# Patient Record
Sex: FEMALE | Race: WHITE | NOT HISPANIC OR LATINO | Employment: OTHER | URBAN - METROPOLITAN AREA
[De-identification: names, ages, dates, MRNs, and addresses within clinical notes are randomized per-mention and may not be internally consistent; named-entity substitution may affect disease eponyms.]

---

## 2017-01-06 ENCOUNTER — ALLSCRIPTS OFFICE VISIT (OUTPATIENT)
Dept: OTHER | Facility: OTHER | Age: 82
End: 2017-01-06

## 2017-01-06 DIAGNOSIS — E78.5 HYPERLIPIDEMIA: ICD-10-CM

## 2017-01-19 ENCOUNTER — ALLSCRIPTS OFFICE VISIT (OUTPATIENT)
Dept: FAMILY MEDICINE CLINIC | Facility: CLINIC | Age: 82
End: 2017-01-19
Payer: COMMERCIAL

## 2017-01-19 PROCEDURE — 99212 OFFICE O/P EST SF 10 MIN: CPT | Performed by: PHYSICIAN ASSISTANT

## 2017-02-16 ENCOUNTER — ALLSCRIPTS OFFICE VISIT (OUTPATIENT)
Dept: FAMILY MEDICINE CLINIC | Facility: CLINIC | Age: 82
End: 2017-02-16
Payer: COMMERCIAL

## 2017-02-16 PROCEDURE — 99213 OFFICE O/P EST LOW 20 MIN: CPT | Performed by: PHYSICIAN ASSISTANT

## 2017-02-20 ENCOUNTER — GENERIC CONVERSION - ENCOUNTER (OUTPATIENT)
Dept: OTHER | Facility: OTHER | Age: 82
End: 2017-02-20

## 2017-03-20 ENCOUNTER — ALLSCRIPTS OFFICE VISIT (OUTPATIENT)
Dept: FAMILY MEDICINE CLINIC | Facility: CLINIC | Age: 82
End: 2017-03-20
Payer: COMMERCIAL

## 2017-03-20 PROCEDURE — 99212 OFFICE O/P EST SF 10 MIN: CPT | Performed by: PHYSICIAN ASSISTANT

## 2017-04-14 ENCOUNTER — APPOINTMENT (EMERGENCY)
Dept: RADIOLOGY | Facility: HOSPITAL | Age: 82
End: 2017-04-14
Payer: COMMERCIAL

## 2017-04-14 ENCOUNTER — HOSPITAL ENCOUNTER (EMERGENCY)
Facility: HOSPITAL | Age: 82
Discharge: HOME | End: 2017-04-14
Attending: EMERGENCY MEDICINE
Payer: COMMERCIAL

## 2017-04-14 VITALS
WEIGHT: 163.14 LBS | DIASTOLIC BLOOD PRESSURE: 61 MMHG | HEART RATE: 61 BPM | RESPIRATION RATE: 18 BRPM | TEMPERATURE: 96.8 F | OXYGEN SATURATION: 99 % | SYSTOLIC BLOOD PRESSURE: 132 MMHG

## 2017-04-14 DIAGNOSIS — M16.9 DEGENERATIVE JOINT DISEASE (DJD) OF HIP: ICD-10-CM

## 2017-04-14 DIAGNOSIS — G89.29 CHRONIC PAIN OF RIGHT HIP: Primary | ICD-10-CM

## 2017-04-14 DIAGNOSIS — M25.551 ACUTE RIGHT HIP PAIN: ICD-10-CM

## 2017-04-14 DIAGNOSIS — M25.551 CHRONIC PAIN OF RIGHT HIP: Primary | ICD-10-CM

## 2017-04-14 PROCEDURE — 96372 THER/PROPH/DIAG INJ SC/IM: CPT

## 2017-04-14 PROCEDURE — 73502 X-RAY EXAM HIP UNI 2-3 VIEWS: CPT

## 2017-04-14 PROCEDURE — 99283 EMERGENCY DEPT VISIT LOW MDM: CPT

## 2017-04-14 RX ORDER — ASPIRIN 81 MG/1
81 TABLET ORAL DAILY
Status: ON HOLD | COMMUNITY
End: 2017-07-17 | Stop reason: HOSPADM

## 2017-04-14 RX ORDER — HYDROCHLOROTHIAZIDE 25 MG/1
25 TABLET ORAL DAILY
Status: ON HOLD | COMMUNITY
End: 2017-07-17 | Stop reason: ALTCHOICE

## 2017-04-14 RX ORDER — KETOROLAC TROMETHAMINE 30 MG/ML
60 INJECTION, SOLUTION INTRAMUSCULAR; INTRAVENOUS ONCE
Status: COMPLETED | OUTPATIENT
Start: 2017-04-14 | End: 2017-04-14

## 2017-04-14 RX ORDER — FUROSEMIDE 20 MG/1
20 TABLET ORAL 2 TIMES DAILY
Status: ON HOLD | COMMUNITY
End: 2017-07-21 | Stop reason: CLARIF

## 2017-04-14 RX ORDER — OLMESARTAN MEDOXOMIL 40 MG/1
40 TABLET ORAL DAILY
Status: ON HOLD | COMMUNITY
End: 2017-07-21 | Stop reason: SDUPTHER

## 2017-04-14 RX ORDER — MULTIVITAMIN
1 TABLET ORAL DAILY
COMMUNITY
End: 2019-09-25

## 2017-04-14 RX ORDER — ALPRAZOLAM 1 MG/1
1 TABLET ORAL
COMMUNITY
End: 2018-01-31 | Stop reason: SDUPTHER

## 2017-04-14 RX ORDER — EPINEPHRINE 0.3 MG/.3ML
0.3 INJECTION SUBCUTANEOUS ONCE
Status: ON HOLD | COMMUNITY
End: 2017-07-21 | Stop reason: SDUPTHER

## 2017-04-14 RX ORDER — METOPROLOL SUCCINATE 100 MG/1
100 TABLET, EXTENDED RELEASE ORAL 2 TIMES DAILY
Status: ON HOLD | COMMUNITY
End: 2017-07-17 | Stop reason: ALTCHOICE

## 2017-04-14 RX ORDER — DICLOFENAC SODIUM AND MISOPROSTOL 75; 200 MG/1; UG/1
1 TABLET, DELAYED RELEASE ORAL AS NEEDED
COMMUNITY
End: 2017-07-20 | Stop reason: HOSPADM

## 2017-04-14 RX ORDER — OXYCODONE HYDROCHLORIDE AND ACETAMINOPHEN 5; 325 MG/1; MG/1
1 TABLET ORAL EVERY 6 HOURS PRN
Qty: 10 TABLET | Refills: 0 | Status: SHIPPED | OUTPATIENT
Start: 2017-04-14 | End: 2017-07-24

## 2017-04-14 RX ORDER — ATORVASTATIN CALCIUM 20 MG/1
20 TABLET, FILM COATED ORAL DAILY
COMMUNITY
End: 2019-06-05 | Stop reason: SDUPTHER

## 2017-04-14 RX ORDER — OXYCODONE HYDROCHLORIDE AND ACETAMINOPHEN 5; 325 MG/1; MG/1
1 TABLET ORAL EVERY 4 HOURS PRN
Status: COMPLETED | OUTPATIENT
Start: 2017-04-14 | End: 2017-04-14

## 2017-04-14 RX ORDER — CLOTRIMAZOLE 1 %
CREAM (GRAM) TOPICAL AS NEEDED
COMMUNITY
End: 2018-07-03

## 2017-04-14 RX ORDER — POTASSIUM CHLORIDE 20 MEQ/1
20 TABLET, EXTENDED RELEASE ORAL DAILY
Status: ON HOLD | COMMUNITY
End: 2017-07-21 | Stop reason: CLARIF

## 2017-04-14 RX ORDER — DOCUSATE SODIUM 100 MG/1
100 CAPSULE, LIQUID FILLED ORAL 2 TIMES DAILY PRN
COMMUNITY

## 2017-04-14 RX ORDER — ALBUTEROL SULFATE 90 UG/1
2 AEROSOL, METERED RESPIRATORY (INHALATION) EVERY 6 HOURS PRN
COMMUNITY
End: 2020-03-16

## 2017-04-14 RX ADMIN — KETOROLAC TROMETHAMINE 60 MG: 30 INJECTION, SOLUTION INTRAMUSCULAR at 11:11

## 2017-04-14 RX ADMIN — OXYCODONE HYDROCHLORIDE AND ACETAMINOPHEN 1 TABLET: 5; 325 TABLET ORAL at 11:11

## 2017-04-19 ENCOUNTER — APPOINTMENT (OUTPATIENT)
Dept: LAB | Facility: MEDICAL CENTER | Age: 82
End: 2017-04-19
Payer: COMMERCIAL

## 2017-04-19 ENCOUNTER — ALLSCRIPTS OFFICE VISIT (OUTPATIENT)
Dept: FAMILY MEDICINE CLINIC | Facility: CLINIC | Age: 82
End: 2017-04-19
Payer: COMMERCIAL

## 2017-04-19 ENCOUNTER — TRANSCRIBE ORDERS (OUTPATIENT)
Dept: LAB | Facility: MEDICAL CENTER | Age: 82
End: 2017-04-19

## 2017-04-19 DIAGNOSIS — R68.89 OTHER GENERAL SYMPTOMS AND SIGNS: ICD-10-CM

## 2017-04-19 DIAGNOSIS — N28.9 DISORDER OF KIDNEY AND URETER: ICD-10-CM

## 2017-04-19 DIAGNOSIS — M25.551 PAIN IN RIGHT HIP: ICD-10-CM

## 2017-04-19 DIAGNOSIS — R94.6 ABNORMAL RESULTS OF THYROID FUNCTION STUDIES: ICD-10-CM

## 2017-04-19 DIAGNOSIS — M25.552 PAIN IN LEFT HIP: ICD-10-CM

## 2017-04-19 DIAGNOSIS — M16.11 PRIMARY OSTEOARTHRITIS OF RIGHT HIP: ICD-10-CM

## 2017-04-19 DIAGNOSIS — R94.4 ABNORMAL RESULTS OF KIDNEY FUNCTION STUDIES: ICD-10-CM

## 2017-04-19 LAB
ALBUMIN SERPL BCP-MCNC: 3.9 G/DL (ref 3.5–5)
ALP SERPL-CCNC: 82 U/L (ref 46–116)
ALT SERPL W P-5'-P-CCNC: 37 U/L (ref 12–78)
ANION GAP SERPL CALCULATED.3IONS-SCNC: 9 MMOL/L (ref 4–13)
AST SERPL W P-5'-P-CCNC: 25 U/L (ref 5–45)
BASOPHILS # BLD AUTO: 0.06 THOUSANDS/ΜL (ref 0–0.1)
BASOPHILS NFR BLD AUTO: 1 % (ref 0–1)
BILIRUB SERPL-MCNC: 0.67 MG/DL (ref 0.2–1)
BUN SERPL-MCNC: 59 MG/DL (ref 5–25)
CALCIUM SERPL-MCNC: 10 MG/DL (ref 8.3–10.1)
CHLORIDE SERPL-SCNC: 103 MMOL/L (ref 100–108)
CO2 SERPL-SCNC: 31 MMOL/L (ref 21–32)
CREAT SERPL-MCNC: 1.62 MG/DL (ref 0.6–1.3)
EOSINOPHIL # BLD AUTO: 0.4 THOUSAND/ΜL (ref 0–0.61)
EOSINOPHIL NFR BLD AUTO: 5 % (ref 0–6)
ERYTHROCYTE [DISTWIDTH] IN BLOOD BY AUTOMATED COUNT: 14.2 % (ref 11.6–15.1)
GFR SERPL CREATININE-BSD FRML MDRD: 30.4 ML/MIN/1.73SQ M
GLUCOSE SERPL-MCNC: 135 MG/DL (ref 65–140)
HCT VFR BLD AUTO: 40.9 % (ref 34.8–46.1)
HGB BLD-MCNC: 13.3 G/DL (ref 11.5–15.4)
IRON SERPL-MCNC: 55 UG/DL (ref 50–170)
LYMPHOCYTES # BLD AUTO: 1.82 THOUSANDS/ΜL (ref 0.6–4.47)
LYMPHOCYTES NFR BLD AUTO: 24 % (ref 14–44)
MCH RBC QN AUTO: 28.1 PG (ref 26.8–34.3)
MCHC RBC AUTO-ENTMCNC: 32.5 G/DL (ref 31.4–37.4)
MCV RBC AUTO: 87 FL (ref 82–98)
MONOCYTES # BLD AUTO: 0.52 THOUSAND/ΜL (ref 0.17–1.22)
MONOCYTES NFR BLD AUTO: 7 % (ref 4–12)
NEUTROPHILS # BLD AUTO: 4.74 THOUSANDS/ΜL (ref 1.85–7.62)
NEUTS SEG NFR BLD AUTO: 63 % (ref 43–75)
NRBC BLD AUTO-RTO: 0 /100 WBCS
PLATELET # BLD AUTO: 253 THOUSANDS/UL (ref 149–390)
PMV BLD AUTO: 10.8 FL (ref 8.9–12.7)
POTASSIUM SERPL-SCNC: 3.4 MMOL/L (ref 3.5–5.3)
PROT SERPL-MCNC: 7.9 G/DL (ref 6.4–8.2)
RBC # BLD AUTO: 4.73 MILLION/UL (ref 3.81–5.12)
SODIUM SERPL-SCNC: 143 MMOL/L (ref 136–145)
T3 SERPL-MCNC: 0.7 NG/ML (ref 0.6–1.8)
T4 FREE SERPL-MCNC: 1.41 NG/DL (ref 0.76–1.46)
TSH SERPL DL<=0.05 MIU/L-ACNC: 0.29 UIU/ML (ref 0.36–3.74)
WBC # BLD AUTO: 7.56 THOUSAND/UL (ref 4.31–10.16)

## 2017-04-19 PROCEDURE — 80053 COMPREHEN METABOLIC PANEL: CPT

## 2017-04-19 PROCEDURE — 84443 ASSAY THYROID STIM HORMONE: CPT

## 2017-04-19 PROCEDURE — 86376 MICROSOMAL ANTIBODY EACH: CPT

## 2017-04-19 PROCEDURE — 83540 ASSAY OF IRON: CPT

## 2017-04-19 PROCEDURE — 36415 COLL VENOUS BLD VENIPUNCTURE: CPT

## 2017-04-19 PROCEDURE — 85025 COMPLETE CBC W/AUTO DIFF WBC: CPT

## 2017-04-19 PROCEDURE — 84439 ASSAY OF FREE THYROXINE: CPT

## 2017-04-19 PROCEDURE — 84480 ASSAY TRIIODOTHYRONINE (T3): CPT

## 2017-04-19 PROCEDURE — 99213 OFFICE O/P EST LOW 20 MIN: CPT | Performed by: FAMILY MEDICINE

## 2017-04-20 LAB — THYROPEROXIDASE AB SERPL-ACNC: 25 IU/ML (ref 0–34)

## 2017-04-21 ENCOUNTER — GENERIC CONVERSION - ENCOUNTER (OUTPATIENT)
Dept: OTHER | Facility: OTHER | Age: 82
End: 2017-04-21

## 2017-05-02 ENCOUNTER — TRANSCRIBE ORDERS (OUTPATIENT)
Dept: LAB | Facility: MEDICAL CENTER | Age: 82
End: 2017-05-02

## 2017-05-02 ENCOUNTER — ALLSCRIPTS OFFICE VISIT (OUTPATIENT)
Dept: OTHER | Facility: OTHER | Age: 82
End: 2017-05-02

## 2017-05-02 ENCOUNTER — APPOINTMENT (OUTPATIENT)
Dept: LAB | Facility: MEDICAL CENTER | Age: 82
End: 2017-05-02
Payer: COMMERCIAL

## 2017-05-02 DIAGNOSIS — R94.4 ABNORMAL RESULTS OF KIDNEY FUNCTION STUDIES: ICD-10-CM

## 2017-05-02 DIAGNOSIS — R94.6 ABNORMAL RESULTS OF THYROID FUNCTION STUDIES: ICD-10-CM

## 2017-05-02 LAB
BUN SERPL-MCNC: 35 MG/DL (ref 5–25)
CREAT SERPL-MCNC: 1.61 MG/DL (ref 0.6–1.3)
GFR SERPL CREATININE-BSD FRML MDRD: 30.6 ML/MIN/1.73SQ M
TSH SERPL DL<=0.05 MIU/L-ACNC: 0.43 UIU/ML (ref 0.36–3.74)

## 2017-05-02 PROCEDURE — 84520 ASSAY OF UREA NITROGEN: CPT

## 2017-05-02 PROCEDURE — 82565 ASSAY OF CREATININE: CPT

## 2017-05-02 PROCEDURE — 36415 COLL VENOUS BLD VENIPUNCTURE: CPT

## 2017-05-02 PROCEDURE — 84443 ASSAY THYROID STIM HORMONE: CPT

## 2017-05-16 ENCOUNTER — GENERIC CONVERSION - ENCOUNTER (OUTPATIENT)
Dept: OTHER | Facility: OTHER | Age: 82
End: 2017-05-16

## 2017-05-17 ENCOUNTER — ALLSCRIPTS OFFICE VISIT (OUTPATIENT)
Dept: FAMILY MEDICINE CLINIC | Facility: CLINIC | Age: 82
End: 2017-05-17
Payer: COMMERCIAL

## 2017-05-17 ENCOUNTER — LAB REQUISITION (OUTPATIENT)
Dept: LAB | Facility: HOSPITAL | Age: 82
End: 2017-05-17
Payer: COMMERCIAL

## 2017-05-17 DIAGNOSIS — F19.20 OTHER PSYCHOACTIVE SUBSTANCE DEPENDENCE, UNCOMPLICATED (HCC): ICD-10-CM

## 2017-05-17 PROCEDURE — 80346 BENZODIAZEPINES1-12: CPT | Performed by: PHYSICIAN ASSISTANT

## 2017-05-17 PROCEDURE — 99212 OFFICE O/P EST SF 10 MIN: CPT | Performed by: FAMILY MEDICINE

## 2017-05-24 DIAGNOSIS — E87.6 HYPOKALEMIA: ICD-10-CM

## 2017-05-24 DIAGNOSIS — N28.9 DISORDER OF KIDNEY AND URETER: ICD-10-CM

## 2017-05-24 DIAGNOSIS — R94.4 ABNORMAL RESULTS OF KIDNEY FUNCTION STUDIES: ICD-10-CM

## 2017-05-24 DIAGNOSIS — I10 ESSENTIAL (PRIMARY) HYPERTENSION: ICD-10-CM

## 2017-05-24 DIAGNOSIS — R60.9 EDEMA: ICD-10-CM

## 2017-05-24 LAB — MISCELLANEOUS LAB TEST RESULT: NORMAL

## 2017-05-30 ENCOUNTER — HOSPITAL ENCOUNTER (EMERGENCY)
Facility: HOSPITAL | Age: 82
Discharge: HOME | End: 2017-05-30
Attending: EMERGENCY MEDICINE | Admitting: EMERGENCY MEDICINE
Payer: COMMERCIAL

## 2017-05-30 VITALS
TEMPERATURE: 99.3 F | HEART RATE: 100 BPM | RESPIRATION RATE: 18 BRPM | DIASTOLIC BLOOD PRESSURE: 74 MMHG | BODY MASS INDEX: 25.2 KG/M2 | HEIGHT: 59 IN | WEIGHT: 125 LBS | OXYGEN SATURATION: 95 % | SYSTOLIC BLOOD PRESSURE: 169 MMHG

## 2017-05-30 DIAGNOSIS — M10.9: Primary | ICD-10-CM

## 2017-05-30 LAB
ALBUMIN SERPL BCP-MCNC: 3.5 G/DL (ref 3.5–5)
ALP SERPL-CCNC: 142 U/L (ref 46–116)
ALT SERPL W P-5'-P-CCNC: 64 U/L (ref 12–78)
ANION GAP SERPL CALCULATED.3IONS-SCNC: 13 MMOL/L (ref 4–13)
APTT PPP: 38 SECONDS (ref 23–35)
AST SERPL W P-5'-P-CCNC: 50 U/L (ref 5–45)
BASOPHILS # BLD AUTO: 0.02 THOUSANDS/ΜL (ref 0–0.1)
BASOPHILS NFR BLD AUTO: 0 % (ref 0–1)
BILIRUB SERPL-MCNC: 0.9 MG/DL (ref 0.2–1)
BUN SERPL-MCNC: 29 MG/DL (ref 5–25)
CALCIUM SERPL-MCNC: 9.9 MG/DL (ref 8.3–10.1)
CHLORIDE SERPL-SCNC: 98 MMOL/L (ref 100–108)
CO2 SERPL-SCNC: 28 MMOL/L (ref 21–32)
CREAT SERPL-MCNC: 1.26 MG/DL (ref 0.6–1.3)
EOSINOPHIL # BLD AUTO: 0.07 THOUSAND/ΜL (ref 0–0.61)
EOSINOPHIL NFR BLD AUTO: 1 % (ref 0–6)
ERYTHROCYTE [DISTWIDTH] IN BLOOD BY AUTOMATED COUNT: 13.7 % (ref 11.6–15.1)
ERYTHROCYTE [SEDIMENTATION RATE] IN BLOOD: 112 MM/HOUR (ref 0–20)
GFR SERPL CREATININE-BSD FRML MDRD: 40.7 ML/MIN/1.73SQ M
GLUCOSE SERPL-MCNC: 124 MG/DL (ref 65–140)
HCT VFR BLD AUTO: 38.5 % (ref 34.8–46.1)
HGB BLD-MCNC: 12.7 G/DL (ref 11.5–15.4)
INR PPP: 1.13 (ref 0.86–1.16)
LACTATE SERPL-SCNC: 1.8 MMOL/L (ref 0.5–2)
LYMPHOCYTES # BLD AUTO: 1.22 THOUSANDS/ΜL (ref 0.6–4.47)
LYMPHOCYTES NFR BLD AUTO: 13 % (ref 14–44)
MCH RBC QN AUTO: 28.5 PG (ref 26.8–34.3)
MCHC RBC AUTO-ENTMCNC: 33 G/DL (ref 31.4–37.4)
MCV RBC AUTO: 87 FL (ref 82–98)
MONOCYTES # BLD AUTO: 1.03 THOUSAND/ΜL (ref 0.17–1.22)
MONOCYTES NFR BLD AUTO: 11 % (ref 4–12)
NEUTROPHILS # BLD AUTO: 7.39 THOUSANDS/ΜL (ref 1.85–7.62)
NEUTS SEG NFR BLD AUTO: 75 % (ref 43–75)
PLATELET # BLD AUTO: 198 THOUSANDS/UL (ref 149–390)
PMV BLD AUTO: 10.5 FL (ref 8.9–12.7)
POTASSIUM SERPL-SCNC: 3.6 MMOL/L (ref 3.5–5.3)
PROT SERPL-MCNC: 8.5 G/DL (ref 6.4–8.2)
PROTHROMBIN TIME: 14.4 SECONDS (ref 12.1–14.4)
RBC # BLD AUTO: 4.45 MILLION/UL (ref 3.81–5.12)
SODIUM SERPL-SCNC: 139 MMOL/L (ref 136–145)
URATE SERPL-MCNC: 9.3 MG/DL (ref 2–6.8)
WBC # BLD AUTO: 9.73 THOUSAND/UL (ref 4.31–10.16)

## 2017-05-30 PROCEDURE — 96374 THER/PROPH/DIAG INJ IV PUSH: CPT

## 2017-05-30 PROCEDURE — 96361 HYDRATE IV INFUSION ADD-ON: CPT

## 2017-05-30 PROCEDURE — 85652 RBC SED RATE AUTOMATED: CPT | Performed by: EMERGENCY MEDICINE

## 2017-05-30 PROCEDURE — 36415 COLL VENOUS BLD VENIPUNCTURE: CPT | Performed by: EMERGENCY MEDICINE

## 2017-05-30 PROCEDURE — 85025 COMPLETE CBC W/AUTO DIFF WBC: CPT | Performed by: EMERGENCY MEDICINE

## 2017-05-30 PROCEDURE — 84550 ASSAY OF BLOOD/URIC ACID: CPT | Performed by: EMERGENCY MEDICINE

## 2017-05-30 PROCEDURE — 99284 EMERGENCY DEPT VISIT MOD MDM: CPT

## 2017-05-30 PROCEDURE — 85610 PROTHROMBIN TIME: CPT | Performed by: EMERGENCY MEDICINE

## 2017-05-30 PROCEDURE — 80053 COMPREHEN METABOLIC PANEL: CPT | Performed by: EMERGENCY MEDICINE

## 2017-05-30 PROCEDURE — 85730 THROMBOPLASTIN TIME PARTIAL: CPT | Performed by: EMERGENCY MEDICINE

## 2017-05-30 PROCEDURE — 83605 ASSAY OF LACTIC ACID: CPT | Performed by: EMERGENCY MEDICINE

## 2017-05-30 PROCEDURE — 87040 BLOOD CULTURE FOR BACTERIA: CPT | Performed by: EMERGENCY MEDICINE

## 2017-05-30 PROCEDURE — 96375 TX/PRO/DX INJ NEW DRUG ADDON: CPT

## 2017-05-30 RX ORDER — COLCHICINE 0.6 MG/1
0.6 TABLET ORAL ONCE
Status: COMPLETED | OUTPATIENT
Start: 2017-05-30 | End: 2017-05-30

## 2017-05-30 RX ORDER — FENTANYL CITRATE 50 UG/ML
50 INJECTION, SOLUTION INTRAMUSCULAR; INTRAVENOUS ONCE
Status: COMPLETED | OUTPATIENT
Start: 2017-05-30 | End: 2017-05-30

## 2017-05-30 RX ORDER — ETODOLAC 400 MG/1
400 TABLET, FILM COATED ORAL 2 TIMES DAILY
Qty: 14 TABLET | Refills: 0 | Status: ON HOLD | OUTPATIENT
Start: 2017-05-30 | End: 2017-07-17 | Stop reason: ALTCHOICE

## 2017-05-30 RX ORDER — COLCHICINE 0.6 MG/1
0.6 TABLET ORAL DAILY
Qty: 3 TABLET | Refills: 0 | Status: ON HOLD | OUTPATIENT
Start: 2017-05-30 | End: 2017-07-17 | Stop reason: HOSPADM

## 2017-05-30 RX ORDER — KETOROLAC TROMETHAMINE 30 MG/ML
15 INJECTION, SOLUTION INTRAMUSCULAR; INTRAVENOUS ONCE
Status: COMPLETED | OUTPATIENT
Start: 2017-05-30 | End: 2017-05-30

## 2017-05-30 RX ADMIN — COLCHICINE 0.6 MG: 0.6 TABLET, FILM COATED ORAL at 21:05

## 2017-05-30 RX ADMIN — FENTANYL CITRATE 50 MCG: 50 INJECTION INTRAMUSCULAR; INTRAVENOUS at 20:27

## 2017-05-30 RX ADMIN — KETOROLAC TROMETHAMINE 15 MG: 30 INJECTION, SOLUTION INTRAMUSCULAR at 20:25

## 2017-05-30 RX ADMIN — SODIUM CHLORIDE 1000 ML: 0.9 INJECTION, SOLUTION INTRAVENOUS at 20:24

## 2017-05-30 RX ADMIN — COLCHICINE 0.6 MG: 0.6 TABLET, FILM COATED ORAL at 21:04

## 2017-05-31 ENCOUNTER — GENERIC CONVERSION - ENCOUNTER (OUTPATIENT)
Dept: OTHER | Facility: OTHER | Age: 82
End: 2017-05-31

## 2017-06-05 LAB — BACTERIA BLD CULT: NORMAL

## 2017-06-12 ENCOUNTER — ALLSCRIPTS OFFICE VISIT (OUTPATIENT)
Dept: FAMILY MEDICINE CLINIC | Facility: CLINIC | Age: 82
End: 2017-06-12
Payer: COMMERCIAL

## 2017-06-12 PROCEDURE — 99212 OFFICE O/P EST SF 10 MIN: CPT | Performed by: FAMILY MEDICINE

## 2017-06-20 ENCOUNTER — HOSPITAL ENCOUNTER (OUTPATIENT)
Dept: ULTRASOUND IMAGING | Facility: HOSPITAL | Age: 82
Discharge: HOME | End: 2017-06-20
Payer: COMMERCIAL

## 2017-06-20 DIAGNOSIS — R60.9 EDEMA: ICD-10-CM

## 2017-06-20 DIAGNOSIS — R94.4 ABNORMAL RESULTS OF KIDNEY FUNCTION STUDIES: ICD-10-CM

## 2017-06-20 DIAGNOSIS — N28.9 DISORDER OF KIDNEY AND URETER: ICD-10-CM

## 2017-06-20 PROCEDURE — 76770 US EXAM ABDO BACK WALL COMP: CPT

## 2017-07-05 ENCOUNTER — HOSPITAL ENCOUNTER (OUTPATIENT)
Dept: RADIOLOGY | Facility: HOSPITAL | Age: 82
Discharge: HOME | End: 2017-07-05
Payer: COMMERCIAL

## 2017-07-05 ENCOUNTER — APPOINTMENT (OUTPATIENT)
Dept: LAB | Facility: HOSPITAL | Age: 82
End: 2017-07-05
Payer: COMMERCIAL

## 2017-07-05 ENCOUNTER — HOSPITAL ENCOUNTER (OUTPATIENT)
Dept: NON INVASIVE DIAGNOSTICS | Facility: HOSPITAL | Age: 82
Discharge: HOME | End: 2017-07-05
Payer: COMMERCIAL

## 2017-07-05 ENCOUNTER — TRANSCRIBE ORDERS (OUTPATIENT)
Dept: ADMINISTRATIVE | Facility: HOSPITAL | Age: 82
End: 2017-07-05

## 2017-07-05 DIAGNOSIS — N28.9 DISORDER OF KIDNEY AND URETER: ICD-10-CM

## 2017-07-05 DIAGNOSIS — M25.552 PAIN IN LEFT HIP: ICD-10-CM

## 2017-07-05 DIAGNOSIS — M25.551 PAIN IN RIGHT HIP: ICD-10-CM

## 2017-07-05 DIAGNOSIS — M16.11 PRIMARY OSTEOARTHRITIS OF RIGHT HIP: ICD-10-CM

## 2017-07-05 DIAGNOSIS — M16.11 PRIMARY OSTEOARTHRITIS OF RIGHT HIP: Primary | ICD-10-CM

## 2017-07-05 LAB
ABO GROUP BLD: NORMAL
ALBUMIN SERPL BCP-MCNC: 3.6 G/DL (ref 3.5–5)
ALP SERPL-CCNC: 76 U/L (ref 46–116)
ALT SERPL W P-5'-P-CCNC: 28 U/L (ref 12–78)
ANION GAP SERPL CALCULATED.3IONS-SCNC: 8 MMOL/L (ref 4–13)
APTT PPP: 35 SECONDS (ref 23–35)
AST SERPL W P-5'-P-CCNC: 21 U/L (ref 5–45)
ATRIAL RATE: 61 BPM
BASOPHILS # BLD AUTO: 0.04 THOUSANDS/ΜL (ref 0–0.1)
BASOPHILS NFR BLD AUTO: 1 % (ref 0–1)
BILIRUB SERPL-MCNC: 0.4 MG/DL (ref 0.2–1)
BILIRUB UR QL STRIP: NEGATIVE
BLD GP AB SCN SERPL QL: NEGATIVE
BUN SERPL-MCNC: 21 MG/DL (ref 5–25)
CALCIUM SERPL-MCNC: 9.2 MG/DL (ref 8.3–10.1)
CHLORIDE SERPL-SCNC: 105 MMOL/L (ref 100–108)
CLARITY UR: CLEAR
CO2 SERPL-SCNC: 29 MMOL/L (ref 21–32)
COLOR UR: YELLOW
CREAT SERPL-MCNC: 0.97 MG/DL (ref 0.6–1.3)
EOSINOPHIL # BLD AUTO: 0.27 THOUSAND/ΜL (ref 0–0.61)
EOSINOPHIL NFR BLD AUTO: 4 % (ref 0–6)
ERYTHROCYTE [DISTWIDTH] IN BLOOD BY AUTOMATED COUNT: 14.9 % (ref 11.6–15.1)
EST. AVERAGE GLUCOSE BLD GHB EST-MCNC: 114 MG/DL
GFR SERPL CREATININE-BSD FRML MDRD: 55 ML/MIN/1.73SQ M
GLUCOSE SERPL-MCNC: 84 MG/DL (ref 65–140)
GLUCOSE UR STRIP-MCNC: NEGATIVE MG/DL
HBA1C MFR BLD: 5.6 % (ref 4.2–6.3)
HCT VFR BLD AUTO: 40.7 % (ref 34.8–46.1)
HGB BLD-MCNC: 12.9 G/DL (ref 11.5–15.4)
HGB UR QL STRIP.AUTO: NEGATIVE
INR PPP: 1.05 (ref 0.86–1.16)
KETONES UR STRIP-MCNC: NEGATIVE MG/DL
LEUKOCYTE ESTERASE UR QL STRIP: NEGATIVE
LYMPHOCYTES # BLD AUTO: 1.28 THOUSANDS/ΜL (ref 0.6–4.47)
LYMPHOCYTES NFR BLD AUTO: 19 % (ref 14–44)
MCH RBC QN AUTO: 27.6 PG (ref 26.8–34.3)
MCHC RBC AUTO-ENTMCNC: 31.7 G/DL (ref 31.4–37.4)
MCV RBC AUTO: 87 FL (ref 82–98)
MONOCYTES # BLD AUTO: 0.49 THOUSAND/ΜL (ref 0.17–1.22)
MONOCYTES NFR BLD AUTO: 7 % (ref 4–12)
NEUTROPHILS # BLD AUTO: 4.7 THOUSANDS/ΜL (ref 1.85–7.62)
NEUTS SEG NFR BLD AUTO: 69 % (ref 43–75)
NITRITE UR QL STRIP: NEGATIVE
P AXIS: 61 DEGREES
PH UR STRIP.AUTO: 5.5 [PH] (ref 4.5–8)
PLATELET # BLD AUTO: 236 THOUSANDS/UL (ref 149–390)
PMV BLD AUTO: 9.9 FL (ref 8.9–12.7)
POTASSIUM SERPL-SCNC: 4.2 MMOL/L (ref 3.5–5.3)
PR INTERVAL: 148 MS
PROT SERPL-MCNC: 7.2 G/DL (ref 6.4–8.2)
PROT UR STRIP-MCNC: NEGATIVE MG/DL
PROTHROMBIN TIME: 13.6 SECONDS (ref 12.1–14.4)
QRS AXIS: 8 DEGREES
QRSD INTERVAL: 76 MS
QT INTERVAL: 400 MS
QTC INTERVAL: 402 MS
RBC # BLD AUTO: 4.68 MILLION/UL (ref 3.81–5.12)
RH BLD: POSITIVE
SODIUM SERPL-SCNC: 142 MMOL/L (ref 136–145)
SP GR UR STRIP.AUTO: 1.01 (ref 1–1.03)
SPECIMEN EXPIRATION DATE: NORMAL
T WAVE AXIS: 56 DEGREES
UROBILINOGEN UR QL STRIP.AUTO: 0.2 E.U./DL
VENTRICULAR RATE: 61 BPM
WBC # BLD AUTO: 6.78 THOUSAND/UL (ref 4.31–10.16)

## 2017-07-05 PROCEDURE — 85730 THROMBOPLASTIN TIME PARTIAL: CPT

## 2017-07-05 PROCEDURE — 86850 RBC ANTIBODY SCREEN: CPT

## 2017-07-05 PROCEDURE — 71010 HB CHEST X-RAY 1 VIEW FRONTAL: CPT

## 2017-07-05 PROCEDURE — 86900 BLOOD TYPING SEROLOGIC ABO: CPT

## 2017-07-05 PROCEDURE — 83036 HEMOGLOBIN GLYCOSYLATED A1C: CPT

## 2017-07-05 PROCEDURE — 85610 PROTHROMBIN TIME: CPT

## 2017-07-05 PROCEDURE — 85025 COMPLETE CBC W/AUTO DIFF WBC: CPT

## 2017-07-05 PROCEDURE — 86901 BLOOD TYPING SEROLOGIC RH(D): CPT

## 2017-07-05 PROCEDURE — 36415 COLL VENOUS BLD VENIPUNCTURE: CPT

## 2017-07-05 PROCEDURE — 80053 COMPREHEN METABOLIC PANEL: CPT

## 2017-07-05 PROCEDURE — 81003 URINALYSIS AUTO W/O SCOPE: CPT

## 2017-07-05 PROCEDURE — 93005 ELECTROCARDIOGRAM TRACING: CPT

## 2017-07-06 ENCOUNTER — ALLSCRIPTS OFFICE VISIT (OUTPATIENT)
Dept: OTHER | Facility: OTHER | Age: 82
End: 2017-07-06

## 2017-07-06 DIAGNOSIS — N28.9 DISORDER OF KIDNEY AND URETER: ICD-10-CM

## 2017-07-10 ENCOUNTER — ALLSCRIPTS OFFICE VISIT (OUTPATIENT)
Dept: FAMILY MEDICINE CLINIC | Facility: CLINIC | Age: 82
End: 2017-07-10
Payer: COMMERCIAL

## 2017-07-10 PROCEDURE — 99213 OFFICE O/P EST LOW 20 MIN: CPT | Performed by: FAMILY MEDICINE

## 2017-07-14 ENCOUNTER — ANESTHESIA EVENT (OUTPATIENT)
Dept: PERIOP | Facility: HOSPITAL | Age: 82
DRG: 470 | End: 2017-07-14
Payer: COMMERCIAL

## 2017-07-17 ENCOUNTER — ANESTHESIA (OUTPATIENT)
Dept: PERIOP | Facility: HOSPITAL | Age: 82
DRG: 470 | End: 2017-07-17
Payer: COMMERCIAL

## 2017-07-17 ENCOUNTER — HOSPITAL ENCOUNTER (INPATIENT)
Facility: HOSPITAL | Age: 82
LOS: 3 days | Discharge: NURSING, REHAB OR LONG-TERM CARE | DRG: 470 | End: 2017-07-20
Attending: ORTHOPAEDIC SURGERY | Admitting: ORTHOPAEDIC SURGERY
Payer: COMMERCIAL

## 2017-07-17 ENCOUNTER — APPOINTMENT (OUTPATIENT)
Dept: RADIOLOGY | Facility: HOSPITAL | Age: 82
DRG: 470 | End: 2017-07-17
Payer: COMMERCIAL

## 2017-07-17 DIAGNOSIS — M16.11 PRIMARY OSTEOARTHRITIS OF RIGHT HIP: Primary | ICD-10-CM

## 2017-07-17 PROBLEM — E79.0 ELEVATED BLOOD URIC ACID LEVEL: Status: ACTIVE | Noted: 2017-07-17

## 2017-07-17 PROBLEM — M41.80 DEXTROSCOLIOSIS: Status: ACTIVE | Noted: 2017-07-17

## 2017-07-17 PROBLEM — E78.5 HYPERLIPIDEMIA: Status: ACTIVE | Noted: 2017-07-17

## 2017-07-17 PROBLEM — I10 ESSENTIAL HYPERTENSION: Status: ACTIVE | Noted: 2017-07-17

## 2017-07-17 PROBLEM — Z87.39 HISTORY OF GOUT: Status: ACTIVE | Noted: 2017-07-17

## 2017-07-17 PROBLEM — Z96.641 STATUS POST TOTAL REPLACEMENT OF RIGHT HIP: Status: ACTIVE | Noted: 2017-07-17

## 2017-07-17 PROCEDURE — C1776 JOINT DEVICE (IMPLANTABLE): HCPCS | Performed by: ORTHOPAEDIC SURGERY

## 2017-07-17 PROCEDURE — 0SR9029 REPLACEMENT OF RIGHT HIP JOINT WITH METAL ON POLYETHYLENE SYNTHETIC SUBSTITUTE, CEMENTED, OPEN APPROACH: ICD-10-PCS | Performed by: ORTHOPAEDIC SURGERY

## 2017-07-17 PROCEDURE — C1713 ANCHOR/SCREW BN/BN,TIS/BN: HCPCS | Performed by: ORTHOPAEDIC SURGERY

## 2017-07-17 PROCEDURE — 73502 X-RAY EXAM HIP UNI 2-3 VIEWS: CPT

## 2017-07-17 PROCEDURE — 94760 N-INVAS EAR/PLS OXIMETRY 1: CPT

## 2017-07-17 PROCEDURE — 86920 COMPATIBILITY TEST SPIN: CPT

## 2017-07-17 PROCEDURE — 94664 DEMO&/EVAL PT USE INHALER: CPT

## 2017-07-17 DEVICE — PINNACLE GRIPTION ACETABULAR SHELL SECTOR 50MM OD
Type: IMPLANTABLE DEVICE | Site: HIP | Status: FUNCTIONAL
Brand: PINNACLE GRIPTION

## 2017-07-17 DEVICE — CEMENTRALIZER STEM CENTRALIZER 10.5MM CEMENTED
Type: IMPLANTABLE DEVICE | Site: HIP | Status: FUNCTIONAL
Brand: CEMENTRALIZER

## 2017-07-17 DEVICE — PINNACLE CANCELLOUS BONE SCREW 6.5MM X 20MM
Type: IMPLANTABLE DEVICE | Site: HIP | Status: FUNCTIONAL
Brand: PINNACLE

## 2017-07-17 DEVICE — SMARTSET GMV HIGH PERFORMANCE GENTAMICIN MEDIUM VISCOSITY BONE CEMENT 40G
Type: IMPLANTABLE DEVICE | Site: HIP | Status: FUNCTIONAL
Brand: SMARTSET

## 2017-07-17 DEVICE — SUMMIT FEMORAL STEM 12/14 TAPER CEMENTED SIZE 4 STD 114MM
Type: IMPLANTABLE DEVICE | Site: HIP | Status: FUNCTIONAL
Brand: SUMMIT

## 2017-07-17 DEVICE — PINNACLE CANCELLOUS BONE SCREW 6.5MM X 25MM
Type: IMPLANTABLE DEVICE | Site: HIP | Status: FUNCTIONAL
Brand: PINNACLE

## 2017-07-17 DEVICE — PINNACLE HIP SOLUTIONS ALTRX POLYETHYLENE ACETABULAR LINER NEUTRAL 32MM ID 50MM OD
Type: IMPLANTABLE DEVICE | Site: HIP | Status: FUNCTIONAL
Brand: PINNACLE ALTRX

## 2017-07-17 DEVICE — ARTICUL/EZE FEMORAL HEAD DIAMETER 32MM +1 12/14 TAPER
Type: IMPLANTABLE DEVICE | Site: HIP | Status: FUNCTIONAL
Brand: ARTICUL/EZE

## 2017-07-17 RX ORDER — SODIUM CHLORIDE, SODIUM LACTATE, POTASSIUM CHLORIDE, CALCIUM CHLORIDE 600; 310; 30; 20 MG/100ML; MG/100ML; MG/100ML; MG/100ML
100 INJECTION, SOLUTION INTRAVENOUS CONTINUOUS
Status: DISCONTINUED | OUTPATIENT
Start: 2017-07-17 | End: 2017-07-18

## 2017-07-17 RX ORDER — SODIUM CHLORIDE, SODIUM LACTATE, POTASSIUM CHLORIDE, CALCIUM CHLORIDE 600; 310; 30; 20 MG/100ML; MG/100ML; MG/100ML; MG/100ML
125 INJECTION, SOLUTION INTRAVENOUS CONTINUOUS
Status: DISCONTINUED | OUTPATIENT
Start: 2017-07-17 | End: 2017-07-17

## 2017-07-17 RX ORDER — ALPRAZOLAM 0.5 MG/1
1 TABLET ORAL
Status: DISCONTINUED | OUTPATIENT
Start: 2017-07-17 | End: 2017-07-20 | Stop reason: HOSPADM

## 2017-07-17 RX ORDER — FENTANYL CITRATE/PF 50 MCG/ML
25 SYRINGE (ML) INJECTION AS NEEDED
Status: DISCONTINUED | OUTPATIENT
Start: 2017-07-17 | End: 2017-07-17 | Stop reason: HOSPADM

## 2017-07-17 RX ORDER — PROMETHAZINE HYDROCHLORIDE 25 MG/ML
12.5 INJECTION, SOLUTION INTRAMUSCULAR; INTRAVENOUS ONCE AS NEEDED
Status: DISCONTINUED | OUTPATIENT
Start: 2017-07-17 | End: 2017-07-17 | Stop reason: HOSPADM

## 2017-07-17 RX ORDER — ASPIRIN 81 MG/1
81 TABLET ORAL DAILY
Status: DISCONTINUED | OUTPATIENT
Start: 2017-07-18 | End: 2017-07-20 | Stop reason: HOSPADM

## 2017-07-17 RX ORDER — ATORVASTATIN CALCIUM 10 MG/1
20 TABLET, FILM COATED ORAL EVERY EVENING
Status: DISCONTINUED | OUTPATIENT
Start: 2017-07-17 | End: 2017-07-20 | Stop reason: HOSPADM

## 2017-07-17 RX ORDER — ONDANSETRON 2 MG/ML
4 INJECTION INTRAMUSCULAR; INTRAVENOUS EVERY 6 HOURS PRN
Status: DISCONTINUED | OUTPATIENT
Start: 2017-07-17 | End: 2017-07-20 | Stop reason: HOSPADM

## 2017-07-17 RX ORDER — CHOLECALCIFEROL (VITAMIN D3) 10 MCG
1 TABLET ORAL DAILY
Status: DISCONTINUED | OUTPATIENT
Start: 2017-07-18 | End: 2017-07-18 | Stop reason: ALTCHOICE

## 2017-07-17 RX ORDER — LABETALOL HYDROCHLORIDE 5 MG/ML
INJECTION, SOLUTION INTRAVENOUS AS NEEDED
Status: DISCONTINUED | OUTPATIENT
Start: 2017-07-17 | End: 2017-07-17 | Stop reason: SURG

## 2017-07-17 RX ORDER — MAGNESIUM HYDROXIDE/ALUMINUM HYDROXICE/SIMETHICONE 120; 1200; 1200 MG/30ML; MG/30ML; MG/30ML
30 SUSPENSION ORAL EVERY 6 HOURS PRN
Status: DISCONTINUED | OUTPATIENT
Start: 2017-07-17 | End: 2017-07-20 | Stop reason: HOSPADM

## 2017-07-17 RX ORDER — DIPHENHYDRAMINE HYDROCHLORIDE 50 MG/ML
12.5 INJECTION INTRAMUSCULAR; INTRAVENOUS ONCE AS NEEDED
Status: DISCONTINUED | OUTPATIENT
Start: 2017-07-17 | End: 2017-07-17 | Stop reason: HOSPADM

## 2017-07-17 RX ORDER — ALBUTEROL SULFATE 2.5 MG/3ML
2.5 SOLUTION RESPIRATORY (INHALATION) EVERY 4 HOURS PRN
Status: DISCONTINUED | OUTPATIENT
Start: 2017-07-17 | End: 2017-07-17 | Stop reason: HOSPADM

## 2017-07-17 RX ORDER — ONDANSETRON 2 MG/ML
INJECTION INTRAMUSCULAR; INTRAVENOUS AS NEEDED
Status: DISCONTINUED | OUTPATIENT
Start: 2017-07-17 | End: 2017-07-17 | Stop reason: SURG

## 2017-07-17 RX ORDER — OXYCODONE HYDROCHLORIDE AND ACETAMINOPHEN 5; 325 MG/1; MG/1
1 TABLET ORAL EVERY 6 HOURS PRN
Status: DISCONTINUED | OUTPATIENT
Start: 2017-07-17 | End: 2017-07-17

## 2017-07-17 RX ORDER — ONDANSETRON 2 MG/ML
4 INJECTION INTRAMUSCULAR; INTRAVENOUS ONCE AS NEEDED
Status: DISCONTINUED | OUTPATIENT
Start: 2017-07-17 | End: 2017-07-17 | Stop reason: HOSPADM

## 2017-07-17 RX ORDER — DOCUSATE SODIUM 100 MG/1
100 CAPSULE, LIQUID FILLED ORAL 2 TIMES DAILY PRN
Status: DISCONTINUED | OUTPATIENT
Start: 2017-07-17 | End: 2017-07-20 | Stop reason: HOSPADM

## 2017-07-17 RX ORDER — GABAPENTIN 100 MG/1
100 CAPSULE ORAL EVERY 8 HOURS SCHEDULED
Status: DISCONTINUED | OUTPATIENT
Start: 2017-07-17 | End: 2017-07-20 | Stop reason: HOSPADM

## 2017-07-17 RX ORDER — OXYCODONE HYDROCHLORIDE 5 MG/1
5 TABLET ORAL EVERY 4 HOURS PRN
Status: DISCONTINUED | OUTPATIENT
Start: 2017-07-17 | End: 2017-07-20 | Stop reason: HOSPADM

## 2017-07-17 RX ORDER — CLOTRIMAZOLE 1 %
CREAM (GRAM) TOPICAL AS NEEDED
Status: DISCONTINUED | OUTPATIENT
Start: 2017-07-17 | End: 2017-07-20 | Stop reason: HOSPADM

## 2017-07-17 RX ORDER — ALBUMIN, HUMAN INJ 5% 5 %
SOLUTION INTRAVENOUS CONTINUOUS PRN
Status: DISCONTINUED | OUTPATIENT
Start: 2017-07-17 | End: 2017-07-17 | Stop reason: SURG

## 2017-07-17 RX ORDER — FERROUS SULFATE 325(65) MG
325 TABLET ORAL
Status: DISCONTINUED | OUTPATIENT
Start: 2017-07-18 | End: 2017-07-20 | Stop reason: HOSPADM

## 2017-07-17 RX ORDER — ROCURONIUM BROMIDE 10 MG/ML
INJECTION, SOLUTION INTRAVENOUS AS NEEDED
Status: DISCONTINUED | OUTPATIENT
Start: 2017-07-17 | End: 2017-07-17 | Stop reason: SURG

## 2017-07-17 RX ORDER — ACETAMINOPHEN 325 MG/1
650 TABLET ORAL EVERY 6 HOURS PRN
Status: DISCONTINUED | OUTPATIENT
Start: 2017-07-17 | End: 2017-07-17

## 2017-07-17 RX ORDER — POTASSIUM CHLORIDE 20 MEQ/1
20 TABLET, EXTENDED RELEASE ORAL
Status: DISCONTINUED | OUTPATIENT
Start: 2017-07-18 | End: 2017-07-17

## 2017-07-17 RX ORDER — EPINEPHRINE 0.3 MG/.3ML
0.3 INJECTION SUBCUTANEOUS ONCE
Status: DISCONTINUED | OUTPATIENT
Start: 2017-07-17 | End: 2017-07-17

## 2017-07-17 RX ORDER — METOPROLOL TARTRATE 100 MG/1
100 TABLET ORAL EVERY 12 HOURS SCHEDULED
Status: DISCONTINUED | OUTPATIENT
Start: 2017-07-17 | End: 2017-07-20 | Stop reason: HOSPADM

## 2017-07-17 RX ORDER — ALLOPURINOL 100 MG/1
100 TABLET ORAL
Status: DISCONTINUED | OUTPATIENT
Start: 2017-07-18 | End: 2017-07-20 | Stop reason: HOSPADM

## 2017-07-17 RX ORDER — FOLIC ACID 1 MG/1
1 TABLET ORAL DAILY
Status: DISCONTINUED | OUTPATIENT
Start: 2017-07-18 | End: 2017-07-20 | Stop reason: HOSPADM

## 2017-07-17 RX ORDER — ALBUTEROL SULFATE 90 UG/1
2 AEROSOL, METERED RESPIRATORY (INHALATION) EVERY 6 HOURS PRN
Status: DISCONTINUED | OUTPATIENT
Start: 2017-07-17 | End: 2017-07-20 | Stop reason: HOSPADM

## 2017-07-17 RX ORDER — COLCHICINE 0.6 MG/1
0.6 TABLET ORAL DAILY
Status: DISCONTINUED | OUTPATIENT
Start: 2017-07-18 | End: 2017-07-17

## 2017-07-17 RX ORDER — PROPOFOL 10 MG/ML
INJECTION, EMULSION INTRAVENOUS CONTINUOUS PRN
Status: DISCONTINUED | OUTPATIENT
Start: 2017-07-17 | End: 2017-07-17 | Stop reason: SURG

## 2017-07-17 RX ORDER — METOPROLOL TARTRATE 100 MG/1
100 TABLET ORAL EVERY 12 HOURS SCHEDULED
Status: ON HOLD | COMMUNITY
End: 2017-07-21 | Stop reason: SDUPTHER

## 2017-07-17 RX ORDER — FENTANYL CITRATE 50 UG/ML
INJECTION, SOLUTION INTRAMUSCULAR; INTRAVENOUS AS NEEDED
Status: DISCONTINUED | OUTPATIENT
Start: 2017-07-17 | End: 2017-07-17 | Stop reason: SURG

## 2017-07-17 RX ORDER — FUROSEMIDE 20 MG/1
20 TABLET ORAL 2 TIMES DAILY
Status: DISCONTINUED | OUTPATIENT
Start: 2017-07-17 | End: 2017-07-17

## 2017-07-17 RX ORDER — PANTOPRAZOLE SODIUM 40 MG/1
40 TABLET, DELAYED RELEASE ORAL DAILY
Status: DISCONTINUED | OUTPATIENT
Start: 2017-07-18 | End: 2017-07-20 | Stop reason: HOSPADM

## 2017-07-17 RX ORDER — TRANEXAMIC ACID 100 MG/ML
INJECTION, SOLUTION INTRAVENOUS AS NEEDED
Status: DISCONTINUED | OUTPATIENT
Start: 2017-07-17 | End: 2017-07-17 | Stop reason: SURG

## 2017-07-17 RX ORDER — HYDRALAZINE HYDROCHLORIDE 20 MG/ML
INJECTION INTRAMUSCULAR; INTRAVENOUS AS NEEDED
Status: DISCONTINUED | OUTPATIENT
Start: 2017-07-17 | End: 2017-07-17 | Stop reason: SURG

## 2017-07-17 RX ORDER — ACETAMINOPHEN 325 MG/1
650 TABLET ORAL EVERY 6 HOURS PRN
Status: DISCONTINUED | OUTPATIENT
Start: 2017-07-17 | End: 2017-07-20 | Stop reason: HOSPADM

## 2017-07-17 RX ORDER — GLYCOPYRROLATE 0.2 MG/ML
INJECTION INTRAMUSCULAR; INTRAVENOUS AS NEEDED
Status: DISCONTINUED | OUTPATIENT
Start: 2017-07-17 | End: 2017-07-17 | Stop reason: SURG

## 2017-07-17 RX ORDER — BUPIVACAINE HYDROCHLORIDE AND EPINEPHRINE 5; 5 MG/ML; UG/ML
INJECTION, SOLUTION PERINEURAL AS NEEDED
Status: DISCONTINUED | OUTPATIENT
Start: 2017-07-17 | End: 2017-07-17 | Stop reason: HOSPADM

## 2017-07-17 RX ORDER — ASCORBIC ACID 500 MG
500 TABLET ORAL DAILY
Status: DISCONTINUED | OUTPATIENT
Start: 2017-07-18 | End: 2017-07-20 | Stop reason: HOSPADM

## 2017-07-17 RX ORDER — ALLOPURINOL 100 MG/1
100 TABLET ORAL DAILY
COMMUNITY
End: 2017-07-24

## 2017-07-17 RX ORDER — LIDOCAINE HYDROCHLORIDE 5 MG/ML
INJECTION, SOLUTION INFILTRATION; PERINEURAL AS NEEDED
Status: DISCONTINUED | OUTPATIENT
Start: 2017-07-17 | End: 2017-07-17 | Stop reason: SURG

## 2017-07-17 RX ORDER — SODIUM CHLORIDE, SODIUM LACTATE, POTASSIUM CHLORIDE, CALCIUM CHLORIDE 600; 310; 30; 20 MG/100ML; MG/100ML; MG/100ML; MG/100ML
100 INJECTION, SOLUTION INTRAVENOUS CONTINUOUS
Status: DISCONTINUED | OUTPATIENT
Start: 2017-07-17 | End: 2017-07-17

## 2017-07-17 RX ORDER — DOCUSATE SODIUM 100 MG/1
100 CAPSULE, LIQUID FILLED ORAL 2 TIMES DAILY
Status: DISCONTINUED | OUTPATIENT
Start: 2017-07-17 | End: 2017-07-20 | Stop reason: HOSPADM

## 2017-07-17 RX ORDER — MAGNESIUM HYDROXIDE 1200 MG/15ML
LIQUID ORAL AS NEEDED
Status: DISCONTINUED | OUTPATIENT
Start: 2017-07-17 | End: 2017-07-17 | Stop reason: HOSPADM

## 2017-07-17 RX ORDER — METOPROLOL TARTRATE 5 MG/5ML
INJECTION INTRAVENOUS AS NEEDED
Status: DISCONTINUED | OUTPATIENT
Start: 2017-07-17 | End: 2017-07-17 | Stop reason: SURG

## 2017-07-17 RX ORDER — PROPOFOL 10 MG/ML
INJECTION, EMULSION INTRAVENOUS AS NEEDED
Status: DISCONTINUED | OUTPATIENT
Start: 2017-07-17 | End: 2017-07-17 | Stop reason: SURG

## 2017-07-17 RX ORDER — OLMESARTAN MEDOXOMIL 20 MG/1
40 TABLET ORAL DAILY
Status: DISCONTINUED | OUTPATIENT
Start: 2017-07-18 | End: 2017-07-20 | Stop reason: HOSPADM

## 2017-07-17 RX ADMIN — TRANEXAMIC ACID 1 G: 100 INJECTION, SOLUTION INTRAVENOUS at 11:12

## 2017-07-17 RX ADMIN — LABETALOL HYDROCHLORIDE 10 MG: 5 INJECTION, SOLUTION INTRAVENOUS at 11:05

## 2017-07-17 RX ADMIN — HYDROMORPHONE HYDROCHLORIDE 1 MG: 1 INJECTION, SOLUTION INTRAMUSCULAR; INTRAVENOUS; SUBCUTANEOUS at 11:40

## 2017-07-17 RX ADMIN — HYDRALAZINE HYDROCHLORIDE 5 MG: 20 INJECTION INTRAMUSCULAR; INTRAVENOUS at 11:21

## 2017-07-17 RX ADMIN — FENTANYL CITRATE 50 MCG: 50 INJECTION, SOLUTION INTRAMUSCULAR; INTRAVENOUS at 10:35

## 2017-07-17 RX ADMIN — HYDROMORPHONE HYDROCHLORIDE 0.5 MG: 1 INJECTION, SOLUTION INTRAMUSCULAR; INTRAVENOUS; SUBCUTANEOUS at 13:31

## 2017-07-17 RX ADMIN — ROCURONIUM BROMIDE 10 MG: 10 INJECTION, SOLUTION INTRAVENOUS at 12:30

## 2017-07-17 RX ADMIN — OXYCODONE HYDROCHLORIDE AND ACETAMINOPHEN 1 TABLET: 5; 325 TABLET ORAL at 20:17

## 2017-07-17 RX ADMIN — HYDROMORPHONE HYDROCHLORIDE 0.5 MG: 1 INJECTION, SOLUTION INTRAMUSCULAR; INTRAVENOUS; SUBCUTANEOUS at 13:29

## 2017-07-17 RX ADMIN — LABETALOL HYDROCHLORIDE 5 MG: 5 INJECTION, SOLUTION INTRAVENOUS at 10:41

## 2017-07-17 RX ADMIN — METOPROLOL TARTRATE 2 MG: 1 INJECTION, SOLUTION INTRAVENOUS at 11:20

## 2017-07-17 RX ADMIN — CEFAZOLIN SODIUM 1000 MG: 1 SOLUTION INTRAVENOUS at 18:42

## 2017-07-17 RX ADMIN — ALBUMIN HUMAN: 0.05 INJECTION, SOLUTION INTRAVENOUS at 13:08

## 2017-07-17 RX ADMIN — METOPROLOL TARTRATE 3 MG: 1 INJECTION, SOLUTION INTRAVENOUS at 11:25

## 2017-07-17 RX ADMIN — GLYCOPYRROLATE 0.4 MG: 0.2 INJECTION, SOLUTION INTRAMUSCULAR; INTRAVENOUS at 13:07

## 2017-07-17 RX ADMIN — DOCUSATE SODIUM 100 MG: 100 CAPSULE, LIQUID FILLED ORAL at 20:17

## 2017-07-17 RX ADMIN — PROPOFOL 130 MG: 10 INJECTION, EMULSION INTRAVENOUS at 10:35

## 2017-07-17 RX ADMIN — CEFAZOLIN SODIUM 1000 MG: 1 SOLUTION INTRAVENOUS at 10:40

## 2017-07-17 RX ADMIN — ALBUMIN HUMAN: 0.05 INJECTION, SOLUTION INTRAVENOUS at 12:43

## 2017-07-17 RX ADMIN — SODIUM CHLORIDE, SODIUM LACTATE, POTASSIUM CHLORIDE, AND CALCIUM CHLORIDE: .6; .31; .03; .02 INJECTION, SOLUTION INTRAVENOUS at 12:09

## 2017-07-17 RX ADMIN — SODIUM CHLORIDE, SODIUM LACTATE, POTASSIUM CHLORIDE, AND CALCIUM CHLORIDE 125 ML/HR: .6; .31; .03; .02 INJECTION, SOLUTION INTRAVENOUS at 09:05

## 2017-07-17 RX ADMIN — DEXAMETHASONE SODIUM PHOSPHATE 8 MG: 10 INJECTION INTRAMUSCULAR; INTRAVENOUS at 11:02

## 2017-07-17 RX ADMIN — FUROSEMIDE 20 MG: 20 TABLET ORAL at 20:18

## 2017-07-17 RX ADMIN — LIDOCAINE HYDROCHLORIDE 10 ML: 5 INJECTION, SOLUTION INFILTRATION; PERINEURAL at 10:35

## 2017-07-17 RX ADMIN — GABAPENTIN 100 MG: 100 CAPSULE ORAL at 22:22

## 2017-07-17 RX ADMIN — ONDANSETRON HYDROCHLORIDE 4 MG: 2 INJECTION, SOLUTION INTRAVENOUS at 13:11

## 2017-07-17 RX ADMIN — METOPROLOL TARTRATE 100 MG: 100 TABLET ORAL at 20:18

## 2017-07-17 RX ADMIN — SODIUM CHLORIDE, SODIUM LACTATE, POTASSIUM CHLORIDE, AND CALCIUM CHLORIDE: .6; .31; .03; .02 INJECTION, SOLUTION INTRAVENOUS at 10:25

## 2017-07-17 RX ADMIN — ATORVASTATIN CALCIUM 20 MG: 10 TABLET, FILM COATED ORAL at 20:17

## 2017-07-17 RX ADMIN — ROCURONIUM BROMIDE 30 MG: 10 INJECTION, SOLUTION INTRAVENOUS at 10:35

## 2017-07-17 RX ADMIN — SODIUM CHLORIDE, SODIUM LACTATE, POTASSIUM CHLORIDE, AND CALCIUM CHLORIDE 125 ML/HR: .6; .31; .03; .02 INJECTION, SOLUTION INTRAVENOUS at 18:42

## 2017-07-17 RX ADMIN — ROCURONIUM BROMIDE 10 MG: 10 INJECTION, SOLUTION INTRAVENOUS at 11:05

## 2017-07-17 RX ADMIN — PROPOFOL 80 MCG/KG/MIN: 10 INJECTION, EMULSION INTRAVENOUS at 10:39

## 2017-07-17 RX ADMIN — FENTANYL CITRATE 50 MCG: 50 INJECTION, SOLUTION INTRAMUSCULAR; INTRAVENOUS at 11:05

## 2017-07-17 RX ADMIN — NEOSTIGMINE METHYLSULFATE 3 MG: 1 INJECTION, SOLUTION INTRAMUSCULAR; INTRAVENOUS; SUBCUTANEOUS at 13:07

## 2017-07-18 ENCOUNTER — APPOINTMENT (INPATIENT)
Dept: PHYSICAL THERAPY | Facility: HOSPITAL | Age: 82
DRG: 470 | End: 2017-07-18
Payer: COMMERCIAL

## 2017-07-18 ENCOUNTER — APPOINTMENT (INPATIENT)
Dept: OCCUPATIONAL THERAPY | Facility: HOSPITAL | Age: 82
DRG: 470 | End: 2017-07-18
Payer: COMMERCIAL

## 2017-07-18 LAB
25(OH)D3 SERPL-MCNC: 43.1 NG/ML (ref 30–100)
ABO GROUP BLD BPU: NORMAL
ABO GROUP BLD BPU: NORMAL
ALBUMIN SERPL BCP-MCNC: 2.9 G/DL (ref 3.5–5)
ALP SERPL-CCNC: 45 U/L (ref 46–116)
ALT SERPL W P-5'-P-CCNC: 22 U/L (ref 12–78)
ANION GAP SERPL CALCULATED.3IONS-SCNC: 5 MMOL/L (ref 4–13)
AST SERPL W P-5'-P-CCNC: 27 U/L (ref 5–45)
BILIRUB DIRECT SERPL-MCNC: 0.15 MG/DL (ref 0–0.2)
BILIRUB SERPL-MCNC: 0.5 MG/DL (ref 0.2–1)
BPU ID: NORMAL
BPU ID: NORMAL
BUN SERPL-MCNC: 20 MG/DL (ref 5–25)
CALCIUM SERPL-MCNC: 8.1 MG/DL (ref 8.3–10.1)
CHLORIDE SERPL-SCNC: 102 MMOL/L (ref 100–108)
CK MB SERPL-MCNC: 0.9 NG/ML (ref 0–5)
CK MB SERPL-MCNC: <1 % (ref 0–2.5)
CK SERPL-CCNC: 530 U/L (ref 26–192)
CO2 SERPL-SCNC: 31 MMOL/L (ref 21–32)
CREAT SERPL-MCNC: 0.9 MG/DL (ref 0.6–1.3)
CROSSMATCH: NORMAL
CROSSMATCH: NORMAL
ERYTHROCYTE [DISTWIDTH] IN BLOOD BY AUTOMATED COUNT: 14.9 % (ref 11.6–15.1)
GFR SERPL CREATININE-BSD FRML MDRD: 59.9 ML/MIN/1.73SQ M
GLUCOSE SERPL-MCNC: 130 MG/DL (ref 65–140)
HCT VFR BLD AUTO: 25.9 % (ref 34.8–46.1)
HGB BLD-MCNC: 8.3 G/DL (ref 11.5–15.4)
LACTATE SERPL-SCNC: 2.5 MMOL/L (ref 0.5–2)
MAGNESIUM SERPL-MCNC: 1.1 MG/DL (ref 1.6–2.6)
MCH RBC QN AUTO: 27.3 PG (ref 26.8–34.3)
MCHC RBC AUTO-ENTMCNC: 32 G/DL (ref 31.4–37.4)
MCV RBC AUTO: 85 FL (ref 82–98)
PHOSPHATE SERPL-MCNC: 3.8 MG/DL (ref 2.3–4.1)
PLATELET # BLD AUTO: 167 THOUSANDS/UL (ref 149–390)
PMV BLD AUTO: 10.3 FL (ref 8.9–12.7)
POTASSIUM SERPL-SCNC: 4 MMOL/L (ref 3.5–5.3)
PROT SERPL-MCNC: 5.8 G/DL (ref 6.4–8.2)
RBC # BLD AUTO: 3.04 MILLION/UL (ref 3.81–5.12)
SODIUM SERPL-SCNC: 138 MMOL/L (ref 136–145)
UNIT DISPENSE STATUS: NORMAL
UNIT DISPENSE STATUS: NORMAL
UNIT PRODUCT CODE: NORMAL
UNIT PRODUCT CODE: NORMAL
UNIT RH: NORMAL
UNIT RH: NORMAL
URATE SERPL-MCNC: 4.4 MG/DL (ref 2–6.8)
WBC # BLD AUTO: 11.45 THOUSAND/UL (ref 4.31–10.16)

## 2017-07-18 PROCEDURE — 82306 VITAMIN D 25 HYDROXY: CPT | Performed by: INTERNAL MEDICINE

## 2017-07-18 PROCEDURE — G8988 SELF CARE GOAL STATUS: HCPCS

## 2017-07-18 PROCEDURE — 84100 ASSAY OF PHOSPHORUS: CPT | Performed by: INTERNAL MEDICINE

## 2017-07-18 PROCEDURE — G8979 MOBILITY GOAL STATUS: HCPCS | Performed by: PHYSICAL THERAPIST

## 2017-07-18 PROCEDURE — 80076 HEPATIC FUNCTION PANEL: CPT | Performed by: INTERNAL MEDICINE

## 2017-07-18 PROCEDURE — 97535 SELF CARE MNGMENT TRAINING: CPT

## 2017-07-18 PROCEDURE — 85027 COMPLETE CBC AUTOMATED: CPT | Performed by: ORTHOPAEDIC SURGERY

## 2017-07-18 PROCEDURE — 83605 ASSAY OF LACTIC ACID: CPT | Performed by: INTERNAL MEDICINE

## 2017-07-18 PROCEDURE — 97167 OT EVAL HIGH COMPLEX 60 MIN: CPT

## 2017-07-18 PROCEDURE — 82550 ASSAY OF CK (CPK): CPT | Performed by: INTERNAL MEDICINE

## 2017-07-18 PROCEDURE — G8978 MOBILITY CURRENT STATUS: HCPCS | Performed by: PHYSICAL THERAPIST

## 2017-07-18 PROCEDURE — 84550 ASSAY OF BLOOD/URIC ACID: CPT | Performed by: INTERNAL MEDICINE

## 2017-07-18 PROCEDURE — 82553 CREATINE MB FRACTION: CPT | Performed by: INTERNAL MEDICINE

## 2017-07-18 PROCEDURE — 97530 THERAPEUTIC ACTIVITIES: CPT

## 2017-07-18 PROCEDURE — 83735 ASSAY OF MAGNESIUM: CPT | Performed by: INTERNAL MEDICINE

## 2017-07-18 PROCEDURE — 97116 GAIT TRAINING THERAPY: CPT | Performed by: PHYSICAL THERAPIST

## 2017-07-18 PROCEDURE — G8987 SELF CARE CURRENT STATUS: HCPCS

## 2017-07-18 PROCEDURE — 97116 GAIT TRAINING THERAPY: CPT

## 2017-07-18 PROCEDURE — 97163 PT EVAL HIGH COMPLEX 45 MIN: CPT | Performed by: PHYSICAL THERAPIST

## 2017-07-18 PROCEDURE — 80048 BASIC METABOLIC PNL TOTAL CA: CPT | Performed by: ORTHOPAEDIC SURGERY

## 2017-07-18 RX ORDER — MAGNESIUM SULFATE HEPTAHYDRATE 40 MG/ML
2 INJECTION, SOLUTION INTRAVENOUS EVERY 4 HOURS
Status: DISCONTINUED | OUTPATIENT
Start: 2017-07-18 | End: 2017-07-18

## 2017-07-18 RX ORDER — MAGNESIUM SULFATE HEPTAHYDRATE 40 MG/ML
2 INJECTION, SOLUTION INTRAVENOUS EVERY 4 HOURS
Status: COMPLETED | OUTPATIENT
Start: 2017-07-18 | End: 2017-07-18

## 2017-07-18 RX ORDER — VITAMIN B COMPLEX
1 CAPSULE ORAL DAILY
Status: DISCONTINUED | OUTPATIENT
Start: 2017-07-19 | End: 2017-07-20 | Stop reason: HOSPADM

## 2017-07-18 RX ADMIN — DOCUSATE SODIUM 100 MG: 100 CAPSULE, LIQUID FILLED ORAL at 08:36

## 2017-07-18 RX ADMIN — ALLOPURINOL 100 MG: 100 TABLET ORAL at 08:35

## 2017-07-18 RX ADMIN — ASPIRIN 81 MG: 81 TABLET, COATED ORAL at 08:37

## 2017-07-18 RX ADMIN — Medication 325 MG: at 08:38

## 2017-07-18 RX ADMIN — ENOXAPARIN SODIUM 40 MG: 40 INJECTION SUBCUTANEOUS at 08:33

## 2017-07-18 RX ADMIN — ATORVASTATIN CALCIUM 20 MG: 10 TABLET, FILM COATED ORAL at 17:46

## 2017-07-18 RX ADMIN — METOPROLOL TARTRATE 100 MG: 100 TABLET ORAL at 22:32

## 2017-07-18 RX ADMIN — GABAPENTIN 100 MG: 100 CAPSULE ORAL at 05:51

## 2017-07-18 RX ADMIN — GABAPENTIN 100 MG: 100 CAPSULE ORAL at 22:32

## 2017-07-18 RX ADMIN — SODIUM CHLORIDE, SODIUM LACTATE, POTASSIUM CHLORIDE, AND CALCIUM CHLORIDE 100 ML/HR: .6; .31; .03; .02 INJECTION, SOLUTION INTRAVENOUS at 05:51

## 2017-07-18 RX ADMIN — PSYLLIUM HUSK 1 PACKET: 3.4 POWDER ORAL at 17:46

## 2017-07-18 RX ADMIN — ACETAMINOPHEN 650 MG: 325 TABLET, FILM COATED ORAL at 16:51

## 2017-07-18 RX ADMIN — GABAPENTIN 100 MG: 100 CAPSULE ORAL at 14:23

## 2017-07-18 RX ADMIN — MAGNESIUM SULFATE HEPTAHYDRATE 2 G: 40 INJECTION, SOLUTION INTRAVENOUS at 08:38

## 2017-07-18 RX ADMIN — PANTOPRAZOLE SODIUM 40 MG: 40 TABLET, DELAYED RELEASE ORAL at 08:39

## 2017-07-18 RX ADMIN — PSYLLIUM HUSK 1 PACKET: 3.4 POWDER ORAL at 08:35

## 2017-07-18 RX ADMIN — OLMESARTAN MEDOXOMIL 40 MG: 20 TABLET, FILM COATED ORAL at 08:38

## 2017-07-18 RX ADMIN — CEFAZOLIN SODIUM 1000 MG: 1 SOLUTION INTRAVENOUS at 02:23

## 2017-07-18 RX ADMIN — MAGNESIUM SULFATE HEPTAHYDRATE 2 G: 40 INJECTION, SOLUTION INTRAVENOUS at 12:01

## 2017-07-18 RX ADMIN — DOCUSATE SODIUM 100 MG: 100 CAPSULE, LIQUID FILLED ORAL at 17:46

## 2017-07-18 RX ADMIN — FOLIC ACID 1 MG: 1 TABLET ORAL at 08:36

## 2017-07-18 RX ADMIN — OXYCODONE HYDROCHLORIDE 5 MG: 5 TABLET ORAL at 08:46

## 2017-07-18 RX ADMIN — ALPRAZOLAM 1 MG: 0.5 TABLET ORAL at 00:02

## 2017-07-18 RX ADMIN — OXYCODONE HYDROCHLORIDE AND ACETAMINOPHEN 500 MG: 500 TABLET ORAL at 08:36

## 2017-07-18 RX ADMIN — METOPROLOL TARTRATE 100 MG: 100 TABLET ORAL at 08:36

## 2017-07-18 RX ADMIN — Medication 1 TABLET: at 08:38

## 2017-07-18 RX ADMIN — Medication 1 CAPSULE: at 08:36

## 2017-07-19 ENCOUNTER — APPOINTMENT (INPATIENT)
Dept: RADIOLOGY | Facility: HOSPITAL | Age: 82
DRG: 470 | End: 2017-07-19
Payer: COMMERCIAL

## 2017-07-19 ENCOUNTER — APPOINTMENT (INPATIENT)
Dept: OCCUPATIONAL THERAPY | Facility: HOSPITAL | Age: 82
DRG: 470 | End: 2017-07-19
Payer: COMMERCIAL

## 2017-07-19 ENCOUNTER — APPOINTMENT (INPATIENT)
Dept: PHYSICAL THERAPY | Facility: HOSPITAL | Age: 82
DRG: 470 | End: 2017-07-19
Payer: COMMERCIAL

## 2017-07-19 LAB
ANION GAP SERPL CALCULATED.3IONS-SCNC: 5 MMOL/L (ref 4–13)
ANION GAP SERPL CALCULATED.3IONS-SCNC: 5 MMOL/L (ref 4–13)
BASOPHILS # BLD AUTO: 0.02 THOUSANDS/ΜL (ref 0–0.1)
BASOPHILS NFR BLD AUTO: 0 % (ref 0–1)
BUN SERPL-MCNC: 21 MG/DL (ref 5–25)
BUN SERPL-MCNC: 24 MG/DL (ref 5–25)
CALCIUM SERPL-MCNC: 8.4 MG/DL (ref 8.3–10.1)
CALCIUM SERPL-MCNC: 8.6 MG/DL (ref 8.3–10.1)
CHLORIDE SERPL-SCNC: 101 MMOL/L (ref 100–108)
CHLORIDE SERPL-SCNC: 101 MMOL/L (ref 100–108)
CK MB SERPL-MCNC: 0.1 NG/ML (ref 0–5)
CK MB SERPL-MCNC: <1 % (ref 0–2.5)
CK SERPL-CCNC: 535 U/L (ref 26–192)
CO2 SERPL-SCNC: 31 MMOL/L (ref 21–32)
CO2 SERPL-SCNC: 32 MMOL/L (ref 21–32)
CREAT SERPL-MCNC: 0.97 MG/DL (ref 0.6–1.3)
CREAT SERPL-MCNC: 1.16 MG/DL (ref 0.6–1.3)
EOSINOPHIL # BLD AUTO: 0.03 THOUSAND/ΜL (ref 0–0.61)
EOSINOPHIL NFR BLD AUTO: 0 % (ref 0–6)
ERYTHROCYTE [DISTWIDTH] IN BLOOD BY AUTOMATED COUNT: 15.6 % (ref 11.6–15.1)
GFR SERPL CREATININE-BSD FRML MDRD: 44.7 ML/MIN/1.73SQ M
GFR SERPL CREATININE-BSD FRML MDRD: 55 ML/MIN/1.73SQ M
GLUCOSE SERPL-MCNC: 111 MG/DL (ref 65–140)
GLUCOSE SERPL-MCNC: 131 MG/DL (ref 65–140)
HCT VFR BLD AUTO: 25 % (ref 34.8–46.1)
HGB BLD-MCNC: 7.9 G/DL (ref 11.5–15.4)
LYMPHOCYTES # BLD AUTO: 1.24 THOUSANDS/ΜL (ref 0.6–4.47)
LYMPHOCYTES NFR BLD AUTO: 8 % (ref 14–44)
MAGNESIUM SERPL-MCNC: 2 MG/DL (ref 1.6–2.6)
MCH RBC QN AUTO: 27.3 PG (ref 26.8–34.3)
MCHC RBC AUTO-ENTMCNC: 31.6 G/DL (ref 31.4–37.4)
MCV RBC AUTO: 87 FL (ref 82–98)
MONOCYTES # BLD AUTO: 1.57 THOUSAND/ΜL (ref 0.17–1.22)
MONOCYTES NFR BLD AUTO: 11 % (ref 4–12)
NEUTROPHILS # BLD AUTO: 12.16 THOUSANDS/ΜL (ref 1.85–7.62)
NEUTS SEG NFR BLD AUTO: 81 % (ref 43–75)
PLATELET # BLD AUTO: 168 THOUSANDS/UL (ref 149–390)
PMV BLD AUTO: 10.3 FL (ref 8.9–12.7)
POTASSIUM SERPL-SCNC: 3.6 MMOL/L (ref 3.5–5.3)
POTASSIUM SERPL-SCNC: 3.7 MMOL/L (ref 3.5–5.3)
RBC # BLD AUTO: 2.89 MILLION/UL (ref 3.81–5.12)
SODIUM SERPL-SCNC: 137 MMOL/L (ref 136–145)
SODIUM SERPL-SCNC: 138 MMOL/L (ref 136–145)
WBC # BLD AUTO: 15.02 THOUSAND/UL (ref 4.31–10.16)

## 2017-07-19 PROCEDURE — 82553 CREATINE MB FRACTION: CPT | Performed by: INTERNAL MEDICINE

## 2017-07-19 PROCEDURE — 97116 GAIT TRAINING THERAPY: CPT

## 2017-07-19 PROCEDURE — 82550 ASSAY OF CK (CPK): CPT | Performed by: INTERNAL MEDICINE

## 2017-07-19 PROCEDURE — 97530 THERAPEUTIC ACTIVITIES: CPT

## 2017-07-19 PROCEDURE — 83735 ASSAY OF MAGNESIUM: CPT | Performed by: INTERNAL MEDICINE

## 2017-07-19 PROCEDURE — 80048 BASIC METABOLIC PNL TOTAL CA: CPT | Performed by: ORTHOPAEDIC SURGERY

## 2017-07-19 PROCEDURE — 97110 THERAPEUTIC EXERCISES: CPT

## 2017-07-19 PROCEDURE — 80048 BASIC METABOLIC PNL TOTAL CA: CPT | Performed by: FAMILY MEDICINE

## 2017-07-19 PROCEDURE — 71020 HB CHEST X-RAY 2VW FRONTAL&LATL: CPT

## 2017-07-19 PROCEDURE — 85025 COMPLETE CBC W/AUTO DIFF WBC: CPT | Performed by: ORTHOPAEDIC SURGERY

## 2017-07-19 RX ORDER — ASPIRIN 81 MG/1
81 TABLET ORAL DAILY
Qty: 1 TABLET | Refills: 0 | Status: SHIPPED | OUTPATIENT
Start: 2017-07-19 | End: 2021-09-23

## 2017-07-19 RX ORDER — SODIUM CHLORIDE 9 MG/ML
100 INJECTION, SOLUTION INTRAVENOUS CONTINUOUS
Status: DISCONTINUED | OUTPATIENT
Start: 2017-07-19 | End: 2017-07-20 | Stop reason: HOSPADM

## 2017-07-19 RX ADMIN — ALLOPURINOL 100 MG: 100 TABLET ORAL at 08:15

## 2017-07-19 RX ADMIN — METOPROLOL TARTRATE 100 MG: 100 TABLET ORAL at 21:22

## 2017-07-19 RX ADMIN — PSYLLIUM HUSK 1 PACKET: 3.4 POWDER ORAL at 17:44

## 2017-07-19 RX ADMIN — ATORVASTATIN CALCIUM 20 MG: 10 TABLET, FILM COATED ORAL at 17:44

## 2017-07-19 RX ADMIN — DOCUSATE SODIUM 100 MG: 100 CAPSULE, LIQUID FILLED ORAL at 08:32

## 2017-07-19 RX ADMIN — SODIUM CHLORIDE 100 ML/HR: 0.9 INJECTION, SOLUTION INTRAVENOUS at 23:31

## 2017-07-19 RX ADMIN — OLMESARTAN MEDOXOMIL 40 MG: 20 TABLET, FILM COATED ORAL at 08:32

## 2017-07-19 RX ADMIN — SODIUM CHLORIDE 100 ML/HR: 0.9 INJECTION, SOLUTION INTRAVENOUS at 13:40

## 2017-07-19 RX ADMIN — PANTOPRAZOLE SODIUM 40 MG: 40 TABLET, DELAYED RELEASE ORAL at 08:33

## 2017-07-19 RX ADMIN — Medication 1 TABLET: at 08:33

## 2017-07-19 RX ADMIN — GABAPENTIN 100 MG: 100 CAPSULE ORAL at 21:22

## 2017-07-19 RX ADMIN — GABAPENTIN 100 MG: 100 CAPSULE ORAL at 13:31

## 2017-07-19 RX ADMIN — ASPIRIN 81 MG: 81 TABLET, COATED ORAL at 08:31

## 2017-07-19 RX ADMIN — DOCUSATE SODIUM 100 MG: 100 CAPSULE, LIQUID FILLED ORAL at 17:44

## 2017-07-19 RX ADMIN — Medication 325 MG: at 08:15

## 2017-07-19 RX ADMIN — GABAPENTIN 100 MG: 100 CAPSULE ORAL at 05:03

## 2017-07-19 RX ADMIN — BISACODYL 10 MG: 5 TABLET, COATED ORAL at 12:05

## 2017-07-19 RX ADMIN — PSYLLIUM HUSK 1 PACKET: 3.4 POWDER ORAL at 08:33

## 2017-07-19 RX ADMIN — Medication 1 EACH: at 08:47

## 2017-07-19 RX ADMIN — ENOXAPARIN SODIUM 40 MG: 40 INJECTION SUBCUTANEOUS at 08:33

## 2017-07-19 RX ADMIN — OXYCODONE HYDROCHLORIDE 5 MG: 5 TABLET ORAL at 08:34

## 2017-07-19 RX ADMIN — METOPROLOL TARTRATE 100 MG: 100 TABLET ORAL at 08:32

## 2017-07-19 RX ADMIN — ACETAMINOPHEN 650 MG: 325 TABLET, FILM COATED ORAL at 05:09

## 2017-07-19 RX ADMIN — ACETAMINOPHEN 650 MG: 325 TABLET, FILM COATED ORAL at 16:11

## 2017-07-19 RX ADMIN — OXYCODONE HYDROCHLORIDE 5 MG: 5 TABLET ORAL at 23:52

## 2017-07-19 RX ADMIN — OXYCODONE HYDROCHLORIDE AND ACETAMINOPHEN 500 MG: 500 TABLET ORAL at 08:32

## 2017-07-19 RX ADMIN — FOLIC ACID 1 MG: 1 TABLET ORAL at 08:33

## 2017-07-20 ENCOUNTER — APPOINTMENT (INPATIENT)
Dept: OCCUPATIONAL THERAPY | Facility: HOSPITAL | Age: 82
DRG: 470 | End: 2017-07-20
Payer: COMMERCIAL

## 2017-07-20 ENCOUNTER — APPOINTMENT (INPATIENT)
Dept: PHYSICAL THERAPY | Facility: HOSPITAL | Age: 82
DRG: 470 | End: 2017-07-20
Payer: COMMERCIAL

## 2017-07-20 ENCOUNTER — HOSPITAL ENCOUNTER (INPATIENT)
Facility: HOSPITAL | Age: 82
LOS: 1 days | Discharge: NURSING, REHAB OR LONG-TERM CARE | DRG: 559 | End: 2017-07-21
Attending: INTERNAL MEDICINE | Admitting: INTERNAL MEDICINE
Payer: COMMERCIAL

## 2017-07-20 VITALS
WEIGHT: 148 LBS | HEIGHT: 59 IN | BODY MASS INDEX: 29.84 KG/M2 | OXYGEN SATURATION: 96 % | HEART RATE: 100 BPM | RESPIRATION RATE: 18 BRPM | DIASTOLIC BLOOD PRESSURE: 63 MMHG | TEMPERATURE: 99.5 F | SYSTOLIC BLOOD PRESSURE: 140 MMHG

## 2017-07-20 DIAGNOSIS — Z11.1 SCREENING-PULMONARY TB: Primary | ICD-10-CM

## 2017-07-20 LAB
ABO GROUP BLD BPU: NORMAL
ABO GROUP BLD: NORMAL
ANION GAP SERPL CALCULATED.3IONS-SCNC: 8 MMOL/L (ref 4–13)
BACTERIA UR QL AUTO: ABNORMAL /HPF
BASOPHILS # BLD AUTO: 0.01 THOUSANDS/ΜL (ref 0–0.1)
BASOPHILS NFR BLD AUTO: 0 % (ref 0–1)
BILIRUB UR QL STRIP: NEGATIVE
BLD GP AB SCN SERPL QL: NEGATIVE
BPU ID: NORMAL
BUN SERPL-MCNC: 20 MG/DL (ref 5–25)
CALCIUM SERPL-MCNC: 7.9 MG/DL (ref 8.3–10.1)
CHLORIDE SERPL-SCNC: 105 MMOL/L (ref 100–108)
CK MB SERPL-MCNC: 0 NG/ML (ref 0–5)
CK MB SERPL-MCNC: <1 % (ref 0–2.5)
CK SERPL-CCNC: 481 U/L (ref 26–192)
CLARITY UR: ABNORMAL
CO2 SERPL-SCNC: 27 MMOL/L (ref 21–32)
COLOR UR: YELLOW
CREAT SERPL-MCNC: 0.95 MG/DL (ref 0.6–1.3)
CROSSMATCH: NORMAL
EOSINOPHIL # BLD AUTO: 0.33 THOUSAND/ΜL (ref 0–0.61)
EOSINOPHIL NFR BLD AUTO: 3 % (ref 0–6)
ERYTHROCYTE [DISTWIDTH] IN BLOOD BY AUTOMATED COUNT: 15.1 % (ref 11.6–15.1)
GFR SERPL CREATININE-BSD FRML MDRD: 56.3 ML/MIN/1.73SQ M
GLUCOSE SERPL-MCNC: 99 MG/DL (ref 65–140)
GLUCOSE UR STRIP-MCNC: NEGATIVE MG/DL
HCT VFR BLD AUTO: 22.2 % (ref 34.8–46.1)
HGB BLD-MCNC: 6.8 G/DL (ref 11.5–15.4)
HGB UR QL STRIP.AUTO: ABNORMAL
KETONES UR STRIP-MCNC: NEGATIVE MG/DL
LEUKOCYTE ESTERASE UR QL STRIP: NEGATIVE
LYMPHOCYTES # BLD AUTO: 1.17 THOUSANDS/ΜL (ref 0.6–4.47)
LYMPHOCYTES NFR BLD AUTO: 10 % (ref 14–44)
MAGNESIUM SERPL-MCNC: 2.1 MG/DL (ref 1.6–2.6)
MCH RBC QN AUTO: 26.9 PG (ref 26.8–34.3)
MCHC RBC AUTO-ENTMCNC: 30.6 G/DL (ref 31.4–37.4)
MCV RBC AUTO: 88 FL (ref 82–98)
MONOCYTES # BLD AUTO: 1.19 THOUSAND/ΜL (ref 0.17–1.22)
MONOCYTES NFR BLD AUTO: 10 % (ref 4–12)
NEUTROPHILS # BLD AUTO: 9.51 THOUSANDS/ΜL (ref 1.85–7.62)
NEUTS SEG NFR BLD AUTO: 77 % (ref 43–75)
NITRITE UR QL STRIP: NEGATIVE
NON-SQ EPI CELLS URNS QL MICRO: ABNORMAL /HPF
PH UR STRIP.AUTO: 6 [PH] (ref 4.5–8)
PLATELET # BLD AUTO: 152 THOUSANDS/UL (ref 149–390)
PMV BLD AUTO: 11 FL (ref 8.9–12.7)
POTASSIUM SERPL-SCNC: 3.4 MMOL/L (ref 3.5–5.3)
PROT UR STRIP-MCNC: ABNORMAL MG/DL
RBC # BLD AUTO: 2.53 MILLION/UL (ref 3.81–5.12)
RBC #/AREA URNS AUTO: ABNORMAL /HPF
RH BLD: POSITIVE
SODIUM SERPL-SCNC: 140 MMOL/L (ref 136–145)
SP GR UR STRIP.AUTO: 1.01 (ref 1–1.03)
SPECIMEN EXPIRATION DATE: NORMAL
UNIT DISPENSE STATUS: NORMAL
UNIT PRODUCT CODE: NORMAL
UNIT RH: NORMAL
UROBILINOGEN UR QL STRIP.AUTO: 0.2 E.U./DL
WBC # BLD AUTO: 12.21 THOUSAND/UL (ref 4.31–10.16)
WBC #/AREA URNS AUTO: ABNORMAL /HPF

## 2017-07-20 PROCEDURE — 83735 ASSAY OF MAGNESIUM: CPT | Performed by: FAMILY MEDICINE

## 2017-07-20 PROCEDURE — 30233N1 TRANSFUSION OF NONAUTOLOGOUS RED BLOOD CELLS INTO PERIPHERAL VEIN, PERCUTANEOUS APPROACH: ICD-10-PCS | Performed by: GENERAL PRACTICE

## 2017-07-20 PROCEDURE — P9021 RED BLOOD CELLS UNIT: HCPCS

## 2017-07-20 PROCEDURE — 85025 COMPLETE CBC W/AUTO DIFF WBC: CPT | Performed by: FAMILY MEDICINE

## 2017-07-20 PROCEDURE — 81001 URINALYSIS AUTO W/SCOPE: CPT | Performed by: FAMILY MEDICINE

## 2017-07-20 PROCEDURE — 97110 THERAPEUTIC EXERCISES: CPT

## 2017-07-20 PROCEDURE — 82553 CREATINE MB FRACTION: CPT | Performed by: FAMILY MEDICINE

## 2017-07-20 PROCEDURE — 86920 COMPATIBILITY TEST SPIN: CPT

## 2017-07-20 PROCEDURE — 97530 THERAPEUTIC ACTIVITIES: CPT

## 2017-07-20 PROCEDURE — 82550 ASSAY OF CK (CPK): CPT | Performed by: FAMILY MEDICINE

## 2017-07-20 PROCEDURE — 86850 RBC ANTIBODY SCREEN: CPT | Performed by: INTERNAL MEDICINE

## 2017-07-20 PROCEDURE — 97166 OT EVAL MOD COMPLEX 45 MIN: CPT

## 2017-07-20 PROCEDURE — 86901 BLOOD TYPING SEROLOGIC RH(D): CPT | Performed by: INTERNAL MEDICINE

## 2017-07-20 PROCEDURE — 80048 BASIC METABOLIC PNL TOTAL CA: CPT | Performed by: ORTHOPAEDIC SURGERY

## 2017-07-20 PROCEDURE — 97116 GAIT TRAINING THERAPY: CPT

## 2017-07-20 PROCEDURE — 86900 BLOOD TYPING SEROLOGIC ABO: CPT | Performed by: INTERNAL MEDICINE

## 2017-07-20 RX ORDER — FERROUS SULFATE 325(65) MG
325 TABLET ORAL 2 TIMES DAILY WITH MEALS
Refills: 0 | Status: ON HOLD
Start: 2017-07-20 | End: 2017-07-21 | Stop reason: SDUPTHER

## 2017-07-20 RX ORDER — ASCORBIC ACID 500 MG
500 TABLET ORAL DAILY
Refills: 0 | Status: CANCELLED | OUTPATIENT
Start: 2017-07-20

## 2017-07-20 RX ORDER — FOLIC ACID 1 MG/1
1 TABLET ORAL DAILY
Refills: 0
Start: 2017-07-20 | End: 2018-07-03

## 2017-07-20 RX ORDER — POTASSIUM CHLORIDE 20 MEQ/1
20 TABLET, EXTENDED RELEASE ORAL
Status: COMPLETED | OUTPATIENT
Start: 2017-07-20 | End: 2017-07-20

## 2017-07-20 RX ORDER — POTASSIUM CHLORIDE 20 MEQ/1
20 TABLET, EXTENDED RELEASE ORAL ONCE
Status: COMPLETED | OUTPATIENT
Start: 2017-07-20 | End: 2017-07-20

## 2017-07-20 RX ORDER — PANTOPRAZOLE SODIUM 40 MG/1
40 TABLET, DELAYED RELEASE ORAL DAILY
Refills: 0
Start: 2017-07-20 | End: 2018-07-03

## 2017-07-20 RX ORDER — ASCORBIC ACID 500 MG
500 TABLET ORAL DAILY
Refills: 0
Start: 2017-07-20 | End: 2018-07-03

## 2017-07-20 RX ADMIN — FOLIC ACID 1 MG: 1 TABLET ORAL at 08:23

## 2017-07-20 RX ADMIN — ASPIRIN 81 MG: 81 TABLET, COATED ORAL at 08:24

## 2017-07-20 RX ADMIN — DOCUSATE SODIUM 100 MG: 100 CAPSULE, LIQUID FILLED ORAL at 17:25

## 2017-07-20 RX ADMIN — OLMESARTAN MEDOXOMIL 40 MG: 20 TABLET, FILM COATED ORAL at 08:24

## 2017-07-20 RX ADMIN — ENOXAPARIN SODIUM 40 MG: 40 INJECTION SUBCUTANEOUS at 08:24

## 2017-07-20 RX ADMIN — POTASSIUM CHLORIDE 20 MEQ: 1500 TABLET, EXTENDED RELEASE ORAL at 13:56

## 2017-07-20 RX ADMIN — SODIUM CHLORIDE 100 ML/HR: 0.9 INJECTION, SOLUTION INTRAVENOUS at 13:28

## 2017-07-20 RX ADMIN — SODIUM CHLORIDE 100 ML/HR: 0.9 INJECTION, SOLUTION INTRAVENOUS at 07:52

## 2017-07-20 RX ADMIN — DOCUSATE SODIUM 100 MG: 100 CAPSULE, LIQUID FILLED ORAL at 08:23

## 2017-07-20 RX ADMIN — METOPROLOL TARTRATE 100 MG: 100 TABLET ORAL at 08:24

## 2017-07-20 RX ADMIN — PANTOPRAZOLE SODIUM 40 MG: 40 TABLET, DELAYED RELEASE ORAL at 08:24

## 2017-07-20 RX ADMIN — GABAPENTIN 100 MG: 100 CAPSULE ORAL at 05:17

## 2017-07-20 RX ADMIN — OXYCODONE HYDROCHLORIDE 5 MG: 5 TABLET ORAL at 13:47

## 2017-07-20 RX ADMIN — Medication 1 TABLET: at 08:24

## 2017-07-20 RX ADMIN — POTASSIUM CHLORIDE 20 MEQ: 1500 TABLET, EXTENDED RELEASE ORAL at 10:25

## 2017-07-20 RX ADMIN — Medication 1 EACH: at 10:24

## 2017-07-20 RX ADMIN — GABAPENTIN 100 MG: 100 CAPSULE ORAL at 13:47

## 2017-07-20 RX ADMIN — OXYCODONE HYDROCHLORIDE 5 MG: 5 TABLET ORAL at 08:27

## 2017-07-20 RX ADMIN — ALLOPURINOL 100 MG: 100 TABLET ORAL at 08:24

## 2017-07-20 RX ADMIN — Medication 325 MG: at 08:24

## 2017-07-20 RX ADMIN — ATORVASTATIN CALCIUM 20 MG: 10 TABLET, FILM COATED ORAL at 17:25

## 2017-07-20 RX ADMIN — OXYCODONE HYDROCHLORIDE AND ACETAMINOPHEN 500 MG: 500 TABLET ORAL at 08:23

## 2017-07-21 ENCOUNTER — APPOINTMENT (EMERGENCY)
Dept: CT IMAGING | Facility: HOSPITAL | Age: 82
DRG: 177 | End: 2017-07-21
Payer: COMMERCIAL

## 2017-07-21 ENCOUNTER — APPOINTMENT (INPATIENT)
Dept: ULTRASOUND IMAGING | Facility: HOSPITAL | Age: 82
DRG: 177 | End: 2017-07-21
Payer: COMMERCIAL

## 2017-07-21 ENCOUNTER — HOSPITAL ENCOUNTER (INPATIENT)
Facility: HOSPITAL | Age: 82
LOS: 3 days | Discharge: NURSING, REHAB OR LONG-TERM CARE | DRG: 177 | End: 2017-07-24
Attending: EMERGENCY MEDICINE | Admitting: INTERNAL MEDICINE
Payer: COMMERCIAL
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
## 2017-07-21 DIAGNOSIS — I26.99 PULMONARY EMBOLISM (HCC): Primary | ICD-10-CM

## 2017-07-21 DIAGNOSIS — I10 ESSENTIAL HYPERTENSION: ICD-10-CM

## 2017-07-21 DIAGNOSIS — J18.9 HAP (HOSPITAL-ACQUIRED PNEUMONIA): ICD-10-CM

## 2017-07-21 DIAGNOSIS — Z96.641 STATUS POST TOTAL REPLACEMENT OF RIGHT HIP: ICD-10-CM

## 2017-07-21 DIAGNOSIS — M41.80 DEXTROSCOLIOSIS: ICD-10-CM

## 2017-07-21 DIAGNOSIS — J18.9 PNEUMONIA: ICD-10-CM

## 2017-07-21 PROBLEM — E78.5 HYPERLIPIDEMIA: Chronic | Status: ACTIVE | Noted: 2017-07-17

## 2017-07-21 LAB
ALBUMIN SERPL BCP-MCNC: 2.3 G/DL (ref 3.5–5)
ALP SERPL-CCNC: 221 U/L (ref 46–116)
ALT SERPL W P-5'-P-CCNC: 143 U/L (ref 12–78)
ANION GAP SERPL CALCULATED.3IONS-SCNC: 8 MMOL/L (ref 4–13)
APTT PPP: 142 SECONDS (ref 23–35)
APTT PPP: 38 SECONDS (ref 23–35)
AST SERPL W P-5'-P-CCNC: 143 U/L (ref 5–45)
BASOPHILS # BLD AUTO: 0.02 THOUSANDS/ΜL (ref 0–0.1)
BASOPHILS NFR BLD AUTO: 0 % (ref 0–1)
BILIRUB SERPL-MCNC: 1.1 MG/DL (ref 0.2–1)
BILIRUB UR QL STRIP: NEGATIVE
BUN SERPL-MCNC: 15 MG/DL (ref 5–25)
CALCIUM SERPL-MCNC: 8.4 MG/DL (ref 8.3–10.1)
CHLORIDE SERPL-SCNC: 103 MMOL/L (ref 100–108)
CLARITY UR: CLEAR
CO2 SERPL-SCNC: 28 MMOL/L (ref 21–32)
COLOR UR: YELLOW
CREAT SERPL-MCNC: 0.96 MG/DL (ref 0.6–1.3)
EOSINOPHIL # BLD AUTO: 0.2 THOUSAND/ΜL (ref 0–0.61)
EOSINOPHIL NFR BLD AUTO: 2 % (ref 0–6)
ERYTHROCYTE [DISTWIDTH] IN BLOOD BY AUTOMATED COUNT: 14.9 % (ref 11.6–15.1)
GFR SERPL CREATININE-BSD FRML MDRD: 55.6 ML/MIN/1.73SQ M
GLUCOSE SERPL-MCNC: 119 MG/DL (ref 65–140)
GLUCOSE UR STRIP-MCNC: NEGATIVE MG/DL
HCT VFR BLD AUTO: 27.9 % (ref 34.8–46.1)
HGB BLD-MCNC: 8.9 G/DL (ref 11.5–15.4)
HGB UR QL STRIP.AUTO: NEGATIVE
INR PPP: 1.11 (ref 0.86–1.16)
KETONES UR STRIP-MCNC: NEGATIVE MG/DL
L PNEUMO1 AG UR QL IA.RAPID: NEGATIVE
LACTATE SERPL-SCNC: 1.3 MMOL/L (ref 0.5–2)
LEUKOCYTE ESTERASE UR QL STRIP: NEGATIVE
LYMPHOCYTES # BLD AUTO: 0.83 THOUSANDS/ΜL (ref 0.6–4.47)
LYMPHOCYTES NFR BLD AUTO: 8 % (ref 14–44)
MCH RBC QN AUTO: 27.6 PG (ref 26.8–34.3)
MCHC RBC AUTO-ENTMCNC: 31.9 G/DL (ref 31.4–37.4)
MCV RBC AUTO: 87 FL (ref 82–98)
MONOCYTES # BLD AUTO: 0.88 THOUSAND/ΜL (ref 0.17–1.22)
MONOCYTES NFR BLD AUTO: 8 % (ref 4–12)
NEUTROPHILS # BLD AUTO: 8.75 THOUSANDS/ΜL (ref 1.85–7.62)
NEUTS SEG NFR BLD AUTO: 82 % (ref 43–75)
NITRITE UR QL STRIP: NEGATIVE
PH UR STRIP.AUTO: 5.5 [PH] (ref 4.5–8)
PLATELET # BLD AUTO: 182 THOUSANDS/UL (ref 149–390)
PMV BLD AUTO: 10.2 FL (ref 8.9–12.7)
POTASSIUM SERPL-SCNC: 4.4 MMOL/L (ref 3.5–5.3)
PROT SERPL-MCNC: 6.6 G/DL (ref 6.4–8.2)
PROT UR STRIP-MCNC: NEGATIVE MG/DL
PROTHROMBIN TIME: 14.2 SECONDS (ref 12.1–14.4)
RBC # BLD AUTO: 3.22 MILLION/UL (ref 3.81–5.12)
S PNEUM AG UR QL: NEGATIVE
SODIUM SERPL-SCNC: 139 MMOL/L (ref 136–145)
SP GR UR STRIP.AUTO: 1.01 (ref 1–1.03)
TROPONIN I SERPL-MCNC: <0.02 NG/ML
UROBILINOGEN UR QL STRIP.AUTO: 1 E.U./DL
WBC # BLD AUTO: 10.68 THOUSAND/UL (ref 4.31–10.16)

## 2017-07-21 PROCEDURE — 96365 THER/PROPH/DIAG IV INF INIT: CPT

## 2017-07-21 PROCEDURE — 97530 THERAPEUTIC ACTIVITIES: CPT

## 2017-07-21 PROCEDURE — 80053 COMPREHEN METABOLIC PANEL: CPT | Performed by: EMERGENCY MEDICINE

## 2017-07-21 PROCEDURE — 96361 HYDRATE IV INFUSION ADD-ON: CPT

## 2017-07-21 PROCEDURE — 83605 ASSAY OF LACTIC ACID: CPT | Performed by: EMERGENCY MEDICINE

## 2017-07-21 PROCEDURE — 97112 NEUROMUSCULAR REEDUCATION: CPT

## 2017-07-21 PROCEDURE — 93970 EXTREMITY STUDY: CPT

## 2017-07-21 PROCEDURE — 84484 ASSAY OF TROPONIN QUANT: CPT | Performed by: EMERGENCY MEDICINE

## 2017-07-21 PROCEDURE — 81003 URINALYSIS AUTO W/O SCOPE: CPT | Performed by: EMERGENCY MEDICINE

## 2017-07-21 PROCEDURE — 87449 NOS EACH ORGANISM AG IA: CPT | Performed by: PHYSICIAN ASSISTANT

## 2017-07-21 PROCEDURE — 87040 BLOOD CULTURE FOR BACTERIA: CPT | Performed by: EMERGENCY MEDICINE

## 2017-07-21 PROCEDURE — 85025 COMPLETE CBC W/AUTO DIFF WBC: CPT | Performed by: EMERGENCY MEDICINE

## 2017-07-21 PROCEDURE — 97116 GAIT TRAINING THERAPY: CPT

## 2017-07-21 PROCEDURE — 94664 DEMO&/EVAL PT USE INHALER: CPT

## 2017-07-21 PROCEDURE — 71275 CT ANGIOGRAPHY CHEST: CPT

## 2017-07-21 PROCEDURE — 85730 THROMBOPLASTIN TIME PARTIAL: CPT | Performed by: INTERNAL MEDICINE

## 2017-07-21 PROCEDURE — 85730 THROMBOPLASTIN TIME PARTIAL: CPT | Performed by: EMERGENCY MEDICINE

## 2017-07-21 PROCEDURE — 36415 COLL VENOUS BLD VENIPUNCTURE: CPT | Performed by: EMERGENCY MEDICINE

## 2017-07-21 PROCEDURE — 99285 EMERGENCY DEPT VISIT HI MDM: CPT

## 2017-07-21 PROCEDURE — 85610 PROTHROMBIN TIME: CPT | Performed by: EMERGENCY MEDICINE

## 2017-07-21 PROCEDURE — 73701 CT LOWER EXTREMITY W/DYE: CPT

## 2017-07-21 RX ORDER — ATORVASTATIN CALCIUM 10 MG/1
20 TABLET, FILM COATED ORAL DAILY
Status: DISCONTINUED | OUTPATIENT
Start: 2017-07-22 | End: 2017-07-24 | Stop reason: HOSPADM

## 2017-07-21 RX ORDER — ALLOPURINOL 100 MG/1
100 TABLET ORAL DAILY
Status: DISCONTINUED | OUTPATIENT
Start: 2017-07-22 | End: 2017-07-24 | Stop reason: HOSPADM

## 2017-07-21 RX ORDER — ALBUTEROL SULFATE 2.5 MG/3ML
2.5 SOLUTION RESPIRATORY (INHALATION) ONCE
Status: COMPLETED | OUTPATIENT
Start: 2017-07-21 | End: 2017-07-21

## 2017-07-21 RX ORDER — EPINEPHRINE 0.3 MG/.3ML
INJECTION SUBCUTANEOUS
COMMUNITY
End: 2018-06-04 | Stop reason: SDUPTHER

## 2017-07-21 RX ORDER — DOCUSATE SODIUM 100 MG/1
100 CAPSULE, LIQUID FILLED ORAL 2 TIMES DAILY PRN
Status: DISCONTINUED | OUTPATIENT
Start: 2017-07-21 | End: 2017-07-24 | Stop reason: HOSPADM

## 2017-07-21 RX ORDER — FOLIC ACID 1 MG/1
1 TABLET ORAL DAILY
Status: DISCONTINUED | OUTPATIENT
Start: 2017-07-22 | End: 2017-07-24 | Stop reason: HOSPADM

## 2017-07-21 RX ORDER — POTASSIUM CHLORIDE 20 MEQ/1
TABLET, EXTENDED RELEASE ORAL
COMMUNITY
Start: 2016-11-29 | End: 2018-10-03 | Stop reason: SDUPTHER

## 2017-07-21 RX ORDER — HEPARIN SODIUM 1000 [USP'U]/ML
2600 INJECTION, SOLUTION INTRAVENOUS; SUBCUTANEOUS AS NEEDED
Status: DISCONTINUED | OUTPATIENT
Start: 2017-07-21 | End: 2017-07-23

## 2017-07-21 RX ORDER — PANTOPRAZOLE SODIUM 40 MG/1
40 TABLET, DELAYED RELEASE ORAL DAILY
Status: DISCONTINUED | OUTPATIENT
Start: 2017-07-22 | End: 2017-07-23

## 2017-07-21 RX ORDER — ALPRAZOLAM 0.5 MG/1
1 TABLET ORAL
Status: DISCONTINUED | OUTPATIENT
Start: 2017-07-21 | End: 2017-07-24 | Stop reason: HOSPADM

## 2017-07-21 RX ORDER — ASPIRIN 81 MG
TABLET, DELAYED RELEASE (ENTERIC COATED) ORAL
Status: ON HOLD | COMMUNITY
End: 2017-07-21 | Stop reason: SDUPTHER

## 2017-07-21 RX ORDER — HEPARIN SODIUM 1000 [USP'U]/ML
5200 INJECTION, SOLUTION INTRAVENOUS; SUBCUTANEOUS ONCE
Status: COMPLETED | OUTPATIENT
Start: 2017-07-21 | End: 2017-07-21

## 2017-07-21 RX ORDER — ACETAMINOPHEN 325 MG/1
650 TABLET ORAL EVERY 6 HOURS PRN
Status: DISCONTINUED | OUTPATIENT
Start: 2017-07-21 | End: 2017-07-24 | Stop reason: HOSPADM

## 2017-07-21 RX ORDER — ASPIRIN 81 MG/1
81 TABLET ORAL
Status: ON HOLD | COMMUNITY
End: 2017-07-21 | Stop reason: SDUPTHER

## 2017-07-21 RX ORDER — ONDANSETRON 2 MG/ML
4 INJECTION INTRAMUSCULAR; INTRAVENOUS EVERY 4 HOURS PRN
Status: DISCONTINUED | OUTPATIENT
Start: 2017-07-21 | End: 2017-07-24 | Stop reason: HOSPADM

## 2017-07-21 RX ORDER — HEPARIN SODIUM 10000 [USP'U]/100ML
3-30 INJECTION, SOLUTION INTRAVENOUS
Status: DISCONTINUED | OUTPATIENT
Start: 2017-07-21 | End: 2017-07-23

## 2017-07-21 RX ORDER — VITAMIN B COMPLEX
1 CAPSULE ORAL DAILY
Status: DISCONTINUED | OUTPATIENT
Start: 2017-07-22 | End: 2017-07-24 | Stop reason: HOSPADM

## 2017-07-21 RX ORDER — ALPRAZOLAM 1 MG/1
1 TABLET ORAL
Status: ON HOLD | COMMUNITY
End: 2017-07-21 | Stop reason: SDUPTHER

## 2017-07-21 RX ORDER — METOPROLOL TARTRATE 100 MG/1
100 TABLET ORAL EVERY 12 HOURS SCHEDULED
Status: DISCONTINUED | OUTPATIENT
Start: 2017-07-21 | End: 2017-07-24 | Stop reason: HOSPADM

## 2017-07-21 RX ORDER — FUROSEMIDE 20 MG/1
TABLET ORAL 2 TIMES DAILY
COMMUNITY
Start: 2016-12-06 | End: 2018-07-11 | Stop reason: SDUPTHER

## 2017-07-21 RX ORDER — ASCORBIC ACID 500 MG
500 TABLET ORAL DAILY
Status: DISCONTINUED | OUTPATIENT
Start: 2017-07-22 | End: 2017-07-24 | Stop reason: HOSPADM

## 2017-07-21 RX ORDER — CLOTRIMAZOLE 1 %
CREAM (GRAM) TOPICAL
Status: ON HOLD | COMMUNITY
End: 2017-07-21 | Stop reason: CLARIF

## 2017-07-21 RX ORDER — OLMESARTAN MEDOXOMIL 20 MG/1
40 TABLET ORAL DAILY
Status: DISCONTINUED | OUTPATIENT
Start: 2017-07-22 | End: 2017-07-24 | Stop reason: HOSPADM

## 2017-07-21 RX ORDER — OLMESARTAN MEDOXOMIL 40 MG/1
TABLET ORAL
COMMUNITY
Start: 2016-10-10 | End: 2019-01-31 | Stop reason: SDUPTHER

## 2017-07-21 RX ORDER — FERROUS SULFATE 325(65) MG
325 TABLET ORAL 2 TIMES DAILY WITH MEALS
Status: DISCONTINUED | OUTPATIENT
Start: 2017-07-21 | End: 2017-07-24 | Stop reason: HOSPADM

## 2017-07-21 RX ORDER — OXYCODONE HYDROCHLORIDE AND ACETAMINOPHEN 5; 325 MG/1; MG/1
1 TABLET ORAL EVERY 6 HOURS PRN
Status: DISCONTINUED | OUTPATIENT
Start: 2017-07-21 | End: 2017-07-24 | Stop reason: HOSPADM

## 2017-07-21 RX ORDER — OXYCODONE HYDROCHLORIDE 5 MG/1
2 CAPSULE ORAL EVERY 4 HOURS PRN
COMMUNITY
Start: 2017-06-20 | End: 2018-05-03

## 2017-07-21 RX ORDER — FERROUS SULFATE 325(65) MG
TABLET ORAL
COMMUNITY
Start: 2017-05-02 | End: 2021-10-01

## 2017-07-21 RX ORDER — HEPARIN SODIUM 1000 [USP'U]/ML
5200 INJECTION, SOLUTION INTRAVENOUS; SUBCUTANEOUS AS NEEDED
Status: DISCONTINUED | OUTPATIENT
Start: 2017-07-21 | End: 2017-07-23

## 2017-07-21 RX ORDER — METOPROLOL TARTRATE 100 MG/1
100 TABLET ORAL 2 TIMES DAILY
COMMUNITY
End: 2018-10-03 | Stop reason: SDUPTHER

## 2017-07-21 RX ADMIN — IOHEXOL 100 ML: 350 INJECTION, SOLUTION INTRAVENOUS at 15:28

## 2017-07-21 RX ADMIN — HEPARIN SODIUM 5200 UNITS: 1000 INJECTION, SOLUTION INTRAVENOUS; SUBCUTANEOUS at 17:08

## 2017-07-21 RX ADMIN — CEFEPIME HYDROCHLORIDE 2000 MG: 2 INJECTION, SOLUTION INTRAVENOUS at 14:25

## 2017-07-21 RX ADMIN — HEPARIN SODIUM 5200 UNITS: 1000 INJECTION, SOLUTION INTRAVENOUS; SUBCUTANEOUS at 17:09

## 2017-07-21 RX ADMIN — METOPROLOL TARTRATE 100 MG: 100 TABLET ORAL at 21:43

## 2017-07-21 RX ADMIN — ACETAMINOPHEN 650 MG: 325 TABLET, FILM COATED ORAL at 19:09

## 2017-07-21 RX ADMIN — SODIUM CHLORIDE 2097 ML: 0.9 INJECTION, SOLUTION INTRAVENOUS at 14:17

## 2017-07-21 RX ADMIN — HEPARIN SODIUM AND DEXTROSE 18 UNITS/KG/HR: 10000; 5 INJECTION INTRAVENOUS at 17:11

## 2017-07-21 RX ADMIN — ALBUTEROL SULFATE 2.5 MG: 2.5 SOLUTION RESPIRATORY (INHALATION) at 16:17

## 2017-07-21 RX ADMIN — VANCOMYCIN HYDROCHLORIDE 1000 MG: 1 INJECTION, POWDER, LYOPHILIZED, FOR SOLUTION INTRAVENOUS at 15:34

## 2017-07-21 RX ADMIN — Medication 325 MG: at 19:09

## 2017-07-22 LAB
ANION GAP SERPL CALCULATED.3IONS-SCNC: 10 MMOL/L (ref 4–13)
ANION GAP SERPL CALCULATED.3IONS-SCNC: 8 MMOL/L (ref 4–13)
APTT PPP: 67 SECONDS (ref 23–35)
APTT PPP: 77 SECONDS (ref 23–35)
BASOPHILS # BLD AUTO: 0.02 THOUSANDS/ΜL (ref 0–0.1)
BASOPHILS NFR BLD AUTO: 0 % (ref 0–1)
BUN SERPL-MCNC: 11 MG/DL (ref 5–25)
BUN SERPL-MCNC: 13 MG/DL (ref 5–25)
CALCIUM SERPL-MCNC: 8.2 MG/DL (ref 8.3–10.1)
CALCIUM SERPL-MCNC: 8.2 MG/DL (ref 8.3–10.1)
CHLORIDE SERPL-SCNC: 104 MMOL/L (ref 100–108)
CHLORIDE SERPL-SCNC: 105 MMOL/L (ref 100–108)
CO2 SERPL-SCNC: 26 MMOL/L (ref 21–32)
CO2 SERPL-SCNC: 28 MMOL/L (ref 21–32)
CREAT SERPL-MCNC: 0.85 MG/DL (ref 0.6–1.3)
CREAT SERPL-MCNC: 0.9 MG/DL (ref 0.6–1.3)
EOSINOPHIL # BLD AUTO: 0.43 THOUSAND/ΜL (ref 0–0.61)
EOSINOPHIL NFR BLD AUTO: 5 % (ref 0–6)
ERYTHROCYTE [DISTWIDTH] IN BLOOD BY AUTOMATED COUNT: 14.9 % (ref 11.6–15.1)
GFR SERPL CREATININE-BSD FRML MDRD: 59.9 ML/MIN/1.73SQ M
GFR SERPL CREATININE-BSD FRML MDRD: >60 ML/MIN/1.73SQ M
GLUCOSE SERPL-MCNC: 102 MG/DL (ref 65–140)
GLUCOSE SERPL-MCNC: 87 MG/DL (ref 65–140)
HCT VFR BLD AUTO: 25.5 % (ref 34.8–46.1)
HGB BLD-MCNC: 7.9 G/DL (ref 11.5–15.4)
LYMPHOCYTES # BLD AUTO: 1.08 THOUSANDS/ΜL (ref 0.6–4.47)
LYMPHOCYTES NFR BLD AUTO: 12 % (ref 14–44)
MCH RBC QN AUTO: 27 PG (ref 26.8–34.3)
MCHC RBC AUTO-ENTMCNC: 31 G/DL (ref 31.4–37.4)
MCV RBC AUTO: 87 FL (ref 82–98)
MONOCYTES # BLD AUTO: 0.96 THOUSAND/ΜL (ref 0.17–1.22)
MONOCYTES NFR BLD AUTO: 11 % (ref 4–12)
NEUTROPHILS # BLD AUTO: 6.37 THOUSANDS/ΜL (ref 1.85–7.62)
NEUTS SEG NFR BLD AUTO: 72 % (ref 43–75)
PLATELET # BLD AUTO: 167 THOUSANDS/UL (ref 149–390)
PMV BLD AUTO: 10.5 FL (ref 8.9–12.7)
POTASSIUM SERPL-SCNC: 3.5 MMOL/L (ref 3.5–5.3)
POTASSIUM SERPL-SCNC: 3.6 MMOL/L (ref 3.5–5.3)
RBC # BLD AUTO: 2.93 MILLION/UL (ref 3.81–5.12)
SODIUM SERPL-SCNC: 140 MMOL/L (ref 136–145)
SODIUM SERPL-SCNC: 141 MMOL/L (ref 136–145)
URATE SERPL-MCNC: 4.1 MG/DL (ref 2–6.8)
WBC # BLD AUTO: 8.86 THOUSAND/UL (ref 4.31–10.16)

## 2017-07-22 PROCEDURE — 80048 BASIC METABOLIC PNL TOTAL CA: CPT | Performed by: FAMILY MEDICINE

## 2017-07-22 PROCEDURE — 85730 THROMBOPLASTIN TIME PARTIAL: CPT | Performed by: INTERNAL MEDICINE

## 2017-07-22 PROCEDURE — 85730 THROMBOPLASTIN TIME PARTIAL: CPT | Performed by: FAMILY MEDICINE

## 2017-07-22 PROCEDURE — 84550 ASSAY OF BLOOD/URIC ACID: CPT | Performed by: PHYSICIAN ASSISTANT

## 2017-07-22 PROCEDURE — 85025 COMPLETE CBC W/AUTO DIFF WBC: CPT | Performed by: PHYSICIAN ASSISTANT

## 2017-07-22 PROCEDURE — 80048 BASIC METABOLIC PNL TOTAL CA: CPT | Performed by: PHYSICIAN ASSISTANT

## 2017-07-22 RX ORDER — COLCHICINE 0.6 MG/1
1.2 TABLET ORAL DAILY
Status: DISCONTINUED | OUTPATIENT
Start: 2017-07-22 | End: 2017-07-24 | Stop reason: HOSPADM

## 2017-07-22 RX ORDER — METHYLPREDNISOLONE SODIUM SUCCINATE 40 MG/ML
40 INJECTION, POWDER, LYOPHILIZED, FOR SOLUTION INTRAMUSCULAR; INTRAVENOUS DAILY
Status: DISCONTINUED | OUTPATIENT
Start: 2017-07-22 | End: 2017-07-24

## 2017-07-22 RX ORDER — ALPRAZOLAM 0.25 MG/1
TABLET ORAL
Status: DISPENSED
Start: 2017-07-22 | End: 2017-07-22

## 2017-07-22 RX ORDER — ALPRAZOLAM 0.5 MG/1
0.5 TABLET ORAL 3 TIMES DAILY PRN
Status: DISCONTINUED | OUTPATIENT
Start: 2017-07-22 | End: 2017-07-24 | Stop reason: HOSPADM

## 2017-07-22 RX ORDER — FUROSEMIDE 10 MG/ML
20 INJECTION INTRAMUSCULAR; INTRAVENOUS ONCE
Status: COMPLETED | OUTPATIENT
Start: 2017-07-22 | End: 2017-07-22

## 2017-07-22 RX ORDER — MORPHINE SULFATE 4 MG/ML
4 INJECTION, SOLUTION INTRAMUSCULAR; INTRAVENOUS EVERY 4 HOURS PRN
Status: DISCONTINUED | OUTPATIENT
Start: 2017-07-22 | End: 2017-07-24 | Stop reason: HOSPADM

## 2017-07-22 RX ADMIN — ATORVASTATIN CALCIUM 20 MG: 10 TABLET, FILM COATED ORAL at 08:25

## 2017-07-22 RX ADMIN — Medication 1 TABLET: at 08:25

## 2017-07-22 RX ADMIN — COLCHICINE 1.2 MG: 0.6 TABLET, FILM COATED ORAL at 10:53

## 2017-07-22 RX ADMIN — MORPHINE SULFATE 4 MG: 4 INJECTION, SOLUTION INTRAMUSCULAR; INTRAVENOUS at 09:02

## 2017-07-22 RX ADMIN — OXYCODONE HYDROCHLORIDE AND ACETAMINOPHEN 1 TABLET: 5; 325 TABLET ORAL at 22:50

## 2017-07-22 RX ADMIN — CEFEPIME HYDROCHLORIDE 2000 MG: 2 INJECTION, POWDER, FOR SOLUTION INTRAVENOUS at 15:50

## 2017-07-22 RX ADMIN — VANCOMYCIN HYDROCHLORIDE 1250 MG: 500 INJECTION, POWDER, LYOPHILIZED, FOR SOLUTION INTRAVENOUS at 13:09

## 2017-07-22 RX ADMIN — OXYCODONE HYDROCHLORIDE AND ACETAMINOPHEN 1 TABLET: 5; 325 TABLET ORAL at 01:27

## 2017-07-22 RX ADMIN — PANTOPRAZOLE SODIUM 40 MG: 40 TABLET, DELAYED RELEASE ORAL at 08:25

## 2017-07-22 RX ADMIN — METHYLPREDNISOLONE SODIUM SUCCINATE 40 MG: 40 INJECTION, POWDER, FOR SOLUTION INTRAMUSCULAR; INTRAVENOUS at 09:00

## 2017-07-22 RX ADMIN — METOPROLOL TARTRATE 100 MG: 100 TABLET ORAL at 20:58

## 2017-07-22 RX ADMIN — HEPARIN SODIUM AND DEXTROSE 15 UNITS/KG/HR: 10000; 5 INJECTION INTRAVENOUS at 14:18

## 2017-07-22 RX ADMIN — ALLOPURINOL 100 MG: 100 TABLET ORAL at 08:25

## 2017-07-22 RX ADMIN — Medication 1 CAPSULE: at 09:00

## 2017-07-22 RX ADMIN — OXYCODONE HYDROCHLORIDE AND ACETAMINOPHEN 500 MG: 500 TABLET ORAL at 08:25

## 2017-07-22 RX ADMIN — OLMESARTAN MEDOXOMIL 40 MG: 20 TABLET, FILM COATED ORAL at 08:25

## 2017-07-22 RX ADMIN — OXYCODONE HYDROCHLORIDE AND ACETAMINOPHEN 1 TABLET: 5; 325 TABLET ORAL at 07:33

## 2017-07-22 RX ADMIN — Medication 325 MG: at 15:50

## 2017-07-22 RX ADMIN — OXYCODONE HYDROCHLORIDE AND ACETAMINOPHEN 1 TABLET: 5; 325 TABLET ORAL at 14:17

## 2017-07-22 RX ADMIN — FUROSEMIDE 20 MG: 10 INJECTION, SOLUTION INTRAMUSCULAR; INTRAVENOUS at 10:52

## 2017-07-22 RX ADMIN — ALPRAZOLAM 0.5 MG: 0.25 TABLET ORAL at 08:23

## 2017-07-22 RX ADMIN — FOLIC ACID 1 MG: 1 TABLET ORAL at 08:25

## 2017-07-22 RX ADMIN — Medication 325 MG: at 08:25

## 2017-07-22 RX ADMIN — ALPRAZOLAM 0.5 MG: 0.25 TABLET ORAL at 08:32

## 2017-07-22 RX ADMIN — METOPROLOL TARTRATE 100 MG: 100 TABLET ORAL at 08:26

## 2017-07-23 LAB
APTT PPP: 79 SECONDS (ref 23–35)
BASOPHILS # BLD AUTO: 0.01 THOUSANDS/ΜL (ref 0–0.1)
BASOPHILS NFR BLD AUTO: 0 % (ref 0–1)
EOSINOPHIL # BLD AUTO: 0.02 THOUSAND/ΜL (ref 0–0.61)
EOSINOPHIL NFR BLD AUTO: 0 % (ref 0–6)
ERYTHROCYTE [DISTWIDTH] IN BLOOD BY AUTOMATED COUNT: 14.7 % (ref 11.6–15.1)
HCT VFR BLD AUTO: 25.4 % (ref 34.8–46.1)
HGB BLD-MCNC: 8.2 G/DL (ref 11.5–15.4)
LYMPHOCYTES # BLD AUTO: 0.81 THOUSANDS/ΜL (ref 0.6–4.47)
LYMPHOCYTES NFR BLD AUTO: 7 % (ref 14–44)
MAGNESIUM SERPL-MCNC: 1.7 MG/DL (ref 1.6–2.6)
MCH RBC QN AUTO: 27.6 PG (ref 26.8–34.3)
MCHC RBC AUTO-ENTMCNC: 32.3 G/DL (ref 31.4–37.4)
MCV RBC AUTO: 86 FL (ref 82–98)
MONOCYTES # BLD AUTO: 0.99 THOUSAND/ΜL (ref 0.17–1.22)
MONOCYTES NFR BLD AUTO: 8 % (ref 4–12)
NEUTROPHILS # BLD AUTO: 10.37 THOUSANDS/ΜL (ref 1.85–7.62)
NEUTS SEG NFR BLD AUTO: 85 % (ref 43–75)
PLATELET # BLD AUTO: 228 THOUSANDS/UL (ref 149–390)
PMV BLD AUTO: 10.3 FL (ref 8.9–12.7)
RBC # BLD AUTO: 2.97 MILLION/UL (ref 3.81–5.12)
WBC # BLD AUTO: 12.2 THOUSAND/UL (ref 4.31–10.16)

## 2017-07-23 PROCEDURE — G8978 MOBILITY CURRENT STATUS: HCPCS

## 2017-07-23 PROCEDURE — 83735 ASSAY OF MAGNESIUM: CPT | Performed by: FAMILY MEDICINE

## 2017-07-23 PROCEDURE — 85730 THROMBOPLASTIN TIME PARTIAL: CPT | Performed by: INTERNAL MEDICINE

## 2017-07-23 PROCEDURE — 85025 COMPLETE CBC W/AUTO DIFF WBC: CPT | Performed by: FAMILY MEDICINE

## 2017-07-23 PROCEDURE — 97162 PT EVAL MOD COMPLEX 30 MIN: CPT

## 2017-07-23 PROCEDURE — G8979 MOBILITY GOAL STATUS: HCPCS

## 2017-07-23 PROCEDURE — 97530 THERAPEUTIC ACTIVITIES: CPT

## 2017-07-23 RX ORDER — MAGNESIUM CARB/ALUMINUM HYDROX 105-160MG
296 TABLET,CHEWABLE ORAL ONCE
Status: COMPLETED | OUTPATIENT
Start: 2017-07-23 | End: 2017-07-23

## 2017-07-23 RX ORDER — PANTOPRAZOLE SODIUM 40 MG/1
40 TABLET, DELAYED RELEASE ORAL
Status: DISCONTINUED | OUTPATIENT
Start: 2017-07-24 | End: 2017-07-24 | Stop reason: HOSPADM

## 2017-07-23 RX ADMIN — METOPROLOL TARTRATE 100 MG: 100 TABLET ORAL at 10:47

## 2017-07-23 RX ADMIN — APIXABAN 10 MG: 5 TABLET, FILM COATED ORAL at 10:46

## 2017-07-23 RX ADMIN — VANCOMYCIN HYDROCHLORIDE 1250 MG: 500 INJECTION, POWDER, LYOPHILIZED, FOR SOLUTION INTRAVENOUS at 12:32

## 2017-07-23 RX ADMIN — OLMESARTAN MEDOXOMIL 40 MG: 20 TABLET, FILM COATED ORAL at 10:47

## 2017-07-23 RX ADMIN — OXYCODONE HYDROCHLORIDE AND ACETAMINOPHEN 1 TABLET: 5; 325 TABLET ORAL at 10:45

## 2017-07-23 RX ADMIN — PANTOPRAZOLE SODIUM 40 MG: 40 TABLET, DELAYED RELEASE ORAL at 10:46

## 2017-07-23 RX ADMIN — ATORVASTATIN CALCIUM 20 MG: 10 TABLET, FILM COATED ORAL at 10:46

## 2017-07-23 RX ADMIN — APIXABAN 10 MG: 5 TABLET, FILM COATED ORAL at 17:16

## 2017-07-23 RX ADMIN — Medication 1 CAPSULE: at 10:53

## 2017-07-23 RX ADMIN — Medication 325 MG: at 10:47

## 2017-07-23 RX ADMIN — CEFEPIME HYDROCHLORIDE 2000 MG: 2 INJECTION, POWDER, FOR SOLUTION INTRAVENOUS at 17:15

## 2017-07-23 RX ADMIN — METOPROLOL TARTRATE 100 MG: 100 TABLET ORAL at 21:07

## 2017-07-23 RX ADMIN — MAGESIUM CITRATE 296 ML: 1.75 LIQUID ORAL at 10:48

## 2017-07-23 RX ADMIN — PSYLLIUM HUSK 1 PACKET: 6 GRANULE ORAL at 10:49

## 2017-07-23 RX ADMIN — Medication 325 MG: at 17:16

## 2017-07-23 RX ADMIN — OXYCODONE HYDROCHLORIDE AND ACETAMINOPHEN 1 TABLET: 5; 325 TABLET ORAL at 18:16

## 2017-07-23 RX ADMIN — COLCHICINE 1.2 MG: 0.6 TABLET, FILM COATED ORAL at 10:46

## 2017-07-23 RX ADMIN — OXYCODONE HYDROCHLORIDE AND ACETAMINOPHEN 500 MG: 500 TABLET ORAL at 10:47

## 2017-07-23 RX ADMIN — FOLIC ACID 1 MG: 1 TABLET ORAL at 10:47

## 2017-07-23 RX ADMIN — Medication 1 TABLET: at 10:46

## 2017-07-23 RX ADMIN — METHYLPREDNISOLONE SODIUM SUCCINATE 40 MG: 40 INJECTION, POWDER, FOR SOLUTION INTRAMUSCULAR; INTRAVENOUS at 10:49

## 2017-07-24 ENCOUNTER — HOSPITAL ENCOUNTER (INPATIENT)
Facility: HOSPITAL | Age: 82
LOS: 3 days | Discharge: HOME HEALTH | DRG: 559 | End: 2017-07-27
Attending: INTERNAL MEDICINE | Admitting: INTERNAL MEDICINE
Payer: COMMERCIAL

## 2017-07-24 ENCOUNTER — APPOINTMENT (INPATIENT)
Dept: PHYSICAL THERAPY | Facility: HOSPITAL | Age: 82
DRG: 177 | End: 2017-07-24
Payer: COMMERCIAL

## 2017-07-24 ENCOUNTER — GENERIC CONVERSION - ENCOUNTER (OUTPATIENT)
Dept: OTHER | Facility: OTHER | Age: 82
End: 2017-07-24

## 2017-07-24 ENCOUNTER — APPOINTMENT (INPATIENT)
Dept: NON INVASIVE DIAGNOSTICS | Facility: HOSPITAL | Age: 82
DRG: 177 | End: 2017-07-24
Payer: COMMERCIAL

## 2017-07-24 VITALS
BODY MASS INDEX: 29.6 KG/M2 | DIASTOLIC BLOOD PRESSURE: 86 MMHG | WEIGHT: 146.83 LBS | TEMPERATURE: 98.4 F | HEIGHT: 59 IN | HEART RATE: 79 BPM | RESPIRATION RATE: 18 BRPM | OXYGEN SATURATION: 95 % | SYSTOLIC BLOOD PRESSURE: 143 MMHG

## 2017-07-24 LAB
ANION GAP SERPL CALCULATED.3IONS-SCNC: 9 MMOL/L (ref 4–13)
BASOPHILS # BLD MANUAL: 0 THOUSAND/UL (ref 0–0.1)
BASOPHILS NFR MAR MANUAL: 0 % (ref 0–1)
BUN SERPL-MCNC: 24 MG/DL (ref 5–25)
CALCIUM SERPL-MCNC: 8.7 MG/DL (ref 8.3–10.1)
CHLORIDE SERPL-SCNC: 107 MMOL/L (ref 100–108)
CO2 SERPL-SCNC: 29 MMOL/L (ref 21–32)
CREAT SERPL-MCNC: 0.85 MG/DL (ref 0.6–1.3)
EOSINOPHIL # BLD MANUAL: 0.11 THOUSAND/UL (ref 0–0.4)
EOSINOPHIL NFR BLD MANUAL: 1 % (ref 0–6)
ERYTHROCYTE [DISTWIDTH] IN BLOOD BY AUTOMATED COUNT: 14.8 % (ref 11.6–15.1)
GFR SERPL CREATININE-BSD FRML MDRD: >60 ML/MIN/1.73SQ M
GLUCOSE SERPL-MCNC: 96 MG/DL (ref 65–140)
HCT VFR BLD AUTO: 26.4 % (ref 34.8–46.1)
HGB BLD-MCNC: 8.2 G/DL (ref 11.5–15.4)
LYMPHOCYTES # BLD AUTO: 1.59 THOUSAND/UL (ref 0.6–4.47)
LYMPHOCYTES # BLD AUTO: 14 % (ref 14–44)
MCH RBC QN AUTO: 27 PG (ref 26.8–34.3)
MCHC RBC AUTO-ENTMCNC: 31.1 G/DL (ref 31.4–37.4)
MCV RBC AUTO: 87 FL (ref 82–98)
MONOCYTES # BLD AUTO: 0.11 THOUSAND/UL (ref 0–1.22)
MONOCYTES NFR BLD: 1 % (ref 4–12)
NEUTROPHILS # BLD MANUAL: 9.53 THOUSAND/UL (ref 1.85–7.62)
NEUTS SEG NFR BLD AUTO: 84 % (ref 43–75)
PLATELET # BLD AUTO: 307 THOUSANDS/UL (ref 149–390)
PLATELET BLD QL SMEAR: ADEQUATE
PMV BLD AUTO: 10 FL (ref 8.9–12.7)
POTASSIUM SERPL-SCNC: 3.4 MMOL/L (ref 3.5–5.3)
RBC # BLD AUTO: 3.04 MILLION/UL (ref 3.81–5.12)
SODIUM SERPL-SCNC: 145 MMOL/L (ref 136–145)
TOTAL CELLS COUNTED SPEC: 100
VANCOMYCIN TROUGH SERPL-MCNC: 10.9 UG/ML (ref 10–20)
WBC # BLD AUTO: 11.34 THOUSAND/UL (ref 4.31–10.16)

## 2017-07-24 PROCEDURE — 85007 BL SMEAR W/DIFF WBC COUNT: CPT | Performed by: FAMILY MEDICINE

## 2017-07-24 PROCEDURE — 80048 BASIC METABOLIC PNL TOTAL CA: CPT | Performed by: FAMILY MEDICINE

## 2017-07-24 PROCEDURE — 93306 TTE W/DOPPLER COMPLETE: CPT

## 2017-07-24 PROCEDURE — 85027 COMPLETE CBC AUTOMATED: CPT | Performed by: FAMILY MEDICINE

## 2017-07-24 PROCEDURE — 97116 GAIT TRAINING THERAPY: CPT

## 2017-07-24 PROCEDURE — 80202 ASSAY OF VANCOMYCIN: CPT | Performed by: INTERNAL MEDICINE

## 2017-07-24 PROCEDURE — 97166 OT EVAL MOD COMPLEX 45 MIN: CPT

## 2017-07-24 PROCEDURE — 97110 THERAPEUTIC EXERCISES: CPT

## 2017-07-24 PROCEDURE — 97535 SELF CARE MNGMENT TRAINING: CPT

## 2017-07-24 RX ORDER — COLCHICINE 0.6 MG/1
0.6 TABLET ORAL DAILY
Refills: 0
Start: 2017-07-24 | End: 2020-01-08 | Stop reason: ALTCHOICE

## 2017-07-24 RX ORDER — LEVOFLOXACIN 750 MG/1
750 TABLET ORAL EVERY 24 HOURS
Qty: 5 TABLET | Refills: 0
Start: 2017-07-24 | End: 2017-07-29

## 2017-07-24 RX ADMIN — VANCOMYCIN HYDROCHLORIDE 1250 MG: 500 INJECTION, POWDER, LYOPHILIZED, FOR SOLUTION INTRAVENOUS at 11:43

## 2017-07-24 RX ADMIN — Medication 325 MG: at 08:12

## 2017-07-24 RX ADMIN — ALPRAZOLAM 1 MG: 0.5 TABLET ORAL at 00:01

## 2017-07-24 RX ADMIN — CEFEPIME HYDROCHLORIDE 2000 MG: 2 INJECTION, POWDER, FOR SOLUTION INTRAVENOUS at 10:08

## 2017-07-24 RX ADMIN — METOPROLOL TARTRATE 100 MG: 100 TABLET ORAL at 08:12

## 2017-07-24 RX ADMIN — APIXABAN 10 MG: 5 TABLET, FILM COATED ORAL at 08:13

## 2017-07-24 RX ADMIN — MORPHINE SULFATE 4 MG: 4 INJECTION, SOLUTION INTRAMUSCULAR; INTRAVENOUS at 12:13

## 2017-07-24 RX ADMIN — COLCHICINE 1.2 MG: 0.6 TABLET, FILM COATED ORAL at 08:12

## 2017-07-24 RX ADMIN — OLMESARTAN MEDOXOMIL 40 MG: 20 TABLET, FILM COATED ORAL at 08:12

## 2017-07-24 RX ADMIN — Medication 1 CAPSULE: at 08:13

## 2017-07-24 RX ADMIN — FOLIC ACID 1 MG: 1 TABLET ORAL at 08:12

## 2017-07-24 RX ADMIN — OXYCODONE HYDROCHLORIDE AND ACETAMINOPHEN 1 TABLET: 5; 325 TABLET ORAL at 13:33

## 2017-07-24 RX ADMIN — OXYCODONE HYDROCHLORIDE AND ACETAMINOPHEN 500 MG: 500 TABLET ORAL at 08:13

## 2017-07-24 RX ADMIN — OXYCODONE HYDROCHLORIDE AND ACETAMINOPHEN 1 TABLET: 5; 325 TABLET ORAL at 08:09

## 2017-07-24 RX ADMIN — ATORVASTATIN CALCIUM 20 MG: 10 TABLET, FILM COATED ORAL at 08:12

## 2017-07-24 RX ADMIN — PANTOPRAZOLE SODIUM 40 MG: 40 TABLET, DELAYED RELEASE ORAL at 05:41

## 2017-07-24 RX ADMIN — Medication 1 TABLET: at 08:12

## 2017-07-25 PROCEDURE — 97110 THERAPEUTIC EXERCISES: CPT

## 2017-07-25 PROCEDURE — 97530 THERAPEUTIC ACTIVITIES: CPT

## 2017-07-25 PROCEDURE — 97116 GAIT TRAINING THERAPY: CPT

## 2017-07-25 PROCEDURE — 97162 PT EVAL MOD COMPLEX 30 MIN: CPT

## 2017-07-25 PROCEDURE — 97112 NEUROMUSCULAR REEDUCATION: CPT

## 2017-07-26 LAB
BACTERIA BLD CULT: NORMAL
BACTERIA BLD CULT: NORMAL

## 2017-07-26 PROCEDURE — 97535 SELF CARE MNGMENT TRAINING: CPT

## 2017-07-26 PROCEDURE — 97116 GAIT TRAINING THERAPY: CPT

## 2017-07-26 PROCEDURE — 97110 THERAPEUTIC EXERCISES: CPT

## 2017-07-26 PROCEDURE — 97530 THERAPEUTIC ACTIVITIES: CPT

## 2017-08-01 ENCOUNTER — ALLSCRIPTS OFFICE VISIT (OUTPATIENT)
Dept: OTHER | Facility: OTHER | Age: 82
End: 2017-08-01

## 2017-08-10 ENCOUNTER — APPOINTMENT (OUTPATIENT)
Dept: FAMILY MEDICINE CLINIC | Facility: CLINIC | Age: 82
End: 2017-08-10
Payer: COMMERCIAL

## 2017-08-10 ENCOUNTER — GENERIC CONVERSION - ENCOUNTER (OUTPATIENT)
Dept: OTHER | Facility: OTHER | Age: 82
End: 2017-08-10

## 2017-08-10 DIAGNOSIS — D64.9 ANEMIA: ICD-10-CM

## 2017-08-10 PROCEDURE — 99213 OFFICE O/P EST LOW 20 MIN: CPT | Performed by: FAMILY MEDICINE

## 2017-09-08 ENCOUNTER — GENERIC CONVERSION - ENCOUNTER (OUTPATIENT)
Dept: OTHER | Facility: OTHER | Age: 82
End: 2017-09-08

## 2017-09-08 ENCOUNTER — APPOINTMENT (OUTPATIENT)
Dept: FAMILY MEDICINE CLINIC | Facility: CLINIC | Age: 82
End: 2017-09-08
Payer: COMMERCIAL

## 2017-09-08 PROCEDURE — 99212 OFFICE O/P EST SF 10 MIN: CPT | Performed by: FAMILY MEDICINE

## 2017-09-12 ENCOUNTER — ALLSCRIPTS OFFICE VISIT (OUTPATIENT)
Dept: OTHER | Facility: OTHER | Age: 82
End: 2017-09-12

## 2017-09-12 ENCOUNTER — APPOINTMENT (OUTPATIENT)
Dept: RADIOLOGY | Facility: MEDICAL CENTER | Age: 82
End: 2017-09-12
Payer: COMMERCIAL

## 2017-09-12 DIAGNOSIS — M16.11 PRIMARY OSTEOARTHRITIS OF RIGHT HIP: ICD-10-CM

## 2017-09-12 PROCEDURE — 73502 X-RAY EXAM HIP UNI 2-3 VIEWS: CPT

## 2017-09-13 ENCOUNTER — TRANSCRIBE ORDERS (OUTPATIENT)
Dept: LAB | Facility: MEDICAL CENTER | Age: 82
End: 2017-09-13

## 2017-09-13 ENCOUNTER — APPOINTMENT (OUTPATIENT)
Dept: LAB | Facility: MEDICAL CENTER | Age: 82
End: 2017-09-13
Payer: COMMERCIAL

## 2017-09-13 DIAGNOSIS — D64.9 ANEMIA: ICD-10-CM

## 2017-09-13 LAB
BASOPHILS # BLD AUTO: 0.04 THOUSANDS/ΜL (ref 0–0.1)
BASOPHILS NFR BLD AUTO: 1 % (ref 0–1)
EOSINOPHIL # BLD AUTO: 0.23 THOUSAND/ΜL (ref 0–0.61)
EOSINOPHIL NFR BLD AUTO: 4 % (ref 0–6)
ERYTHROCYTE [DISTWIDTH] IN BLOOD BY AUTOMATED COUNT: 15.2 % (ref 11.6–15.1)
HCT VFR BLD AUTO: 38.7 % (ref 34.8–46.1)
HGB BLD-MCNC: 12.5 G/DL (ref 11.5–15.4)
IRON SERPL-MCNC: 52 UG/DL (ref 50–170)
LYMPHOCYTES # BLD AUTO: 1.17 THOUSANDS/ΜL (ref 0.6–4.47)
LYMPHOCYTES NFR BLD AUTO: 20 % (ref 14–44)
MCH RBC QN AUTO: 27.7 PG (ref 26.8–34.3)
MCHC RBC AUTO-ENTMCNC: 32.3 G/DL (ref 31.4–37.4)
MCV RBC AUTO: 86 FL (ref 82–98)
MONOCYTES # BLD AUTO: 0.53 THOUSAND/ΜL (ref 0.17–1.22)
MONOCYTES NFR BLD AUTO: 9 % (ref 4–12)
NEUTROPHILS # BLD AUTO: 3.93 THOUSANDS/ΜL (ref 1.85–7.62)
NEUTS SEG NFR BLD AUTO: 66 % (ref 43–75)
NRBC BLD AUTO-RTO: 0 /100 WBCS
PLATELET # BLD AUTO: 184 THOUSANDS/UL (ref 149–390)
PMV BLD AUTO: 11 FL (ref 8.9–12.7)
RBC # BLD AUTO: 4.52 MILLION/UL (ref 3.81–5.12)
WBC # BLD AUTO: 5.91 THOUSAND/UL (ref 4.31–10.16)

## 2017-09-13 PROCEDURE — 36415 COLL VENOUS BLD VENIPUNCTURE: CPT

## 2017-09-13 PROCEDURE — 83540 ASSAY OF IRON: CPT

## 2017-09-13 PROCEDURE — 85025 COMPLETE CBC W/AUTO DIFF WBC: CPT

## 2017-10-06 ENCOUNTER — GENERIC CONVERSION - ENCOUNTER (OUTPATIENT)
Dept: OTHER | Facility: OTHER | Age: 82
End: 2017-10-06

## 2017-10-06 ENCOUNTER — APPOINTMENT (OUTPATIENT)
Dept: FAMILY MEDICINE CLINIC | Facility: CLINIC | Age: 82
End: 2017-10-06
Payer: COMMERCIAL

## 2017-10-06 PROCEDURE — 99213 OFFICE O/P EST LOW 20 MIN: CPT | Performed by: FAMILY MEDICINE

## 2017-11-03 ENCOUNTER — ALLSCRIPTS OFFICE VISIT (OUTPATIENT)
Dept: FAMILY MEDICINE CLINIC | Facility: CLINIC | Age: 82
End: 2017-11-03
Payer: COMMERCIAL

## 2017-11-03 DIAGNOSIS — N28.9 DISORDER OF KIDNEY AND URETER: ICD-10-CM

## 2017-11-03 PROCEDURE — 99212 OFFICE O/P EST SF 10 MIN: CPT | Performed by: FAMILY MEDICINE

## 2017-11-10 NOTE — PROGRESS NOTES
Assessment    1  Insomnia, persistent (307 42) (G47 00)   2  Abnormal kidney function (593 9) (N28 9)    Plan  Abnormal kidney function    · (1) COMPREHENSIVE METABOLIC PANEL; Status:Active; Requested PBK:[id number]; Anxiety    · ALPRAZolam 1 MG Oral Tablet; TAKE 1 TABLET  DAILY AS NEEDED  Insomnia, persistent    · Follow-up visit in 1 month Evaluation and Treatment  Follow-up  Status: Hold For -Scheduling  Requested for: [id number]    Discussion/Summary    We will stop the Allopurinol  She will continue her other meds  Check CMP prior to next visit  1 month  Chief Complaint  Patient is here for a medication refill  No complaints today  Patient does not want a flu shot  Advance Directives  Advance Directive [facility]:  YES - Patient has an advance health care directive  a scanned copy is in the patient's chart  History of Present Illness  81 yo female presents for above  She is doing well  She wants to come off the Allopurinol and see if the gout recurs  other complaints today  Review of Systems   Constitutional: No fever, no chills, feels well, no tiredness, no recent weight gain or weight loss  Eyes: No complaints of eye pain, no red eyes, no eyesight problems, no discharge, no dry eyes, no itching of eyes  ENT: no complaints of earache, no loss of hearing, no nose bleeds, no nasal discharge, no sore throat, no hoarseness  Cardiovascular: No complaints of slow heart rate, no fast heart rate, no chest pain, no palpitations, no leg claudication, no lower extremity edema  Respiratory: No complaints of shortness of breath, no wheezing, no cough, no SOB on exertion, no orthopnea, no PND  Gastrointestinal: No complaints of abdominal pain, no constipation, no nausea or vomiting, no diarrhea, no bloody stools  Genitourinary: No complaints of dysuria, no incontinence, no pelvic pain, no dysmenorrhea, no vaginal discharge or bleeding  Musculoskeletal: as noted in HPI  Active Problems  1  Abnormal electrocardiogram (794 31) (R94 31)   2  Abnormal kidney function (593 9) (N28 9)   3  Abnormal thyroid blood test (794 5) (R94 6)   4  Active drug dependence (304 60) (F19 20)   5  Aftercare following right hip joint replacement surgery (V54 81,V43 64) (Z47 1,Z96 641)   6  Ambulatory dysfunction (719 7) (R26 2)   7  Anemia, unspecified type (285 9) (D64 9)   8  Anxiety (300 00) (F41 9)   9  Arthritis (716 90) (M19 90)   10  Arthritis of right hip (716 95) (M16 11)   11  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)   12  Callus (700) (L84)   13  Cold intolerance (780 99) (R68 89)   14  Colon cancer screening (V76 51) (Z12 11)   15  Constipation (564 00) (K59 00)   16  Depression screen (V79 0) (Z13 89)   17  Dyslipidemia (272 4) (E78 5)   18  Edema (782 3) (R60 9)   19  Encounter for mammogram to establish baseline mammogram (V76 12) (Z12 31)   20  Encounter for screening for other nervous system disorder (V80 09) (Z13 858)   21  Fatigue (780 79) (R53 83)   22  Gout (274 9) (M10 9)   23  Heart disease (429 9) (I51 9)   24  Hypertension (401 9) (I10)   25  Hypokalemia (276 8) (E87 6)   26  Influenza vaccine needed (V04 81) (Z23)   27  Insomnia, persistent (307 42) (G47 00)   28  Iron deficiency anemia (280 9) (D50 9)   29  Medicare annual wellness visit, initial (V70 0) (Z00 00)   30  Need for pneumococcal vaccination (V03 82) (Z23)   31  Need for shingles vaccine (V04 89) (Z23)   32  Numbness of right hand (782 0) (R20 0)   33  Pain of both hip joints (719 45) (M25 551,M25 552)   34  Pre-op examination (V72 84) (Z01 818)   35  Renal function test abnormal (794 4) (R94 4)   36  Screening for diabetes mellitus (V77 1) (Z13 1)   37  Screening for osteoporosis (V82 81) (Z13 820)   38  Status post right hip replacement (V43 64) (A67 522)    Past Medical History  1  History of Abnormal blood sugar (790 29) (R73 09)   2  History of Acute maxillary sinusitis, recurrence not specified (461 0) (J01 00)   3  History of Acute sinusitis, recurrence not specified, unspecified location (461 9) (J01 90)   4  History of Acute upper respiratory infection (465 9) (J06 9)   5  Anxiety (300 00) (F41 9)   6  History of Arthralgia of right hip (719 45) (M25 551)   7  Arthritis (716 90) (M19 90)   8  History of Ceruminosis, right (380 4) (H61 21)   9  History of Denial (799 29) (R45 89)   10  History of acute otitis media (V12 49) (Z86 69)   11  History of acute sinusitis (V12 69) (Z87 09)   12  History of anaphylactic shock (V13 81) (Z87 892)   13  History of cardiomyopathy (V12 59) (Z86 79)   14  History of chest pain (V13 89) (Z87 898)   15  History of ganglion cyst (V13 3) (Z87 39)   16  History of weight gain (V15 89) (Z87 898)   17  Hypertension (401 9) (I10)   18  History of Rash and nonspecific skin eruption (782 1) (R21)   19  History of Wrist pain, acute, right (719 43) (M25 531)    The active problems and past medical history were reviewed and updated today  Surgical History  1  History of Back Surgery   2  History of Catheter Ablation Atrial Supraventricular Tachycardia   3  History of Hip Replacement   4  History of Knee Arthroplasty   5  History of Vaginal Hysterectomy    The surgical history was reviewed and updated today  Family History  Mother    1  Family history of Old age  Father    2  Family history of Old age    The family history was reviewed and updated today  Social History     · Never a smoker   · No alcohol use   · Non-smoker (V49 89) (Z78 9)   · Tetanus booster  The social history was reviewed and updated today  The social history was reviewed and is unchanged  Current Meds   1  Alive Womens 50+ TABS; TAKE 1 TABLET DAILY; Therapy: ([place]) to Recorded   2  ALPRAZolam 1 MG Oral Tablet; TAKE 1 TABLET  DAILY AS NEEDED; Therapy: 12SBM3328 to (Last Rx:06Oct2017) Ordered   3  Atorvastatin Calcium 20 MG Oral Tablet; TAKE 1 TABLET DAILY;  Therapy: 53TTY2489 to (Last Rx:03Apr2017) Requested for: 03Apr2017 Ordered   4  B Complex TABS; TAKE 1 TABLET DAILY; Therapy: (Jeevan Phan) to Recorded   5  Colchicine 0 6 MG Oral Tablet; TAKE 1 TABLET DAILY AS DIRECTED; Therapy: 65TMR5199 to (Evaluate:09Sep2017)  Requested for: 04Zda9055; Last Rx:50Gdd5569 Ordered   6  Diclofenac-Misoprostol 50-0 2 MG Oral Tablet Delayed Release; 0ne tab po mushtaq as needed; Therapy: 33Blq8257 to (Last Rx:08Sep2017)  Requested for: 08Sep2017 Ordered   7  Eliquis 5 MG Oral Tablet; TAKE ONE (1) TABLET BY MOUTH TWICE DAILY; Therapy: 13Pcj1138 to (Last Rx:16Ypw9044)  Requested for: 30Aug2017 Ordered   8  EPINEPHrine 0 3 MG/0 3ML Injection Solution Auto-injector; INJECT 0 3ML INTRAMUSCULARLY AS DIRECTED  Requested for: 02Jun2016; Last Rx:02Jun2016 Ordered   9  Folic Acid 1 MG Oral Tablet; TAKE 1 TABLET DAILY AS DIRECTED; Therapy: 41CZN7003 to (Evaluate:30Aug2017)  Requested for: 44VLG0762; Last Rx:02May2017 Ordered   10  Furosemide 20 MG Oral Tablet; TAKE 1 TABLET BY MOUTH DAILY AS DIRECTED; Therapy: 78CWF2936 to (Evaluate:29Sep2018)  Requested for: 37TBD9371; Last  Rx:04Oct2017 Ordered   11  Metoprolol Tartrate 100 MG Oral Tablet; Take 1 tablet 2x daily as directed; Therapy: 88TBI9113 to (Evaluate:03Sep2018)  Requested for: 08Sep2017; Last  Rx:08Sep2017 Ordered   12  Potassium Chloride Winter ER 20 MEQ Oral Tablet Extended Release; TAKE 1 TABLET BY  MOUTH EVERY DAY -MUST TAKE WITH FOOD OR MILK; Therapy: 66ULW6062 to (Last Rx:10Oct2017)  Requested for: 15RTB2311 Ordered   13  ProAir  (90 Base) MCG/ACT Inhalation Aerosol Solution; INHALE 1 TO 2 PUFFS  EVERY 6 HOURS AS NEEDED; Therapy: 36EZR0347 to (Kuldip Thedford)  Requested for: 93FWL3430; Last  Rx:08Jun2015 Ordered   14  Vitamin C 500 MG Oral Capsule; Take 1 capsule twice daily; Therapy: 34JAT1627 to (Evaluate:84Nju4962)  Requested for: 27KZJ4728; Last  QP:50BLK5221 Ordered    The medication list was reviewed and updated today  Allergies  1   Codeine Derivatives   2  TETANUS  3  Bee sting    Vitals  Vital Signs    Recorded: 63PHD0862 11:42AM   Temperature 98 5 F   Heart Rate 70   Respiration 16   Systolic 018   Diastolic 62   Height 4 ft 11 in   Weight 145 lb    BMI Calculated 29 29   BSA Calculated 1 61   O2 Saturation 94       Physical Exam   Constitutional  General appearance: No acute distress, well appearing and well nourished  Eyes  Conjunctiva and lids: No swelling, erythema or discharge  Pupils and irises: Equal, round and reactive to light  Ears, Nose, Mouth, and Throat  External inspection of ears and nose: Normal    Pulmonary  Respiratory effort: No increased work of breathing or signs of respiratory distress  Auscultation of lungs: Clear to auscultation  Cardiovascular  Palpation of heart: Normal PMI, no thrills  Auscultation of heart: Normal rate and rhythm, normal S1 and S2, without murmurs  Examination of extremities for edema and/or varicosities: Normal    Abdomen  Abdomen: Non-tender, no masses  Liver and spleen: No hepatomegaly or splenomegaly  Lymphatic  Palpation of lymph nodes in neck: No lymphadenopathy  Skin  Skin and subcutaneous tissue: Normal without rashes or lesions  Neurologic  Cranial nerves: Cranial nerves 2-12 intact  Psychiatric  Orientation to person, place, and time: Normal    Mood and affect: Normal          Future Appointments    Date/Time Provider Specialty Site   03/13/2018 11:00 AM BETH Rees  Orthopedic Surgery [address]        Signatures   Electronically signed by :  Tamara Anthony, [facility]; Nov  3 2017 11:56AM EST                       (Author)    Electronically signed by : Alla Briseno MD; Nov 8 2017  7:20PM EST                       (Author)

## 2017-11-14 ENCOUNTER — TRANSCRIBE ORDERS (OUTPATIENT)
Dept: RADIOLOGY | Facility: MEDICAL CENTER | Age: 82
End: 2017-11-14

## 2017-11-14 ENCOUNTER — LAB (OUTPATIENT)
Dept: LAB | Facility: MEDICAL CENTER | Age: 82
End: 2017-11-14
Payer: COMMERCIAL

## 2017-11-14 DIAGNOSIS — N28.9 DISORDER OF KIDNEY AND URETER: ICD-10-CM

## 2017-11-14 LAB
ALBUMIN SERPL BCP-MCNC: 3.8 G/DL (ref 3.5–5)
ALP SERPL-CCNC: 107 U/L (ref 46–116)
ALT SERPL W P-5'-P-CCNC: 27 U/L (ref 12–78)
ANION GAP SERPL CALCULATED.3IONS-SCNC: 8 MMOL/L (ref 4–13)
AST SERPL W P-5'-P-CCNC: 21 U/L (ref 5–45)
BILIRUB SERPL-MCNC: 0.93 MG/DL (ref 0.2–1)
BUN SERPL-MCNC: 35 MG/DL (ref 5–25)
CALCIUM SERPL-MCNC: 9.3 MG/DL (ref 8.3–10.1)
CHLORIDE SERPL-SCNC: 107 MMOL/L (ref 100–108)
CO2 SERPL-SCNC: 29 MMOL/L (ref 21–32)
CREAT SERPL-MCNC: 0.97 MG/DL (ref 0.6–1.3)
GFR SERPL CREATININE-BSD FRML MDRD: 55 ML/MIN/1.73SQ M
GLUCOSE P FAST SERPL-MCNC: 88 MG/DL (ref 65–99)
POTASSIUM SERPL-SCNC: 3.5 MMOL/L (ref 3.5–5.3)
PROT SERPL-MCNC: 7.9 G/DL (ref 6.4–8.2)
SODIUM SERPL-SCNC: 144 MMOL/L (ref 136–145)

## 2017-11-14 PROCEDURE — 36415 COLL VENOUS BLD VENIPUNCTURE: CPT

## 2017-11-14 PROCEDURE — 80053 COMPREHEN METABOLIC PANEL: CPT

## 2017-12-04 ENCOUNTER — ALLSCRIPTS OFFICE VISIT (OUTPATIENT)
Dept: FAMILY MEDICINE CLINIC | Facility: CLINIC | Age: 82
End: 2017-12-04
Payer: COMMERCIAL

## 2017-12-04 PROCEDURE — 99212 OFFICE O/P EST SF 10 MIN: CPT | Performed by: FAMILY MEDICINE

## 2017-12-06 NOTE — PROGRESS NOTES
Assessment    1  Anxiety (300 00) (F41 9)    Plan  Anxiety    · ALPRAZolam 1 MG Oral Tablet; TAKE 1 TABLET  DAILY AS NEEDED   · Follow-up visit in 1 month Evaluation and Treatment  Follow-up  Status: Hold For -Scheduling  Requested for: 06KXS3101    Discussion/Summary    Continue current meds  RTC 1 month  Chief Complaint  pt here for f/u      Advance Directives  Advance Directive [facility]:  YES - Patient has an advance health care directive  a scanned copy is in the patient's chart  History of Present Illness  81 yo female presents for above  She is doing well  No complaints today  med refill  Review of Systems   Constitutional: No fever, no chills, feels well, no tiredness, no recent weight gain or weight loss  Eyes: No complaints of eye pain, no red eyes, no eyesight problems, no discharge, no dry eyes, no itching of eyes  ENT: no complaints of earache, no loss of hearing, no nose bleeds, no nasal discharge, no sore throat, no hoarseness  Cardiovascular: No complaints of slow heart rate, no fast heart rate, no chest pain, no palpitations, no leg claudication, no lower extremity edema  Respiratory: No complaints of shortness of breath, no wheezing, no cough, no SOB on exertion, no orthopnea, no PND  Gastrointestinal: No complaints of abdominal pain, no constipation, no nausea or vomiting, no diarrhea, no bloody stools  Genitourinary: No complaints of dysuria, no incontinence, no pelvic pain, no dysmenorrhea, no vaginal discharge or bleeding  Musculoskeletal: No complaints of arthralgias, no myalgias, no joint swelling or stiffness, no limb pain or swelling  Integumentary: No complaints of skin rash or lesions, no itching, no skin wounds, no breast pain or lump  Neurological: No complaints of headache, no confusion, no convulsions, no numbness, no dizziness or fainting, no tingling, no limb weakness, no difficulty walking    Psychiatric: Not suicidal, no sleep disturbance, no anxiety or depression, no change in personality, no emotional problems  Endocrine: No complaints of proptosis, no hot flashes, no muscle weakness, no deepening of the voice, no feelings of weakness  Hematologic/Lymphatic: No complaints of swollen glands, no swollen glands in the neck, does not bleed easily, does not bruise easily  Active Problems  1  Abnormal electrocardiogram (794 31) (R94 31)   2  Abnormal kidney function (593 9) (N28 9)   3  Abnormal thyroid blood test (794 5) (R94 6)   4  Active drug dependence (304 60) (F19 20)   5  Aftercare following right hip joint replacement surgery (V54 81,V43 64) (Z47 1,Z96 641)   6  Ambulatory dysfunction (719 7) (R26 2)   7  Anemia, unspecified type (285 9) (D64 9)   8  Anxiety (300 00) (F41 9)   9  Arthritis of right hip (716 95) (M16 11)   10  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)   11  Callus (700) (L84)   12  Cold intolerance (780 99) (R68 89)   13  Colon cancer screening (V76 51) (Z12 11)   14  Constipation (564 00) (K59 00)   15  Depression screen (V79 0) (Z13 89)   16  Dyslipidemia (272 4) (E78 5)   17  Edema (782 3) (R60 9)   18  Encounter for mammogram to establish baseline mammogram (V76 12) (Z12 31)   19  Encounter for screening for other nervous system disorder (V80 09) (Z13 858)   20  Fatigue (780 79) (R53 83)   21  Gout (274 9) (M10 9)   22  Heart disease (429 9) (I51 9)   23  History of arthritis (V13 4) (Z87 39)   24  Hypertension (401 9) (I10)   25  Hypokalemia (276 8) (E87 6)   26  Influenza vaccine needed (V04 81) (Z23)   27  Insomnia, persistent (307 42) (G47 00)   28  Iron deficiency anemia (280 9) (D50 9)   29  Medicare annual wellness visit, initial (V70 0) (Z00 00)   30  Need for pneumococcal vaccination (V03 82) (Z23)   31  Numbness of right hand (782 0) (R20 0)   32  Pain of both hip joints (719 45) (M25 551,M25 552)   33  Pre-op examination (V72 84) (Z01 818)   34  Renal function test abnormal (794 4) (R94 4)   35   Screening for diabetes mellitus (V77 1) (Z13 1)   36  Screening for osteoporosis (V82 81) (Z13 820)   37  Status post right hip replacement (V43 64) (T72 254)    Past Medical History  1  History of Abnormal blood sugar (790 29) (R73 09)   2  History of Acute maxillary sinusitis, recurrence not specified (461 0) (J01 00)   3  History of Acute sinusitis, recurrence not specified, unspecified location (461 9) (J01 90)   4  History of Acute upper respiratory infection (465 9) (J06 9)   5  Anxiety (300 00) (F41 9)   6  History of Arthralgia of right hip (719 45) (M25 551)   7  History of Ceruminosis, right (380 4) (H61 21)   8  History of Denial (799 29) (R45 89)   9  History of acute otitis media (V12 49) (Z86 69)   10  History of acute sinusitis (V12 69) (Z87 09)   11  History of anaphylactic shock (V13 81) (Z87 892)   12  History of arthritis (V13 4) (Z87 39)   13  History of cardiomyopathy (V12 59) (Z86 79)   14  History of chest pain (V13 89) (Z87 898)   15  History of ganglion cyst (V13 3) (Z87 39)   16  History of weight gain (V15 89) (Z87 898)   17  Hypertension (401 9) (I10)   18  History of Need for shingles vaccine (V04 89) (Z23)   19  History of Rash and nonspecific skin eruption (782 1) (R21)   20  History of Wrist pain, acute, right (719 43) (M25 531)    The active problems and past medical history were reviewed and updated today  Surgical History  1  History of Back Surgery   2  History of Catheter Ablation Atrial Supraventricular Tachycardia   3  History of Hip Replacement   4  History of Knee Arthroplasty   5  History of Vaginal Hysterectomy    The surgical history was reviewed and updated today  Family History  Mother    1  Family history of Old age  Father    2  Family history of Old age    The family history was reviewed and updated today  Social History     · Never a smoker   · No alcohol use   · Non-smoker (V49 89) (Z78 9)   · Tetanus booster  The social history was reviewed and updated today  The social history was reviewed and is unchanged  Current Meds   1  Alive Womens 50+ TABS; TAKE 1 TABLET DAILY; Therapy: (Angy Edouard) to Recorded   2  ALPRAZolam 1 MG Oral Tablet; TAKE 1 TABLET  DAILY AS NEEDED; Therapy: 27PJN8746 to (Last Rx:03Nov2017) Ordered   3  Atorvastatin Calcium 20 MG Oral Tablet; TAKE 1 TABLET DAILY; Therapy: 75VJY9105 to (Last Rx:03Apr2017)  Requested for: 69Ekg0289 Ordered   4  B Complex TABS; TAKE 1 TABLET DAILY; Therapy: (Lewfredia Javan) to Recorded   5  Colchicine 0 6 MG Oral Tablet; TAKE 1 TABLET DAILY AS DIRECTED; Therapy: 48LJW3271 to (Evaluate:09Sep2017)  Requested for: 45Rrj4267; Last Rx:09Qmx5954 Ordered   6  Diclofenac-Misoprostol 50-0 2 MG Oral Tablet Delayed Release; 0ne tab po mushtaq as needed; Therapy: 07Lub2942 to (Last Rx:08Sep2017)  Requested for: 08Sep2017 Ordered   7  Eliquis 5 MG Oral Tablet; TAKE ONE (1) TABLET BY MOUTH TWICE DAILY; Therapy: 49Udu7988 to (Last Rx:30Aug2017)  Requested for: 89Tsm1212 Ordered   8  EPINEPHrine 0 3 MG/0 3ML Injection Solution Auto-injector; INJECT 0 3ML INTRAMUSCULARLY AS DIRECTED  Requested for: 02Jun2016; Last Rx:02Jun2016 Ordered   9  Folic Acid 1 MG Oral Tablet; TAKE 1 TABLET DAILY AS DIRECTED; Therapy: 00XLU2847 to (Evaluate:43Yen1768)  Requested for: 77TPH2900; Last Rx:02May2017 Ordered   10  Furosemide 20 MG Oral Tablet; TAKE 1 TABLET 2X DAILY AS NEEDED; Therapy: 34YKC8661 to (TAVTALFM:41HMI0855)  Requested for: 34EQA8954 Recorded   11  Metoprolol Tartrate 100 MG Oral Tablet; Take 1 tablet 2x daily as directed; Therapy: 07UIR8275 to (Evaluate:59Bwd1188)  Requested for: 71Gcu0887; Last  Rx:09Hmt2344 Ordered   12  Potassium Chloride Winter ER 20 MEQ Oral Tablet Extended Release; TAKE 1 TABLET BY  MOUTH EVERY DAY -MUST TAKE WITH FOOD OR MILK; Therapy: 57VUO4395 to (Last Rx:83Aqw6121)  Requested for: 26DQF6105 Ordered   13   ProAir  (90 Base) MCG/ACT Inhalation Aerosol Solution; INHALE 1 TO 2 PUFFS  EVERY 6 HOURS AS NEEDED; Therapy: 49KPA7547 to (22 380989)  Requested for: 47UVP1994; Last  Rx:08Jun2015 Ordered   14  Vitamin C 500 MG Oral Capsule; Take 1 capsule twice daily; Therapy: 11IHJ1118 to (Evaluate:38Zoy6204)  Requested for: 15GLT8000; Last  OY:18TDY1461 Ordered    The medication list was reviewed and updated today  Allergies  1  Codeine Derivatives   2  TETANUS  3  Bee sting    Vitals  Vital Signs    Recorded: 51JDT4736 11:41AM   Temperature 97 4 F   Heart Rate 63   Respiration 18   Systolic 791   Diastolic 68   Weight 726 lb    BMI Calculated 29 49   BSA Calculated 1 61   O2 Saturation 94       Physical Exam   Constitutional  General appearance: No acute distress, well appearing and well nourished  Eyes  Conjunctiva and lids: No swelling, erythema or discharge  Pupils and irises: Equal, round and reactive to light  Ears, Nose, Mouth, and Throat  External inspection of ears and nose: Normal    Pulmonary  Respiratory effort: No increased work of breathing or signs of respiratory distress  Auscultation of lungs: Clear to auscultation  Cardiovascular  Palpation of heart: Normal PMI, no thrills  Auscultation of heart: Normal rate and rhythm, normal S1 and S2, without murmurs  Examination of extremities for edema and/or varicosities: Normal    Lymphatic  Palpation of lymph nodes in neck: No lymphadenopathy  Skin  Skin and subcutaneous tissue: Normal without rashes or lesions  Neurologic  Cranial nerves: Cranial nerves 2-12 intact  Psychiatric  Orientation to person, place, and time: Normal    Mood and affect: Normal          Future Appointments    Date/Time Provider Specialty Site   01/18/2018 12:40 PM Kam Krause DO Cardiology [facility]   03/13/2018 11:00 AM BETH Contreras   Orthopedic Surgery [facility]   01/03/2018 11:15 AM Kimber Rashid, [address] [place] [address]       Signatures   Electronically signed by : Mague Bui, [facility]; Dec  4 2017 12:05PM EST                       (Author)    Electronically signed by : Aleyda Loja MD; Dec  4 2017  4:51PM EST                       (Author)

## 2018-01-03 ENCOUNTER — GENERIC CONVERSION - ENCOUNTER (OUTPATIENT)
Dept: OTHER | Facility: OTHER | Age: 83
End: 2018-01-03

## 2018-01-03 ENCOUNTER — APPOINTMENT (OUTPATIENT)
Dept: FAMILY MEDICINE CLINIC | Facility: CLINIC | Age: 83
End: 2018-01-03
Payer: COMMERCIAL

## 2018-01-03 PROCEDURE — 99213 OFFICE O/P EST LOW 20 MIN: CPT | Performed by: FAMILY MEDICINE

## 2018-01-09 NOTE — CONSULTS
I had the pleasure of evaluating your patient, Lilibeth Wilhelm  My full evaluation follows:      History of Present Illness  Mrs Eva Silvestre is a pleasant 24-year-old female who presents to the office for follow-up  Since her last visit, she's been feeling well  She has no symptoms suggestive of recurrent SVT  She leads a sedentary lifestyle but with the activity she is able to do she denies any exertional chest pain but has noted some shortness of breath with exertion since her last visit  She denies signs or symptoms of right or left sided heart failure  She denies lightheadedness, dizziness, syncope or presyncope  She denies symptoms of claudication  During her last visit due to lower extremity edema her amlodipine was discontinued and her Benicar was increased  She was also placed on Lasix  Review of Systems      Cardiac: as noted in HPI  Skin: No complaints of nonhealing sores or skin rash  Genitourinary: No complaints of recurrent urinary tract infections, frequent urination at night, difficult urination, blood in urine, kidney stones, loss of bladder control, kidney problems, denies any birth control or hormone replacement, is not post menopausal, not currently pregnant  Psychological: No complaints of feeling depressed, anxiety, panic attacks, or difficulty concentrating  General: as noted in HPI  Respiratory: No complaints of shortness of breath, cough with sputum, or wheezing  HEENT: No complaints of serious problems, hearing problems, nose problems, throat problems, or snoring  Gastrointestinal: No complaints of liver problems, nausea, vomiting, heartburn, constipation, bloody stools, diarrhea, problems swallowing, adbominal pain, or rectal bleeding  Hematologic: No complaints of bleeding disorders, anemia, blood clots, or excessive brusing     Neurological: No complaints of numbness, tingling, dizziness, weakness, seizures, headaches, syncope or fainting, AM fatigue, daytime sleepiness, no witnessed apnea episodes  Musculoskeletal: No complaints of arthritis, back pain, or painfull swelling  Active Problems    1  Abnormal electrocardiogram (794 31) (R94 31)   2  Acute otitis media (382 9) (H66 90)   3  Anxiety (300 00) (F41 9)   4  Arthritis (716 90) (M19 90)   5  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)   6  Colon cancer screening (V76 51) (Z12 11)   7  Constipation (564 00) (K59 00)   8  Depression screen (V79 0) (Z13 89)   9  Edema (782 3) (R60 9)   10  Encounter for mammogram to establish baseline mammogram (V76 12) (Z12 31)   11  Fatigue (780 79) (R53 83)   12  Hypertension (401 9) (I10)   13  Hypokalemia (276 8) (E87 6)   14  Influenza vaccine needed (V04 81) (Z23)   15  Iron deficiency anemia (280 9) (D50 9)   16  Medicare annual wellness visit, initial (V70 0) (Z00 00)   17  Need for pneumococcal vaccination (V03 82) (Z23)   18  Need for shingles vaccine (V04 89) (Z23)   19  Rash and nonspecific skin eruption (782 1) (R21)   20  Screening for diabetes mellitus (V77 1) (Z13 1)   21  Screening for osteoporosis (V82 81) (Z13 820)   22  Weight gain (783 1) (R63 5)    Past Medical History    · History of Acute upper respiratory infection (465 9) (J06 9)   · Anxiety (300 00) (F41 9)   · Arthritis (716 90) (M19 90)   · History of Denial (799 29) (R45 89)   · History of cardiomyopathy (V12 59) (Z86 79)   · History of chest pain (V13 89) (K99 916)   · Hypertension (401 9) (I10)    The active problems and past medical history were reviewed and updated today  Surgical History    · History of Back Surgery   · History of Catheter Ablation Atrial Supraventricular Tachycardia   · History of Knee Arthroplasty   · History of Vaginal Hysterectomy    The surgical history was reviewed and updated today  Family History    · Family history of Old age    · Family history of Old age    The family history was reviewed and updated today         Social History    · Never a smoker   · No alcohol use   · Non-smoker (V49 89) (Z78 9)   · Tetanus booster  The social history was reviewed and updated today  Current Meds   1  Alive Womens 50+ TABS; TAKE 1 TABLET DAILY; Therapy: (Leslie Rod) to Recorded   2  ALPRAZolam 0 5 MG Oral Tablet; TAKE 1 TABLET DAILY AS DIRECTED; Therapy: 89HYS3812 to (Evaluate:20Jan2016); Last Rx:93Akp0796 Ordered   3  Arthrotec 75-0 2 MG Oral Tablet Delayed Release; take 1 tab daily  Requested for:   61Gje6428; Last Rx:07Nit7761 Ordered   4  Aspirin 81 MG Oral Tablet; TAKE 1 TABLET DAILY; Therapy: (Recorded:28Apr2015) to Recorded   5  B Complex TABS; TAKE 1 TABLET DAILY; Therapy: (Leslie Rod) to Recorded   6  Benicar 40 MG Oral Tablet; TAKE 1 TABLET DAILY; Therapy: 35IJG3808 to (Evaluate:19Sep2016)  Requested for: 61LVR9193; Last   Rx:09Gwz6349 Ordered   7  Clotrimazole 1 % External Cream;   Therapy: (Recorded:09Oct2015) to Recorded   8  Dulcolax Stool Softener CAPS; TAKE 1 CAPSULE TWICE DAILY AS NEEDED; Therapy: (Leslie Rod) to Recorded   9  Fiber TABS; USE AS DIRECTED; Therapy: (Anusha Lux) to Recorded   10  Furosemide 20 MG Oral Tablet; TAKE 1 TABLET DAILY AS DIRECTED; Therapy: 79MBF8211 to (Evaluate:19Sep2016)  Requested for: 81AYL3389; Last    Rx:23Xtl0036 Ordered   11  Hydrochlorothiazide 25 MG Oral Tablet; TAKE 1 TABLET DAILY; Therapy: 21XCD0038 to (Last Rx:34Msy9142)  Requested for: 27EAL6970 Ordered   12  Ibuprofen 600 MG Oral Tablet; Therapy: 05XBY1954 to Recorded   13  Lipo-Flavonoid Plus TABS; take 3 tabs daily; Therapy: (Leslie Rod) to Recorded   14  Metoprolol Tartrate 100 MG Oral Tablet; TAKE 1 TABLET TWICE DAILY  Requested for:    58Smj4435; Last Rx:59Hyt1623 Ordered   15  Potassium Chloride Winter ER 10 MEQ Oral Tablet Extended Release; TAKE (1) TABLET    DAILY; Therapy: 61YRH0568 to (Last Rx:76Snq0756)  Requested for: 09Kkh0629 Ordered   16   ProAir  (90 Base) MCG/ACT Inhalation Aerosol Solution; INHALE 1 TO 2 PUFFS    EVERY 6 HOURS AS NEEDED; Therapy: 71IOV2200 to ()  Requested for: 65FEO9674; Last    Rx:08Jun2015 Ordered    The medication list was reviewed and updated today  Allergies    1  Codeine Derivatives   2  TETANUS    3  Bee sting    Vitals   Recorded: 57NJV5850 01:25PM Recorded: 07TYG4844 12:48PM   Heart Rate  80   Systolic 314 259, LUE, Sitting   Diastolic 64 70, LUE, Sitting   Height  4 ft 11 in   Weight  161 lb    BMI Calculated  32 52   BSA Calculated  1 68     Physical Exam    Constitutional   General appearance: No acute distress, well appearing and well nourished  Eyes   Conjunctiva and Sclera examination: Conjunctiva pink, sclera anicteric  Ears, Nose, Mouth, and Throat - Oropharynx: Clear, nares are clear, mucous membranes are moist    Neck   Neck and thyroid: Normal, supple, trachea midline, no thyromegaly  Pulmonary   Respiratory effort: No increased work of breathing or signs of respiratory distress  Auscultation of lungs: Clear to auscultation, no rales, no rhonchi, no wheezing, good air movement  Cardiovascular   Auscultation of heart: Normal rate and rhythm, normal S1 and S2, no murmurs  Carotid pulses: Normal, 2+ bilaterally  Peripheral vascular exam: Normal pulses throughout, no tenderness, erythema or swelling  Pedal pulses: Normal, 2+ bilaterally  Examination of extremities for edema and/or varicosities: Abnormal   bilateral ankle 1+ pitting edema and bilateral pretibial 1+ pitting edema  Abdomen   Abdomen: Non-tender and no distention  Liver and spleen: No hepatomegaly or splenomegaly  Musculoskeletal Gait and station: Normal gait  Digits and nails: Normal without clubbing or cyanosis  Inspection/palpation of joints, bones, and muscles: Normal, ROM normal     Skin - Skin and subcutaneous tissue: Normal without rashes or lesions  Skin is warm and well perfused, normal turgor  Neurologic - Cranial nerves: II - XII intact  Speech: Normal     Psychiatric - Orientation to person, place, and time: Normal  Mood and affect: Normal       Assessment    1  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)   2  History of Catheter Ablation Atrial Supraventricular Tachycardia   3  Hypertension (401 9) (I10)   4  Edema (782 3) (R60 9)    Plan  Anxiety    · ALPRAZolam 0 5 MG Oral Tablet; TAKE 1 TABLET DAILY AS DIRECTED   Rx By: Lauren Kenyon; Dispense: 30 Days ; #:30 Tablet; Refill: 0; For: Anxiety; KIMBERLY = N; Print Rx  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 re-entry tachycardia), Edema, Hypertension    · Follow-up visit in 6 months Evaluation and Treatment  Follow-up  Status: Hold For -  Scheduling  Requested for: 80DLW5999   Ordered; For: 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 re-entry tachycardia), Edema, Hypertension; Ordered By: Lauren Kenyon Performed:  Due: 64BYT8392  Hypokalemia    · From  Potassium Chloride Winter ER 10 MEQ Oral Tablet Extended Release  TAKE (1) TABLET DAILY To Klor-Con M20 20 MEQ Oral Tablet Extended Release TAKE 1  TABLET BY MOUTH EVERY DAY -MUST TAKE WITH FOOD OR MILK   Rx By: Lauren Kenyon; Dispense: 30 Days ; #:30 Tablet Extended Release; Refill: 6; For: Hypokalemia; KIMBERLY = N; Sent To: [facility]    Discussion/Summary    Mrs Timi Hernandez is a pleasant 61-year-old female who presents to the office today for routine follow-up  Since her last visit she has been feeling well without any symptoms suggestive of recurrent SVT  Her lower extremity edema resolved after the discontinuation of amlodipine  Her blood pressure is well-controlled in the office today  Her lipids were reviewed  Statin therapy is indicated and she declines  I will see her back in the office in six months for re-evaluation  Thank you very much for allowing me to participate in the care of this patient  If you have any questions, please do not hesitate to contact me        Future Appointments    Signatures   Electronically signed by : Ger Linares DO; Jan 29 2016  1:40PM EST                       (Author)

## 2018-01-10 NOTE — PROGRESS NOTES
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)   2  History of Catheter Ablation Atrial Supraventricular Tachycardia   3  Hypertension (401 9) (I10)   4  Edema (782 3) (R60 9)    Plan  Anxiety    · ALPRAZolam 0 5 MG Oral Tablet; TAKE 1 TABLET DAILY AS DIRECTED   Rx By: Mata Teixeira; Dispense: 30 Days ; #:30 Tablet; Refill: 0; For: Anxiety; KIMBERLY = N; Print Rx  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 re-entry tachycardia), Edema, Hypertension    · Follow-up visit in 6 months Evaluation and Treatment  Follow-up  Status: Hold For -  Scheduling  Requested for: 31FAR5978   Ordered; For: 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 re-entry tachycardia), Edema, Hypertension; Ordered By: Mata Teixeira Performed:  Due: 49CEM7132  Hypokalemia    · From  Potassium Chloride Winter ER 10 MEQ Oral Tablet Extended Release  TAKE (1) TABLET DAILY To Klor-Con M20 20 MEQ Oral Tablet Extended Release TAKE 1  TABLET BY MOUTH EVERY DAY -MUST TAKE WITH FOOD OR MILK   Rx By: Mata Teixeira; Dispense: 30 Days ; #:30 Tablet Extended Release; Refill: 6; For: Hypokalemia; KIMBERLY = N; Sent To: [facility]    Discussion/Summary  Cardiology Discussion Summary Free Text Note Form [facility]:   Mrs Dev Montoya is a pleasant 51-year-old female who presents to the office today for routine follow-up  Since her last visit she has been feeling well without any symptoms suggestive of recurrent SVT  Her lower extremity edema resolved after the discontinuation of amlodipine  Her blood pressure is well-controlled in the office today  Her lipids were reviewed  Statin therapy is indicated and she declines  I will see her back in the office in six months for re-evaluation  History of Present Illness  Cardiology HPI Free Text Note Form [facility]: Mrs Dev Montoya is a pleasant 51-year-old female who presents to the office for follow-up  Since her last visit, she's been feeling well  She has no symptoms suggestive of recurrent SVT   She leads a sedentary lifestyle but with the activity she is able to do she denies any exertional chest pain but has noted some shortness of breath with exertion since her last visit  She denies signs or symptoms of right or left sided heart failure  She denies lightheadedness, dizziness, syncope or presyncope  She denies symptoms of claudication  During her last visit due to lower extremity edema her amlodipine was discontinued and her Benicar was increased  She was also placed on Lasix  Review of Systems  Cardiology Female ROS:     Cardiac: as noted in HPI  Skin: No complaints of nonhealing sores or skin rash  Genitourinary: No complaints of recurrent urinary tract infections, frequent urination at night, difficult urination, blood in urine, kidney stones, loss of bladder control, kidney problems, denies any birth control or hormone replacement, is not post menopausal, not currently pregnant  Psychological: No complaints of feeling depressed, anxiety, panic attacks, or difficulty concentrating  General: as noted in HPI  Respiratory: No complaints of shortness of breath, cough with sputum, or wheezing  HEENT: No complaints of serious problems, hearing problems, nose problems, throat problems, or snoring  Gastrointestinal: No complaints of liver problems, nausea, vomiting, heartburn, constipation, bloody stools, diarrhea, problems swallowing, adbominal pain, or rectal bleeding  Hematologic: No complaints of bleeding disorders, anemia, blood clots, or excessive brusing  Neurological: No complaints of numbness, tingling, dizziness, weakness, seizures, headaches, syncope or fainting, AM fatigue, daytime sleepiness, no witnessed apnea episodes  Musculoskeletal: No complaints of arthritis, back pain, or painfull swelling  Active Problems  Problems    1  Abnormal electrocardiogram (794 31) (R94 31)   2  Acute otitis media (382 9) (H66 90)   3  Anxiety (300 00) (F41 9)   4  Arthritis (716 90) (M19 90)   5   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)   6  Colon cancer screening (V76 51) (Z12 11)   7  Constipation (564 00) (K59 00)   8  Depression screen (V79 0) (Z13 89)   9  Edema (782 3) (R60 9)   10  Encounter for mammogram to establish baseline mammogram (V76 12) (Z12 31)   11  Fatigue (780 79) (R53 83)   12  Hypertension (401 9) (I10)   13  Hypokalemia (276 8) (E87 6)   14  Influenza vaccine needed (V04 81) (Z23)   15  Iron deficiency anemia (280 9) (D50 9)   16  Medicare annual wellness visit, initial (V70 0) (Z00 00)   17  Need for pneumococcal vaccination (V03 82) (Z23)   18  Need for shingles vaccine (V04 89) (Z23)   19  Rash and nonspecific skin eruption (782 1) (R21)   20  Screening for diabetes mellitus (V77 1) (Z13 1)   21  Screening for osteoporosis (V82 81) (Z13 820)   22  Weight gain (783 1) (R63 5)    Past Medical History  Problems    1  History of Acute upper respiratory infection (465 9) (J06 9)   2  Anxiety (300 00) (F41 9)   3  Arthritis (716 90) (M19 90)   4  History of Denial (799 29) (R45 89)   5  History of cardiomyopathy (V12 59) (Z86 79)   6  History of chest pain (V13 89) (Z87 898)   7  Hypertension (401 9) (I10)  Active Problems And Past Medical History Reviewed: The active problems and past medical history were reviewed and updated today  Surgical History  Problems    1  History of Back Surgery   2  History of Catheter Ablation Atrial Supraventricular Tachycardia   3  History of Knee Arthroplasty   4  History of Vaginal Hysterectomy  Surgical History Reviewed: The surgical history was reviewed and updated today  Family History  Mother    1  Family history of Old age  Father    2  Family history of Old age  Family History Reviewed: The family history was reviewed and updated today  Social History  Problems    · Never a smoker   · No alcohol use   · Non-smoker (V49 89) (Z78 9)   · Tetanus booster  Social History Reviewed: The social history was reviewed and updated today  Current Meds   1  Alive Womens 50+ TABS; TAKE 1 TABLET DAILY; Therapy: (Tacy Filter) to Recorded   2  ALPRAZolam 0 5 MG Oral Tablet; TAKE 1 TABLET DAILY AS DIRECTED; Therapy: 96VWQ2001 to (Evaluate:20Jan2016); Last Rx:44Xrs4931 Ordered   3  Arthrotec 75-0 2 MG Oral Tablet Delayed Release; take 1 tab daily  Requested for:   79Cef7163; Last Rx:78Isk1270 Ordered   4  Aspirin 81 MG Oral Tablet; TAKE 1 TABLET DAILY; Therapy: (Recorded:28Apr2015) to Recorded   5  B Complex TABS; TAKE 1 TABLET DAILY; Therapy: (Tacy Filter) to Recorded   6  Benicar 40 MG Oral Tablet; TAKE 1 TABLET DAILY; Therapy: 79ESW5284 to (Evaluate:19Sep2016)  Requested for: 24QVR4343; Last   Rx:95Rqp9131 Ordered   7  Clotrimazole 1 % External Cream;   Therapy: (Recorded:09Oct2015) to Recorded   8  Dulcolax Stool Softener CAPS; TAKE 1 CAPSULE TWICE DAILY AS NEEDED; Therapy: (Tacy Filter) to Recorded   9  Fiber TABS; USE AS DIRECTED; Therapy: (Leila Calloway) to Recorded   10  Furosemide 20 MG Oral Tablet; TAKE 1 TABLET DAILY AS DIRECTED; Therapy: 64IPF0301 to (Evaluate:19Sep2016)  Requested for: 38HPD3983; Last    Rx:91Bvq8616 Ordered   11  Hydrochlorothiazide 25 MG Oral Tablet; TAKE 1 TABLET DAILY; Therapy: 77GKP4944 to (Last Rx:40Vmd4221)  Requested for: 21MGE2588 Ordered   12  Ibuprofen 600 MG Oral Tablet; Therapy: 55GXP2184 to Recorded   13  Lipo-Flavonoid Plus TABS; take 3 tabs daily; Therapy: (Tacy Filter) to Recorded   14  Metoprolol Tartrate 100 MG Oral Tablet; TAKE 1 TABLET TWICE DAILY  Requested for:    12Fzg6713; Last Rx:88Tqs3285 Ordered   15  Potassium Chloride Winter ER 10 MEQ Oral Tablet Extended Release; TAKE (1) TABLET    DAILY; Therapy: 40BDO2108 to (Last Rx:06Lej0315)  Requested for: 64Oku6362 Ordered   16  ProAir  (90 Base) MCG/ACT Inhalation Aerosol Solution; INHALE 1 TO 2 PUFFS    EVERY 6 HOURS AS NEEDED;     Therapy: 26GOQ2267 to (Evaluate:06Oct2015)  Requested for: 38FKA8359; Last    Rx:96Ldt0106 Ordered  Medication List Reviewed: The medication list was reviewed and updated today  Allergies  Medication    1  Codeine Derivatives   2  TETANUS  Non-Medication    3  Bee sting    Vitals  Vital Signs [Data Includes: Current Encounter]    Recorded: 87NGN5908 01:25PM Recorded: 50MTD2422 12:48PM   Heart Rate  80   Systolic 989 844, LUE, Sitting   Diastolic 64 70, LUE, Sitting   Height  4 ft 11 in   Weight  161 lb    BMI Calculated  32 52   BSA Calculated  1 68     Physical Exam    Constitutional   General appearance: No acute distress, well appearing and well nourished  Eyes   Conjunctiva and Sclera examination: Conjunctiva pink, sclera anicteric  Ears, Nose, Mouth, and Throat - Oropharynx: Clear, nares are clear, mucous membranes are moist    Neck   Neck and thyroid: Normal, supple, trachea midline, no thyromegaly  Pulmonary   Respiratory effort: No increased work of breathing or signs of respiratory distress  Auscultation of lungs: Clear to auscultation, no rales, no rhonchi, no wheezing, good air movement  Cardiovascular   Auscultation of heart: Normal rate and rhythm, normal S1 and S2, no murmurs  Carotid pulses: Normal, 2+ bilaterally  Peripheral vascular exam: Normal pulses throughout, no tenderness, erythema or swelling  Pedal pulses: Normal, 2+ bilaterally  Examination of extremities for edema and/or varicosities: Abnormal   bilateral ankle 1+ pitting edema and bilateral pretibial 1+ pitting edema  Abdomen   Abdomen: Non-tender and no distention  Liver and spleen: No hepatomegaly or splenomegaly  Musculoskeletal Gait and station: Normal gait  Digits and nails: Normal without clubbing or cyanosis  Inspection/palpation of joints, bones, and muscles: Normal, ROM normal     Skin - Skin and subcutaneous tissue: Normal without rashes or lesions  Skin is warm and well perfused, normal turgor      Neurologic - Cranial nerves: II - XII intact   Speech: Normal     Psychiatric - Orientation to person, place, and time: Normal  Mood and affect: Normal       Future Appointments    Date/Time Provider Specialty Site   02/22/2016 10:00 AM Charmaine Galicia, [address] [place] [address]     Signatures   Electronically signed by : Brooke Lewis DO; Jan 29 2016  1:40PM EST                       (Author)

## 2018-01-10 NOTE — MISCELLANEOUS
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  dr Gio Hardy was in the office when she called, i discussed her situation with him and he ordered her a medrol dose pack and more colchicine 0 6mg, he wanted her to take 3 additional days of the colchicine and he gave her 7 more pills to keep on hand in case the gout would return and it was over a weekend again, this way she would already have the treatment in the house  being a nurse he felt comfortable with soing this for her  rx were phoned into [facility]  Active Problems    1  Abnormal blood sugar (790 29) (R73 09)   2  Abnormal electrocardiogram (794 31) (R94 31)   3  Abnormal kidney function (593 9) (N28 9)   4  Abnormal thyroid blood test (794 5) (R94 6)   5  Active drug dependence (304 60) (F19 20)   6  Ambulatory dysfunction (719 7) (R26 2)   7  Anaphylaxis (995 0) (T78 2XXA)   8  Anxiety (300 00) (F41 9)   9  Arthralgia of right hip (719 45) (M25 551)   10  Arthritis (716 90) (M19 90)   11  Arthritis of right hip (716 95) (M16 11)   12  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)   13  Callus (700) (L84)   14  Ceruminosis, right (380 4) (H61 21)   15  Cold intolerance (780 99) (R68 89)   16  Colon cancer screening (V76 51) (Z12 11)   17  Constipation (564 00) (K59 00)   18  Depression screen (V79 0) (Z13 89)   19  Dyslipidemia (272 4) (E78 5)   20  Edema (782 3) (R60 9)   21  Encounter for mammogram to establish baseline mammogram (V76 12) (Z12 31)   22  Encounter for screening for other nervous system disorder (V80 09) (Z13 858)   23  Fatigue (780 79) (R53 83)   24  Ganglion (727 43) (M67 40)   25  Heart disease (429 9) (I51 9)   26   Hypertension (401 9) (I10) 27  Hypokalemia (276 8) (E87 6)   28  Influenza vaccine needed (V04 81) (Z23)   29  Insomnia, persistent (307 42) (G47 00)   30  Iron deficiency anemia (280 9) (D50 9)   31  Medicare annual wellness visit, initial (V70 0) (Z00 00)   32  Need for pneumococcal vaccination (V03 82) (Z23)   33  Need for shingles vaccine (V04 89) (Z23)   34  Numbness of right hand (782 0) (R20 0)   35  Pain of both hip joints (719 45) (M25 551,M25 552)   36  Rash and nonspecific skin eruption (782 1) (R21)   37  Renal function test abnormal (794 4) (R94 4)   38  Screening for diabetes mellitus (V77 1) (Z13 1)   39  Screening for osteoporosis (V82 81) (Z13 820)   40  Weight gain (783 1) (R63 5)   41  Wrist pain, acute, right (719 43) (M25 531)    Current Meds   1  Alive Womens 50+ TABS; TAKE 1 TABLET DAILY; Therapy: (Serenity Artist) to Recorded   2  ALPRAZolam 1 MG Oral Tablet; TAKE 1 TABLET  DAILY AS NEEDED; Therapy: 03HSN0980 to (Last Rx:26Mno2880) Ordered   3  Atorvastatin Calcium 20 MG Oral Tablet; TAKE 1 TABLET DAILY; Therapy: 23AIC2632 to (Last Rx:03Apr2017)  Requested for: 75Uyt9106 Ordered   4  B Complex TABS; TAKE 1 TABLET DAILY; Therapy: (Serenity Briscoe) to Recorded   5  Benicar 40 MG Oral Tablet (Olmesartan Medoxomil); TAKE 1 TABLET DAILY; Therapy: 86Xif3740 to [phone])  Requested for: 34NCC7462; Last   Rx:39Mnt5429 Ordered   6  Diclofenac-Misoprostol 75-0 2 MG Oral Tablet Delayed Release (Arthrotec); take 1 tab   daily as needed with food; Therapy: 75MJJ5085 to (Last Rx:27Bhy2350)  Requested for: 67TNU0719 Ordered   7  Dulcolax Stool Softener CAPS; TAKE 1 CAPSULE TWICE DAILY AS NEEDED; Therapy: (Serenity Brsicoe) to Recorded   8  EPINEPHrine 0 3 MG/0 3ML Injection Solution Auto-injector; INJECT 0 3ML   INTRAMUSCULARLY AS DIRECTED  Requested for: 02Jun2016; Last Rx:02Jun2016   Ordered   9  Ferrous Sulfate 325 (65 Fe) MG Oral Tablet; TAKE 1 TABLET TWICE DAILY WITH   MEALS;    Therapy: 39WYJ4003 to (Evaluate:63Czh7105)  Requested for: 25VUB4710; Last   Rx:02May2017 Ordered   10  Fiber TABS; USE AS DIRECTED; Therapy: (Nya Dudley) to Recorded   11  Folic Acid 1 MG Oral Tablet; TAKE 1 TABLET DAILY AS DIRECTED; Therapy: 41AFO5080 to (Evaluate:71Umv1817)  Requested for: 89MGI2700; Last    Rx:02May2017 Ordered   12  Furosemide 20 MG Oral Tablet (Lasix); TAKE 1 TABLET BY MOUTH DAILY AS    DIRECTED; Therapy: 19ULR3413 to (Edwardo Hazel)  Requested for: 10NIA5288; Last    Rx:73Izm3281 Ordered   13  HydroCHLOROthiazide 25 MG Oral Tablet; take one tablet daily; Therapy: 69OTH1827 to (Last Rx:05Apr2017)  Requested for: 82Flc0718 Ordered   14  Hydrocodone-Acetaminophen 5-325 MG Oral Tablet; Take 1 tab Q8hrs as needed; Therapy: 34WRE0403 to (Complete:07Jun2017); Last Rx:23May2017 Ordered   15  Ibuprofen 600 MG Oral Tablet; Therapy: 68VSQ0208 to Recorded   16  Lipo-Flavonoid Plus TABS; take 3 tabs daily; Therapy: (Poli Abernathy) to Recorded   17  Metoprolol Tartrate 100 MG Oral Tablet (Lopressor); Take 1 tablet 2x daily as directed; Therapy: 48WSJ1185 to (Evaluate:02Sep2017)  Requested for: 62Qul7871; Last    Rx:07Sep2016 Ordered   18  Potassium Chloride Winter ER 20 MEQ Oral Tablet Extended Release; TAKE 1 TABLET BY    MOUTH EVERY DAY -MUST TAKE WITH FOOD OR MILK; Therapy: 53HRY4034 to (Last Rx:29Nov2016)  Requested for: 50PLH1004 Ordered   19  ProAir  (90 Base) MCG/ACT Inhalation Aerosol Solution; INHALE 1 TO 2 PUFFS    EVERY 6 HOURS AS NEEDED; Therapy: 34OHU8006 to (Georg Ormond)  Requested for: 16FDJ5678; Last    Rx:08Jun2015 Ordered   20  TraMADol HCl - 50 MG Oral Tablet; TAKE 1 TABLET EVERY 8 HOURS AS NEEDED; Therapy: 60RZP8056 to (Evaluate:19May2017); Last FARHAD:61ORL9690 Ordered   21  Vitamin C 500 MG Oral Capsule; Take 1 capsule twice daily;     Therapy: 54GZP1479 to (Evaluate:47Ims4120)  Requested for: 78KFF7874; Last    RE:42XNA8220 Ordered    Allergies    1  Codeine Derivatives   2   TETANUS    3  Bee sting    Signatures   Electronically signed by : Nicola Anne, ; May 31 6305  9:54AM EST                       (Author)

## 2018-01-11 ENCOUNTER — TELEPHONE (OUTPATIENT)
Dept: FAMILY MEDICINE CLINIC | Facility: CLINIC | Age: 83
End: 2018-01-11

## 2018-01-12 VITALS
HEIGHT: 59 IN | HEART RATE: 73 BPM | DIASTOLIC BLOOD PRESSURE: 74 MMHG | WEIGHT: 162 LBS | BODY MASS INDEX: 32.66 KG/M2 | SYSTOLIC BLOOD PRESSURE: 142 MMHG

## 2018-01-12 VITALS
WEIGHT: 143 LBS | DIASTOLIC BLOOD PRESSURE: 75 MMHG | BODY MASS INDEX: 28.88 KG/M2 | SYSTOLIC BLOOD PRESSURE: 155 MMHG | HEART RATE: 67 BPM

## 2018-01-13 VITALS
SYSTOLIC BLOOD PRESSURE: 138 MMHG | HEIGHT: 59 IN | TEMPERATURE: 98.6 F | BODY MASS INDEX: 30.24 KG/M2 | OXYGEN SATURATION: 97 % | DIASTOLIC BLOOD PRESSURE: 70 MMHG | HEART RATE: 74 BPM | WEIGHT: 150 LBS

## 2018-01-13 VITALS
BODY MASS INDEX: 31.65 KG/M2 | RESPIRATION RATE: 17 BRPM | TEMPERATURE: 97.6 F | WEIGHT: 157 LBS | OXYGEN SATURATION: 94 % | DIASTOLIC BLOOD PRESSURE: 74 MMHG | HEART RATE: 93 BPM | SYSTOLIC BLOOD PRESSURE: 128 MMHG | HEIGHT: 59 IN

## 2018-01-13 VITALS
OXYGEN SATURATION: 97 % | RESPIRATION RATE: 17 BRPM | DIASTOLIC BLOOD PRESSURE: 68 MMHG | SYSTOLIC BLOOD PRESSURE: 130 MMHG | WEIGHT: 160 LBS | HEART RATE: 63 BPM | HEIGHT: 59 IN | TEMPERATURE: 98.6 F | BODY MASS INDEX: 32.25 KG/M2

## 2018-01-14 VITALS
BODY MASS INDEX: 29.23 KG/M2 | HEART RATE: 70 BPM | TEMPERATURE: 98.5 F | OXYGEN SATURATION: 94 % | RESPIRATION RATE: 16 BRPM | DIASTOLIC BLOOD PRESSURE: 62 MMHG | WEIGHT: 145 LBS | SYSTOLIC BLOOD PRESSURE: 128 MMHG | HEIGHT: 59 IN

## 2018-01-14 VITALS
DIASTOLIC BLOOD PRESSURE: 66 MMHG | SYSTOLIC BLOOD PRESSURE: 118 MMHG | BODY MASS INDEX: 29.03 KG/M2 | HEIGHT: 59 IN | WEIGHT: 144 LBS | HEART RATE: 82 BPM

## 2018-01-14 VITALS
OXYGEN SATURATION: 96 % | SYSTOLIC BLOOD PRESSURE: 138 MMHG | HEART RATE: 72 BPM | BODY MASS INDEX: 30.84 KG/M2 | WEIGHT: 153 LBS | HEIGHT: 59 IN | DIASTOLIC BLOOD PRESSURE: 86 MMHG | TEMPERATURE: 98.6 F

## 2018-01-14 VITALS
WEIGHT: 151 LBS | HEIGHT: 59 IN | TEMPERATURE: 97.6 F | DIASTOLIC BLOOD PRESSURE: 86 MMHG | OXYGEN SATURATION: 97 % | HEART RATE: 80 BPM | SYSTOLIC BLOOD PRESSURE: 140 MMHG | BODY MASS INDEX: 30.44 KG/M2

## 2018-01-14 VITALS
HEIGHT: 59 IN | BODY MASS INDEX: 31.85 KG/M2 | DIASTOLIC BLOOD PRESSURE: 94 MMHG | SYSTOLIC BLOOD PRESSURE: 164 MMHG | WEIGHT: 158 LBS | HEART RATE: 84 BPM

## 2018-01-14 VITALS
WEIGHT: 152 LBS | SYSTOLIC BLOOD PRESSURE: 146 MMHG | DIASTOLIC BLOOD PRESSURE: 74 MMHG | BODY MASS INDEX: 30.64 KG/M2 | HEIGHT: 59 IN

## 2018-01-15 VITALS
DIASTOLIC BLOOD PRESSURE: 64 MMHG | RESPIRATION RATE: 17 BRPM | SYSTOLIC BLOOD PRESSURE: 134 MMHG | HEART RATE: 62 BPM | WEIGHT: 163 LBS | HEIGHT: 59 IN | TEMPERATURE: 98.6 F | OXYGEN SATURATION: 97 % | BODY MASS INDEX: 32.86 KG/M2

## 2018-01-15 VITALS
SYSTOLIC BLOOD PRESSURE: 128 MMHG | HEART RATE: 71 BPM | RESPIRATION RATE: 17 BRPM | DIASTOLIC BLOOD PRESSURE: 74 MMHG | TEMPERATURE: 96.8 F | WEIGHT: 161 LBS | OXYGEN SATURATION: 95 % | BODY MASS INDEX: 32.46 KG/M2 | HEIGHT: 59 IN

## 2018-01-15 NOTE — MISCELLANEOUS
Message  [facility] PATIENT WAS CALLED AND TOLD THAT HER KIDNEY FUNCTION IS NOT  PERCENT  PATIENT WAS ADVISED TO STOP HER DICLOFENAC AND TRY USING TYLENOL FOR HER PAIN  SHE WAS ALSO ADVISED TO INCREASE HER FLUID INTAKE TO 2 QUARTS PER DAY  SHE IS TO HAVE REPEAT LAB WORK DONE IN TWO WEEKS  Active Problems    1  Abnormal blood sugar (790 29) (R73 09)   2  Abnormal electrocardiogram (794 31) (R94 31)   3  Ambulatory dysfunction (719 7) (R26 2)   4  Anaphylaxis (995 0) (T78 2XXA)   5  Anxiety (300 00) (F41 9)   6  Arthralgia of right hip (719 45) (M25 551)   7  Arthritis (716 90) (M19 90)   8  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)   9  Callus (700) (L84)   10  Ceruminosis, right (380 4) (H61 21)   11  Cold intolerance (780 99) (R68 89)   12  Colon cancer screening (V76 51) (Z12 11)   13  Constipation (564 00) (K59 00)   14  Depression screen (V79 0) (Z13 89)   15  Dyslipidemia (272 4) (E78 5)   16  Edema (782 3) (R60 9)   17  Encounter for mammogram to establish baseline mammogram (V76 12) (Z12 31)   18  Encounter for screening for other nervous system disorder (V80 09) (Z13 858)   19  Fatigue (780 79) (R53 83)   20  Ganglion (727 43) (M67 40)   21  Heart disease (429 9) (I51 9)   22  Hypertension (401 9) (I10)   23  Hypokalemia (276 8) (E87 6)   24  Influenza vaccine needed (V04 81) (Z23)   25  Insomnia, persistent (307 42) (G47 00)   26  Iron deficiency anemia (280 9) (D50 9)   27  Medicare annual wellness visit, initial (V70 0) (Z00 00)   28  Need for pneumococcal vaccination (V03 82) (Z23)   29  Need for shingles vaccine (V04 89) (Z23)   30  Numbness of right hand (782 0) (R20 0)   31  Pain of both hip joints (719 45) (M25 551,M25 552)   32  Rash and nonspecific skin eruption (782 1) (R21)   33  Screening for diabetes mellitus (V77 1) (Z13 1)   34  Screening for osteoporosis (V82 81) (Z13 820)   35  Weight gain (783 1) (R63 5)   36   Wrist pain, acute, right (099 43) (M25 531)    Current Meds   1  Alive Womens 50+ TABS; TAKE 1 TABLET DAILY; Therapy: (Nicole Mon) to Recorded   2  ALPRAZolam 1 MG Oral Tablet; TAKE 1 TABLET  DAILY AS NEEDED; Therapy: 77KTZ2165 to (Last Rx:19Mwe1943) Ordered   3  Aspirin 81 MG TABS; TAKE 1 TABLET DAILY; Therapy: (Nicole Mon) to Recorded   4  Atorvastatin Calcium 20 MG Oral Tablet; TAKE 1 TABLET DAILY; Therapy: 26FOC2301 to (Last Rx:50Smx3738)  Requested for: 03Apr2017 Ordered   5  B Complex TABS; TAKE 1 TABLET DAILY; Therapy: (Nicole Mon) to Recorded   6  Benicar 40 MG Oral Tablet (Olmesartan Medoxomil); TAKE 1 TABLET DAILY; Therapy: 34Jca2411 to (03 [phone])  Requested for: 39OMU5079; Last   Rx:69Rau1602 Ordered   7  Clotrimazole 1 % External Cream;   Therapy: (Recorded:14Bat8591) to Recorded   8  Diclofenac-Misoprostol 75-0 2 MG Oral Tablet Delayed Release (Arthrotec); take 1 tab   daily as needed with food; Therapy: 47YYV2444 to (Last Rx:89Qzd5603)  Requested for: 07ZQO4602 Ordered   9  Dulcolax Stool Softener CAPS; TAKE 1 CAPSULE TWICE DAILY AS NEEDED; Therapy: (Nicole Mon) to Recorded   10  EPINEPHrine 0 3 MG/0 3ML Injection Solution Auto-injector; INJECT 0 3ML    INTRAMUSCULARLY AS DIRECTED  Requested for: 02Jun2016; Last Rx:02Jun2016    Ordered   11  Fiber TABS; USE AS DIRECTED; Therapy: (Jamia Childers) to Recorded   12  Furosemide 20 MG Oral Tablet (Lasix); TAKE 1 TABLET BY MOUTH DAILY AS    DIRECTED; Therapy: 86KEH1413 to (Donal Fabry)  Requested for: 10KEO1365; Last    Rx:48Fyt6273 Ordered   13  HydroCHLOROthiazide 25 MG Oral Tablet; take one tablet daily; Therapy: 83YRJ0297 to (Last Rx:43Exj1818)  Requested for: 96Syr1012 Ordered   14  Ibuprofen 600 MG Oral Tablet; Therapy: 79NFW6339 to Recorded   15  Lipo-Flavonoid Plus TABS; take 3 tabs daily; Therapy: (Nicole Mon) to Recorded   16  Metoprolol Tartrate 100 MG Oral Tablet (Lopressor);  Take 1 tablet 2x daily as directed; Therapy: 87AFF3323 to (Evaluate:02Sep2017)  Requested for: 89Udh8492; Last    Rx:07Sep2016 Ordered   17  Potassium Chloride Winter ER 20 MEQ Oral Tablet Extended Release; TAKE 1 TABLET BY    MOUTH EVERY DAY -MUST TAKE WITH FOOD OR MILK; Therapy: 62PYX0366 to (Last Rx:29Nov2016)  Requested for: 81RUC1023 Ordered   18  ProAir  (90 Base) MCG/ACT Inhalation Aerosol Solution; INHALE 1 TO 2 PUFFS    EVERY 6 HOURS AS NEEDED; Therapy: 60UJB5251 to [phone])  Requested for: 48PCW9140; Last    Rx:08Jun2015 Ordered    Allergies    1  Codeine Derivatives   2   TETANUS    3  Bee sting    Signatures   Electronically signed by : Vicki Conley, ; Apr 21 2358 51:81XZ EST                       (Author)

## 2018-01-17 NOTE — MISCELLANEOUS
Message  pt called to report that since saturday she has been very ill  has head and nasal congestion, when blowing her nose, very little comes out  what does come out is clear  feels like there is cement blocking her nasal passages  has a headache and pain under her eyes  can't stop coughing  cough is non producttive but harsh  her sides hurt from coughing and it keeps her up all night  Has bodyaches but no fever  took zyrtec and tussin cough medicine but neither is helping her  she sounds pretty sick  i offered her an appt for tomorrow but she said she was just here and doesn't feel well enough to come down  asking if you would send an rx to her pharmacy for her      Active Problems    1  Abnormal blood sugar (790 29) (R73 09)   2  Abnormal electrocardiogram (794 31) (R94 31)   3  Ambulatory dysfunction (719 7) (R26 2)   4  Anaphylaxis (995 0) (T78 2XXA)   5  Anxiety (300 00) (F41 9)   6  Arthralgia of right hip (719 45) (M25 551)   7  Arthritis (716 90) (M19 90)   8  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)   9  Callus (700) (L84)   10  Ceruminosis, right (380 4) (H61 21)   11  Colon cancer screening (V76 51) (Z12 11)   12  Constipation (564 00) (K59 00)   13  Depression screen (V79 0) (Z13 89)   14  Dyslipidemia (272 4) (E78 5)   15  Edema (782 3) (R60 9)   16  Encounter for mammogram to establish baseline mammogram (V76 12) (Z12 31)   17  Encounter for screening for other nervous system disorder (V80 09) (Z13 858)   18  Fatigue (780 79) (R53 83)   19  Ganglion (727 43) (M67 40)   20  Heart disease (429 9) (I51 9)   21  Hypertension (401 9) (I10)   22  Hypokalemia (276 8) (E87 6)   23  Influenza vaccine needed (V04 81) (Z23)   24  Iron deficiency anemia (280 9) (D50 9)   25  Medicare annual wellness visit, initial (V70 0) (Z00 00)   26  Need for pneumococcal vaccination (V03 82) (Z23)   27  Need for shingles vaccine (V04 89) (Z23)   28  Numbness of right hand (782 0) (R20 0)   29   Pain of both hip joints (719 45) (M25 551,M25 552)   30  Rash and nonspecific skin eruption (782 1) (R21)   31  Screening for diabetes mellitus (V77 1) (Z13 1)   32  Screening for osteoporosis (V82 81) (Z13 820)   33  Weight gain (783 1) (R63 5)   34  Wrist pain, acute, right (719 43) (M25 531)    Current Meds   1  Alive Womens 50+ TABS; TAKE 1 TABLET DAILY; Therapy: (Rheba Pear) to Recorded   2  ALPRAZolam 1 MG Oral Tablet; TAKE 1 TABLET  DAILY AS NEEDED; Therapy: 92PTV6424 to (Last Rx:22Xir8305) Ordered   3  Aspirin 81 MG TABS; TAKE 1 TABLET DAILY; Therapy: (Rheba Pear) to Recorded   4  Atorvastatin Calcium 20 MG Oral Tablet; TAKE 1 TABLET DAILY; Therapy: 42VNV2841 to (Evaluate:05Zws7568)  Requested for: 66PTT3878; Last   Rx:25Lyl0710 Ordered   5  B Complex TABS; TAKE 1 TABLET DAILY; Therapy: (Rheba Pear) to Recorded   6  Benicar 40 MG Oral Tablet (Olmesartan Medoxomil); TAKE 1 TABLET DAILY; Therapy: 59Bql5935 to (Emeterio Boswell)  Requested for: 25NBC7888; Last   Rx:40Qmy3564 Ordered   7  Clotrimazole 1 % External Cream;   Therapy: (Recorded:09Oct2015) to Recorded   8  Diclofenac-Misoprostol 75-0 2 MG Oral Tablet Delayed Release (Arthrotec); take 1 tab   daily as needed with food; Therapy: 44CYU8015 to (Last Rx:86Oue2361)  Requested for: 43IEI4270 Ordered   9  Dulcolax Stool Softener CAPS; TAKE 1 CAPSULE TWICE DAILY AS NEEDED; Therapy: (Rheba Pear) to Recorded   10  EPINEPHrine 0 3 MG/0 3ML Injection Solution Auto-injector; INJECT 0 3ML    INTRAMUSCULARLY AS DIRECTED  Requested for: 02Jun2016; Last Rx:02Jun2016    Ordered   11  Fiber TABS; USE AS DIRECTED; Therapy: (Ariana Fong) to Recorded   12  Furosemide 20 MG Oral Tablet (Lasix); TAKE 1 TABLET BY MOUTH DAILY AS    DIRECTED; Therapy: 64XBN2716 to (Hobson Angelucci)  Requested for: 47MCV1031; Last    Rx:65Sqz6843 Ordered   13  HydroCHLOROthiazide 25 MG Oral Tablet; take one tablet daily;     Therapy: 53RIJ9824 to (Last Rx:10Jan2017)  Requested for: 27UFB3688 Ordered   14  Ibuprofen 600 MG Oral Tablet; Therapy: 19OIX4972 to Recorded   15  Lipo-Flavonoid Plus TABS; take 3 tabs daily; Therapy: (Qiana Scanlon) to Recorded   16  Metoprolol Tartrate 100 MG Oral Tablet (Lopressor); Take 1 tablet 2x daily as directed; Therapy: 29JMR2155 to (Evaluate:02Sep2017)  Requested for: 82Wur3242; Last    Rx:07Sep2016 Ordered   17  Penicillin V Potassium 500 MG Oral Tablet; TAKE 1 TABLET 3 TIMES DAILY UNTIL    GONE;    Therapy: 42AZV8186 to (Evaluate:13Jan2017)  Requested for: 89GOS3005; Last    Rx:06Jan2017 Ordered   18  Potassium Chloride Winter ER 20 MEQ Oral Tablet Extended Release; TAKE 1 TABLET BY    MOUTH EVERY DAY -MUST TAKE WITH FOOD OR MILK; Therapy: 18HNW8284 to (Last Rx:29Nov2016)  Requested for: 76ZQI5563 Ordered   19  ProAir  (90 Base) MCG/ACT Inhalation Aerosol Solution; INHALE 1 TO 2 PUFFS    EVERY 6 HOURS AS NEEDED; Therapy: 16DPW2596 to (Robert Still)  Requested for: 80TSS7264; Last    Rx:08Jun2015 Ordered    Allergies    1  Codeine Derivatives   2   TETANUS    3  Bee sting    Signatures   Electronically signed by : Dayami Iyer, ; Feb 20 6978  2:45PM EST                       (Author)

## 2018-01-18 ENCOUNTER — ALLSCRIPTS OFFICE VISIT (OUTPATIENT)
Dept: OTHER | Facility: OTHER | Age: 83
End: 2018-01-18

## 2018-01-18 DIAGNOSIS — I51.9 HEART DISEASE: ICD-10-CM

## 2018-01-18 DIAGNOSIS — E78.5 HYPERLIPIDEMIA: ICD-10-CM

## 2018-01-19 NOTE — PROGRESS NOTES
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)   2  History of Catheter Ablation Atrial Supraventricular Tachycardia   3  Hypertension (401 9) (I10)   4  Dyslipidemia (272 4) (E78 5)    Plan   Dyslipidemia    · Atorvastatin Calcium 20 MG Oral Tablet; TAKE 1 TABLET DAILY   Rx By: Sofie Canales; Dispense: 90 Days ; #:90 Tablet; Refill: 3;For: Dyslipidemia; KIMBERLY = N; Verified Transmission to [facility]; Last Updated By: System[employer]; 1/18/2018 12:56:49 PM  Dyslipidemia,     · (1) LIPID PANEL FASTING W DIRECT LDL REFLEX; Status:Active; Requested    HUC:27BSW2230; Perform:[facility]; MMX:51DBQ2526;LYNWLUX; For:Dyslipidemia, ; Ordered By:Josey Yu;  Unlinked    · Follow-up visit in 6 months Evaluation and Treatment  Follow-up  Status: Hold For -    Scheduling  Requested for: 12CTR6142   Ordered;Ordered By: Sofie Canales Performed:  Due: 10WCR9636    Discussion/Summary   Cardiology Discussion Summary Free Text Note Form [facility]:    Mrs Amaya Zavala is a pleasant 72-year-old female who presents to the office today for routine follow-up     her last visit she has been feeling well  She offers no cardiopulmonary complaints today  blood pressure is well controlled in the office today  She will remain on her current regimen as prescribed  do not have any recent assessment of her lipids and a prescription was provided    had a recent echocardiogram which was unrevealing except for moderate pulmonary hypertension in the setting of a PE    is asymptomatic and no further testing is indicated  will see her back in the office in six months for re-evaluation  History of Present Illness   Cardiology HPI Free Text Note Form [facility]: Mrs Amaya Zavala is a pleasant 72-year-old female who presents to the office for follow-up    her last visit she did undergo a right hip replacement  After discharge she was readmitted with pneumonia and a pulmonary embolism   During that hospital stay she had an echo performed which was unrevealing except for mild tricuspid regurgitation and moderate pulmonary hypertension in the setting of her PE    her last visit, she's otherwise been feeling well  She has no symptoms suggestive of recurrent SVT  She leads a sedentary lifestyle but with the activity she is able to do she denies any exertional chest pain or shortness of breath  She denies signs or symptoms of right or left sided heart failure  She denies lightheadedness, dizziness, syncope or presyncope  She denies symptoms of claudication  Review of Systems   Cardiology Female ROS:         Cardiac: as noted in HPI  Skin: No complaints of nonhealing sores or skin rash  Genitourinary: No complaints of recurrent urinary tract infections, frequent urination at night, difficult urination, blood in urine, kidney stones, loss of bladder control, kidney problems, denies any birth control or hormone replacement, is not post menopausal, not currently pregnant  Psychological: No complaints of feeling depressed, anxiety, panic attacks, or difficulty concentrating  General: as noted in HPI  Respiratory: No complaints of shortness of breath, cough with sputum, or wheezing  HEENT: No complaints of serious problems, hearing problems, nose problems, throat problems, or snoring  Gastrointestinal: No complaints of liver problems, nausea, vomiting, heartburn, constipation, bloody stools, diarrhea, problems swallowing, adbominal pain, or rectal bleeding  Hematologic: No complaints of bleeding disorders, anemia, blood clots, or excessive brusing  Neurological: No complaints of numbness, tingling, dizziness, weakness, seizures, headaches, syncope or fainting, AM fatigue, daytime sleepiness, no witnessed apnea episodes  Musculoskeletal: No complaints of arthritis, back pain, or painfull swelling  Active Problems   Problems    1   Abnormal electrocardiogram (794 31) (R94 31)   2  Abnormal kidney function (593 9) (N28 9)   3  Abnormal thyroid blood test (794 5) (R94 6)   4  Active drug dependence (304 60) (F19 20)   5  Acute sinusitis, recurrence not specified, unspecified location (461 9) (J01 90)   6  Aftercare following right hip joint replacement surgery (V54 81,V43 64) (Z47 1,Z96 641)   7  Ambulatory dysfunction (719 7) (R26 2)   8  Anemia, unspecified type (285 9) (D64 9)   9  Anxiety (300 00) (F41 9)   10  Arthritis of right hip (716 95) (M16 11)   11  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)   12  Callus (700) (L84)   13  Cold intolerance (780 99) (R68 89)   14  Colon cancer screening (V76 51) (Z12 11)   15  Constipation (564 00) (K59 00)   16  Depression screen (V79 0) (Z13 89)   17  Dyslipidemia (272 4) (E78 5)   18  Edema (782 3) (R60 9)   19  Encounter for mammogram to establish baseline mammogram (V76 12) (Z12 31)   20  Encounter for screening for other nervous system disorder (V80 09) (Z13 858)   21  Fatigue (780 79) (R53 83)   22  Gout (274 9) (M10 9)   23  History of arthritis (V13 4) (Z87 39)   24  Hypertension (401 9) (I10)   25  Hypokalemia (276 8) (E87 6)   26  Influenza vaccine needed (V04 81) (Z23)   27  Insomnia, persistent (307 42) (G47 00)   28  Iron deficiency anemia (280 9) (D50 9)   29  Medicare annual wellness visit, initial (V70 0) (Z00 00)   30  Memory loss (780 93) (R41 3)   31  Need for pneumococcal vaccination (V03 82) (Z23)   32  Numbness of right hand (782 0) (R20 0)   33  Pain of both hip joints (719 45) (M25 551,M25 552)   34  Pre-op examination (V72 84) (Z01 818)   35  Renal function test abnormal (794 4) (R94 4)   36  Screening for diabetes mellitus (V77 1) (Z13 1)   37  Screening for osteoporosis (V82 81) (Z13 820)   38  Status post right hip replacement (V43 64) (H70 784)    Past Medical History   Problems    1  History of Abnormal blood sugar (790 29) (R73 09)   2   History of Acute maxillary sinusitis, recurrence not specified (461 0) (J01 00)   3  History of Acute upper respiratory infection (465 9) (J06 9)   4  Anxiety (300 00) (F41 9)   5  History of Arthralgia of right hip (719 45) (M25 551)   6  History of Ceruminosis, right (380 4) (H61 21)   7  History of Denial (799 29) (R45 89)   8  History of acute otitis media (V12 49) (Z86 69)   9  History of acute sinusitis (V12 69) (Z87 09)   10  History of anaphylactic shock (V13 81) (Z87 892)   11  History of arthritis (V13 4) (Z87 39)   12  History of cardiomyopathy (V12 59) (Z86 79)   13  History of chest pain (V13 89) (Z87 898)   14  History of ganglion cyst (V13 3) (Z87 39)   15  History of weight gain (V15 89) (Z87 898)   16  Hypertension (401 9) (I10)   17  History of Need for shingles vaccine (V04 89) (Z23)   18  History of Rash and nonspecific skin eruption (782 1) (R21)   19  History of Wrist pain, acute, right (719 43) (M25 531)  Active Problems And Past Medical History Reviewed: The active problems and past medical history were reviewed and updated today  Surgical History   Problems    1  History of Back Surgery   2  History of Catheter Ablation Atrial Supraventricular Tachycardia   3  History of Hip Replacement   4  History of Knee Arthroplasty   5  History of Vaginal Hysterectomy  Surgical History Reviewed: The surgical history was reviewed and updated today  Family History   Mother    1  Family history of Old age  Father    2  Family history of Old age  Family History Reviewed: The family history was reviewed and updated today  Social History   Problems    · Never a smoker   · No alcohol use   · Non-smoker (V49 89) (Z78 9)   · Tetanus booster  Social History Reviewed: The social history was reviewed and updated today  Current Meds    1  Alive Womens 50+ TABS; TAKE 1 TABLET DAILY; Therapy: (Shahid Serrato) to Recorded   2  ALPRAZolam 1 MG Oral Tablet; TAKE 1 TABLET  DAILY AS NEEDED;      Therapy: 43OIB5899 to (Last HN:81IBJ5566) Ordered   3  Amoxicillin-Pot Clavulanate 500-125 MG Oral Tablet; TAKE 1 TABLET 3 TIMES DAILY     UNTIL GONE;     Therapy: 18WVA3213 to (Evaluate:15Jan2018)  Requested for: 55RLL8508; Last     Rx:05Jan2018 Ordered   4  Arthrotec  MG-MCG TABS; Take one tab daily; Therapy: (Recorded:18Jan2018) to Recorded   5  Atorvastatin Calcium 20 MG Oral Tablet; TAKE 1 TABLET DAILY; Therapy: 94YAW9496 to (Last Rx:96Alu1887)  Requested for: 03Apr2017 Ordered   6  B Complex TABS; TAKE 1 TABLET DAILY; Therapy: (Rik Marlown) to Recorded   7  Colchicine 0 6 MG Oral Tablet; TAKE 1 TABLET DAILY AS DIRECTED; Therapy: 26RNJ0021 to (Evaluate:09Sep2017)  Requested for: 63Rjk1566; Last     Rx:84Gxb2650 Ordered   8  Diclofenac-Misoprostol 50-0 2 MG Oral Tablet Delayed Release; 0ne tab po mushtaq as     needed; Therapy: 81Uxx5029 to (Last Rx:70Pin6623)  Requested for: 09Uvo7814 Ordered   9  Eliquis 5 MG Oral Tablet; TAKE ONE (1) TABLET BY MOUTH TWICE DAILY; Therapy: 64Vnl4561 to (Last Rx:36Sxm3802)  Requested for: 32Gyy8667 Ordered   10  EPINEPHrine 0 3 MG/0 3ML Injection Solution Auto-injector; INJECT 0 3ML      INTRAMUSCULARLY AS DIRECTED  Requested for: 02Jun2016; Last Rx:02Jun2016      Ordered   11  Folic Acid 1 MG Oral Tablet; TAKE 1 TABLET DAILY AS DIRECTED; Therapy: 31MYQ3552 to (Evaluate:70Skc5917)  Requested for: 27FYE9402; Last      Rx:95Ptl5416 Ordered   12  Furosemide 20 MG Oral Tablet; take 1 tab 2x daily as needed; Therapy: 23EPI1512 to (Last XH:25WCZ2702)  Requested for: 33EFM3201 Ordered   13  Metoprolol Tartrate 100 MG Oral Tablet; Take 1 tablet 2x daily as directed; Therapy: 78AEZ2492 to (Evaluate:19Usl6616)  Requested for: 16Ftg1970; Last      Rx:10Zry4918 Ordered   14  Olmesartan Medoxomil 40 MG Oral Tablet; TAKE 1 TABLET DAILY; Therapy: 25Apr2015 to (Kanika Tapia)  Requested for: 92PRM3333; Last      Rx:87Tbb0579 Ordered   15   Potassium Chloride Winter ER 20 MEQ Oral Tablet Extended Release; TAKE 1 TABLET BY      MOUTH EVERY DAY -MUST TAKE WITH FOOD OR MILK; Therapy: 17CKH3126 to (Last Rx:10Oct2017)  Requested for: 10FCD8373 Ordered   16  ProAir  (90 Base) MCG/ACT Inhalation Aerosol Solution; INHALE 1 TO 2 PUFFS      EVERY 6 HOURS AS NEEDED; Therapy: 24YNW9220 to (Smita Castro)  Requested for: 99VJV0858; Last      Rx:08Jun2015 Ordered   17  Vitamin C 500 MG Oral Capsule; Take 1 capsule twice daily; Therapy: 90YEB4657 to (Evaluate:43Soh4194)  Requested for: 98PFJ0931; Last      EC:90HEK7095 Ordered  Medication List Reviewed: The medication list was reviewed and updated today  Allergies   Medication    1  Codeine Derivatives   2  TETANUS  Non-Medication    3  Bee sting    Physical Exam        Constitutional      General appearance: No acute distress, well appearing and well nourished  Eyes      Conjunctiva and Sclera examination: Conjunctiva pink, sclera anicteric  Ears, Nose, Mouth, and Throat - Oropharynx: Clear, nares are clear, mucous membranes are moist       Neck      Neck and thyroid: Normal, supple, trachea midline, no thyromegaly  Pulmonary      Respiratory effort: No increased work of breathing or signs of respiratory distress  Auscultation of lungs: Clear to auscultation, no rales, no rhonchi, no wheezing, good air movement  Cardiovascular      Auscultation of heart: Normal rate and rhythm, normal S1 and S2, no murmurs  Carotid pulses: Normal, 2+ bilaterally  Peripheral vascular exam: Normal pulses throughout, no tenderness, erythema or swelling  Pedal pulses: Normal, 2+ bilaterally  Examination of extremities for edema and/or varicosities: Abnormal   bilateral ankle 1+ pitting edema-- and-- bilateral pretibial 1+ pitting edema  Abdomen      Abdomen: Non-tender and no distention  Liver and spleen: No hepatomegaly or splenomegaly         Musculoskeletal Gait and station: Normal gait  -- Digits and nails: Normal without clubbing or cyanosis  -- Inspection/palpation of joints, bones, and muscles: Normal, ROM normal        Skin - Skin and subcutaneous tissue: Normal without rashes or lesions  Skin is warm and well perfused, normal turgor  Neurologic - Cranial nerves: II - XII intact  -- Speech: Normal        Psychiatric - Orientation to person, place, and time: Normal -- Mood and affect: Normal       Future Appointments      Date/Time Provider Specialty Site   03/13/2018 11:00 AM BETH Gutierrez   Orthopedic Surgery [facility]   01/31/2018 01:00 PM Jodi Denson [facility] [address]     Signatures    Electronically signed by : Artem Contreras DO; Jan 18 2018  1:02PM EST                       (Author)

## 2018-01-22 VITALS
HEIGHT: 59 IN | TEMPERATURE: 97.6 F | HEART RATE: 77 BPM | DIASTOLIC BLOOD PRESSURE: 74 MMHG | OXYGEN SATURATION: 96 % | SYSTOLIC BLOOD PRESSURE: 116 MMHG | BODY MASS INDEX: 28.83 KG/M2 | WEIGHT: 143 LBS

## 2018-01-22 VITALS
WEIGHT: 145 LBS | BODY MASS INDEX: 29.23 KG/M2 | TEMPERATURE: 97.7 F | SYSTOLIC BLOOD PRESSURE: 126 MMHG | OXYGEN SATURATION: 96 % | HEIGHT: 59 IN | DIASTOLIC BLOOD PRESSURE: 80 MMHG | HEART RATE: 68 BPM

## 2018-01-22 VITALS
WEIGHT: 146 LBS | DIASTOLIC BLOOD PRESSURE: 82 MMHG | TEMPERATURE: 97.7 F | RESPIRATION RATE: 17 BRPM | HEART RATE: 68 BPM | HEIGHT: 59 IN | BODY MASS INDEX: 29.43 KG/M2 | OXYGEN SATURATION: 96 % | SYSTOLIC BLOOD PRESSURE: 126 MMHG

## 2018-01-23 VITALS
RESPIRATION RATE: 18 BRPM | DIASTOLIC BLOOD PRESSURE: 68 MMHG | SYSTOLIC BLOOD PRESSURE: 122 MMHG | TEMPERATURE: 97.4 F | HEART RATE: 63 BPM | BODY MASS INDEX: 29.49 KG/M2 | WEIGHT: 146 LBS | OXYGEN SATURATION: 94 %

## 2018-01-23 VITALS
DIASTOLIC BLOOD PRESSURE: 76 MMHG | SYSTOLIC BLOOD PRESSURE: 110 MMHG | HEIGHT: 59 IN | HEART RATE: 65 BPM | BODY MASS INDEX: 30.14 KG/M2 | WEIGHT: 149.5 LBS

## 2018-01-23 NOTE — CONSULTS
I had the pleasure of evaluating your patient, Jay Modi  My full evaluation follows:      History of Present Illness  Mrs Virgilio Cotto is a pleasant 77-year-old female who presents to the office for follow-up  Since her last visit she did undergo a right hip replacement  After discharge she was readmitted with pneumonia and a pulmonary embolism  During that hospital stay she had an echo performed which was unrevealing except for mild tricuspid regurgitation and moderate pulmonary hypertension in the setting of her PE  Since her last visit, she's otherwise been feeling well  She has no symptoms suggestive of recurrent SVT  She leads a sedentary lifestyle but with the activity she is able to do she denies any exertional chest pain or shortness of breath  She denies signs or symptoms of right or left sided heart failure  She denies lightheadedness, dizziness, syncope or presyncope  She denies symptoms of claudication  Review of Systems      Cardiac: as noted in HPI  Skin: No complaints of nonhealing sores or skin rash  Genitourinary: No complaints of recurrent urinary tract infections, frequent urination at night, difficult urination, blood in urine, kidney stones, loss of bladder control, kidney problems, denies any birth control or hormone replacement, is not post menopausal, not currently pregnant  Psychological: No complaints of feeling depressed, anxiety, panic attacks, or difficulty concentrating  General: as noted in HPI  Respiratory: No complaints of shortness of breath, cough with sputum, or wheezing  HEENT: No complaints of serious problems, hearing problems, nose problems, throat problems, or snoring  Gastrointestinal: No complaints of liver problems, nausea, vomiting, heartburn, constipation, bloody stools, diarrhea, problems swallowing, adbominal pain, or rectal bleeding  Hematologic: No complaints of bleeding disorders, anemia, blood clots, or excessive brusing  Neurological: No complaints of numbness, tingling, dizziness, weakness, seizures, headaches, syncope or fainting, AM fatigue, daytime sleepiness, no witnessed apnea episodes  Musculoskeletal: No complaints of arthritis, back pain, or painfull swelling  Active Problems    1  Abnormal electrocardiogram (794 31) (R94 31)   2  Abnormal kidney function (593 9) (N28 9)   3  Abnormal thyroid blood test (794 5) (R94 6)   4  Active drug dependence (304 60) (F19 20)   5  Acute sinusitis, recurrence not specified, unspecified location (461 9) (J01 90)   6  Aftercare following right hip joint replacement surgery (V54 81,V43 64) (Z47 1,Z96 641)   7  Ambulatory dysfunction (719 7) (R26 2)   8  Anemia, unspecified type (285 9) (D64 9)   9  Anxiety (300 00) (F41 9)   10  Arthritis of right hip (716 95) (M16 11)   11  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)   12  Callus (700) (L84)   13  Cold intolerance (780 99) (R68 89)   14  Colon cancer screening (V76 51) (Z12 11)   15  Constipation (564 00) (K59 00)   16  Depression screen (V79 0) (Z13 89)   17  Dyslipidemia (272 4) (E78 5)   18  Edema (782 3) (R60 9)   19  Encounter for mammogram to establish baseline mammogram (V76 12) (Z12 31)   20  Encounter for screening for other nervous system disorder (V80 09) (Z13 858)   21  Fatigue (780 79) (R53 83)   22  Gout (274 9) (M10 9)   23  History of arthritis (V13 4) (Z87 39)   24  Hypertension (401 9) (I10)   25  Hypokalemia (276 8) (E87 6)   26  Influenza vaccine needed (V04 81) (Z23)   27  Insomnia, persistent (307 42) (G47 00)   28  Iron deficiency anemia (280 9) (D50 9)   29  Medicare annual wellness visit, initial (V70 0) (Z00 00)   30  Memory loss (780 93) (R41 3)   31  Need for pneumococcal vaccination (V03 82) (Z23)   32  Numbness of right hand (782 0) (R20 0)   33  Pain of both hip joints (719 45) (M25 551,M25 552)   34  Pre-op examination (V72 84) (Z01 818)   35   Renal function test abnormal (794 4) (R94 4)   36  Screening for diabetes mellitus (V77 1) (Z13 1)   37  Screening for osteoporosis (V82 81) (Z13 820)   38  Status post right hip replacement (V43 64) (J29 621)    Past Medical History    · History of Abnormal blood sugar (790 29) (R73 09)   · History of Acute maxillary sinusitis, recurrence not specified (461 0) (J01 00)   · History of Acute upper respiratory infection (465 9) (J06 9)   · Anxiety (300 00) (F41 9)   · History of Arthralgia of right hip (719 45) (M25 551)   · History of Ceruminosis, right (380 4) (H61 21)   · History of Denial (799 29) (R45 89)   · History of acute otitis media (V12 49) (Z86 69)   · History of acute sinusitis (V12 69) (Z87 09)   · History of anaphylactic shock (V13 81) (X34 591)   · History of arthritis (V13 4) (Z87 39)   · History of cardiomyopathy (V12 59) (Z86 79)   · History of chest pain (V13 89) (B86 503)   · History of ganglion cyst (V13 3) (Z87 39)   · History of weight gain (V15 89) (Z36 502)   · Hypertension (401 9) (I10)   · History of Need for shingles vaccine (V04 89) (Z23)   · History of Rash and nonspecific skin eruption (782 1) (R21)   · History of Wrist pain, acute, right (719 43) (M25 531)    The active problems and past medical history were reviewed and updated today  Surgical History    · History of Back Surgery   · History of Catheter Ablation Atrial Supraventricular Tachycardia   · History of Hip Replacement   · History of Knee Arthroplasty   · History of Vaginal Hysterectomy    The surgical history was reviewed and updated today  Family History    · Family history of Old age    · Family history of Old age    The family history was reviewed and updated today  Social History    · Never a smoker   · No alcohol use   · Non-smoker (V49 89) (Z78 9)   · Tetanus booster  The social history was reviewed and updated today  Current Meds   1  Alive Womens 50+ TABS; TAKE 1 TABLET DAILY; Therapy: (Conrad Doom) to Recorded   2  ALPRAZolam 1 MG Oral Tablet; TAKE 1 TABLET  DAILY AS NEEDED; Therapy: 64PAZ7492 to (Last Rx:03Jan2018) Ordered   3  Amoxicillin-Pot Clavulanate 500-125 MG Oral Tablet; TAKE 1 TABLET 3 TIMES DAILY   UNTIL GONE;   Therapy: 79VTZ8272 to (Evaluate:15Jan2018)  Requested for: 20XSF7740; Last   Rx:05Jan2018 Ordered   4  Arthrotec  MG-MCG TABS; Take one tab daily; Therapy: (Recorded:18Jan2018) to Recorded   5  Atorvastatin Calcium 20 MG Oral Tablet; TAKE 1 TABLET DAILY; Therapy: 01ECP6864 to (Last Rx:03Apr2017)  Requested for: 03Apr2017 Ordered   6  B Complex TABS; TAKE 1 TABLET DAILY; Therapy: (Joe Smalls) to Recorded   7  Colchicine 0 6 MG Oral Tablet; TAKE 1 TABLET DAILY AS DIRECTED; Therapy: 29SEX6180 to (Evaluate:09Sep2017)  Requested for: 31Uun7862; Last   Rx:03Nhl7810 Ordered   8  Diclofenac-Misoprostol 50-0 2 MG Oral Tablet Delayed Release; 0ne tab po mushtaq as   needed; Therapy: 71Xhl3042 to (Last Rx:08Sep2017)  Requested for: 29Avo3341 Ordered   9  Eliquis 5 MG Oral Tablet; TAKE ONE (1) TABLET BY MOUTH TWICE DAILY; Therapy: 32Wsa9728 to (Last Rx:78Mrm1215)  Requested for: 18Tsy6265 Ordered   10  EPINEPHrine 0 3 MG/0 3ML Injection Solution Auto-injector; INJECT 0 3ML    INTRAMUSCULARLY AS DIRECTED  Requested for: 02Jun2016; Last Rx:02Jun2016    Ordered   11  Folic Acid 1 MG Oral Tablet; TAKE 1 TABLET DAILY AS DIRECTED; Therapy: 51FYB0236 to (Evaluate:33Rwl0478)  Requested for: 92XFJ0934; Last    Rx:93Cqo7560 Ordered   12  Furosemide 20 MG Oral Tablet; take 1 tab 2x daily as needed; Therapy: 13UAE0090 to (Last UF:62IEQ1668)  Requested for: 92YAT3243 Ordered   13  Metoprolol Tartrate 100 MG Oral Tablet; Take 1 tablet 2x daily as directed; Therapy: 70OLB8100 to (Evaluate:24Svu5548)  Requested for: 85Xmv5257; Last    Rx:35Grd7181 Ordered   14  Olmesartan Medoxomil 40 MG Oral Tablet; TAKE 1 TABLET DAILY;     Therapy: 25Apr2015 to (Neel Bidding)  Requested for: 65IOM6731; Last Rx:04Wtd6486 Ordered   15  Potassium Chloride Winter ER 20 MEQ Oral Tablet Extended Release; TAKE 1 TABLET BY    MOUTH EVERY DAY -MUST TAKE WITH FOOD OR MILK; Therapy: 84NPV2795 to (Last Rx:10Oct2017)  Requested for: 88PAE9063 Ordered   16  ProAir  (90 Base) MCG/ACT Inhalation Aerosol Solution; INHALE 1 TO 2 PUFFS    EVERY 6 HOURS AS NEEDED; Therapy: 59IFA4632 to (21 )  Requested for: 35IKS7192; Last    Rx:08Jun2015 Ordered   17  Vitamin C 500 MG Oral Capsule; Take 1 capsule twice daily; Therapy: 12PBX3453 to (Evaluate:66Het7768)  Requested for: 61RKQ6656; Last    HY:09KRI1111 Ordered    The medication list was reviewed and updated today  Allergies    1  Codeine Derivatives   2  TETANUS    3  Bee sting    Physical Exam    Constitutional   General appearance: No acute distress, well appearing and well nourished  Eyes   Conjunctiva and Sclera examination: Conjunctiva pink, sclera anicteric  Ears, Nose, Mouth, and Throat - Oropharynx: Clear, nares are clear, mucous membranes are moist    Neck   Neck and thyroid: Normal, supple, trachea midline, no thyromegaly  Pulmonary   Respiratory effort: No increased work of breathing or signs of respiratory distress  Auscultation of lungs: Clear to auscultation, no rales, no rhonchi, no wheezing, good air movement  Cardiovascular   Auscultation of heart: Normal rate and rhythm, normal S1 and S2, no murmurs  Carotid pulses: Normal, 2+ bilaterally  Peripheral vascular exam: Normal pulses throughout, no tenderness, erythema or swelling  Pedal pulses: Normal, 2+ bilaterally  Examination of extremities for edema and/or varicosities: Abnormal   bilateral ankle 1+ pitting edema and bilateral pretibial 1+ pitting edema  Abdomen   Abdomen: Non-tender and no distention  Liver and spleen: No hepatomegaly or splenomegaly  Musculoskeletal Gait and station: Normal gait  Digits and nails: Normal without clubbing or cyanosis  Inspection/palpation of joints, bones, and muscles: Normal, ROM normal     Skin - Skin and subcutaneous tissue: Normal without rashes or lesions  Skin is warm and well perfused, normal turgor  Neurologic - Cranial nerves: II - XII intact  Speech: Normal     Psychiatric - Orientation to person, place, and time: Normal  Mood and affect: Normal       Assessment    1  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)   2  History of Catheter Ablation Atrial Supraventricular Tachycardia   3  Hypertension (401 9) (I10)   4  Dyslipidemia (272 4) (E78 5)    Plan  Dyslipidemia    · Atorvastatin Calcium 20 MG Oral Tablet; TAKE 1 TABLET DAILY   Rx By: Hamida Hawthorne; Dispense: 90 Days ; #:90 Tablet; Refill: 3; For: Dyslipidemia; KIMBERLY = N; Verified Transmission to [facility]; Last Updated By: [employer]; 1/18/2018 12:56:49 PM  Dyslipidemia,     · (1) LIPID PANEL FASTING W DIRECT LDL REFLEX; Status:Active; Requested  GUX:66BHD9590; Perform:[facility]; ROSA:48DGN5884;HDUDLDK; For:Dyslipidemia, ; Ordered By:Maddison Yu;  Unlinked    · Follow-up visit in 6 months Evaluation and Treatment  Follow-up  Status: Hold For -  Scheduling  Requested for: 38JUI6329   Ordered; Ordered By: Hamida Hawthorne Performed:  Due: 73TBB8559    Discussion/Summary    Mrs Eva Silvestre is a pleasant 25-year-old female who presents to the office today for routine follow-up  Since her last visit she has been feeling well  She offers no cardiopulmonary complaints today  Her blood pressure is well controlled in the office today  She will remain on her current regimen as prescribed  I do not have any recent assessment of her lipids and a prescription was provided  She had a recent echocardiogram which was unrevealing except for moderate pulmonary hypertension in the setting of a PE  She is asymptomatic and no further testing is indicated  I will see her back in the office in six months for re-evaluation        Thank you very much for allowing me to participate in the care of this patient  If you have any questions, please do not hesitate to contact me        Future Appointments    Signatures   Electronically signed by : Farida Soto DO; Jan 18 2018  1:02PM EST                       (Author)

## 2018-01-24 VITALS
DIASTOLIC BLOOD PRESSURE: 66 MMHG | TEMPERATURE: 98.9 F | HEART RATE: 67 BPM | OXYGEN SATURATION: 95 % | HEIGHT: 59 IN | SYSTOLIC BLOOD PRESSURE: 110 MMHG | BODY MASS INDEX: 29.43 KG/M2 | RESPIRATION RATE: 17 BRPM | WEIGHT: 146 LBS

## 2018-01-31 ENCOUNTER — OFFICE VISIT (OUTPATIENT)
Dept: FAMILY MEDICINE CLINIC | Facility: CLINIC | Age: 83
End: 2018-01-31
Payer: COMMERCIAL

## 2018-01-31 VITALS
TEMPERATURE: 97 F | WEIGHT: 147 LBS | SYSTOLIC BLOOD PRESSURE: 124 MMHG | HEART RATE: 62 BPM | HEIGHT: 59 IN | RESPIRATION RATE: 18 BRPM | BODY MASS INDEX: 29.64 KG/M2 | OXYGEN SATURATION: 92 % | DIASTOLIC BLOOD PRESSURE: 66 MMHG

## 2018-01-31 DIAGNOSIS — M1A.9XX0 CHRONIC GOUT WITHOUT TOPHUS, UNSPECIFIED CAUSE, UNSPECIFIED SITE: Primary | ICD-10-CM

## 2018-01-31 DIAGNOSIS — I26.99 OTHER PULMONARY EMBOLISM WITHOUT ACUTE COR PULMONALE, UNSPECIFIED CHRONICITY (HCC): ICD-10-CM

## 2018-01-31 DIAGNOSIS — F41.9 ANXIETY: ICD-10-CM
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
## 2018-01-31 PROBLEM — M10.9 GOUT: Status: ACTIVE | Noted: 2017-05-31

## 2018-01-31 PROBLEM — D64.9 ANEMIA: Status: ACTIVE | Noted: 2017-08-10

## 2018-01-31 PROBLEM — M16.11 PRIMARY OSTEOARTHRITIS OF RIGHT HIP: Status: RESOLVED | Noted: 2017-07-17 | Resolved: 2018-01-31

## 2018-01-31 PROBLEM — J18.9 HAP (HOSPITAL-ACQUIRED PNEUMONIA): Status: RESOLVED | Noted: 2017-07-21 | Resolved: 2018-01-31

## 2018-01-31 PROCEDURE — 99213 OFFICE O/P EST LOW 20 MIN: CPT | Performed by: FAMILY MEDICINE

## 2018-01-31 RX ORDER — VITAMIN B COMPLEX
1 TABLET ORAL DAILY
COMMUNITY
End: 2018-01-31 | Stop reason: SDUPTHER

## 2018-01-31 RX ORDER — DICLOFENAC SODIUM AND MISOPROSTOL 50; 200 MG/1; UG/1
TABLET, DELAYED RELEASE ORAL
COMMUNITY
Start: 2017-08-01 | End: 2018-01-31 | Stop reason: SDUPTHER

## 2018-01-31 RX ORDER — ALPRAZOLAM 1 MG/1
1 TABLET ORAL
Qty: 30 TABLET | Refills: 0 | Status: SHIPPED | OUTPATIENT
Start: 2018-01-31 | End: 2018-01-31 | Stop reason: SDUPTHER

## 2018-01-31 RX ORDER — MV-MIN/FOLIC/VIT K/LUT/HERB293 240-120MCG
1 TABLET ORAL DAILY
COMMUNITY
End: 2018-01-31 | Stop reason: SDUPTHER

## 2018-01-31 RX ORDER — CLOTRIMAZOLE AND BETAMETHASONE DIPROPIONATE 10; .64 MG/G; MG/G
CREAM TOPICAL 2 TIMES DAILY
COMMUNITY
End: 2018-07-03

## 2018-01-31 RX ORDER — ALPRAZOLAM 1 MG/1
1 TABLET ORAL
Qty: 30 TABLET | Refills: 0 | Status: SHIPPED | OUTPATIENT
Start: 2018-01-31 | End: 2018-03-05 | Stop reason: SDUPTHER

## 2018-01-31 RX ORDER — ALLOPURINOL 100 MG/1
100 TABLET ORAL DAILY
Qty: 30 TABLET | Refills: 3 | Status: SHIPPED | OUTPATIENT
Start: 2018-01-31 | End: 2018-06-19 | Stop reason: SDUPTHER

## 2018-01-31 RX ORDER — DICLOFENAC SODIUM AND MISOPROSTOL 50; 200 MG/1; UG/1
1 TABLET, DELAYED RELEASE ORAL DAILY
COMMUNITY
End: 2018-02-26 | Stop reason: SDUPTHER

## 2018-01-31 NOTE — PROGRESS NOTES
History and Physical  Quita Collier 80 y o  female MRN: [id number]      Assessment:  Anxiety  Gout    Plan:  Continue current meds  Start Allopurinol 100 mg daily  Stop Eliquis as dicussed  RTC 1 month  Chief Complaint   Patient presents with    Follow-up        HPI:  Quita Collier is a 80 y o  female who presents  For med refill  Last week had another episode of gout  She treated it with Colchicine and is much improved  She is at 6 months of Eliquis therapy and wants off  Historical Information   Past Medical History:   Diagnosis Date    Arthritis     Cardiac disease     Chronic pain     Gout     Hypertension      Past Surgical History:   Procedure Laterality Date    APPENDECTOMY      ATRIAL ABLATION SURGERY      HYSTERECTOMY      JOINT REPLACEMENT  knees    KNEE ARTHROSCOPY      b/l knees    LAMINECTOMY      ME TOTAL HIP ARTHROPLASTY Right 7/17/2017    Procedure: TOTAL ANTERIOR HIP ARTHROPLASTY;  Surgeon: Kayode Cooper MD;  Location: [facility];  Service: Orthopedics     Social History   History   Alcohol Use No     History   Drug Use No     History   Smoking Status    Never Smoker   Smokeless Tobacco    Never Used     History reviewed  No pertinent family history      Meds/Allergies   Allergies   Allergen Reactions    Bee Venom Anaphylaxis    Codeine     Tetanus Toxoid        Meds:    Current Outpatient Prescriptions:     albuterol (PROVENTIL HFA,VENTOLIN HFA) 90 mcg/act inhaler, Inhale 2 puffs every 6 (six) hours as needed for wheezing, Disp: , Rfl:     ALPRAZolam (XANAX) 1 mg tablet, Take 1 tablet (1 mg total) by mouth daily at bedtime as needed for anxiety, Disp: 30 tablet, Rfl: 0    apixaban (ELIQUIS) 5 mg, 10mg twice daily x 6 days then 5mg PO BID, Disp: , Rfl: 0    atorvastatin (LIPITOR) 20 mg tablet, Take 20 mg by mouth daily, Disp: , Rfl:     B Complex Vitamins (B COMPLEX 1 PO), Take by mouth, Disp: , Rfl:     colchicine (COLCRYS) 0 6 mg tablet, Take 1 tablet by mouth daily, Disp: , Rfl: 0    diclofenac-misoprostol (ARTHROTEC 50) 50-0 2 MG per tablet, Take 1 tablet by mouth daily, Disp: , Rfl:     docusate sodium (COLACE) 100 mg capsule, Take 100 mg by mouth 2 (two) times a day as needed for constipation, Disp: , Rfl:     EPINEPHrine (EPIPEN) 0 3 mg/0 3 mL SOAJ, EPINEPHrine 0 3 MG/0 3ML Injection Solution Auto-injector INJECT 0 3ML INTRAMUSCULARLY AS DIRECTED  Quantity: 2;  Refills: 3   Ulshafer PAC, Marygrace Boast ; Active, Disp: , Rfl:     ferrous sulfate 325 (65 Fe) mg tablet, Take by mouth, Disp: , Rfl:     folic acid (FOLVITE) 1 mg tablet, Take 1 tablet by mouth daily, Disp: , Rfl: 0    furosemide (LASIX) 20 mg tablet, Take by mouth 2 (two) times a day, Disp: , Rfl:     metoprolol tartrate (LOPRESSOR) 100 mg tablet, Take 100 mg by mouth 2 (two) times a day  , Disp: , Rfl:     Multiple Vitamin (MULTIVITAMIN) tablet, Take 1 tablet by mouth daily, Disp: , Rfl:     olmesartan (BENICAR) 40 mg tablet, Take by mouth, Disp: , Rfl:     potassium chloride (K-DUR,KLOR-CON) 20 mEq tablet, Take by mouth, Disp: , Rfl:     Vitamins-Lipotropics (LIPO-FLAVONOID PLUS PO), Take 2 tablets by mouth daily  , Disp: , Rfl:     ascorbic acid (VITAMIN C) 500 MG tablet, Take 1 tablet by mouth daily, Disp: , Rfl: 0    aspirin (ECOTRIN LOW STRENGTH) 81 mg EC tablet, Take 1 tablet by mouth daily, Disp: 1 tablet, Rfl: 0    clotrimazole (LOTRIMIN) 1 % cream, Apply topically as needed, Disp: , Rfl:     Inulin (FIBER CHOICE PO), Take by mouth, Disp: , Rfl:     oxyCODONE (OXY-IR) 5 MG capsule, Take 2 capsules by mouth every 4 (four) hours as needed for severe pain, Disp: , Rfl:     pantoprazole (PROTONIX) 40 mg tablet, Take 1 tablet by mouth daily, Disp: , Rfl: 0      REVIEW OF SYSTEMS  Review of Systems   Constitutional: Negative  HENT: Negative  Eyes: Negative  Respiratory: Negative  Cardiovascular: Negative  Gastrointestinal: Negative  Endocrine: Negative  Genitourinary: Negative  Musculoskeletal: Positive for arthralgias  Skin: Negative  Allergic/Immunologic: Negative  Neurological: Negative  Mild memory impairment  Hematological: Negative  Psychiatric/Behavioral: Negative  Current Vitals:   Blood Pressure: 124/66 (01/31/18 1258)  Pulse: 62 (01/31/18 1258)  Temperature: (!) 97 °F (36 1 °C) (01/31/18 1258)  Respirations: 18 (01/31/18 1258)  Height: 4' 11" (149 9 cm) (01/31/18 1258)  Weight - Scale: 66 7 kg (147 lb) (01/31/18 1258)  SpO2: 92 % (01/31/18 1258)      PHYSICAL EXAMS:  Physical Exam   Constitutional: She is oriented to person, place, and time  She appears well-developed and well-nourished  HENT:   Head: Normocephalic and atraumatic  Eyes: Conjunctivae and EOM are normal  Pupils are equal, round, and reactive to light  Neck: Normal range of motion  Neck supple  No thyromegaly present  Cardiovascular: Normal rate, regular rhythm and normal heart sounds  Pulmonary/Chest: Effort normal and breath sounds normal    Musculoskeletal: Normal range of motion  Neurological: She is alert and oriented to person, place, and time  Skin: Skin is warm and dry  Psychiatric: She has a normal mood and affect  Lab Results:          Derick Cogan, PA-C  1/31/2018, 1:13 PM

## 2018-02-14 ENCOUNTER — APPOINTMENT (OUTPATIENT)
Dept: LAB | Facility: MEDICAL CENTER | Age: 83
End: 2018-02-14
Payer: COMMERCIAL

## 2018-02-14 ENCOUNTER — TRANSCRIBE ORDERS (OUTPATIENT)
Dept: RADIOLOGY | Facility: MEDICAL CENTER | Age: 83
End: 2018-02-14

## 2018-02-14 DIAGNOSIS — E78.5 HYPERLIPIDEMIA: ICD-10-CM

## 2018-02-14 DIAGNOSIS — I51.9 HEART DISEASE: ICD-10-CM

## 2018-02-14 LAB
CHOLEST SERPL-MCNC: 139 MG/DL (ref 50–200)
HDLC SERPL-MCNC: 50 MG/DL (ref 40–60)
LDLC SERPL CALC-MCNC: 71 MG/DL (ref 0–100)
TRIGL SERPL-MCNC: 88 MG/DL

## 2018-02-14 PROCEDURE — 80061 LIPID PANEL: CPT

## 2018-02-14 PROCEDURE — 36415 COLL VENOUS BLD VENIPUNCTURE: CPT

## 2018-02-26 DIAGNOSIS — M19.90 ARTHRITIS: Primary | ICD-10-CM

## 2018-02-26 RX ORDER — DICLOFENAC SODIUM AND MISOPROSTOL 50; 200 MG/1; UG/1
1 TABLET, DELAYED RELEASE ORAL DAILY
Qty: 30 TABLET | Refills: 3 | Status: SHIPPED | OUTPATIENT
Start: 2018-02-26 | End: 2018-06-04 | Stop reason: DRUGHIGH

## 2018-03-05 ENCOUNTER — OFFICE VISIT (OUTPATIENT)
Dept: FAMILY MEDICINE CLINIC | Facility: CLINIC | Age: 83
End: 2018-03-05
Payer: COMMERCIAL

## 2018-03-05 VITALS
OXYGEN SATURATION: 95 % | RESPIRATION RATE: 18 BRPM | DIASTOLIC BLOOD PRESSURE: 60 MMHG | HEIGHT: 59 IN | TEMPERATURE: 97 F | BODY MASS INDEX: 29.64 KG/M2 | HEART RATE: 61 BPM | WEIGHT: 147 LBS | SYSTOLIC BLOOD PRESSURE: 124 MMHG

## 2018-03-05 DIAGNOSIS — F41.9 ANXIETY: ICD-10-CM

## 2018-03-05 PROCEDURE — 99213 OFFICE O/P EST LOW 20 MIN: CPT | Performed by: FAMILY MEDICINE

## 2018-03-05 RX ORDER — ALPRAZOLAM 1 MG/1
1 TABLET ORAL
Qty: 30 TABLET | Refills: 0 | Status: SHIPPED | OUTPATIENT
Start: 2018-03-05 | End: 2018-04-04 | Stop reason: SDUPTHER

## 2018-03-05 NOTE — PROGRESS NOTES
History and Physical  Jeanella Siemens 80 y o  female MRN: [id number]      Assessment:   Insomnia  HTN    Plan:  Continue current meds  RTC 1 month  Chief Complaint   Patient presents with    Medication Refill        HPI:  Jeanella Siemens is a 80 y o  female who presents for med refill  She is doing well  No complaints today  Historical Information   Past Medical History:   Diagnosis Date    Arthritis     Cardiac disease     Chronic pain     Gout     Hypertension      Past Surgical History:   Procedure Laterality Date    APPENDECTOMY      ATRIAL ABLATION SURGERY      HYSTERECTOMY      JOINT REPLACEMENT  knees    KNEE ARTHROSCOPY      b/l knees    LAMINECTOMY      NM TOTAL HIP ARTHROPLASTY Right 7/17/2017    Procedure: TOTAL ANTERIOR HIP ARTHROPLASTY;  Surgeon: Michaelle Salazar MD;  Location: [facility];  Service: Orthopedics     Social History   History   Alcohol Use No     History   Drug Use No     History   Smoking Status    Never Smoker   Smokeless Tobacco    Never Used     No family history on file      Meds/Allergies   Allergies   Allergen Reactions    Bee Venom Anaphylaxis    Codeine     Tetanus Toxoid        Meds:    Current Outpatient Prescriptions:     albuterol (PROVENTIL HFA,VENTOLIN HFA) 90 mcg/act inhaler, Inhale 2 puffs every 6 (six) hours as needed for wheezing, Disp: , Rfl:     allopurinol (ZYLOPRIM) 100 mg tablet, Take 1 tablet (100 mg total) by mouth daily, Disp: 30 tablet, Rfl: 3    ALPRAZolam (XANAX) 1 mg tablet, Take 1 tablet (1 mg total) by mouth daily at bedtime as needed for anxiety, Disp: 30 tablet, Rfl: 0    apixaban (ELIQUIS) 5 mg, Take 1 tablet (5 mg total) by mouth daily 10mg twice daily x 6 days then 5mg PO BID, Disp: 7 tablet, Rfl: 0    ascorbic acid (VITAMIN C) 500 MG tablet, Take 1 tablet by mouth daily, Disp: , Rfl: 0    aspirin (ECOTRIN LOW STRENGTH) 81 mg EC tablet, Take 1 tablet by mouth daily, Disp: 1 tablet, Rfl: 0    atorvastatin (LIPITOR) 20 mg tablet, Take 20 mg by mouth daily, Disp: , Rfl:     B Complex Vitamins (B COMPLEX 1 PO), Take by mouth, Disp: , Rfl:     clotrimazole (LOTRIMIN) 1 % cream, Apply topically as needed, Disp: , Rfl:     clotrimazole-betamethasone (LOTRISONE) 1-0 05 % cream, Apply topically 2 (two) times a day, Disp: , Rfl:     colchicine (COLCRYS) 0 6 mg tablet, Take 1 tablet by mouth daily, Disp: , Rfl: 0    diclofenac-misoprostol (ARTHROTEC 50) 50-0 2 MG per tablet, Take 1 tablet by mouth daily, Disp: 30 tablet, Rfl: 3    docusate sodium (COLACE) 100 mg capsule, Take 100 mg by mouth 2 (two) times a day as needed for constipation, Disp: , Rfl:     EPINEPHrine (EPIPEN) 0 3 mg/0 3 mL SOAJ, EPINEPHrine 0 3 MG/0 3ML Injection Solution Auto-injector INJECT 0 3ML INTRAMUSCULARLY AS DIRECTED  Quantity: 2;  Refills: 3   Richar Mane ; Active, Disp: , Rfl:     ferrous sulfate 325 (65 Fe) mg tablet, Take by mouth, Disp: , Rfl:     folic acid (FOLVITE) 1 mg tablet, Take 1 tablet by mouth daily, Disp: , Rfl: 0    furosemide (LASIX) 20 mg tablet, Take by mouth 2 (two) times a day, Disp: , Rfl:     Inulin (FIBER CHOICE PO), Take by mouth, Disp: , Rfl:     metoprolol tartrate (LOPRESSOR) 100 mg tablet, Take 100 mg by mouth 2 (two) times a day  , Disp: , Rfl:     Multiple Vitamin (MULTIVITAMIN) tablet, Take 1 tablet by mouth daily, Disp: , Rfl:     olmesartan (BENICAR) 40 mg tablet, Take by mouth, Disp: , Rfl:     oxyCODONE (OXY-IR) 5 MG capsule, Take 2 capsules by mouth every 4 (four) hours as needed for severe pain, Disp: , Rfl:     pantoprazole (PROTONIX) 40 mg tablet, Take 1 tablet by mouth daily, Disp: , Rfl: 0    potassium chloride (K-DUR,KLOR-CON) 20 mEq tablet, Take by mouth, Disp: , Rfl:     Vitamins-Lipotropics (LIPO-FLAVONOID PLUS PO), Take 2 tablets by mouth daily  , Disp: , Rfl:       REVIEW OF SYSTEMS  Review of Systems   Constitutional: Negative  HENT: Negative  Eyes: Negative      Respiratory: Negative  Cardiovascular: Negative  Gastrointestinal: Negative  Endocrine: Negative  Genitourinary: Negative  Musculoskeletal: Negative  Allergic/Immunologic: Negative  Neurological: Negative  Hematological: Negative  Psychiatric/Behavioral:        Insomnia       Current Vitals:   Blood Pressure: 124/60 (03/05/18 0938)  Pulse: 61 (03/05/18 0938)  Temperature: (!) 97 °F (36 1 °C) (03/05/18 0938)  Respirations: 18 (03/05/18 0938)  Height: 4' 11" (149 9 cm) (03/05/18 1104)  Weight - Scale: 66 7 kg (147 lb) (03/05/18 0938)  SpO2: 95 % (03/05/18 0938)      PHYSICAL EXAMS:  Physical Exam   Constitutional: She is oriented to person, place, and time  She appears well-developed and well-nourished  HENT:   Head: Normocephalic and atraumatic  Right Ear: External ear normal    Left Ear: External ear normal    Mouth/Throat: Oropharynx is clear and moist    Eyes: Conjunctivae and EOM are normal  Pupils are equal, round, and reactive to light  Neck: Normal range of motion  Neck supple  No thyromegaly present  Cardiovascular: Normal rate, regular rhythm and normal heart sounds  Pulmonary/Chest: Effort normal and breath sounds normal  She has no wheezes  She has no rales  Musculoskeletal: Normal range of motion  She exhibits no edema  Neurological: She is alert and oriented to person, place, and time  No cranial nerve deficit  Skin: Skin is warm and dry  Psychiatric: She has a normal mood and affect  Lab Results:          Abe Terrazas PA-C  3/5/2018, 9:52 AM

## 2018-04-04 ENCOUNTER — OFFICE VISIT (OUTPATIENT)
Dept: FAMILY MEDICINE CLINIC | Facility: CLINIC | Age: 83
End: 2018-04-04
Payer: COMMERCIAL

## 2018-04-04 VITALS
HEIGHT: 59 IN | OXYGEN SATURATION: 97 % | WEIGHT: 149 LBS | TEMPERATURE: 97.9 F | SYSTOLIC BLOOD PRESSURE: 122 MMHG | RESPIRATION RATE: 17 BRPM | BODY MASS INDEX: 30.04 KG/M2 | DIASTOLIC BLOOD PRESSURE: 68 MMHG | HEART RATE: 62 BPM

## 2018-04-04 DIAGNOSIS — R21 RASH: Primary | ICD-10-CM

## 2018-04-04 DIAGNOSIS — Z76.0 MEDICATION REFILL: ICD-10-CM

## 2018-04-04 DIAGNOSIS — F41.9 ANXIETY: ICD-10-CM

## 2018-04-04 PROCEDURE — 99213 OFFICE O/P EST LOW 20 MIN: CPT | Performed by: FAMILY MEDICINE

## 2018-04-04 RX ORDER — ALPRAZOLAM 1 MG/1
1 TABLET ORAL
Qty: 30 TABLET | Refills: 0 | Status: SHIPPED | OUTPATIENT
Start: 2018-04-04 | End: 2018-05-03 | Stop reason: SDUPTHER

## 2018-04-04 RX ORDER — TRIAMCINOLONE ACETONIDE 0.25 MG/G
CREAM TOPICAL 2 TIMES DAILY
Qty: 30 G | Refills: 0 | Status: SHIPPED | OUTPATIENT
Start: 2018-04-04 | End: 2019-10-22

## 2018-04-04 NOTE — PROGRESS NOTES
History and Physical  Maceo Cheadle 80 y o  female MRN: [id number]      Assessment:   Insomnia  Rash    Plan:  Triamcinolone BID to affected area  Continue current meds  RTC 1 month or sooner if needed  Chief Complaint   Patient presents with    Medication Refill    Rash     left wrist        HPI:  Maceo Cheadle is a 80 y o  female who presents with rash x 2 weeks  Admits to itch  Located on L wrist only  Historical Information   Past Medical History:   Diagnosis Date    Arthritis     Cardiac disease     Chronic pain     Gout     Hypertension      Past Surgical History:   Procedure Laterality Date    APPENDECTOMY      ATRIAL ABLATION SURGERY      HYSTERECTOMY      JOINT REPLACEMENT  knees    KNEE ARTHROSCOPY      b/l knees    LAMINECTOMY      [place] TOTAL HIP ARTHROPLASTY Right 7/17/2017    Procedure: TOTAL ANTERIOR HIP ARTHROPLASTY;  Surgeon: Tim Gustafson MD;  Location: [facility];  Service: Orthopedics     Social History   History   Alcohol Use No     History   Drug Use No     History   Smoking Status    Never Smoker   Smokeless Tobacco    Never Used     No family history on file      Meds/Allergies   Allergies   Allergen Reactions    Bee Venom Anaphylaxis    Codeine     Tetanus Toxoid        Meds:    Current Outpatient Prescriptions:     albuterol (PROVENTIL HFA,VENTOLIN HFA) 90 mcg/act inhaler, Inhale 2 puffs every 6 (six) hours as needed for wheezing, Disp: , Rfl:     allopurinol (ZYLOPRIM) 100 mg tablet, Take 1 tablet (100 mg total) by mouth daily, Disp: 30 tablet, Rfl: 3    ALPRAZolam (XANAX) 1 mg tablet, Take 1 tablet (1 mg total) by mouth daily at bedtime as needed for sleep, Disp: 30 tablet, Rfl: 0    apixaban (ELIQUIS) 5 mg, Take 1 tablet (5 mg total) by mouth daily 10mg twice daily x 6 days then 5mg PO BID, Disp: 7 tablet, Rfl: 0    ascorbic acid (VITAMIN C) 500 MG tablet, Take 1 tablet by mouth daily, Disp: , Rfl: 0    aspirin (ECOTRIN LOW STRENGTH) 81 mg EC tablet, Take 1 tablet by mouth daily, Disp: 1 tablet, Rfl: 0    atorvastatin (LIPITOR) 20 mg tablet, Take 20 mg by mouth daily, Disp: , Rfl:     B Complex Vitamins (B COMPLEX 1 PO), Take by mouth, Disp: , Rfl:     clotrimazole (LOTRIMIN) 1 % cream, Apply topically as needed, Disp: , Rfl:     clotrimazole-betamethasone (LOTRISONE) 1-0 05 % cream, Apply topically 2 (two) times a day, Disp: , Rfl:     colchicine (COLCRYS) 0 6 mg tablet, Take 1 tablet by mouth daily, Disp: , Rfl: 0    diclofenac-misoprostol (ARTHROTEC 50) 50-0 2 MG per tablet, Take 1 tablet by mouth daily, Disp: 30 tablet, Rfl: 3    docusate sodium (COLACE) 100 mg capsule, Take 100 mg by mouth 2 (two) times a day as needed for constipation, Disp: , Rfl:     EPINEPHrine (EPIPEN) 0 3 mg/0 3 mL SOAJ, EPINEPHrine 0 3 MG/0 3ML Injection Solution Auto-injector INJECT 0 3ML INTRAMUSCULARLY AS DIRECTED  Quantity: 2;  Refills: 3   ShannonshQuoc Aaron ;  Active, Disp: , Rfl:     ferrous sulfate 325 (65 Fe) mg tablet, Take by mouth, Disp: , Rfl:     folic acid (FOLVITE) 1 mg tablet, Take 1 tablet by mouth daily, Disp: , Rfl: 0    furosemide (LASIX) 20 mg tablet, Take by mouth 2 (two) times a day, Disp: , Rfl:     Inulin (FIBER CHOICE PO), Take by mouth, Disp: , Rfl:     metoprolol tartrate (LOPRESSOR) 100 mg tablet, Take 100 mg by mouth 2 (two) times a day  , Disp: , Rfl:     Multiple Vitamin (MULTIVITAMIN) tablet, Take 1 tablet by mouth daily, Disp: , Rfl:     olmesartan (BENICAR) 40 mg tablet, Take by mouth, Disp: , Rfl:     oxyCODONE (OXY-IR) 5 MG capsule, Take 2 capsules by mouth every 4 (four) hours as needed for severe pain, Disp: , Rfl:     pantoprazole (PROTONIX) 40 mg tablet, Take 1 tablet by mouth daily, Disp: , Rfl: 0    potassium chloride (K-DUR,KLOR-CON) 20 mEq tablet, Take by mouth, Disp: , Rfl:     Vitamins-Lipotropics (LIPO-FLAVONOID PLUS PO), Take 2 tablets by mouth daily  , Disp: , Rfl:       REVIEW OF SYSTEMS  Review of Systems   Constitutional: Negative  HENT: Negative  Eyes: Negative  Respiratory: Negative  Cardiovascular: Negative  Gastrointestinal: Negative  Endocrine: Negative  Genitourinary: Negative  Musculoskeletal: Negative  Skin: Positive for rash  Allergic/Immunologic: Negative  Neurological: Negative  Hematological: Negative  Psychiatric/Behavioral: Positive for sleep disturbance  Current Vitals:   Blood Pressure: 122/68 (04/04/18 1003)  Pulse: 62 (04/04/18 1003)  Temperature: 97 9 °F (36 6 °C) (04/04/18 1003)  Respirations: 17 (04/04/18 1003)  Height: 4' 11" (149 9 cm) (04/04/18 1003)  Weight - Scale: 67 6 kg (149 lb) (04/04/18 1003)  SpO2: 97 % (04/04/18 1003)      PHYSICAL EXAMS:  Physical Exam   Constitutional: She is oriented to person, place, and time  She appears well-developed and well-nourished  HENT:   Head: Normocephalic and atraumatic  Right Ear: External ear normal    Left Ear: External ear normal    Mouth/Throat: Oropharynx is clear and moist    Eyes: EOM are normal  Pupils are equal, round, and reactive to light  Neck: Normal range of motion  Neck supple  No thyromegaly present  Cardiovascular: Normal rate, regular rhythm and normal heart sounds  Pulmonary/Chest: Effort normal and breath sounds normal  She has no wheezes  She has no rales  Musculoskeletal: Normal range of motion  Neurological: She is alert and oriented to person, place, and time  No cranial nerve deficit  Skin:   3 dry skin patches noted on L wrist    Psychiatric: She has a normal mood and affect  Lab Results:          Saige Dasilva PA-C  4/4/2018, 10:22 AM

## 2018-04-20 DIAGNOSIS — J01.00 ACUTE MAXILLARY SINUSITIS, RECURRENCE NOT SPECIFIED: Primary | ICD-10-CM

## 2018-04-20 RX ORDER — AZITHROMYCIN 250 MG/1
250 TABLET, FILM COATED ORAL DAILY
Qty: 6 TABLET | Refills: 0 | Status: SHIPPED | OUTPATIENT
Start: 2018-04-20 | End: 2018-04-25

## 2018-04-23 ENCOUNTER — OFFICE VISIT (OUTPATIENT)
Dept: CARDIOLOGY CLINIC | Facility: HOSPITAL | Age: 83
End: 2018-04-23
Payer: COMMERCIAL

## 2018-04-23 VITALS
BODY MASS INDEX: 30.24 KG/M2 | SYSTOLIC BLOOD PRESSURE: 150 MMHG | HEART RATE: 60 BPM | DIASTOLIC BLOOD PRESSURE: 71 MMHG | WEIGHT: 150 LBS | HEIGHT: 59 IN

## 2018-04-23 DIAGNOSIS — I10 BENIGN ESSENTIAL HYPERTENSION: ICD-10-CM

## 2018-04-23 DIAGNOSIS — E78.5 DYSLIPIDEMIA: ICD-10-CM

## 2018-04-23 DIAGNOSIS — Z98.890 HISTORY OF CARDIAC RADIOFREQUENCY ABLATION: ICD-10-CM

## 2018-04-23 DIAGNOSIS — I47.1 AVNRT (AV NODAL RE-ENTRY TACHYCARDIA) (HCC): Primary | ICD-10-CM

## 2018-04-23 PROCEDURE — 99214 OFFICE O/P EST MOD 30 MIN: CPT | Performed by: INTERNAL MEDICINE

## 2018-04-23 PROCEDURE — 4040F PNEUMOC VAC/ADMIN/RCVD: CPT | Performed by: INTERNAL MEDICINE

## 2018-04-23 NOTE — PROGRESS NOTES
Cardiology Follow Up    Josue Solano  1935  [id number]  [address] [address]    1  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 re-entry tachycardia) ([address] )     2  History of cardiac radiofrequency ablation     3  Benign essential hypertension     4  Dyslipidemia         Discussion/Summary:  Mrs Charlee Figueroa is a pleasant 70-year-old female who presents to the office today for routine follow-up  Since her last visit she has been feeling well  She offers no cardiopulmonary complaints today  Her blood pressure is elevated in the office today  It appears to have been well controlled during two recent visits with her primary care provider in the recent past   She does have the capability to monitor it at home and I have asked her to do so  She will notify the office of persistently elevated readings  Otherwise her most recent lipids were reviewed  They are acceptable on current therapy  She is asymptomatic and no testing is advised  I will see her back in the office in six months or sooner if deemed necessary  Interval History:   Mrs Charlee Figueroa is a pleasant 70-year-old female who presents to the office for follow-up      Since her last visit, she's  been feeling well  She has no symptoms suggestive of recurrent SVT  She leads a sedentary lifestyle but with the activity she is able to do she denies any exertional chest pain or shortness of breath  She denies signs or symptoms of right or left sided heart failure  She denies lightheadedness, dizziness, syncope or presyncope  She denies palpitations or symptoms of claudication      Problem List     Status post total replacement of right hip    Hypertension    Hyperlipidemia (Chronic)    Elevated blood uric acid level    History of gout    Dextroscoliosis    Screening-pulmonary TB    Anemia    Anxiety    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 re-entry tachycardia) ([facility] [address] )    Dyslipidemia    Gout    Heart disease    Iron deficiency anemia        Past Medical History:   Diagnosis Date    Arthritis     Cardiac disease     Chronic pain     Gout     Hypertension      Social History     Social History    Marital status: /Civil Union     Spouse name: N/A    Number of children: N/A    Years of education: N/A     Occupational History    Not on file  Social History Main Topics    Smoking status: Never Smoker    Smokeless tobacco: Never Used    Alcohol use No    Drug use: No    Sexual activity: Not on file     Other Topics Concern    Not on file     Social History Narrative    No narrative on file      No family history on file    Past Surgical History:   Procedure Laterality Date    APPENDECTOMY      ATRIAL ABLATION SURGERY      HYSTERECTOMY      JOINT REPLACEMENT  knees    KNEE ARTHROSCOPY      b/l knees    LAMINECTOMY      [place] TOTAL HIP ARTHROPLASTY Right 7/17/2017    Procedure: TOTAL ANTERIOR HIP ARTHROPLASTY;  Surgeon: Anatoliy Allen MD;  Location: [facility];  Service: Orthopedics       Current Outpatient Prescriptions:     albuterol (PROVENTIL HFA,VENTOLIN HFA) 90 mcg/act inhaler, Inhale 2 puffs every 6 (six) hours as needed for wheezing, Disp: , Rfl:     allopurinol (ZYLOPRIM) 100 mg tablet, Take 1 tablet (100 mg total) by mouth daily, Disp: 30 tablet, Rfl: 3    ALPRAZolam (XANAX) 1 mg tablet, Take 1 tablet (1 mg total) by mouth daily at bedtime as needed for sleep, Disp: 30 tablet, Rfl: 0    aspirin (ECOTRIN LOW STRENGTH) 81 mg EC tablet, Take 1 tablet by mouth daily, Disp: 1 tablet, Rfl: 0    atorvastatin (LIPITOR) 20 mg tablet, Take 20 mg by mouth daily, Disp: , Rfl:     clotrimazole (LOTRIMIN) 1 % cream, Apply topically as needed, Disp: , Rfl:     clotrimazole-betamethasone (LOTRISONE) 1-0 05 % cream, Apply topically 2 (two) times a day, Disp: , Rfl:     diclofenac-misoprostol (ARTHROTEC 50) 50-0 2 MG per tablet, Take 1 tablet by mouth daily, Disp: 30 tablet, Rfl: 3    docusate sodium (COLACE) 100 mg capsule, Take 100 mg by mouth 2 (two) times a day as needed for constipation, Disp: , Rfl:     EPINEPHrine (EPIPEN) 0 3 mg/0 3 mL SOAJ, EPINEPHrine 0 3 MG/0 3ML Injection Solution Auto-injector INJECT 0 3ML INTRAMUSCULARLY AS DIRECTED  Quantity: 2;  Refills: 3   Ulshafer PAC, Lorenzo Loosen ;  Active, Disp: , Rfl:     ferrous sulfate 325 (65 Fe) mg tablet, Take by mouth, Disp: , Rfl:     furosemide (LASIX) 20 mg tablet, Take by mouth 2 (two) times a day, Disp: , Rfl:     Inulin (FIBER CHOICE PO), Take by mouth, Disp: , Rfl:     metoprolol tartrate (LOPRESSOR) 100 mg tablet, Take 100 mg by mouth 2 (two) times a day  , Disp: , Rfl:     olmesartan (BENICAR) 40 mg tablet, Take by mouth, Disp: , Rfl:     potassium chloride (K-DUR,KLOR-CON) 20 mEq tablet, Take by mouth, Disp: , Rfl:     triamcinolone (KENALOG) 0 025 % cream, Apply topically 2 (two) times a day, Disp: 30 g, Rfl: 0    ascorbic acid (VITAMIN C) 500 MG tablet, Take 1 tablet by mouth daily, Disp: , Rfl: 0    azithromycin (ZITHROMAX) 250 mg tablet, Take 1 tablet (250 mg total) by mouth daily for 5 days 2 pills today, then one daily for 4 more days, Disp: 6 tablet, Rfl: 0    B Complex Vitamins (B COMPLEX 1 PO), Take by mouth, Disp: , Rfl:     colchicine (COLCRYS) 0 6 mg tablet, Take 1 tablet by mouth daily, Disp: , Rfl: 0    folic acid (FOLVITE) 1 mg tablet, Take 1 tablet by mouth daily, Disp: , Rfl: 0    Multiple Vitamin (MULTIVITAMIN) tablet, Take 1 tablet by mouth daily, Disp: , Rfl:     oxyCODONE (OXY-IR) 5 MG capsule, Take 2 capsules by mouth every 4 (four) hours as needed for severe pain, Disp: , Rfl:     pantoprazole (PROTONIX) 40 mg tablet, Take 1 tablet by mouth daily, Disp: , Rfl: 0    Vitamins-Lipotropics (LIPO-FLAVONOID PLUS PO), Take 2 tablets by mouth daily  , Disp: , Rfl:   Allergies   Allergen Reactions    Bee Venom Anaphylaxis    Codeine     Tetanus Toxoid        Labs:     Chemistry        Component Value Date/Time     11/14/2017 0737     11/13/2015 1253    K 3 5 11/14/2017 0737    K 3 3 (L) 11/13/2015 1253     11/14/2017 0737     11/13/2015 1253    CO2 29 11/14/2017 0737    CO2 28 8 11/13/2015 1253    BUN 35 (H) 11/14/2017 0737    BUN 24 11/13/2015 1253    CREATININE 0 97 11/14/2017 0737    CREATININE 0 85 11/13/2015 1253        Component Value Date/Time    CALCIUM 9 3 11/14/2017 0737    CALCIUM 9 0 11/13/2015 1253    ALKPHOS 107 11/14/2017 0737    ALKPHOS 83 11/13/2015 1253    AST 21 11/14/2017 0737    AST 20 11/13/2015 1253    ALT 27 11/14/2017 0737    ALT 36 11/13/2015 1253    BILITOT 0 93 11/14/2017 0737    BILITOT 0 63 11/13/2015 1253            Lab Results   Component Value Date    CHOL 139 02/14/2018    CHOL 143 07/22/2016    CHOL 201 11/13/2015     Lab Results   Component Value Date    HDL 50 02/14/2018    HDL 42 07/22/2016    HDL 46 11/13/2015     Lab Results   Component Value Date    LDLCALC 71 02/14/2018    LDLCALC 79 07/22/2016    LDLCALC 129 (H) 11/13/2015     Lab Results   Component Value Date    TRIG 88 02/14/2018    TRIG 110 07/22/2016    TRIG 128 11/13/2015     No components found for: CHOLHDL    Imaging: No results found  Review of Systems   Cardiovascular: Positive for irregular heartbeat  Negative for chest pain, claudication, cyanosis, dyspnea on exertion, leg swelling, near-syncope, orthopnea, palpitations, paroxysmal nocturnal dyspnea and syncope  All other systems reviewed and are negative  Vitals:    04/23/18 1308   BP: 150/71   Pulse: 60     Vitals:    04/23/18 1308   Weight: 68 kg (150 lb)     Height: 4' 11" (149 9 cm)   Body mass index is 30 3 kg/m²      Physical Exam:   General appearance:  Appears stated age, alert, well appearing and in no distress  HEENT:  PERRLA, EOMI, no scleral icterus, no conjunctival pallor  NECK:  Supple, No elevated JVP, no thyromegaly, no carotid bruits  HEART: Regular rate and rhythm, normal S1/S2, no S3/S4, no murmur or rub  LUNGS:  Clear to auscultation bilaterally  ABDOMEN:  Soft, non-tender, positive bowel sounds, no rebound or guarding, no organomegaly   EXTREMITIES:  No edema  VASCULAR:  Normal pedal pulses   SKIN: No lesions or rashes on exposed skin  NEURO:  CN II-XII intact, no focal deficits

## 2018-05-03 ENCOUNTER — OFFICE VISIT (OUTPATIENT)
Dept: FAMILY MEDICINE CLINIC | Facility: CLINIC | Age: 83
End: 2018-05-03
Payer: COMMERCIAL

## 2018-05-03 VITALS
HEIGHT: 59 IN | BODY MASS INDEX: 30.72 KG/M2 | WEIGHT: 152.4 LBS | OXYGEN SATURATION: 98 % | HEART RATE: 61 BPM | RESPIRATION RATE: 16 BRPM | SYSTOLIC BLOOD PRESSURE: 130 MMHG | TEMPERATURE: 97.6 F | DIASTOLIC BLOOD PRESSURE: 80 MMHG

## 2018-05-03 DIAGNOSIS — F41.9 ANXIETY: ICD-10-CM

## 2018-05-03 PROCEDURE — 99213 OFFICE O/P EST LOW 20 MIN: CPT | Performed by: FAMILY MEDICINE

## 2018-05-03 RX ORDER — ALPRAZOLAM 1 MG/1
1 TABLET ORAL
Qty: 30 TABLET | Refills: 0 | Status: SHIPPED | OUTPATIENT
Start: 2018-05-03 | End: 2018-06-04 | Stop reason: SDUPTHER

## 2018-05-03 NOTE — PROGRESS NOTES
History and Physical  Lloyd Remy 80 y o  female MRN: [id number]      Assessment:   Insomnia    Plan:  Continue current meds  RTC 1 month    Chief Complaint   Patient presents with    Medication Refill        HPI:  Lloyd Remy is a 80 y o  female who presents for above  She is doing well  No complaints today  Patient Active Problem List   Diagnosis    Status post total replacement of right hip    Benign essential hypertension    Hyperlipidemia    Elevated blood uric acid level    History of gout    Dextroscoliosis    Screening-pulmonary TB    Anemia    Anxiety    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 re-entry tachycardia) ([facility] [address] )    Dyslipidemia    Gout    Iron deficiency anemia    History of cardiac radiofrequency ablation     Historical Information   Past Medical History:   Diagnosis Date    Arthritis     Cardiac disease     Chronic pain     Gout     Hypertension      Past Surgical History:   Procedure Laterality Date    APPENDECTOMY      ATRIAL ABLATION SURGERY      HYSTERECTOMY      JOINT REPLACEMENT  knees    KNEE ARTHROSCOPY      b/l knees    LAMINECTOMY      [place] TOTAL HIP ARTHROPLASTY Right 7/17/2017    Procedure: TOTAL ANTERIOR HIP ARTHROPLASTY;  Surgeon: Natalie Baugh MD;  Location: [facility];  Service: Orthopedics     Social History   History   Alcohol Use No     History   Drug Use No     History   Smoking Status    Never Smoker   Smokeless Tobacco    Never Used     No family history on file      Meds/Allergies   Allergies   Allergen Reactions    Bee Venom Anaphylaxis    Codeine     Tetanus Toxoid        Meds:    Current Outpatient Prescriptions:     albuterol (PROVENTIL HFA,VENTOLIN HFA) 90 mcg/act inhaler, Inhale 2 puffs every 6 (six) hours as needed for wheezing, Disp: , Rfl:     allopurinol (ZYLOPRIM) 100 mg tablet, Take 1 tablet (100 mg total) by mouth daily, Disp: 30 tablet, Rfl: 3    ALPRAZolam (XANAX) 1 mg tablet, Take 1 tablet (1 mg total) by mouth daily at bedtime as needed for sleep, Disp: 30 tablet, Rfl: 0    ascorbic acid (VITAMIN C) 500 MG tablet, Take 1 tablet by mouth daily, Disp: , Rfl: 0    aspirin (ECOTRIN LOW STRENGTH) 81 mg EC tablet, Take 1 tablet by mouth daily, Disp: 1 tablet, Rfl: 0    atorvastatin (LIPITOR) 20 mg tablet, Take 20 mg by mouth daily, Disp: , Rfl:     B Complex Vitamins (B COMPLEX 1 PO), Take by mouth, Disp: , Rfl:     clotrimazole (LOTRIMIN) 1 % cream, Apply topically as needed, Disp: , Rfl:     clotrimazole-betamethasone (LOTRISONE) 1-0 05 % cream, Apply topically 2 (two) times a day, Disp: , Rfl:     colchicine (COLCRYS) 0 6 mg tablet, Take 1 tablet by mouth daily, Disp: , Rfl: 0    diclofenac-misoprostol (ARTHROTEC 50) 50-0 2 MG per tablet, Take 1 tablet by mouth daily, Disp: 30 tablet, Rfl: 3    docusate sodium (COLACE) 100 mg capsule, Take 100 mg by mouth 2 (two) times a day as needed for constipation, Disp: , Rfl:     EPINEPHrine (EPIPEN) 0 3 mg/0 3 mL SOAJ, EPINEPHrine 0 3 MG/0 3ML Injection Solution Auto-injector INJECT 0 3ML INTRAMUSCULARLY AS DIRECTED  Quantity: 2;  Refills: 3   Ulleonor APARICIO, Dave Ricks ;  Active, Disp: , Rfl:     ferrous sulfate 325 (65 Fe) mg tablet, Take by mouth, Disp: , Rfl:     folic acid (FOLVITE) 1 mg tablet, Take 1 tablet by mouth daily, Disp: , Rfl: 0    furosemide (LASIX) 20 mg tablet, Take by mouth 2 (two) times a day, Disp: , Rfl:     Inulin (FIBER CHOICE PO), Take by mouth, Disp: , Rfl:     metoprolol tartrate (LOPRESSOR) 100 mg tablet, Take 100 mg by mouth 2 (two) times a day  , Disp: , Rfl:     Multiple Vitamin (MULTIVITAMIN) tablet, Take 1 tablet by mouth daily, Disp: , Rfl:     olmesartan (BENICAR) 40 mg tablet, Take by mouth, Disp: , Rfl:     pantoprazole (PROTONIX) 40 mg tablet, Take 1 tablet by mouth daily, Disp: , Rfl: 0    potassium chloride (K-DUR,KLOR-CON) 20 mEq tablet, Take by mouth, Disp: , Rfl:     triamcinolone (KENALOG) 0 025 % cream, Apply topically 2 (two) times a day, Disp: 30 g, Rfl: 0    Vitamins-Lipotropics (LIPO-FLAVONOID PLUS PO), Take 2 tablets by mouth daily  , Disp: , Rfl:       REVIEW OF SYSTEMS  Review of Systems   Constitutional: Negative  HENT: Negative  Eyes: Negative  Respiratory: Negative  Cardiovascular: Negative  Gastrointestinal: Negative  Endocrine: Negative  Genitourinary: Negative  Musculoskeletal: Negative  Allergic/Immunologic: Negative  Neurological: Negative  Hematological: Negative  Psychiatric/Behavioral: Positive for sleep disturbance  Current Vitals:   Blood Pressure: 130/80 (05/03/18 1119)  Pulse: 61 (05/03/18 1119)  Temperature: 97 6 °F (36 4 °C) (05/03/18 1119)  Temp Source: Tympanic (05/03/18 1119)  Respirations: 16 (05/03/18 1119)  Height: 4' 11" (149 9 cm) (05/03/18 1119)  Weight - Scale: 69 1 kg (152 lb 6 4 oz) (05/03/18 1119)  SpO2: 98 % (05/03/18 1119)      PHYSICAL EXAMS:  Physical Exam   Constitutional: She is oriented to person, place, and time  She appears well-developed and well-nourished  HENT:   Head: Normocephalic and atraumatic  Right Ear: External ear normal    Left Ear: External ear normal    Eyes: EOM are normal  Pupils are equal, round, and reactive to light  Neck: Normal range of motion  Neck supple  No thyromegaly present  Cardiovascular: Normal rate and regular rhythm  Pulmonary/Chest: Effort normal and breath sounds normal  She has no wheezes  She has no rales  Musculoskeletal: She exhibits no edema  Neurological: She is alert and oriented to person, place, and time  No cranial nerve deficit  Skin: Skin is warm and dry  Psychiatric: She has a normal mood and affect  Lab Results:          Bri Davis PA-C  5/3/2018, 11:46 AM

## 2018-06-04 ENCOUNTER — OFFICE VISIT (OUTPATIENT)
Dept: FAMILY MEDICINE CLINIC | Facility: CLINIC | Age: 83
End: 2018-06-04
Payer: COMMERCIAL

## 2018-06-04 VITALS
RESPIRATION RATE: 16 BRPM | SYSTOLIC BLOOD PRESSURE: 140 MMHG | DIASTOLIC BLOOD PRESSURE: 80 MMHG | WEIGHT: 156.6 LBS | TEMPERATURE: 97.3 F | OXYGEN SATURATION: 96 % | HEART RATE: 62 BPM | BODY MASS INDEX: 31.57 KG/M2 | HEIGHT: 59 IN

## 2018-06-04 DIAGNOSIS — F41.9 ANXIETY: ICD-10-CM

## 2018-06-04 DIAGNOSIS — T78.2XXD ANAPHYLAXIS, SUBSEQUENT ENCOUNTER: ICD-10-CM

## 2018-06-04 DIAGNOSIS — M19.90 ARTHRITIS: Primary | ICD-10-CM

## 2018-06-04 PROCEDURE — 99213 OFFICE O/P EST LOW 20 MIN: CPT | Performed by: FAMILY MEDICINE

## 2018-06-04 RX ORDER — ALPRAZOLAM 1 MG/1
1 TABLET ORAL
Qty: 30 TABLET | Refills: 0 | Status: SHIPPED | OUTPATIENT
Start: 2018-06-04 | End: 2018-07-03 | Stop reason: SDUPTHER

## 2018-06-04 RX ORDER — DICLOFENAC SODIUM AND MISOPROSTOL 75; 200 MG/1; UG/1
1 TABLET, DELAYED RELEASE ORAL 2 TIMES DAILY
Qty: 60 TABLET | Refills: 1 | Status: SHIPPED | OUTPATIENT
Start: 2018-06-04 | End: 2018-08-27 | Stop reason: SDUPTHER

## 2018-06-04 RX ORDER — EPINEPHRINE 0.3 MG/.3ML
INJECTION SUBCUTANEOUS
Qty: 1 EACH | Refills: 1 | Status: SHIPPED | OUTPATIENT
Start: 2018-06-04 | End: 2020-03-12 | Stop reason: SDUPTHER

## 2018-06-04 NOTE — PROGRESS NOTES
History and Physical  Sylvia Haley 80 y o  female MRN: [id number]      Assessment:   Insomnia  HTN  Arthritis    Plan: Will increase Arthrotec to 75 mg daily  Continue other meds  RTC 1 month  Chief Complaint   Patient presents with    Medication Refill        HPI:  Sylvia Haley is a 80 y o  female who presents for med refill  She is doing well except for increased arthritis pain  Historical Information   Past Medical History:   Diagnosis Date    Arthritis     Cardiac disease     Chronic pain     Gout     Hypertension      Past Surgical History:   Procedure Laterality Date    APPENDECTOMY      ATRIAL ABLATION SURGERY      HYSTERECTOMY      JOINT REPLACEMENT  knees    KNEE ARTHROSCOPY      b/l knees    LAMINECTOMY      [place] TOTAL HIP ARTHROPLASTY Right 7/17/2017    Procedure: TOTAL ANTERIOR HIP ARTHROPLASTY;  Surgeon: Corry Caldwell MD;  Location: [facility];  Service: Orthopedics     Social History   History   Alcohol Use No     History   Drug Use No     History   Smoking Status    Never Smoker   Smokeless Tobacco    Never Used     No family history on file      Meds/Allergies   Allergies   Allergen Reactions    Bee Venom Anaphylaxis    Codeine     Tetanus Toxoid        Meds:    Current Outpatient Prescriptions:     albuterol (PROVENTIL HFA,VENTOLIN HFA) 90 mcg/act inhaler, Inhale 2 puffs every 6 (six) hours as needed for wheezing, Disp: , Rfl:     allopurinol (ZYLOPRIM) 100 mg tablet, Take 1 tablet (100 mg total) by mouth daily, Disp: 30 tablet, Rfl: 3    ALPRAZolam (XANAX) 1 mg tablet, Take 1 tablet (1 mg total) by mouth daily at bedtime as needed for sleep, Disp: 30 tablet, Rfl: 0    ascorbic acid (VITAMIN C) 500 MG tablet, Take 1 tablet by mouth daily, Disp: , Rfl: 0    aspirin (ECOTRIN LOW STRENGTH) 81 mg EC tablet, Take 1 tablet by mouth daily, Disp: 1 tablet, Rfl: 0    atorvastatin (LIPITOR) 20 mg tablet, Take 20 mg by mouth daily, Disp: , Rfl:     clotrimazole (LOTRIMIN) 1 % cream, Apply topically as needed, Disp: , Rfl:     clotrimazole-betamethasone (LOTRISONE) 1-0 05 % cream, Apply topically 2 (two) times a day, Disp: , Rfl:     colchicine (COLCRYS) 0 6 mg tablet, Take 1 tablet by mouth daily, Disp: , Rfl: 0    diclofenac-misoprostol (ARTHROTEC 50) 50-0 2 MG per tablet, Take 1 tablet by mouth daily, Disp: 30 tablet, Rfl: 3    docusate sodium (COLACE) 100 mg capsule, Take 100 mg by mouth 2 (two) times a day as needed for constipation, Disp: , Rfl:     EPINEPHrine (EPIPEN) 0 3 mg/0 3 mL SOAJ, EPINEPHrine 0 3 MG/0 3ML Injection Solution Auto-injector INJECT 0 3ML INTRAMUSCULARLY AS DIRECTED  Quantity: 2;  Refills: 3   [facility] ; Active, Disp: , Rfl:     ferrous sulfate 325 (65 Fe) mg tablet, Take by mouth, Disp: , Rfl:     folic acid (FOLVITE) 1 mg tablet, Take 1 tablet by mouth daily, Disp: , Rfl: 0    furosemide (LASIX) 20 mg tablet, Take by mouth 2 (two) times a day, Disp: , Rfl:     Inulin (FIBER CHOICE PO), Take by mouth, Disp: , Rfl:     metoprolol tartrate (LOPRESSOR) 100 mg tablet, Take 100 mg by mouth 2 (two) times a day  , Disp: , Rfl:     Multiple Vitamin (MULTIVITAMIN) tablet, Take 1 tablet by mouth daily, Disp: , Rfl:     olmesartan (BENICAR) 40 mg tablet, Take by mouth, Disp: , Rfl:     pantoprazole (PROTONIX) 40 mg tablet, Take 1 tablet by mouth daily, Disp: , Rfl: 0    potassium chloride (K-DUR,KLOR-CON) 20 mEq tablet, Take by mouth, Disp: , Rfl:     triamcinolone (KENALOG) 0 025 % cream, Apply topically 2 (two) times a day, Disp: 30 g, Rfl: 0    Vitamins-Lipotropics (LIPO-FLAVONOID PLUS PO), Take 2 tablets by mouth daily  , Disp: , Rfl:     B Complex Vitamins (B COMPLEX 1 PO), Take by mouth, Disp: , Rfl:       REVIEW OF SYSTEMS  Review of Systems   Constitutional: Negative  HENT: Negative  Eyes: Negative  Respiratory: Negative  Cardiovascular: Negative  Gastrointestinal: Negative  Endocrine: Negative  Genitourinary: Negative  Musculoskeletal: Positive for arthralgias  Skin: Negative  Allergic/Immunologic: Negative  Neurological: Negative  Hematological: Negative  Psychiatric/Behavioral: Negative  Current Vitals:   Blood Pressure: 140/80 (06/04/18 1046)  Pulse: 62 (06/04/18 1046)  Temperature: (!) 97 3 °F (36 3 °C) (06/04/18 1046)  Temp Source: Tympanic (06/04/18 1046)  Respirations: 16 (06/04/18 1046)  Height: 4' 11" (149 9 cm) (06/04/18 1046)  Weight - Scale: 71 kg (156 lb 9 6 oz) (06/04/18 1046)  SpO2: 96 % (06/04/18 1046)  Body mass index is 31 63 kg/m²  PHYSICAL EXAMS:  Physical Exam   Constitutional: She is oriented to person, place, and time  She appears well-developed and well-nourished  HENT:   Head: Normocephalic and atraumatic  Right Ear: External ear normal    Left Ear: External ear normal    Mouth/Throat: Oropharynx is clear and moist    Eyes: EOM are normal  Pupils are equal, round, and reactive to light  Neck: Normal range of motion  Neck supple  No thyromegaly present  Cardiovascular: Normal rate and regular rhythm  Pulmonary/Chest: Effort normal and breath sounds normal  She has no wheezes  She has no rales  Musculoskeletal: Normal range of motion  She exhibits no edema  Neurological: She is alert and oriented to person, place, and time  No cranial nerve deficit  Skin: Skin is warm and dry  Psychiatric: She has a normal mood and affect  Lab Results:          Suhas Abbasi PA-C  6/4/2018, 10:59 AM

## 2018-06-19 DIAGNOSIS — M1A.9XX0 CHRONIC GOUT WITHOUT TOPHUS, UNSPECIFIED CAUSE, UNSPECIFIED SITE: ICD-10-CM

## 2018-06-20 RX ORDER — ALLOPURINOL 100 MG/1
100 TABLET ORAL DAILY
Qty: 30 TABLET | Refills: 0 | Status: SHIPPED | OUTPATIENT
Start: 2018-06-20 | End: 2018-07-17 | Stop reason: SDUPTHER

## 2018-07-03 ENCOUNTER — OFFICE VISIT (OUTPATIENT)
Dept: FAMILY MEDICINE CLINIC | Facility: CLINIC | Age: 83
End: 2018-07-03
Payer: COMMERCIAL

## 2018-07-03 VITALS
BODY MASS INDEX: 31.45 KG/M2 | TEMPERATURE: 97.6 F | SYSTOLIC BLOOD PRESSURE: 130 MMHG | DIASTOLIC BLOOD PRESSURE: 72 MMHG | WEIGHT: 156 LBS | RESPIRATION RATE: 18 BRPM | HEIGHT: 59 IN | HEART RATE: 55 BPM | OXYGEN SATURATION: 95 %

## 2018-07-03 DIAGNOSIS — F41.9 ANXIETY: ICD-10-CM

## 2018-07-03 PROCEDURE — 99213 OFFICE O/P EST LOW 20 MIN: CPT | Performed by: FAMILY MEDICINE

## 2018-07-03 RX ORDER — ALPRAZOLAM 1 MG/1
1 TABLET ORAL
Qty: 30 TABLET | Refills: 0 | Status: SHIPPED | OUTPATIENT
Start: 2018-07-03 | End: 2018-08-01 | Stop reason: SDUPTHER

## 2018-07-03 NOTE — PROGRESS NOTES
History and Physical  Brandon Weinstein 80 y o  female MRN: [id number]      Assessment:   Insomnia  HTN    Plan:  Continue current meds  Keep appt with Cardiology as scheduled  RTC 1 month    Chief Complaint   Patient presents with    Follow-up    Insomnia     Patient not sleeping  HPI:  Brandon Weinstein is a 80 y o  female who presents for med refill  She is doing well  except for sleep issues but the Xanax does work  No complaints today  Historical Information   Past Medical History:   Diagnosis Date    Arthritis     Cardiac disease     Chronic pain     Gout     Hypertension      Past Surgical History:   Procedure Laterality Date    APPENDECTOMY      ATRIAL ABLATION SURGERY      HYSTERECTOMY      JOINT REPLACEMENT  knees    KNEE ARTHROSCOPY      b/l knees    LAMINECTOMY      [place] TOTAL HIP ARTHROPLASTY Right 7/17/2017    Procedure: TOTAL ANTERIOR HIP ARTHROPLASTY;  Surgeon: Bienvenido Eubanks MD;  Location: [facility];  Service: Orthopedics     Social History   History   Alcohol Use No     History   Drug Use No     History   Smoking Status    Never Smoker   Smokeless Tobacco    Never Used     No family history on file      Meds/Allergies   Allergies   Allergen Reactions    Bee Venom Anaphylaxis    Codeine      Nausea/vomiting    Tetanus Toxoid     Tetanus Toxoids        Meds:    Current Outpatient Prescriptions:     albuterol (PROVENTIL HFA,VENTOLIN HFA) 90 mcg/act inhaler, Inhale 2 puffs every 6 (six) hours as needed for wheezing, Disp: , Rfl:     allopurinol (ZYLOPRIM) 100 mg tablet, Take 1 tablet (100 mg total) by mouth daily, Disp: 30 tablet, Rfl: 0    ALPRAZolam (XANAX) 1 mg tablet, Take 1 tablet (1 mg total) by mouth daily at bedtime as needed for sleep, Disp: 30 tablet, Rfl: 0    aspirin (ECOTRIN LOW STRENGTH) 81 mg EC tablet, Take 1 tablet by mouth daily, Disp: 1 tablet, Rfl: 0    atorvastatin (LIPITOR) 20 mg tablet, Take 20 mg by mouth daily, Disp: , Rfl:     colchicine (COLCRYS) 0 6 mg tablet, Take 1 tablet by mouth daily, Disp: , Rfl: 0    diclofenac-misoprostol (ARTHROTEC) 75-0 2 MG per tablet, Take 1 tablet by mouth 2 (two) times a day, Disp: 60 tablet, Rfl: 1    docusate sodium (COLACE) 100 mg capsule, Take 100 mg by mouth 2 (two) times a day as needed for constipation, Disp: , Rfl:     EPINEPHrine (EPIPEN) 0 3 mg/0 3 mL SOAJ, Inject as necessary, Disp: 1 each, Rfl: 1    ferrous sulfate 325 (65 Fe) mg tablet, Take by mouth, Disp: , Rfl:     furosemide (LASIX) 20 mg tablet, Take by mouth 2 (two) times a day, Disp: , Rfl:     Inulin (FIBER CHOICE PO), Take by mouth, Disp: , Rfl:     metoprolol tartrate (LOPRESSOR) 100 mg tablet, Take 100 mg by mouth 2 (two) times a day  , Disp: , Rfl:     Multiple Vitamin (MULTIVITAMIN) tablet, Take 1 tablet by mouth daily, Disp: , Rfl:     olmesartan (BENICAR) 40 mg tablet, Take by mouth, Disp: , Rfl:     potassium chloride (K-DUR,KLOR-CON) 20 mEq tablet, Take by mouth, Disp: , Rfl:     triamcinolone (KENALOG) 0 025 % cream, Apply topically 2 (two) times a day, Disp: 30 g, Rfl: 0    Vitamins-Lipotropics (LIPO-FLAVONOID PLUS PO), Take 2 tablets by mouth daily  , Disp: , Rfl:       REVIEW OF SYSTEMS  Review of Systems   Constitutional: Negative  HENT: Negative  Eyes: Negative  Respiratory: Negative  Cardiovascular: Negative  Gastrointestinal: Negative  Endocrine: Negative  Genitourinary: Negative  Musculoskeletal: Negative  Skin: Negative  Allergic/Immunologic: Negative  Neurological: Negative  Hematological: Negative  Psychiatric/Behavioral: Positive for sleep disturbance         Current Vitals:   Blood Pressure: 130/72 (07/03/18 0836)  Pulse: 55 (07/03/18 0836)  Temperature: 97 6 °F (36 4 °C) (07/03/18 0836)  Respirations: 18 (07/03/18 0836)  Height: 4' 11" (149 9 cm) (07/03/18 0836)  Weight - Scale: 70 8 kg (156 lb) (07/03/18 0836)  SpO2: 95 % (07/03/18 0836)  Body mass index is 31 51 kg/m²       PHYSICAL EXAMS:  Physical Exam   Constitutional: She is oriented to person, place, and time  She appears well-developed and well-nourished  HENT:   Head: Normocephalic and atraumatic  Right Ear: External ear normal    Left Ear: External ear normal    Eyes: EOM are normal  Pupils are equal, round, and reactive to light  Neck: Normal range of motion  Neck supple  No thyromegaly present  Cardiovascular: Normal rate and regular rhythm  Pulmonary/Chest: Effort normal and breath sounds normal  She has no wheezes  She has no rales  Musculoskeletal: She exhibits no edema  Neurological: She is alert and oriented to person, place, and time  No cranial nerve deficit  Skin: Skin is warm and dry  Psychiatric: She has a normal mood and affect  Lab Results:          Jonathan Barrett PA-C  7/3/2018, 8:41 AM

## 2018-07-11 DIAGNOSIS — I10 ESSENTIAL HYPERTENSION: Primary | ICD-10-CM

## 2018-07-11 RX ORDER — FUROSEMIDE 20 MG/1
TABLET ORAL
Qty: 180 TABLET | Refills: 0 | Status: SHIPPED | OUTPATIENT
Start: 2018-07-11 | End: 2018-10-03 | Stop reason: SDUPTHER

## 2018-07-17 DIAGNOSIS — M1A.9XX0 CHRONIC GOUT WITHOUT TOPHUS, UNSPECIFIED CAUSE, UNSPECIFIED SITE: ICD-10-CM

## 2018-07-17 RX ORDER — ALLOPURINOL 100 MG/1
100 TABLET ORAL DAILY
Qty: 90 TABLET | Refills: 1 | Status: SHIPPED | OUTPATIENT
Start: 2018-07-17 | End: 2018-10-17 | Stop reason: SDUPTHER

## 2018-08-01 ENCOUNTER — OFFICE VISIT (OUTPATIENT)
Dept: FAMILY MEDICINE CLINIC | Facility: CLINIC | Age: 83
End: 2018-08-01
Payer: COMMERCIAL

## 2018-08-01 VITALS
HEIGHT: 59 IN | OXYGEN SATURATION: 96 % | WEIGHT: 158 LBS | SYSTOLIC BLOOD PRESSURE: 150 MMHG | BODY MASS INDEX: 31.85 KG/M2 | DIASTOLIC BLOOD PRESSURE: 74 MMHG | RESPIRATION RATE: 16 BRPM | HEART RATE: 90 BPM | TEMPERATURE: 98.6 F

## 2018-08-01 DIAGNOSIS — I10 ESSENTIAL HYPERTENSION: Primary | ICD-10-CM

## 2018-08-01 DIAGNOSIS — F41.9 ANXIETY: ICD-10-CM

## 2018-08-01 PROCEDURE — 99213 OFFICE O/P EST LOW 20 MIN: CPT | Performed by: FAMILY MEDICINE

## 2018-08-01 PROCEDURE — 81001 URINALYSIS AUTO W/SCOPE: CPT | Performed by: PHYSICIAN ASSISTANT

## 2018-08-01 RX ORDER — ALPRAZOLAM 1 MG/1
1 TABLET ORAL
Qty: 30 TABLET | Refills: 0 | Status: SHIPPED | OUTPATIENT
Start: 2018-08-01 | End: 2018-08-30 | Stop reason: SDUPTHER

## 2018-08-01 NOTE — PROGRESS NOTES
History and Physical  Anant Marin 80 y o  female MRN: [id number]      Assessment:   Anxiety  HTN  Gout    Plan:  Continue current meds  Watch sodium intake  Labs will be due next month  RTC 1 month    Chief Complaint   Patient presents with    Medication Refill        HPI:  Anant Marin is a 80 y o  female who presents for Xanax refill  She is doing well  She is upset as her  will be having knee surgery  Gout has been good since starting Allopurinol  Historical Information   Past Medical History:   Diagnosis Date    Arthritis     Cardiac disease     Chronic pain     Gout     Hypertension      Past Surgical History:   Procedure Laterality Date    APPENDECTOMY      ATRIAL ABLATION SURGERY      HYSTERECTOMY      JOINT REPLACEMENT  knees    KNEE ARTHROSCOPY      b/l knees    LAMINECTOMY      [place] TOTAL HIP ARTHROPLASTY Right 7/17/2017    Procedure: TOTAL ANTERIOR HIP ARTHROPLASTY;  Surgeon: Jie Welch MD;  Location: [facility];  Service: Orthopedics     Social History   History   Alcohol Use No     History   Drug Use No     History   Smoking Status    Never Smoker   Smokeless Tobacco    Never Used     No family history on file      Meds/Allergies   Allergies   Allergen Reactions    Bee Venom Anaphylaxis    Codeine      Nausea/vomiting    Tetanus Toxoid     Tetanus Toxoids        Meds:    Current Outpatient Prescriptions:     albuterol (PROVENTIL HFA,VENTOLIN HFA) 90 mcg/act inhaler, Inhale 2 puffs every 6 (six) hours as needed for wheezing, Disp: , Rfl:     allopurinol (ZYLOPRIM) 100 mg tablet, Take 1 tablet (100 mg total) by mouth daily, Disp: 90 tablet, Rfl: 1    ALPRAZolam (XANAX) 1 mg tablet, Take 1 tablet (1 mg total) by mouth daily at bedtime as needed for sleep, Disp: 30 tablet, Rfl: 0    aspirin (ECOTRIN LOW STRENGTH) 81 mg EC tablet, Take 1 tablet by mouth daily, Disp: 1 tablet, Rfl: 0    atorvastatin (LIPITOR) 20 mg tablet, Take 20 mg by mouth daily, Disp: , Rfl:   colchicine (COLCRYS) 0 6 mg tablet, Take 1 tablet by mouth daily, Disp: , Rfl: 0    diclofenac-misoprostol (ARTHROTEC) 75-0 2 MG per tablet, Take 1 tablet by mouth 2 (two) times a day, Disp: 60 tablet, Rfl: 1    docusate sodium (COLACE) 100 mg capsule, Take 100 mg by mouth 2 (two) times a day as needed for constipation, Disp: , Rfl:     EPINEPHrine (EPIPEN) 0 3 mg/0 3 mL SOAJ, Inject as necessary, Disp: 1 each, Rfl: 1    ferrous sulfate 325 (65 Fe) mg tablet, Take by mouth, Disp: , Rfl:     furosemide (LASIX) 20 mg tablet, TAKE 1 TABLET TWICE DAILY AS NEEDED, Disp: 180 tablet, Rfl: 0    Inulin (FIBER CHOICE PO), Take by mouth, Disp: , Rfl:     metoprolol tartrate (LOPRESSOR) 100 mg tablet, Take 100 mg by mouth 2 (two) times a day  , Disp: , Rfl:     Multiple Vitamin (MULTIVITAMIN) tablet, Take 1 tablet by mouth daily, Disp: , Rfl:     olmesartan (BENICAR) 40 mg tablet, Take by mouth, Disp: , Rfl:     potassium chloride (K-DUR,KLOR-CON) 20 mEq tablet, Take by mouth, Disp: , Rfl:     triamcinolone (KENALOG) 0 025 % cream, Apply topically 2 (two) times a day, Disp: 30 g, Rfl: 0    Vitamins-Lipotropics (LIPO-FLAVONOID PLUS PO), Take 2 tablets by mouth daily  , Disp: , Rfl:       REVIEW OF SYSTEMS  Review of Systems   Constitutional: Negative  HENT: Negative  Eyes: Negative  Respiratory: Negative  Cardiovascular: Negative  Gastrointestinal: Negative  Endocrine: Negative  Genitourinary: Negative  Musculoskeletal: Negative  Skin: Negative  Allergic/Immunologic: Negative  Neurological: Negative  Hematological: Negative      Psychiatric/Behavioral:        As per HPI       Current Vitals:   Blood Pressure: 150/74 (08/01/18 0851)  Pulse: 90 (08/01/18 0851)  Temperature: 98 6 °F (37 °C) (08/01/18 0851)  Temp Source: Tympanic (08/01/18 0851)  Respirations: 16 (08/01/18 0851)  Height: 4' 11" (149 9 cm) (08/01/18 0851)  Weight - Scale: 71 7 kg (158 lb) (08/01/18 0851)  SpO2: 96 % (08/01/18 4147)  Body mass index is 31 91 kg/m²  PHYSICAL EXAMS:  Physical Exam   Constitutional: She is oriented to person, place, and time  She appears well-developed and well-nourished  HENT:   Head: Normocephalic and atraumatic  Right Ear: External ear normal    Left Ear: External ear normal    Eyes: Pupils are equal, round, and reactive to light  Neck: Normal range of motion  Neck supple  No thyromegaly present  Cardiovascular: Normal rate, regular rhythm and normal heart sounds  Pulmonary/Chest: Effort normal and breath sounds normal  She has no wheezes  She has no rales  Musculoskeletal: She exhibits no edema  Neurological: She is alert and oriented to person, place, and time  No cranial nerve deficit  Skin: Skin is warm and dry  Psychiatric: She has a normal mood and affect  Lab Results:          Bri Davis PA-C  8/1/2018, 9:08 AM

## 2018-08-13 ENCOUNTER — LAB (OUTPATIENT)
Dept: LAB | Facility: MEDICAL CENTER | Age: 83
End: 2018-08-13
Payer: COMMERCIAL

## 2018-08-13 DIAGNOSIS — I10 ESSENTIAL HYPERTENSION: ICD-10-CM

## 2018-08-13 LAB
ALBUMIN SERPL BCP-MCNC: 3.8 G/DL (ref 3.5–5)
ALP SERPL-CCNC: 107 U/L (ref 46–116)
ALT SERPL W P-5'-P-CCNC: 42 U/L (ref 12–78)
ANION GAP SERPL CALCULATED.3IONS-SCNC: 7 MMOL/L (ref 4–13)
AST SERPL W P-5'-P-CCNC: 25 U/L (ref 5–45)
BACTERIA UR QL AUTO: ABNORMAL /HPF
BASOPHILS # BLD AUTO: 0.03 THOUSANDS/ΜL (ref 0–0.1)
BASOPHILS NFR BLD AUTO: 1 % (ref 0–1)
BILIRUB SERPL-MCNC: 0.84 MG/DL (ref 0.2–1)
BILIRUB UR QL STRIP: NEGATIVE
BUN SERPL-MCNC: 48 MG/DL (ref 5–25)
CALCIUM SERPL-MCNC: 9 MG/DL (ref 8.3–10.1)
CHLORIDE SERPL-SCNC: 109 MMOL/L (ref 100–108)
CHOLEST SERPL-MCNC: 147 MG/DL (ref 50–200)
CLARITY UR: CLEAR
CO2 SERPL-SCNC: 27 MMOL/L (ref 21–32)
COLOR UR: YELLOW
CREAT SERPL-MCNC: 1.08 MG/DL (ref 0.6–1.3)
CREAT UR-MCNC: 71.2 MG/DL
EOSINOPHIL # BLD AUTO: 0.24 THOUSAND/ΜL (ref 0–0.61)
EOSINOPHIL NFR BLD AUTO: 4 % (ref 0–6)
ERYTHROCYTE [DISTWIDTH] IN BLOOD BY AUTOMATED COUNT: 14 % (ref 11.6–15.1)
GFR SERPL CREATININE-BSD FRML MDRD: 48 ML/MIN/1.73SQ M
GLUCOSE P FAST SERPL-MCNC: 96 MG/DL (ref 65–99)
GLUCOSE UR STRIP-MCNC: NEGATIVE MG/DL
HCT VFR BLD AUTO: 41.4 % (ref 34.8–46.1)
HDLC SERPL-MCNC: 41 MG/DL (ref 40–60)
HGB BLD-MCNC: 13.4 G/DL (ref 11.5–15.4)
HGB UR QL STRIP.AUTO: NEGATIVE
HYALINE CASTS #/AREA URNS LPF: ABNORMAL /LPF
IMM GRANULOCYTES # BLD AUTO: 0.01 THOUSAND/UL (ref 0–0.2)
IMM GRANULOCYTES NFR BLD AUTO: 0 % (ref 0–2)
KETONES UR STRIP-MCNC: NEGATIVE MG/DL
LDLC SERPL CALC-MCNC: 86 MG/DL (ref 0–100)
LEUKOCYTE ESTERASE UR QL STRIP: ABNORMAL
LYMPHOCYTES # BLD AUTO: 1.19 THOUSANDS/ΜL (ref 0.6–4.47)
LYMPHOCYTES NFR BLD AUTO: 21 % (ref 14–44)
MCH RBC QN AUTO: 28.6 PG (ref 26.8–34.3)
MCHC RBC AUTO-ENTMCNC: 32.4 G/DL (ref 31.4–37.4)
MCV RBC AUTO: 88 FL (ref 82–98)
MICROALBUMIN UR-MCNC: <5 MG/L (ref 0–20)
MICROALBUMIN/CREAT 24H UR: <7 MG/G CREATININE (ref 0–30)
MONOCYTES # BLD AUTO: 0.58 THOUSAND/ΜL (ref 0.17–1.22)
MONOCYTES NFR BLD AUTO: 10 % (ref 4–12)
NEUTROPHILS # BLD AUTO: 3.76 THOUSANDS/ΜL (ref 1.85–7.62)
NEUTS SEG NFR BLD AUTO: 64 % (ref 43–75)
NITRITE UR QL STRIP: NEGATIVE
NON-SQ EPI CELLS URNS QL MICRO: ABNORMAL /HPF
NONHDLC SERPL-MCNC: 106 MG/DL
NRBC BLD AUTO-RTO: 0 /100 WBCS
PH UR STRIP.AUTO: 6 [PH] (ref 4.5–8)
PLATELET # BLD AUTO: 172 THOUSANDS/UL (ref 149–390)
PMV BLD AUTO: 10.8 FL (ref 8.9–12.7)
POTASSIUM SERPL-SCNC: 3.5 MMOL/L (ref 3.5–5.3)
PROT SERPL-MCNC: 7.5 G/DL (ref 6.4–8.2)
PROT UR STRIP-MCNC: NEGATIVE MG/DL
RBC # BLD AUTO: 4.69 MILLION/UL (ref 3.81–5.12)
RBC #/AREA URNS AUTO: ABNORMAL /HPF
SODIUM SERPL-SCNC: 143 MMOL/L (ref 136–145)
SP GR UR STRIP.AUTO: 1.01 (ref 1–1.03)
TRIGL SERPL-MCNC: 102 MG/DL
TSH SERPL DL<=0.05 MIU/L-ACNC: 1.14 UIU/ML (ref 0.36–3.74)
UROBILINOGEN UR QL STRIP.AUTO: 0.2 E.U./DL
WBC # BLD AUTO: 5.81 THOUSAND/UL (ref 4.31–10.16)
WBC #/AREA URNS AUTO: ABNORMAL /HPF

## 2018-08-13 PROCEDURE — 84443 ASSAY THYROID STIM HORMONE: CPT

## 2018-08-13 PROCEDURE — 82570 ASSAY OF URINE CREATININE: CPT

## 2018-08-13 PROCEDURE — 80053 COMPREHEN METABOLIC PANEL: CPT

## 2018-08-13 PROCEDURE — 85025 COMPLETE CBC W/AUTO DIFF WBC: CPT

## 2018-08-13 PROCEDURE — 36415 COLL VENOUS BLD VENIPUNCTURE: CPT

## 2018-08-13 PROCEDURE — 82043 UR ALBUMIN QUANTITATIVE: CPT

## 2018-08-13 PROCEDURE — 80061 LIPID PANEL: CPT

## 2018-08-27 DIAGNOSIS — M19.90 ARTHRITIS: ICD-10-CM

## 2018-08-27 RX ORDER — DICLOFENAC SODIUM AND MISOPROSTOL 75; 200 MG/1; UG/1
1 TABLET, DELAYED RELEASE ORAL 2 TIMES DAILY
Qty: 60 TABLET | Refills: 3 | Status: SHIPPED | OUTPATIENT
Start: 2018-08-27 | End: 2018-11-20 | Stop reason: SDUPTHER

## 2018-08-30 ENCOUNTER — OFFICE VISIT (OUTPATIENT)
Dept: FAMILY MEDICINE CLINIC | Facility: CLINIC | Age: 83
End: 2018-08-30
Payer: COMMERCIAL

## 2018-08-30 VITALS
HEART RATE: 82 BPM | SYSTOLIC BLOOD PRESSURE: 138 MMHG | OXYGEN SATURATION: 97 % | RESPIRATION RATE: 18 BRPM | DIASTOLIC BLOOD PRESSURE: 76 MMHG | TEMPERATURE: 97.6 F | HEIGHT: 59 IN

## 2018-08-30 DIAGNOSIS — F41.9 ANXIETY: ICD-10-CM

## 2018-08-30 PROCEDURE — 99213 OFFICE O/P EST LOW 20 MIN: CPT | Performed by: FAMILY MEDICINE

## 2018-08-30 RX ORDER — ALPRAZOLAM 1 MG/1
1 TABLET ORAL
Qty: 30 TABLET | Refills: 0 | Status: SHIPPED | OUTPATIENT
Start: 2018-08-30 | End: 2018-09-27 | Stop reason: SDUPTHER

## 2018-08-30 NOTE — PROGRESS NOTES
History and Physical  David Nava 80 y o  female MRN: [id number]      Assessment:   Insomnia  HTN    Plan:  Continue current meds  RTC 1 month    Chief Complaint   Patient presents with    Follow-up     regular check up        HPI:  David Nava is a 80 y o  female who presents for med refill  She is doing well  No complaints today  Historical Information      Past Medical History:   Diagnosis Date    Arthritis     Cardiac disease     Chronic pain     Gout     Hypertension      Past Surgical History:   Procedure Laterality Date    APPENDECTOMY      ATRIAL ABLATION SURGERY      HYSTERECTOMY      JOINT REPLACEMENT  knees    KNEE ARTHROSCOPY      b/l knees    LAMINECTOMY      [place] TOTAL HIP ARTHROPLASTY Right 7/17/2017    Procedure: TOTAL ANTERIOR HIP ARTHROPLASTY;  Surgeon: Yin Shin MD;  Location: [facility];  Service: Orthopedics     Social History   History   Alcohol Use No     History   Drug Use No     History   Smoking Status    Never Smoker   Smokeless Tobacco    Never Used     History reviewed  No pertinent family history      Meds/Allergies   Allergies   Allergen Reactions    Bee Venom Anaphylaxis    Codeine      Nausea/vomiting    Tetanus Toxoid     Tetanus Toxoids        Meds:    Current Outpatient Prescriptions:     albuterol (PROVENTIL HFA,VENTOLIN HFA) 90 mcg/act inhaler, Inhale 2 puffs every 6 (six) hours as needed for wheezing, Disp: , Rfl:     allopurinol (ZYLOPRIM) 100 mg tablet, Take 1 tablet (100 mg total) by mouth daily, Disp: 90 tablet, Rfl: 1    ALPRAZolam (XANAX) 1 mg tablet, Take 1 tablet (1 mg total) by mouth daily at bedtime as needed for sleep, Disp: 30 tablet, Rfl: 0    aspirin (ECOTRIN LOW STRENGTH) 81 mg EC tablet, Take 1 tablet by mouth daily, Disp: 1 tablet, Rfl: 0    atorvastatin (LIPITOR) 20 mg tablet, Take 20 mg by mouth daily, Disp: , Rfl:     colchicine (COLCRYS) 0 6 mg tablet, Take 1 tablet by mouth daily, Disp: , Rfl: 0   diclofenac-misoprostol (ARTHROTEC 75) 75-0 2 MG per tablet, Take 1 tablet by mouth 2 (two) times a day, Disp: 60 tablet, Rfl: 3    docusate sodium (COLACE) 100 mg capsule, Take 100 mg by mouth 2 (two) times a day as needed for constipation, Disp: , Rfl:     EPINEPHrine (EPIPEN) 0 3 mg/0 3 mL SOAJ, Inject as necessary, Disp: 1 each, Rfl: 1    ferrous sulfate 325 (65 Fe) mg tablet, Take by mouth, Disp: , Rfl:     furosemide (LASIX) 20 mg tablet, TAKE 1 TABLET TWICE DAILY AS NEEDED, Disp: 180 tablet, Rfl: 0    Inulin (FIBER CHOICE PO), Take by mouth, Disp: , Rfl:     metoprolol tartrate (LOPRESSOR) 100 mg tablet, Take 100 mg by mouth 2 (two) times a day  , Disp: , Rfl:     Multiple Vitamin (MULTIVITAMIN) tablet, Take 1 tablet by mouth daily, Disp: , Rfl:     olmesartan (BENICAR) 40 mg tablet, Take by mouth, Disp: , Rfl:     potassium chloride (K-DUR,KLOR-CON) 20 mEq tablet, Take by mouth, Disp: , Rfl:     triamcinolone (KENALOG) 0 025 % cream, Apply topically 2 (two) times a day, Disp: 30 g, Rfl: 0    Vitamins-Lipotropics (LIPO-FLAVONOID PLUS PO), Take 2 tablets by mouth daily  , Disp: , Rfl:       REVIEW OF SYSTEMS  Review of Systems   Constitutional: Negative  HENT: Negative  Eyes: Negative  Respiratory: Negative  Cardiovascular: Negative  Gastrointestinal: Negative  Endocrine: Negative  Genitourinary: Negative  Musculoskeletal: Negative  Skin: Negative  Allergic/Immunologic: Negative  Neurological: Negative  Psychiatric/Behavioral: Positive for sleep disturbance  Current Vitals:   Blood Pressure: 138/76 (08/30/18 0828)  Pulse: 82 (08/30/18 0828)  Temperature: 97 6 °F (36 4 °C) (08/30/18 0828)  Respirations: 18 (08/30/18 0828)  Height: 4' 11" (149 9 cm) (08/30/18 0828)  SpO2: 97 % (08/30/18 0828)  There is no height or weight on file to calculate BMI  PHYSICAL EXAMS:  Physical Exam   Constitutional: She is oriented to person, place, and time   She appears well-developed and well-nourished  HENT:   Head: Normocephalic and atraumatic  Right Ear: External ear normal    Left Ear: External ear normal    Eyes: Pupils are equal, round, and reactive to light  Neck: Normal range of motion  Neck supple  No thyromegaly present  Cardiovascular: Normal rate, regular rhythm and normal heart sounds  Pulmonary/Chest: Effort normal and breath sounds normal  She has no wheezes  She has no rales  Musculoskeletal: She exhibits no edema  Neurological: She is alert and oriented to person, place, and time  No cranial nerve deficit  Skin: Skin is warm and dry  Psychiatric: She has a normal mood and affect  Lab Results:          Ronnie Mckeon PA-C  8/30/2018, 8:30 AM

## 2018-09-27 ENCOUNTER — OFFICE VISIT (OUTPATIENT)
Dept: FAMILY MEDICINE CLINIC | Facility: CLINIC | Age: 83
End: 2018-09-27
Payer: COMMERCIAL

## 2018-09-27 VITALS
BODY MASS INDEX: 31.65 KG/M2 | SYSTOLIC BLOOD PRESSURE: 128 MMHG | HEIGHT: 59 IN | RESPIRATION RATE: 16 BRPM | DIASTOLIC BLOOD PRESSURE: 82 MMHG | TEMPERATURE: 98.2 F | HEART RATE: 61 BPM | OXYGEN SATURATION: 93 % | WEIGHT: 157 LBS

## 2018-09-27 DIAGNOSIS — F41.9 ANXIETY: ICD-10-CM

## 2018-09-27 PROCEDURE — 99213 OFFICE O/P EST LOW 20 MIN: CPT | Performed by: FAMILY MEDICINE

## 2018-09-27 RX ORDER — ALPRAZOLAM 1 MG/1
1 TABLET ORAL
Qty: 30 TABLET | Refills: 0 | Status: SHIPPED | OUTPATIENT
Start: 2018-09-27 | End: 2018-10-25 | Stop reason: SDUPTHER

## 2018-10-03 DIAGNOSIS — I10 ESSENTIAL HYPERTENSION: Primary | ICD-10-CM

## 2018-10-03 DIAGNOSIS — E87.6 HYPOKALEMIA: Primary | ICD-10-CM

## 2018-10-03 RX ORDER — FUROSEMIDE 20 MG/1
TABLET ORAL
Qty: 180 TABLET | Refills: 0 | Status: SHIPPED | OUTPATIENT
Start: 2018-10-03 | End: 2018-12-27 | Stop reason: SDUPTHER

## 2018-10-03 RX ORDER — POTASSIUM CHLORIDE 20 MEQ/1
TABLET, EXTENDED RELEASE ORAL
Qty: 30 TABLET | Refills: 0 | Status: SHIPPED | OUTPATIENT
Start: 2018-10-03 | End: 2018-11-05 | Stop reason: SDUPTHER

## 2018-10-03 RX ORDER — METOPROLOL TARTRATE 100 MG/1
TABLET ORAL
Qty: 180 TABLET | Refills: 0 | Status: SHIPPED | OUTPATIENT
Start: 2018-10-03 | End: 2018-12-27 | Stop reason: SDUPTHER

## 2018-10-17 DIAGNOSIS — M1A.9XX0 CHRONIC GOUT WITHOUT TOPHUS, UNSPECIFIED CAUSE, UNSPECIFIED SITE: ICD-10-CM

## 2018-10-17 RX ORDER — ALLOPURINOL 100 MG/1
TABLET ORAL
Qty: 90 TABLET | Refills: 0 | Status: SHIPPED | OUTPATIENT
Start: 2018-10-17 | End: 2018-12-27 | Stop reason: SDUPTHER

## 2018-10-25 ENCOUNTER — OFFICE VISIT (OUTPATIENT)
Dept: FAMILY MEDICINE CLINIC | Facility: CLINIC | Age: 83
End: 2018-10-25
Payer: COMMERCIAL

## 2018-10-25 VITALS
DIASTOLIC BLOOD PRESSURE: 82 MMHG | HEIGHT: 59 IN | BODY MASS INDEX: 32.05 KG/M2 | TEMPERATURE: 97.6 F | SYSTOLIC BLOOD PRESSURE: 130 MMHG | OXYGEN SATURATION: 93 % | HEART RATE: 59 BPM | WEIGHT: 159 LBS | RESPIRATION RATE: 18 BRPM

## 2018-10-25 DIAGNOSIS — F41.9 ANXIETY: ICD-10-CM

## 2018-10-25 PROCEDURE — 99213 OFFICE O/P EST LOW 20 MIN: CPT | Performed by: FAMILY MEDICINE

## 2018-10-25 RX ORDER — ALPRAZOLAM 1 MG/1
1 TABLET ORAL
Qty: 30 TABLET | Refills: 0 | Status: SHIPPED | OUTPATIENT
Start: 2018-10-25 | End: 2018-10-25 | Stop reason: SDUPTHER

## 2018-10-25 RX ORDER — ALPRAZOLAM 1 MG/1
1 TABLET ORAL
Qty: 30 TABLET | Refills: 1 | Status: SHIPPED | OUTPATIENT
Start: 2018-10-25 | End: 2018-12-27 | Stop reason: SDUPTHER

## 2018-10-25 NOTE — PROGRESS NOTES
History and Physical  Tiffanie Andre 80 y o  female MRN: [id number]      Assessment:   Insomnia  HTN  Hyperlipdemia    Plan:  Continue current meds  Refuses Flu vaccine  RTC 1 month    Chief Complaint   Patient presents with    Follow-up     monthly check up  HPI:  Tiffanie Andre is a 80 y o  female who presents for Xanax refill  She uses for sleep  We discussed another agent due to possible side effects  She refuses  This is what works for her  She is doing well  No issues today  She refuses flu vaccine  She uses her own "flu vaccine"  Historical Information   Past Medical History:   Diagnosis Date    Arthritis     Cardiac disease     Chronic pain     Gout     Hypertension      Past Surgical History:   Procedure Laterality Date    APPENDECTOMY      ATRIAL ABLATION SURGERY      HYSTERECTOMY      JOINT REPLACEMENT  knees    KNEE ARTHROSCOPY      b/l knees    LAMINECTOMY      [place] TOTAL HIP ARTHROPLASTY Right 7/17/2017    Procedure: TOTAL ANTERIOR HIP ARTHROPLASTY;  Surgeon: Hossein Ray MD;  Location: [facility];  Service: Orthopedics     Social History   History   Alcohol Use No     History   Drug Use No     History   Smoking Status    Never Smoker   Smokeless Tobacco    Never Used     History reviewed  No pertinent family history  Meds/Allergies   Allergies   Allergen Reactions    Bee Venom Anaphylaxis    Codeine      Nausea/vomiting    Tetanus Toxoid     Tetanus Toxoids        Meds:    Current Outpatient Prescriptions:     albuterol (PROVENTIL HFA,VENTOLIN HFA) 90 mcg/act inhaler, Inhale 2 puffs every 6 (six) hours as needed for wheezing, Disp: , Rfl:     allopurinol (ZYLOPRIM) 100 mg tablet, TAKE (1) TABLET DAILY  , Disp: 90 tablet, Rfl: 0    ALPRAZolam (XANAX) 1 mg tablet, Take 1 tablet (1 mg total) by mouth daily at bedtime as needed for sleep, Disp: 30 tablet, Rfl: 0    aspirin (ECOTRIN LOW STRENGTH) 81 mg EC tablet, Take 1 tablet by mouth daily, Disp: 1 tablet, Rfl: 0    atorvastatin (LIPITOR) 20 mg tablet, Take 20 mg by mouth daily, Disp: , Rfl:     colchicine (COLCRYS) 0 6 mg tablet, Take 1 tablet by mouth daily, Disp: , Rfl: 0    diclofenac-misoprostol (ARTHROTEC 75) 75-0 2 MG per tablet, Take 1 tablet by mouth 2 (two) times a day, Disp: 60 tablet, Rfl: 3    docusate sodium (COLACE) 100 mg capsule, Take 100 mg by mouth 2 (two) times a day as needed for constipation, Disp: , Rfl:     EPINEPHrine (EPIPEN) 0 3 mg/0 3 mL SOAJ, Inject as necessary, Disp: 1 each, Rfl: 1    ferrous sulfate 325 (65 Fe) mg tablet, Take by mouth, Disp: , Rfl:     furosemide (LASIX) 20 mg tablet, TAKE 1 TABLET TWICE DAILY AS NEEDED, Disp: 180 tablet, Rfl: 0    Inulin (FIBER CHOICE PO), Take by mouth, Disp: , Rfl:     metoprolol tartrate (LOPRESSOR) 100 mg tablet, TAKE ONE TABLET BY MOUTH TWICE A DAY AS DIRECTED, Disp: 180 tablet, Rfl: 0    Multiple Vitamin (MULTIVITAMIN) tablet, Take 1 tablet by mouth daily, Disp: , Rfl:     olmesartan (BENICAR) 40 mg tablet, Take by mouth, Disp: , Rfl:     potassium chloride (K-DUR,KLOR-CON) 20 mEq tablet, TAKE 1 TABLET BY MOUTH EVERY DAY -MUST TAKE WITH FOOD OR MILK, Disp: 30 tablet, Rfl: 0    triamcinolone (KENALOG) 0 025 % cream, Apply topically 2 (two) times a day, Disp: 30 g, Rfl: 0    Vitamins-Lipotropics (LIPO-FLAVONOID PLUS PO), Take 2 tablets by mouth daily  , Disp: , Rfl:       REVIEW OF SYSTEMS  Review of Systems   Constitutional: Negative  HENT: Negative  Eyes: Negative  Respiratory: Negative  Cardiovascular: Negative  Gastrointestinal: Negative  Endocrine: Negative  Genitourinary: Negative  Musculoskeletal: Negative  Skin: Negative  Allergic/Immunologic: Negative  Neurological: Negative  Hematological: Negative  Psychiatric/Behavioral: Positive for sleep disturbance         Current Vitals:   Blood Pressure: 130/82 (10/25/18 1101)  Pulse: 59 (10/25/18 1101)  Temperature: 97 6 °F (36 4 °C) (10/25/18 1101)  Respirations: 18 (10/25/18 1101)  Height: 4' 11" (149 9 cm) (10/25/18 1101)  Weight - Scale: 72 1 kg (159 lb) (10/25/18 1101)  SpO2: 93 % (10/25/18 1101)  Body mass index is 32 11 kg/m²  PHYSICAL EXAMS:  Physical Exam   Constitutional: She is oriented to person, place, and time  She appears well-developed and well-nourished  HENT:   Head: Normocephalic and atraumatic  Right Ear: External ear normal    Left Ear: External ear normal    Eyes: Pupils are equal, round, and reactive to light  Neck: Normal range of motion  Neck supple  No thyromegaly present  Cardiovascular: Normal rate, regular rhythm and normal heart sounds  Pulmonary/Chest: Effort normal and breath sounds normal  She has no wheezes  She has no rales  Musculoskeletal: She exhibits no edema  Neurological: She is alert and oriented to person, place, and time  No cranial nerve deficit  Skin: Skin is warm and dry  Psychiatric: She has a normal mood and affect  Lab Results:          Rhonda Leyva PA-C  10/25/2018, 11:03 AM

## 2018-11-05 DIAGNOSIS — E87.6 HYPOKALEMIA: ICD-10-CM

## 2018-11-05 RX ORDER — POTASSIUM CHLORIDE 20 MEQ/1
TABLET, EXTENDED RELEASE ORAL
Qty: 30 TABLET | Refills: 3 | Status: SHIPPED | OUTPATIENT
Start: 2018-11-05 | End: 2019-03-06 | Stop reason: SDUPTHER

## 2018-11-20 DIAGNOSIS — M19.90 ARTHRITIS: ICD-10-CM

## 2018-11-20 RX ORDER — DICLOFENAC SODIUM AND MISOPROSTOL 75; 200 MG/1; UG/1
TABLET, DELAYED RELEASE ORAL
Qty: 180 TABLET | Refills: 2 | Status: SHIPPED | OUTPATIENT
Start: 2018-11-20 | End: 2019-07-23 | Stop reason: SDUPTHER

## 2018-12-21 ENCOUNTER — TELEPHONE (OUTPATIENT)
Dept: FAMILY MEDICINE CLINIC | Facility: CLINIC | Age: 83
End: 2018-12-21

## 2018-12-21 DIAGNOSIS — R19.7 DIARRHEA, UNSPECIFIED TYPE: Primary | ICD-10-CM

## 2018-12-21 RX ORDER — LOPERAMIDE HYDROCHLORIDE 2 MG/1
2 CAPSULE ORAL 4 TIMES DAILY PRN
Qty: 30 CAPSULE | Refills: 0 | Status: SHIPPED | OUTPATIENT
Start: 2018-12-21 | End: 2020-04-22

## 2018-12-27 ENCOUNTER — OFFICE VISIT (OUTPATIENT)
Dept: FAMILY MEDICINE CLINIC | Facility: CLINIC | Age: 83
End: 2018-12-27
Payer: COMMERCIAL

## 2018-12-27 VITALS
OXYGEN SATURATION: 96 % | HEART RATE: 64 BPM | RESPIRATION RATE: 18 BRPM | HEIGHT: 59 IN | SYSTOLIC BLOOD PRESSURE: 130 MMHG | BODY MASS INDEX: 31.85 KG/M2 | DIASTOLIC BLOOD PRESSURE: 80 MMHG | TEMPERATURE: 97.7 F | WEIGHT: 158 LBS

## 2018-12-27 DIAGNOSIS — F41.9 ANXIETY: ICD-10-CM

## 2018-12-27 DIAGNOSIS — I10 ESSENTIAL HYPERTENSION: ICD-10-CM

## 2018-12-27 DIAGNOSIS — J01.00 ACUTE NON-RECURRENT MAXILLARY SINUSITIS: Primary | ICD-10-CM

## 2018-12-27 DIAGNOSIS — M1A.9XX0 CHRONIC GOUT WITHOUT TOPHUS, UNSPECIFIED CAUSE, UNSPECIFIED SITE: ICD-10-CM

## 2018-12-27 PROCEDURE — 99213 OFFICE O/P EST LOW 20 MIN: CPT | Performed by: FAMILY MEDICINE

## 2018-12-27 RX ORDER — ALLOPURINOL 100 MG/1
100 TABLET ORAL DAILY
Qty: 90 TABLET | Refills: 1 | Status: SHIPPED | OUTPATIENT
Start: 2018-12-27 | End: 2020-04-22

## 2018-12-27 RX ORDER — CEPHALEXIN 500 MG/1
500 CAPSULE ORAL EVERY 8 HOURS SCHEDULED
Qty: 21 CAPSULE | Refills: 0 | Status: SHIPPED | OUTPATIENT
Start: 2018-12-27 | End: 2019-01-03

## 2018-12-27 RX ORDER — FUROSEMIDE 20 MG/1
20 TABLET ORAL 2 TIMES DAILY
Qty: 180 TABLET | Refills: 1 | Status: SHIPPED | OUTPATIENT
Start: 2018-12-27 | End: 2019-09-27 | Stop reason: SDUPTHER

## 2018-12-27 RX ORDER — METOPROLOL TARTRATE 100 MG/1
TABLET ORAL
Qty: 180 TABLET | Refills: 0 | Status: SHIPPED | OUTPATIENT
Start: 2018-12-27 | End: 2018-12-27 | Stop reason: SDUPTHER

## 2018-12-27 RX ORDER — ALPRAZOLAM 1 MG/1
1 TABLET ORAL
Qty: 30 TABLET | Refills: 0 | Status: SHIPPED | OUTPATIENT
Start: 2018-12-27 | End: 2019-01-24 | Stop reason: SDUPTHER

## 2018-12-27 RX ORDER — METOPROLOL TARTRATE 100 MG/1
100 TABLET ORAL 2 TIMES DAILY
Qty: 180 TABLET | Refills: 1 | Status: SHIPPED | OUTPATIENT
Start: 2018-12-27 | End: 2019-09-27 | Stop reason: SDUPTHER

## 2018-12-27 RX ORDER — FUROSEMIDE 20 MG/1
TABLET ORAL
Qty: 180 TABLET | Refills: 0 | Status: SHIPPED | OUTPATIENT
Start: 2018-12-27 | End: 2018-12-27 | Stop reason: SDUPTHER

## 2018-12-27 NOTE — PROGRESS NOTES
History and Physical  Vivek Oconnell 80 y o  female MRN: [id number]      Assessment:   Sinusitis  Insomnia    Plan:  Kelfex 500 mg TID x 7 days  Abdominal upset should stop after antibiltic kicks in if being being caused by post nasal drip  Continue other meds  RTC 1 month    Chief Complaint   Patient presents with    Abdominal Pain    Diarrhea    Medication Refill        HPI:  Vivek Oconnell is a 80 y o  female who presents for med refill  Today she also c/o diarrhea and abdominal upset  Diarrhea has resolved  She has sinus congestion and post nasal drip  No fever or chills  Historical Information   Past Medical History:   Diagnosis Date    Arthritis     Cardiac disease     Chronic pain     Gout     Hypertension      Past Surgical History:   Procedure Laterality Date    APPENDECTOMY      ATRIAL ABLATION SURGERY      HYSTERECTOMY      JOINT REPLACEMENT  knees    KNEE ARTHROSCOPY      b/l knees    LAMINECTOMY      [place] TOTAL HIP ARTHROPLASTY Right 7/17/2017    Procedure: TOTAL ANTERIOR HIP ARTHROPLASTY;  Surgeon: aRchel Sales MD;  Location: [facility];  Service: Orthopedics     Social History   History   Alcohol Use No     History   Drug Use No     History   Smoking Status    Never Smoker   Smokeless Tobacco    Never Used     History reviewed  No pertinent family history  Meds/Allergies   Allergies   Allergen Reactions    Bee Venom Anaphylaxis    Codeine      Nausea/vomiting    Tetanus Toxoid     Tetanus Toxoids        Meds:    Current Outpatient Prescriptions:     albuterol (PROVENTIL HFA,VENTOLIN HFA) 90 mcg/act inhaler, Inhale 2 puffs every 6 (six) hours as needed for wheezing, Disp: , Rfl:     allopurinol (ZYLOPRIM) 100 mg tablet, TAKE (1) TABLET DAILY  , Disp: 90 tablet, Rfl: 0    ALPRAZolam (XANAX) 1 mg tablet, Take 1 tablet (1 mg total) by mouth daily at bedtime as needed for sleep, Disp: 30 tablet, Rfl: 1    aspirin (ECOTRIN LOW STRENGTH) 81 mg EC tablet, Take 1 tablet by mouth daily, Disp: 1 tablet, Rfl: 0    atorvastatin (LIPITOR) 20 mg tablet, Take 20 mg by mouth daily, Disp: , Rfl:     colchicine (COLCRYS) 0 6 mg tablet, Take 1 tablet by mouth daily, Disp: , Rfl: 0    diclofenac-misoprostol (ARTHROTEC 75) 75-0 2 MG per tablet, TAKE ONE (1) TABLET BY MOUTH TWO TIMES DAILY  , Disp: 180 tablet, Rfl: 2    docusate sodium (COLACE) 100 mg capsule, Take 100 mg by mouth 2 (two) times a day as needed for constipation, Disp: , Rfl:     EPINEPHrine (EPIPEN) 0 3 mg/0 3 mL SOAJ, Inject as necessary, Disp: 1 each, Rfl: 1    ferrous sulfate 325 (65 Fe) mg tablet, Take by mouth, Disp: , Rfl:     furosemide (LASIX) 20 mg tablet, TAKE ONE (1) TABLET BY MOUTH TWICE DAILY  , Disp: 180 tablet, Rfl: 0    Inulin (FIBER CHOICE PO), Take by mouth, Disp: , Rfl:     loperamide (IMODIUM) 2 mg capsule, Take 1 capsule (2 mg total) by mouth 4 (four) times a day as needed for diarrhea, Disp: 30 capsule, Rfl: 0    metoprolol tartrate (LOPRESSOR) 100 mg tablet, TAKE ONE (1) TABLET BY MOUTH TWICE DAILY  , Disp: 180 tablet, Rfl: 0    Multiple Vitamin (MULTIVITAMIN) tablet, Take 1 tablet by mouth daily, Disp: , Rfl:     olmesartan (BENICAR) 40 mg tablet, Take by mouth, Disp: , Rfl:     potassium chloride (K-DUR,KLOR-CON) 20 mEq tablet, TAKE ONE (1) TABLET BY MOUTH DAILY WITH FOOD, Disp: 30 tablet, Rfl: 3    triamcinolone (KENALOG) 0 025 % cream, Apply topically 2 (two) times a day, Disp: 30 g, Rfl: 0    Vitamins-Lipotropics (LIPO-FLAVONOID PLUS PO), Take 2 tablets by mouth daily  , Disp: , Rfl:       REVIEW OF SYSTEMS  Review of Systems   Constitutional: Negative  HENT: Positive for congestion and postnasal drip  Eyes: Negative  Respiratory: Negative  Cardiovascular: Negative  Psychiatric/Behavioral: Positive for sleep disturbance         Current Vitals:   Blood Pressure: 130/80 (12/27/18 1126)  Pulse: 64 (12/27/18 1126)  Temperature: 97 7 °F (36 5 °C) (12/27/18 1126)  Respirations: 18 (12/27/18 1126)  Height: 4' 11" (149 9 cm) (12/27/18 1126)  Weight - Scale: 71 7 kg (158 lb) (12/27/18 1126)  SpO2: 96 % (12/27/18 1126)  Body mass index is 31 91 kg/m²  PHYSICAL EXAMS:  Physical Exam   Constitutional: She is oriented to person, place, and time  She appears well-developed and well-nourished  HENT:   Head: Normocephalic and atraumatic  Right Ear: External ear normal    Left Ear: External ear normal    Mouth/Throat: Oropharyngeal exudate present  Red swollen mucosa   Eyes: Pupils are equal, round, and reactive to light  Neck: Normal range of motion  Neck supple  No thyromegaly present  Cardiovascular: Normal rate and regular rhythm  Pulmonary/Chest: Effort normal and breath sounds normal  She has no wheezes  She has no rales  Abdominal: Soft  Bowel sounds are normal  There is no tenderness  Musculoskeletal: She exhibits no edema  Diffuse tenderness over R cheekbone  Neurological: She is alert and oriented to person, place, and time  Psychiatric: She has a normal mood and affect  Lab Results:          Gerald Young PA-C  12/27/2018, 11:45 AM

## 2019-01-24 ENCOUNTER — OFFICE VISIT (OUTPATIENT)
Dept: FAMILY MEDICINE CLINIC | Facility: CLINIC | Age: 84
End: 2019-01-24
Payer: COMMERCIAL

## 2019-01-24 VITALS
TEMPERATURE: 96.9 F | HEIGHT: 59 IN | HEART RATE: 64 BPM | OXYGEN SATURATION: 96 % | SYSTOLIC BLOOD PRESSURE: 154 MMHG | DIASTOLIC BLOOD PRESSURE: 78 MMHG | BODY MASS INDEX: 31.91 KG/M2 | RESPIRATION RATE: 16 BRPM

## 2019-01-24 DIAGNOSIS — F41.9 ANXIETY: ICD-10-CM

## 2019-01-24 PROCEDURE — 99213 OFFICE O/P EST LOW 20 MIN: CPT | Performed by: FAMILY MEDICINE

## 2019-01-24 RX ORDER — ALPRAZOLAM 1 MG/1
1 TABLET ORAL
Qty: 30 TABLET | Refills: 0 | Status: SHIPPED | OUTPATIENT
Start: 2019-01-24 | End: 2019-02-21 | Stop reason: SDUPTHER

## 2019-01-24 NOTE — PROGRESS NOTES
Assessment/Plan:    No problem-specific Assessment & Plan notes found for this encounter  Diagnoses and all orders for this visit:    Anxiety  -     ALPRAZolam (XANAX) 1 mg tablet; Take 1 tablet (1 mg total) by mouth daily at bedtime as needed for sleep          Subjective:      Patient ID: Connie Roche is a 80 y o  female  She is a former night shift nurse for about 30 years  She worked in the [place] [id number] and was basically worked swing shift  She does well with 1 mg of Xanax at night for sleep  She has no side effects  She and I had a long discussion today regarding the risks and benefits of long-term use of benzodiazepines in the senior population  She is aware that these medications have been linked to increased risk of falls and therefore hip fractures  She has been stable on this medication for greater than 30 years  I feel the risk of trying to taper this medication is much greater than the benefit of fall reduction  She and I are both in agreement that in her case the benefits of this regimen far outweigh the risks and I am more than comfortable refilling this medication for her  The following portions of the patient's history were reviewed and updated as appropriate:   She  has a past medical history of Arthritis; Cardiac disease; Chronic pain; Gout; and Hypertension    She   Patient Active Problem List    Diagnosis Date Noted    History of cardiac radiofrequency ablation 04/23/2018    Anemia 08/10/2017    Screening-pulmonary TB 07/20/2017    Status post total replacement of right hip 07/17/2017    Benign essential hypertension 07/17/2017    Hyperlipidemia 07/17/2017    Elevated blood uric acid level 07/17/2017    History of gout 07/17/2017    Dextroscoliosis 07/17/2017    Gout 05/31/2017    Dyslipidemia 07/06/2016    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 re-entry tachycardia) ([facility] [address] ) 04/28/2015    Anxiety 11/10/2014    Iron deficiency anemia 11/10/2014     She  has a past surgical history that includes Atrial ablation surgery; Laminectomy; Appendectomy; Hysterectomy; Knee arthroscopy; Joint replacement (knees); and pr total hip arthroplasty (Right, 7/17/2017)  Her family history is not on file  She  reports that she has never smoked  She has never used smokeless tobacco  She reports that she does not drink alcohol or use drugs  Current Outpatient Prescriptions   Medication Sig Dispense Refill    albuterol (PROVENTIL HFA,VENTOLIN HFA) 90 mcg/act inhaler Inhale 2 puffs every 6 (six) hours as needed for wheezing      allopurinol (ZYLOPRIM) 100 mg tablet Take 1 tablet (100 mg total) by mouth daily 90 tablet 1    ALPRAZolam (XANAX) 1 mg tablet Take 1 tablet (1 mg total) by mouth daily at bedtime as needed for sleep 30 tablet 0    aspirin (ECOTRIN LOW STRENGTH) 81 mg EC tablet Take 1 tablet by mouth daily 1 tablet 0    atorvastatin (LIPITOR) 20 mg tablet Take 20 mg by mouth daily      colchicine (COLCRYS) 0 6 mg tablet Take 1 tablet by mouth daily  0    diclofenac-misoprostol (ARTHROTEC 75) 75-0 2 MG per tablet TAKE ONE (1) TABLET BY MOUTH TWO TIMES DAILY   180 tablet 2    docusate sodium (COLACE) 100 mg capsule Take 100 mg by mouth 2 (two) times a day as needed for constipation      EPINEPHrine (EPIPEN) 0 3 mg/0 3 mL SOAJ Inject as necessary 1 each 1    ferrous sulfate 325 (65 Fe) mg tablet Take by mouth      furosemide (LASIX) 20 mg tablet Take 1 tablet (20 mg total) by mouth 2 (two) times a day 180 tablet 1    Inulin (FIBER CHOICE PO) Take by mouth      loperamide (IMODIUM) 2 mg capsule Take 1 capsule (2 mg total) by mouth 4 (four) times a day as needed for diarrhea 30 capsule 0    metoprolol tartrate (LOPRESSOR) 100 mg tablet Take 1 tablet (100 mg total) by mouth 2 (two) times a day 180 tablet 1    Multiple Vitamin (MULTIVITAMIN) tablet Take 1 tablet by mouth daily      olmesartan (BENICAR) 40 mg tablet Take by mouth      potassium chloride (K-DUR,KLOR-CON) 20 mEq tablet TAKE ONE (1) TABLET BY MOUTH DAILY WITH FOOD 30 tablet 3    triamcinolone (KENALOG) 0 025 % cream Apply topically 2 (two) times a day 30 g 0    Vitamins-Lipotropics (LIPO-FLAVONOID PLUS PO) Take 2 tablets by mouth daily         No current facility-administered medications for this visit  Current Outpatient Prescriptions on File Prior to Visit   Medication Sig    albuterol (PROVENTIL HFA,VENTOLIN HFA) 90 mcg/act inhaler Inhale 2 puffs every 6 (six) hours as needed for wheezing    allopurinol (ZYLOPRIM) 100 mg tablet Take 1 tablet (100 mg total) by mouth daily    aspirin (ECOTRIN LOW STRENGTH) 81 mg EC tablet Take 1 tablet by mouth daily    atorvastatin (LIPITOR) 20 mg tablet Take 20 mg by mouth daily    colchicine (COLCRYS) 0 6 mg tablet Take 1 tablet by mouth daily    diclofenac-misoprostol (ARTHROTEC 75) 75-0 2 MG per tablet TAKE ONE (1) TABLET BY MOUTH TWO TIMES DAILY      docusate sodium (COLACE) 100 mg capsule Take 100 mg by mouth 2 (two) times a day as needed for constipation    EPINEPHrine (EPIPEN) 0 3 mg/0 3 mL SOAJ Inject as necessary    ferrous sulfate 325 (65 Fe) mg tablet Take by mouth    furosemide (LASIX) 20 mg tablet Take 1 tablet (20 mg total) by mouth 2 (two) times a day    Inulin (FIBER CHOICE PO) Take by mouth    loperamide (IMODIUM) 2 mg capsule Take 1 capsule (2 mg total) by mouth 4 (four) times a day as needed for diarrhea    metoprolol tartrate (LOPRESSOR) 100 mg tablet Take 1 tablet (100 mg total) by mouth 2 (two) times a day    Multiple Vitamin (MULTIVITAMIN) tablet Take 1 tablet by mouth daily    olmesartan (BENICAR) 40 mg tablet Take by mouth    potassium chloride (K-DUR,KLOR-CON) 20 mEq tablet TAKE ONE (1) TABLET BY MOUTH DAILY WITH FOOD    triamcinolone (KENALOG) 0 025 % cream Apply topically 2 (two) times a day    Vitamins-Lipotropics (LIPO-FLAVONOID PLUS PO) Take 2 tablets by mouth daily      [DISCONTINUED] ALPRAZolam (XANAX) 1 mg tablet Take 1 tablet (1 mg total) by mouth daily at bedtime as needed for sleep     No current facility-administered medications on file prior to visit  She is allergic to bee venom; codeine; tetanus toxoid; and tetanus toxoids       Review of Systems   All other systems reviewed and are negative  Objective:      /78 (BP Location: Right arm, Patient Position: Sitting, Cuff Size: Large)   Pulse 64   Temp (!) 96 9 °F (36 1 °C) (Tympanic)   Resp 16   Ht 4' 11" (1 499 m)   SpO2 96%   BMI 31 91 kg/m²          Physical Exam   Constitutional: She is oriented to person, place, and time  She appears well-developed and well-nourished  Neck: Normal range of motion  Neck supple  Cardiovascular: Normal rate, regular rhythm, normal heart sounds and intact distal pulses  Pulmonary/Chest: Effort normal and breath sounds normal    Abdominal: Soft  Musculoskeletal: Normal range of motion  Neurological: She is alert and oriented to person, place, and time  Skin: Skin is warm and dry  Psychiatric: She has a normal mood and affect  Her behavior is normal  Judgment and thought content normal    Nursing note and vitals reviewed

## 2019-01-31 DIAGNOSIS — I10 BENIGN ESSENTIAL HYPERTENSION: Primary | ICD-10-CM

## 2019-01-31 RX ORDER — OLMESARTAN MEDOXOMIL 40 MG/1
TABLET ORAL
Qty: 90 TABLET | Refills: 0 | Status: SHIPPED | OUTPATIENT
Start: 2019-01-31 | End: 2019-04-29 | Stop reason: SDUPTHER

## 2019-02-21 ENCOUNTER — OFFICE VISIT (OUTPATIENT)
Dept: FAMILY MEDICINE CLINIC | Facility: CLINIC | Age: 84
End: 2019-02-21
Payer: COMMERCIAL

## 2019-02-21 VITALS
HEART RATE: 70 BPM | OXYGEN SATURATION: 96 % | BODY MASS INDEX: 31.91 KG/M2 | TEMPERATURE: 98.1 F | HEIGHT: 59 IN | DIASTOLIC BLOOD PRESSURE: 84 MMHG | SYSTOLIC BLOOD PRESSURE: 136 MMHG | RESPIRATION RATE: 18 BRPM

## 2019-02-21 DIAGNOSIS — F41.9 ANXIETY: ICD-10-CM

## 2019-02-21 PROCEDURE — 99213 OFFICE O/P EST LOW 20 MIN: CPT | Performed by: FAMILY MEDICINE

## 2019-02-21 RX ORDER — ALPRAZOLAM 1 MG/1
1 TABLET ORAL
Qty: 30 TABLET | Refills: 0 | Status: SHIPPED | OUTPATIENT
Start: 2019-02-21 | End: 2019-03-21 | Stop reason: SDUPTHER

## 2019-02-21 NOTE — PROGRESS NOTES
History and Physical  Geeta Maxwell 80 y o  female MRN: [id number]      Assessment:   Insomnia    Plan:  Continue Xanax for sleep prn  RTC 1 month    Chief Complaint   Patient presents with    Follow-up     Medication refill on xanax         HPI:  Geeta Maxwell is a 80 y o  female who presents with Xanax refill  She is doing well  No complaints  She is sleeping well  Historical Information   Past Medical History:   Diagnosis Date    Arthritis     Cardiac disease     Chronic pain     Gout     Hypertension      Past Surgical History:   Procedure Laterality Date    APPENDECTOMY      ATRIAL ABLATION SURGERY      HYSTERECTOMY      JOINT REPLACEMENT  knees    KNEE ARTHROSCOPY      b/l knees    LAMINECTOMY      [place] TOTAL HIP ARTHROPLASTY Right 7/17/2017    Procedure: TOTAL ANTERIOR HIP ARTHROPLASTY;  Surgeon: Ashleigh Fine MD;  Location: [facility];  Service: Orthopedics     Social History   Social History     Substance and Sexual Activity   Alcohol Use No     Social History     Substance and Sexual Activity   Drug Use No     Social History     Tobacco Use   Smoking Status Never Smoker   Smokeless Tobacco Never Used     History reviewed  No pertinent family history      Meds/Allergies   Allergies   Allergen Reactions    Bee Venom Anaphylaxis    Codeine      Nausea/vomiting    Tetanus Toxoid     Tetanus Toxoids        Meds:    Current Outpatient Medications:     albuterol (PROVENTIL HFA,VENTOLIN HFA) 90 mcg/act inhaler, Inhale 2 puffs every 6 (six) hours as needed for wheezing, Disp: , Rfl:     allopurinol (ZYLOPRIM) 100 mg tablet, Take 1 tablet (100 mg total) by mouth daily, Disp: 90 tablet, Rfl: 1    ALPRAZolam (XANAX) 1 mg tablet, Take 1 tablet (1 mg total) by mouth daily at bedtime as needed for sleep, Disp: 30 tablet, Rfl: 0    aspirin (ECOTRIN LOW STRENGTH) 81 mg EC tablet, Take 1 tablet by mouth daily, Disp: 1 tablet, Rfl: 0    atorvastatin (LIPITOR) 20 mg tablet, Take 20 mg by mouth daily, Disp: , Rfl:     colchicine (COLCRYS) 0 6 mg tablet, Take 1 tablet by mouth daily, Disp: , Rfl: 0    diclofenac-misoprostol (ARTHROTEC 75) 75-0 2 MG per tablet, TAKE ONE (1) TABLET BY MOUTH TWO TIMES DAILY  , Disp: 180 tablet, Rfl: 2    docusate sodium (COLACE) 100 mg capsule, Take 100 mg by mouth 2 (two) times a day as needed for constipation, Disp: , Rfl:     EPINEPHrine (EPIPEN) 0 3 mg/0 3 mL SOAJ, Inject as necessary, Disp: 1 each, Rfl: 1    ferrous sulfate 325 (65 Fe) mg tablet, Take by mouth, Disp: , Rfl:     furosemide (LASIX) 20 mg tablet, Take 1 tablet (20 mg total) by mouth 2 (two) times a day, Disp: 180 tablet, Rfl: 1    Inulin (FIBER CHOICE PO), Take by mouth, Disp: , Rfl:     loperamide (IMODIUM) 2 mg capsule, Take 1 capsule (2 mg total) by mouth 4 (four) times a day as needed for diarrhea, Disp: 30 capsule, Rfl: 0    metoprolol tartrate (LOPRESSOR) 100 mg tablet, Take 1 tablet (100 mg total) by mouth 2 (two) times a day, Disp: 180 tablet, Rfl: 1    Multiple Vitamin (MULTIVITAMIN) tablet, Take 1 tablet by mouth daily, Disp: , Rfl:     olmesartan (BENICAR) 40 mg tablet, TAKE 1 TABLET DAILY  , Disp: 90 tablet, Rfl: 0    potassium chloride (K-DUR,KLOR-CON) 20 mEq tablet, TAKE ONE (1) TABLET BY MOUTH DAILY WITH FOOD, Disp: 30 tablet, Rfl: 3    triamcinolone (KENALOG) 0 025 % cream, Apply topically 2 (two) times a day, Disp: 30 g, Rfl: 0    Vitamins-Lipotropics (LIPO-FLAVONOID PLUS PO), Take 2 tablets by mouth daily  , Disp: , Rfl:       REVIEW OF SYSTEMS  Review of Systems   Constitutional: Negative  HENT: Negative  Eyes: Negative  Respiratory: Negative  Cardiovascular: Negative  Gastrointestinal: Negative  Endocrine: Negative  Genitourinary: Negative  Musculoskeletal: Negative  Skin: Negative  Allergic/Immunologic: Negative  Neurological: Negative  Hematological: Negative  Psychiatric/Behavioral: Positive for sleep disturbance         Current Vitals: Blood Pressure: 136/84 (02/21/19 1143)  Pulse: 70 (02/21/19 1143)  Temperature: 98 1 °F (36 7 °C) (02/21/19 1143)  Respirations: 18 (02/21/19 1143)  Height: 4' 11" (149 9 cm) (02/21/19 1143)  SpO2: 96 % (02/21/19 1143)  Body mass index is 31 91 kg/m²  PHYSICAL EXAMS:  Physical Exam   Constitutional: She is oriented to person, place, and time  She appears well-developed and well-nourished  HENT:   Head: Normocephalic and atraumatic  Right Ear: External ear normal    Left Ear: External ear normal    Eyes: Pupils are equal, round, and reactive to light  Neck: Normal range of motion  Neck supple  No thyromegaly present  Cardiovascular: Normal rate and regular rhythm  Pulmonary/Chest: Effort normal and breath sounds normal  She has no wheezes  She has no rales  Musculoskeletal: She exhibits no edema  Neurological: She is alert and oriented to person, place, and time  Skin: Skin is warm and dry  Psychiatric: She has a normal mood and affect  Lab Results:          Radha Abdullahi PA-C  2/21/2019, 12:15 PM

## 2019-03-06 DIAGNOSIS — E87.6 HYPOKALEMIA: ICD-10-CM

## 2019-03-06 RX ORDER — POTASSIUM CHLORIDE 20 MEQ/1
TABLET, EXTENDED RELEASE ORAL
Qty: 90 TABLET | Refills: 1 | Status: SHIPPED | OUTPATIENT
Start: 2019-03-06 | End: 2019-08-30 | Stop reason: SDUPTHER

## 2019-03-21 ENCOUNTER — OFFICE VISIT (OUTPATIENT)
Dept: FAMILY MEDICINE CLINIC | Facility: CLINIC | Age: 84
End: 2019-03-21
Payer: COMMERCIAL

## 2019-03-21 VITALS
DIASTOLIC BLOOD PRESSURE: 84 MMHG | OXYGEN SATURATION: 93 % | HEIGHT: 59 IN | SYSTOLIC BLOOD PRESSURE: 130 MMHG | TEMPERATURE: 97.6 F | BODY MASS INDEX: 31.91 KG/M2 | RESPIRATION RATE: 18 BRPM | HEART RATE: 69 BPM

## 2019-03-21 DIAGNOSIS — F41.9 ANXIETY: ICD-10-CM

## 2019-03-21 DIAGNOSIS — I10 ESSENTIAL HYPERTENSION: ICD-10-CM

## 2019-03-21 DIAGNOSIS — R21 SKIN RASH: Primary | ICD-10-CM

## 2019-03-21 PROCEDURE — 99213 OFFICE O/P EST LOW 20 MIN: CPT | Performed by: FAMILY MEDICINE

## 2019-03-21 RX ORDER — CLOTRIMAZOLE AND BETAMETHASONE DIPROPIONATE 10; .64 MG/G; MG/G
CREAM TOPICAL 2 TIMES DAILY
Qty: 30 G | Refills: 0 | Status: SHIPPED | OUTPATIENT
Start: 2019-03-21 | End: 2020-04-22

## 2019-03-21 RX ORDER — ALPRAZOLAM 1 MG/1
1 TABLET ORAL
Qty: 30 TABLET | Refills: 0 | Status: SHIPPED | OUTPATIENT
Start: 2019-03-21 | End: 2019-04-17 | Stop reason: SDUPTHER

## 2019-03-21 NOTE — PROGRESS NOTES
History and Physical  Ani Blum 80 y o  female MRN: [id number]      Assessment:   Skin rash  Insomnia  HTN    Plan:  Continue current meds  Lotrisone creme to affected area  RTC 1 month    Chief Complaint   Patient presents with    Follow-up     Patient is here for regular check up         HPI:  Ani Blum is a 80 y o  female who presents for med refill  She is doing well  She has noticed a rash and discoloration over LLE  Sometimes itchy and the rash "moves about"  No other issues today  Historical Information   Past Medical History:   Diagnosis Date    Arthritis     Cardiac disease     Chronic pain     Gout     Hypertension      Past Surgical History:   Procedure Laterality Date    APPENDECTOMY      ATRIAL ABLATION SURGERY      HYSTERECTOMY      JOINT REPLACEMENT  knees    KNEE ARTHROSCOPY      b/l knees    LAMINECTOMY      [place] TOTAL HIP ARTHROPLASTY Right 7/17/2017    Procedure: TOTAL ANTERIOR HIP ARTHROPLASTY;  Surgeon: Chantell Connelly MD;  Location: [facility];  Service: Orthopedics     Social History   Social History     Substance and Sexual Activity   Alcohol Use No     Social History     Substance and Sexual Activity   Drug Use No     Social History     Tobacco Use   Smoking Status Never Smoker   Smokeless Tobacco Never Used     History reviewed  No pertinent family history      Meds/Allergies   Allergies   Allergen Reactions    Bee Venom Anaphylaxis    Codeine      Nausea/vomiting    Tetanus Toxoid     Tetanus Toxoids        Meds:    Current Outpatient Medications:     albuterol (PROVENTIL HFA,VENTOLIN HFA) 90 mcg/act inhaler, Inhale 2 puffs every 6 (six) hours as needed for wheezing, Disp: , Rfl:     allopurinol (ZYLOPRIM) 100 mg tablet, Take 1 tablet (100 mg total) by mouth daily, Disp: 90 tablet, Rfl: 1    ALPRAZolam (XANAX) 1 mg tablet, Take 1 tablet (1 mg total) by mouth daily at bedtime as needed for sleep, Disp: 30 tablet, Rfl: 0    aspirin (ECOTRIN LOW STRENGTH) 81 mg EC tablet, Take 1 tablet by mouth daily, Disp: 1 tablet, Rfl: 0    atorvastatin (LIPITOR) 20 mg tablet, Take 20 mg by mouth daily, Disp: , Rfl:     colchicine (COLCRYS) 0 6 mg tablet, Take 1 tablet by mouth daily, Disp: , Rfl: 0    diclofenac-misoprostol (ARTHROTEC 75) 75-0 2 MG per tablet, TAKE ONE (1) TABLET BY MOUTH TWO TIMES DAILY  , Disp: 180 tablet, Rfl: 2    docusate sodium (COLACE) 100 mg capsule, Take 100 mg by mouth 2 (two) times a day as needed for constipation, Disp: , Rfl:     EPINEPHrine (EPIPEN) 0 3 mg/0 3 mL SOAJ, Inject as necessary, Disp: 1 each, Rfl: 1    ferrous sulfate 325 (65 Fe) mg tablet, Take by mouth, Disp: , Rfl:     furosemide (LASIX) 20 mg tablet, Take 1 tablet (20 mg total) by mouth 2 (two) times a day, Disp: 180 tablet, Rfl: 1    Inulin (FIBER CHOICE PO), Take by mouth, Disp: , Rfl:     loperamide (IMODIUM) 2 mg capsule, Take 1 capsule (2 mg total) by mouth 4 (four) times a day as needed for diarrhea, Disp: 30 capsule, Rfl: 0    metoprolol tartrate (LOPRESSOR) 100 mg tablet, Take 1 tablet (100 mg total) by mouth 2 (two) times a day, Disp: 180 tablet, Rfl: 1    Multiple Vitamin (MULTIVITAMIN) tablet, Take 1 tablet by mouth daily, Disp: , Rfl:     olmesartan (BENICAR) 40 mg tablet, TAKE 1 TABLET DAILY  , Disp: 90 tablet, Rfl: 0    potassium chloride (K-DUR,KLOR-CON) 20 mEq tablet, TAKE ONE (1) TABLET BY MOUTH DAILY WITH FOOD , Disp: 90 tablet, Rfl: 1    triamcinolone (KENALOG) 0 025 % cream, Apply topically 2 (two) times a day, Disp: 30 g, Rfl: 0    Vitamins-Lipotropics (LIPO-FLAVONOID PLUS PO), Take 2 tablets by mouth daily  , Disp: , Rfl:       REVIEW OF SYSTEMS  Review of Systems   Constitutional: Negative  HENT: Negative  Eyes: Negative  Respiratory: Negative  Cardiovascular: Negative  Skin: Positive for rash  Psychiatric/Behavioral: Positive for sleep disturbance         Current Vitals:   Blood Pressure: 130/84 (03/21/19 4396)  Pulse: 69 (03/21/19 1336)  Temperature: 97 6 °F (36 4 °C) (03/21/19 1336)  Respirations: 18 (03/21/19 1336)  Height: 4' 11" (149 9 cm) (03/21/19 1336)  SpO2: 93 % (03/21/19 1336)  Body mass index is 31 91 kg/m²  PHYSICAL EXAMS:  Physical Exam   Constitutional: She is oriented to person, place, and time  She appears well-developed and well-nourished  HENT:   Head: Normocephalic and atraumatic  Right Ear: External ear normal    Left Ear: External ear normal    Eyes: Pupils are equal, round, and reactive to light  Neck: Normal range of motion  Neck supple  No thyromegaly present  Cardiovascular: Normal rate and regular rhythm  Pulmonary/Chest: Effort normal and breath sounds normal  She has no wheezes  She has no rales  Lymphadenopathy:     She has no cervical adenopathy  Neurological: She is alert and oriented to person, place, and time  Skin:   2 brown discolorations noted under  L great toe and 3rd toe  Red speckled rash noted over anterior LLE under the knee  Psychiatric: She has a normal mood and affect  Nursing note and vitals reviewed  Lab Results:          Rosa Boateng PA-C  3/21/2019, 1:38 PM

## 2019-04-17 ENCOUNTER — OFFICE VISIT (OUTPATIENT)
Dept: FAMILY MEDICINE CLINIC | Facility: CLINIC | Age: 84
End: 2019-04-17
Payer: COMMERCIAL

## 2019-04-17 VITALS
OXYGEN SATURATION: 94 % | TEMPERATURE: 97.6 F | DIASTOLIC BLOOD PRESSURE: 84 MMHG | HEART RATE: 70 BPM | HEIGHT: 59 IN | RESPIRATION RATE: 18 BRPM | SYSTOLIC BLOOD PRESSURE: 130 MMHG | BODY MASS INDEX: 31.91 KG/M2

## 2019-04-17 DIAGNOSIS — I10 ESSENTIAL HYPERTENSION: Primary | ICD-10-CM

## 2019-04-17 DIAGNOSIS — F41.9 ANXIETY: ICD-10-CM

## 2019-04-17 PROCEDURE — 99213 OFFICE O/P EST LOW 20 MIN: CPT | Performed by: FAMILY MEDICINE

## 2019-04-17 RX ORDER — ALPRAZOLAM 1 MG/1
1 TABLET ORAL
Qty: 30 TABLET | Refills: 0 | Status: SHIPPED | OUTPATIENT
Start: 2019-04-17 | End: 2019-05-16 | Stop reason: SDUPTHER

## 2019-04-18 ENCOUNTER — APPOINTMENT (OUTPATIENT)
Dept: LAB | Facility: MEDICAL CENTER | Age: 84
End: 2019-04-18
Payer: COMMERCIAL

## 2019-04-18 DIAGNOSIS — I10 ESSENTIAL HYPERTENSION: ICD-10-CM

## 2019-04-18 LAB
ALBUMIN SERPL BCP-MCNC: 4 G/DL (ref 3.5–5)
ALP SERPL-CCNC: 116 U/L (ref 46–116)
ALT SERPL W P-5'-P-CCNC: 34 U/L (ref 12–78)
ANION GAP SERPL CALCULATED.3IONS-SCNC: 4 MMOL/L (ref 4–13)
AST SERPL W P-5'-P-CCNC: 18 U/L (ref 5–45)
BILIRUB SERPL-MCNC: 0.51 MG/DL (ref 0.2–1)
BUN SERPL-MCNC: 38 MG/DL (ref 5–25)
CALCIUM SERPL-MCNC: 9.1 MG/DL (ref 8.3–10.1)
CHLORIDE SERPL-SCNC: 109 MMOL/L (ref 100–108)
CO2 SERPL-SCNC: 28 MMOL/L (ref 21–32)
CREAT SERPL-MCNC: 1.1 MG/DL (ref 0.6–1.3)
GFR SERPL CREATININE-BSD FRML MDRD: 46 ML/MIN/1.73SQ M
GLUCOSE P FAST SERPL-MCNC: 89 MG/DL (ref 65–99)
POTASSIUM SERPL-SCNC: 3.9 MMOL/L (ref 3.5–5.3)
PROT SERPL-MCNC: 7.7 G/DL (ref 6.4–8.2)
SODIUM SERPL-SCNC: 141 MMOL/L (ref 136–145)

## 2019-04-18 PROCEDURE — 36415 COLL VENOUS BLD VENIPUNCTURE: CPT

## 2019-04-18 PROCEDURE — 80053 COMPREHEN METABOLIC PANEL: CPT

## 2019-04-29 DIAGNOSIS — I10 BENIGN ESSENTIAL HYPERTENSION: ICD-10-CM

## 2019-04-29 RX ORDER — OLMESARTAN MEDOXOMIL 40 MG/1
TABLET ORAL
Qty: 90 TABLET | Refills: 0 | Status: SHIPPED | OUTPATIENT
Start: 2019-04-29 | End: 2019-07-25 | Stop reason: SDUPTHER

## 2019-05-16 ENCOUNTER — OFFICE VISIT (OUTPATIENT)
Dept: FAMILY MEDICINE CLINIC | Facility: CLINIC | Age: 84
End: 2019-05-16
Payer: COMMERCIAL

## 2019-05-16 VITALS
HEIGHT: 59 IN | HEART RATE: 76 BPM | SYSTOLIC BLOOD PRESSURE: 140 MMHG | RESPIRATION RATE: 18 BRPM | DIASTOLIC BLOOD PRESSURE: 84 MMHG | BODY MASS INDEX: 31.91 KG/M2 | TEMPERATURE: 97.6 F | OXYGEN SATURATION: 97 %

## 2019-05-16 DIAGNOSIS — F41.9 ANXIETY: ICD-10-CM

## 2019-05-16 DIAGNOSIS — I10 ESSENTIAL HYPERTENSION: Primary | ICD-10-CM

## 2019-05-16 PROCEDURE — 99213 OFFICE O/P EST LOW 20 MIN: CPT | Performed by: FAMILY MEDICINE

## 2019-05-16 RX ORDER — ALPRAZOLAM 1 MG/1
1 TABLET ORAL
Qty: 30 TABLET | Refills: 0 | Status: SHIPPED | OUTPATIENT
Start: 2019-05-16 | End: 2019-06-27 | Stop reason: SDUPTHER

## 2019-06-05 DIAGNOSIS — E78.5 DYSLIPIDEMIA: Primary | ICD-10-CM

## 2019-06-05 RX ORDER — ATORVASTATIN CALCIUM 20 MG/1
TABLET, FILM COATED ORAL
Qty: 90 TABLET | Refills: 0 | Status: SHIPPED | OUTPATIENT
Start: 2019-06-05 | End: 2019-08-30 | Stop reason: SDUPTHER

## 2019-06-27 ENCOUNTER — OFFICE VISIT (OUTPATIENT)
Dept: FAMILY MEDICINE CLINIC | Facility: CLINIC | Age: 84
End: 2019-06-27
Payer: COMMERCIAL

## 2019-06-27 VITALS
BODY MASS INDEX: 31.91 KG/M2 | RESPIRATION RATE: 18 BRPM | OXYGEN SATURATION: 94 % | DIASTOLIC BLOOD PRESSURE: 82 MMHG | HEIGHT: 59 IN | HEART RATE: 67 BPM | TEMPERATURE: 97.6 F | SYSTOLIC BLOOD PRESSURE: 130 MMHG

## 2019-06-27 DIAGNOSIS — F41.9 ANXIETY: ICD-10-CM

## 2019-06-27 DIAGNOSIS — I10 ESSENTIAL HYPERTENSION: Primary | ICD-10-CM

## 2019-06-27 PROCEDURE — 99213 OFFICE O/P EST LOW 20 MIN: CPT | Performed by: FAMILY MEDICINE

## 2019-06-27 RX ORDER — ALPRAZOLAM 1 MG/1
1 TABLET ORAL
Qty: 30 TABLET | Refills: 0 | Status: SHIPPED | OUTPATIENT
Start: 2019-06-27 | End: 2019-07-24 | Stop reason: SDUPTHER

## 2019-07-23 ENCOUNTER — TELEPHONE (OUTPATIENT)
Dept: CARDIOLOGY CLINIC | Facility: HOSPITAL | Age: 84
End: 2019-07-23

## 2019-07-23 DIAGNOSIS — M19.90 ARTHRITIS: ICD-10-CM

## 2019-07-23 RX ORDER — DICLOFENAC SODIUM AND MISOPROSTOL 75; 200 MG/1; UG/1
TABLET, DELAYED RELEASE ORAL
Qty: 180 TABLET | Refills: 2 | Status: SHIPPED | OUTPATIENT
Start: 2019-07-23 | End: 2020-04-06

## 2019-07-24 ENCOUNTER — OFFICE VISIT (OUTPATIENT)
Dept: FAMILY MEDICINE CLINIC | Facility: CLINIC | Age: 84
End: 2019-07-24
Payer: COMMERCIAL

## 2019-07-24 VITALS
OXYGEN SATURATION: 95 % | TEMPERATURE: 97.6 F | SYSTOLIC BLOOD PRESSURE: 134 MMHG | HEIGHT: 59 IN | BODY MASS INDEX: 31.91 KG/M2 | RESPIRATION RATE: 18 BRPM | HEART RATE: 58 BPM | DIASTOLIC BLOOD PRESSURE: 80 MMHG

## 2019-07-24 DIAGNOSIS — I10 ESSENTIAL HYPERTENSION: ICD-10-CM

## 2019-07-24 DIAGNOSIS — F41.9 ANXIETY: ICD-10-CM

## 2019-07-24 DIAGNOSIS — H10.13 ALLERGIC CONJUNCTIVITIS OF BOTH EYES: Primary | ICD-10-CM

## 2019-07-24 PROCEDURE — 99213 OFFICE O/P EST LOW 20 MIN: CPT | Performed by: FAMILY MEDICINE

## 2019-07-24 RX ORDER — ALPRAZOLAM 1 MG/1
1 TABLET ORAL
Qty: 30 TABLET | Refills: 0 | Status: SHIPPED | OUTPATIENT
Start: 2019-07-24 | End: 2019-08-27 | Stop reason: SDUPTHER

## 2019-07-24 RX ORDER — OLOPATADINE HYDROCHLORIDE 2 MG/ML
1 SOLUTION/ DROPS OPHTHALMIC DAILY
Qty: 2.5 ML | Refills: 0 | Status: SHIPPED | OUTPATIENT
Start: 2019-07-24 | End: 2020-04-22

## 2019-07-24 NOTE — PROGRESS NOTES
History and Physical  Denise Aceves 80 y o  female MRN: [id number]      Assessment:   Insomnia  Allergic conjunctivitis  HTN    Plan:  Discussed Xanax use in the elderly and possible complications  Continue other meds  Pataday eye drops  Keep appt with Opthamology  RTC 1 month        Chief Complaint   Patient presents with    Follow-up     patient is here for regular check up and refill states her eyes have been bad lately but she has an appoitment with her eye doctor  HPI:  Denise Aceves is a 80 y o  female who presents for med refill  Today she reports having itchy clear discharge  It is crusted over in the mornings  She is scheduled to see Opthmaology 7/29/19  No other complaints today  Historical Information   Past Medical History:   Diagnosis Date    Arthritis     Cardiac disease     Chronic pain     Gout     Hypertension      Past Surgical History:   Procedure Laterality Date    APPENDECTOMY      ATRIAL ABLATION SURGERY      HYSTERECTOMY      JOINT REPLACEMENT  knees    KNEE ARTHROSCOPY      b/l knees    LAMINECTOMY      [place] TOTAL HIP ARTHROPLASTY Right 7/17/2017    Procedure: TOTAL ANTERIOR HIP ARTHROPLASTY;  Surgeon: Pepito Berger MD;  Location: [facility];  Service: Orthopedics     Social History   Social History     Substance and Sexual Activity   Alcohol Use No     Social History     Substance and Sexual Activity   Drug Use No     Social History     Tobacco Use   Smoking Status Never Smoker   Smokeless Tobacco Never Used     History reviewed  No pertinent family history      Meds/Allergies   Allergies   Allergen Reactions    Bee Venom Anaphylaxis    Codeine GI Intolerance     Nausea/vomiting    Tetanus Immune Globulin Other (See Comments)    Tetanus Toxoid     Tetanus Toxoids        Meds:    Current Outpatient Medications:     albuterol (PROVENTIL HFA,VENTOLIN HFA) 90 mcg/act inhaler, Inhale 2 puffs every 6 (six) hours as needed for wheezing, Disp: , Rfl:    allopurinol (ZYLOPRIM) 100 mg tablet, Take 1 tablet (100 mg total) by mouth daily, Disp: 90 tablet, Rfl: 1    ALPRAZolam (XANAX) 1 mg tablet, Take 1 tablet (1 mg total) by mouth daily at bedtime as needed for sleep, Disp: 30 tablet, Rfl: 0    aspirin (ECOTRIN LOW STRENGTH) 81 mg EC tablet, Take 1 tablet by mouth daily, Disp: 1 tablet, Rfl: 0    atorvastatin (LIPITOR) 20 mg tablet, TAKE 1 TABLET DAILY  , Disp: 90 tablet, Rfl: 0    clotrimazole-betamethasone (LOTRISONE) 1-0 05 % cream, Apply topically 2 (two) times a day, Disp: 30 g, Rfl: 0    colchicine (COLCRYS) 0 6 mg tablet, Take 1 tablet by mouth daily, Disp: , Rfl: 0    diclofenac-misoprostol (ARTHROTEC 75) 75-0 2 MG per tablet, TAKE ONE (1) TABLET BY MOUTH TWO TIMES DAILY  , Disp: 180 tablet, Rfl: 2    docusate sodium (COLACE) 100 mg capsule, Take 100 mg by mouth 2 (two) times a day as needed for constipation, Disp: , Rfl:     EPINEPHrine (EPIPEN) 0 3 mg/0 3 mL SOAJ, Inject as necessary, Disp: 1 each, Rfl: 1    ferrous sulfate 325 (65 Fe) mg tablet, Take by mouth, Disp: , Rfl:     furosemide (LASIX) 20 mg tablet, Take 1 tablet (20 mg total) by mouth 2 (two) times a day, Disp: 180 tablet, Rfl: 1    Inulin (FIBER CHOICE PO), Take by mouth, Disp: , Rfl:     loperamide (IMODIUM) 2 mg capsule, Take 1 capsule (2 mg total) by mouth 4 (four) times a day as needed for diarrhea, Disp: 30 capsule, Rfl: 0    metoprolol tartrate (LOPRESSOR) 100 mg tablet, Take 1 tablet (100 mg total) by mouth 2 (two) times a day, Disp: 180 tablet, Rfl: 1    Multiple Vitamin (MULTIVITAMIN) tablet, Take 1 tablet by mouth daily, Disp: , Rfl:     olmesartan (BENICAR) 40 mg tablet, TAKE 1 TABLET DAILY  , Disp: 90 tablet, Rfl: 0    potassium chloride (K-DUR,KLOR-CON) 20 mEq tablet, TAKE ONE (1) TABLET BY MOUTH DAILY WITH FOOD , Disp: 90 tablet, Rfl: 1    triamcinolone (KENALOG) 0 025 % cream, Apply topically 2 (two) times a day, Disp: 30 g, Rfl: 0    Vitamins-Lipotropics (LIPO-FLAVONOID PLUS PO), Take 2 tablets by mouth daily  , Disp: , Rfl:       REVIEW OF SYSTEMS  Review of Systems   Constitutional: Negative  HENT: Negative  Eyes:        As per HPI   Respiratory: Negative  Cardiovascular: Negative  Psychiatric/Behavioral: Positive for sleep disturbance  Current Vitals:   Blood Pressure: 134/80 (07/24/19 1051)  Pulse: 58 (07/24/19 1051)  Temperature: 97 6 °F (36 4 °C) (07/24/19 1051)  Respirations: 18 (07/24/19 1051)  Height: 4' 11" (149 9 cm) (07/24/19 1051)  SpO2: 95 % (07/24/19 1051)  Body mass index is 31 91 kg/m²  PHYSICAL EXAMS:  Physical Exam   Constitutional: She is oriented to person, place, and time  She appears well-developed and well-nourished  HENT:   Head: Normocephalic and atraumatic  Right Ear: External ear normal    Left Ear: External ear normal    Eyes:   L eye is with mild redness  No discharge noted  Neck: Normal range of motion  Neck supple  Cardiovascular: Normal rate and regular rhythm  Pulmonary/Chest: Effort normal and breath sounds normal  She has no wheezes  She has no rales  Musculoskeletal: She exhibits no edema  Lymphadenopathy:     She has no cervical adenopathy  Neurological: She is alert and oriented to person, place, and time  Psychiatric: She has a normal mood and affect  Nursing note and vitals reviewed  Lab Results:          Ronnie Mckeon PA-C  7/24/2019, 11:01 AM

## 2019-07-25 ENCOUNTER — OFFICE VISIT (OUTPATIENT)
Dept: CARDIOLOGY CLINIC | Facility: HOSPITAL | Age: 84
End: 2019-07-25
Payer: COMMERCIAL

## 2019-07-25 VITALS
SYSTOLIC BLOOD PRESSURE: 140 MMHG | HEART RATE: 69 BPM | DIASTOLIC BLOOD PRESSURE: 80 MMHG | HEIGHT: 59 IN | BODY MASS INDEX: 33.14 KG/M2 | WEIGHT: 164.4 LBS

## 2019-07-25 DIAGNOSIS — I10 BENIGN ESSENTIAL HYPERTENSION: ICD-10-CM

## 2019-07-25 DIAGNOSIS — I47.1 AVNRT (AV NODAL RE-ENTRY TACHYCARDIA) (HCC): Primary | ICD-10-CM

## 2019-07-25 DIAGNOSIS — Z98.890 HISTORY OF CARDIAC RADIOFREQUENCY ABLATION: ICD-10-CM

## 2019-07-25 DIAGNOSIS — E78.5 DYSLIPIDEMIA: ICD-10-CM

## 2019-07-25 PROCEDURE — 99214 OFFICE O/P EST MOD 30 MIN: CPT | Performed by: INTERNAL MEDICINE

## 2019-07-25 PROCEDURE — 93000 ELECTROCARDIOGRAM COMPLETE: CPT | Performed by: INTERNAL MEDICINE

## 2019-07-25 RX ORDER — OLMESARTAN MEDOXOMIL 40 MG/1
TABLET ORAL
Qty: 90 TABLET | Refills: 0 | Status: SHIPPED | OUTPATIENT
Start: 2019-07-25 | End: 2019-07-25 | Stop reason: SDUPTHER

## 2019-07-25 RX ORDER — OLMESARTAN MEDOXOMIL 40 MG/1
40 TABLET ORAL DAILY
Qty: 90 TABLET | Refills: 3 | Status: SHIPPED | OUTPATIENT
Start: 2019-07-25 | End: 2020-10-12 | Stop reason: SDUPTHER

## 2019-07-25 NOTE — PROGRESS NOTES
Cardiology Follow Up    Dennis Bonner  1935  [id number]  [address] [address]    1  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 re-entry tachycardia) ([address] )     2  History of cardiac radiofrequency ablation     3  Benign essential hypertension  olmesartan (BENICAR) 40 mg tablet   4  Dyslipidemia         Discussion/Summary:  Mrs Alex Osborn is a pleasant 78-year-old female who presents to the office today for routine follow-up  Since her last visit she has been feeling well  She offers no cardiopulmonary complaints today  Her blood pressure is elevated in the office today  I have asked that she monitor her blood pressure at home and report the readings to the office in a few weeks  If her blood pressure remains elevated I would change her metoprolol to carvedilol  Otherwise her most recent lipids were reviewed  They are acceptable on current therapy  She is asymptomatic and no testing is advised  I will see her back in the office in nine months or sooner if deemed necessary  Interval History:   Mrs Alex Osborn is a pleasant 78-year-old female who presents to the office for follow-up      Since her last visit, she's  been feeling well  She has no symptoms suggestive of recurrent SVT  She leads a sedentary lifestyle with activity she does she denies any exertional chest pain or shortness of breath  She denies any signs or symptoms of congestive heart failure including increasing lower extremity edema, paroxysmal nocturnal dyspnea, orthopnea, acute weight gain or increasing abdominal girth  She denies lightheadedness, syncope or presyncope  She denies symptoms of claudication      Problem List     Status post total replacement of right hip    Hypertension    Hyperlipidemia (Chronic)    Elevated blood uric acid level    History of gout    Dextroscoliosis Screening-pulmonary TB    Anemia    Anxiety    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 re-entry tachycardia) (HCC)    Dyslipidemia    Gout    Heart disease    Iron deficiency anemia        Past Medical History:   Diagnosis Date    Arthritis     Cardiac disease     Chronic pain     Gout     Hypertension      Social History     Socioeconomic History    Marital status: /Civil Union     Spouse name: Not on file    Number of children: Not on file    Years of education: Not on file    Highest education level: Not on file   Occupational History    Not on file   Social Needs    Financial resource strain: Not on file    Food insecurity:     Worry: Not on file     Inability: Not on file    Transportation needs:     Medical: Not on file     Non-medical: Not on file   Tobacco Use    Smoking status: Never Smoker    Smokeless tobacco: Never Used   Substance and Sexual Activity    Alcohol use: No    Drug use: No    Sexual activity: Not on file   Lifestyle    Physical activity:     Days per week: Not on file     Minutes per session: Not on file    Stress: Not on file   Relationships    Social connections:     Talks on phone: Not on file     Gets together: Not on file     Attends [employer] service: Not on file     Active member of club or organization: Not on file     Attends meetings of clubs or organizations: Not on file     Relationship status: Not on file    Intimate partner violence:     Fear of current or ex partner: Not on file     Emotionally abused: Not on file     Physically abused: Not on file     Forced sexual activity: Not on file   Other Topics Concern    Not on file   Social History Narrative    Not on file      History reviewed  No pertinent family history    Past Surgical History:   Procedure Laterality Date    APPENDECTOMY      ATRIAL ABLATION SURGERY      HYSTERECTOMY      JOINT REPLACEMENT  knees    KNEE ARTHROSCOPY      b/l knees    LAMINECTOMY      [place] TOTAL HIP ARTHROPLASTY Right 7/17/2017 Procedure: TOTAL ANTERIOR HIP ARTHROPLASTY;  Surgeon: John Turner MD;  Location: [facility];  Service: Orthopedics       Current Outpatient Medications:     albuterol (PROVENTIL HFA,VENTOLIN HFA) 90 mcg/act inhaler, Inhale 2 puffs every 6 (six) hours as needed for wheezing, Disp: , Rfl:     allopurinol (ZYLOPRIM) 100 mg tablet, Take 1 tablet (100 mg total) by mouth daily, Disp: 90 tablet, Rfl: 1    ALPRAZolam (XANAX) 1 mg tablet, Take 1 tablet (1 mg total) by mouth daily at bedtime as needed for sleep, Disp: 30 tablet, Rfl: 0    aspirin (ECOTRIN LOW STRENGTH) 81 mg EC tablet, Take 1 tablet by mouth daily, Disp: 1 tablet, Rfl: 0    atorvastatin (LIPITOR) 20 mg tablet, TAKE 1 TABLET DAILY  , Disp: 90 tablet, Rfl: 0    clotrimazole-betamethasone (LOTRISONE) 1-0 05 % cream, Apply topically 2 (two) times a day, Disp: 30 g, Rfl: 0    colchicine (COLCRYS) 0 6 mg tablet, Take 1 tablet by mouth daily, Disp: , Rfl: 0    diclofenac-misoprostol (ARTHROTEC 75) 75-0 2 MG per tablet, TAKE ONE (1) TABLET BY MOUTH TWO TIMES DAILY  , Disp: 180 tablet, Rfl: 2    docusate sodium (COLACE) 100 mg capsule, Take 100 mg by mouth 2 (two) times a day as needed for constipation, Disp: , Rfl:     EPINEPHrine (EPIPEN) 0 3 mg/0 3 mL SOAJ, Inject as necessary, Disp: 1 each, Rfl: 1    ferrous sulfate 325 (65 Fe) mg tablet, Take by mouth, Disp: , Rfl:     furosemide (LASIX) 20 mg tablet, Take 1 tablet (20 mg total) by mouth 2 (two) times a day, Disp: 180 tablet, Rfl: 1    Inulin (FIBER CHOICE PO), Take by mouth, Disp: , Rfl:     loperamide (IMODIUM) 2 mg capsule, Take 1 capsule (2 mg total) by mouth 4 (four) times a day as needed for diarrhea, Disp: 30 capsule, Rfl: 0    metoprolol tartrate (LOPRESSOR) 100 mg tablet, Take 1 tablet (100 mg total) by mouth 2 (two) times a day, Disp: 180 tablet, Rfl: 1    olmesartan (BENICAR) 40 mg tablet, Take 1 tablet (40 mg total) by mouth daily, Disp: 90 tablet, Rfl: 3    olopatadine HCl (PATADAY) 0 2 % opth drops, Administer 1 drop to both eyes daily, Disp: 2 5 mL, Rfl: 0    potassium chloride (K-DUR,KLOR-CON) 20 mEq tablet, TAKE ONE (1) TABLET BY MOUTH DAILY WITH FOOD , Disp: 90 tablet, Rfl: 1    triamcinolone (KENALOG) 0 025 % cream, Apply topically 2 (two) times a day, Disp: 30 g, Rfl: 0    Vitamins-Lipotropics (LIPO-FLAVONOID PLUS PO), Take 2 tablets by mouth daily  , Disp: , Rfl:     Multiple Vitamin (MULTIVITAMIN) tablet, Take 1 tablet by mouth daily, Disp: , Rfl:   Allergies   Allergen Reactions    Bee Venom Anaphylaxis    Codeine GI Intolerance     Nausea/vomiting    Tetanus Immune Globulin Other (See Comments)    Tetanus Toxoid     Tetanus Toxoids        Labs:     Chemistry        Component Value Date/Time     11/13/2015 1253    K 3 9 04/18/2019 0826    K 3 3 (L) 11/13/2015 1253     (H) 04/18/2019 0826     11/13/2015 1253    CO2 28 04/18/2019 0826    CO2 28 8 11/13/2015 1253    BUN 38 (H) 04/18/2019 0826    BUN 24 11/13/2015 1253    CREATININE 1 10 04/18/2019 0826    CREATININE 0 85 11/13/2015 1253        Component Value Date/Time    CALCIUM 9 1 04/18/2019 0826    CALCIUM 9 0 11/13/2015 1253    ALKPHOS 116 04/18/2019 0826    ALKPHOS 83 11/13/2015 1253    AST 18 04/18/2019 0826    AST 20 11/13/2015 1253    ALT 34 04/18/2019 0826    ALT 36 11/13/2015 1253    BILITOT 0 63 11/13/2015 1253            Lab Results   Component Value Date    CHOL 201 11/13/2015    CHOL 183 11/12/2014     Lab Results   Component Value Date    HDL 41 08/13/2018    HDL 50 02/14/2018    HDL 42 07/22/2016     Lab Results   Component Value Date    LDLCALC 86 08/13/2018    LDLCALC 71 02/14/2018    LDLCALC 79 07/22/2016     Lab Results   Component Value Date    TRIG 102 08/13/2018    TRIG 88 02/14/2018    TRIG 110 07/22/2016     No results found for: CHOLHDL    Imaging: No results found        Review of Systems   Cardiovascular: Negative for chest pain, claudication, cyanosis, dyspnea on exertion, irregular heartbeat and leg swelling  All other systems reviewed and are negative  Vitals:    07/25/19 1435   BP: 140/80   Pulse: 69     Vitals:    07/25/19 1435   Weight: 74 6 kg (164 lb 6 4 oz)     Height: 4' 11" (149 9 cm)   Body mass index is 33 2 kg/m²      Physical Exam:  General:  Alert and cooperative, appears stated age  HEENT:  PERRLA, EOMI, no scleral icterus, no conjunctival pallor  Neck:  No lymphadenopathy, no thyromegaly, no carotid bruits, no elevated JVP  Heart:  Regular rate and rhythm, normal S1/S2, no S3/S4, no murmur  Lungs:  Clear to auscultation bilaterally   Abdomen:  Soft, non-tender, positive bowel sounds, no rebound or guarding,   no organomegaly   Extremities:  No clubbing, cyanosis or edema   Vascular:  2+ pedal pulses  Skin:  No rashes or lesions on exposed skin  Neurologic:  Cranial nerves II-XII grossly intact without focal deficits

## 2019-08-01 ENCOUNTER — TELEPHONE (OUTPATIENT)
Dept: CARDIOLOGY CLINIC | Facility: HOSPITAL | Age: 84
End: 2019-08-01

## 2019-08-01 DIAGNOSIS — I10 BENIGN ESSENTIAL HTN: Primary | ICD-10-CM

## 2019-08-01 RX ORDER — AMLODIPINE BESYLATE 5 MG/1
5 TABLET ORAL DAILY
Qty: 90 TABLET | Refills: 3 | Status: SHIPPED | OUTPATIENT
Start: 2019-08-01 | End: 2020-08-23 | Stop reason: SDUPTHER

## 2019-08-21 ENCOUNTER — APPOINTMENT (OUTPATIENT)
Dept: LAB | Facility: MEDICAL CENTER | Age: 84
End: 2019-08-21
Payer: COMMERCIAL

## 2019-08-21 DIAGNOSIS — I10 ESSENTIAL HYPERTENSION: ICD-10-CM

## 2019-08-21 LAB
ALBUMIN SERPL BCP-MCNC: 3.6 G/DL (ref 3.5–5)
ALP SERPL-CCNC: 107 U/L (ref 46–116)
ALT SERPL W P-5'-P-CCNC: 38 U/L (ref 12–78)
ANION GAP SERPL CALCULATED.3IONS-SCNC: 11 MMOL/L (ref 4–13)
AST SERPL W P-5'-P-CCNC: 21 U/L (ref 5–45)
BASOPHILS # BLD AUTO: 0.03 THOUSANDS/ΜL (ref 0–0.1)
BASOPHILS NFR BLD AUTO: 0 % (ref 0–1)
BILIRUB SERPL-MCNC: 0.48 MG/DL (ref 0.2–1)
BUN SERPL-MCNC: 24 MG/DL (ref 5–25)
CALCIUM SERPL-MCNC: 9 MG/DL (ref 8.3–10.1)
CHLORIDE SERPL-SCNC: 109 MMOL/L (ref 100–108)
CHOLEST SERPL-MCNC: 150 MG/DL (ref 50–200)
CO2 SERPL-SCNC: 27 MMOL/L (ref 21–32)
CREAT SERPL-MCNC: 0.9 MG/DL (ref 0.6–1.3)
CREAT UR-MCNC: 96.8 MG/DL
EOSINOPHIL # BLD AUTO: 0.26 THOUSAND/ΜL (ref 0–0.61)
EOSINOPHIL NFR BLD AUTO: 4 % (ref 0–6)
ERYTHROCYTE [DISTWIDTH] IN BLOOD BY AUTOMATED COUNT: 13.7 % (ref 11.6–15.1)
GFR SERPL CREATININE-BSD FRML MDRD: 59 ML/MIN/1.73SQ M
GLUCOSE P FAST SERPL-MCNC: 92 MG/DL (ref 65–99)
HCT VFR BLD AUTO: 41.9 % (ref 34.8–46.1)
HDLC SERPL-MCNC: 41 MG/DL (ref 40–60)
HGB BLD-MCNC: 13.4 G/DL (ref 11.5–15.4)
IMM GRANULOCYTES # BLD AUTO: 0.02 THOUSAND/UL (ref 0–0.2)
IMM GRANULOCYTES NFR BLD AUTO: 0 % (ref 0–2)
LDLC SERPL CALC-MCNC: 88 MG/DL (ref 0–100)
LYMPHOCYTES # BLD AUTO: 1.5 THOUSANDS/ΜL (ref 0.6–4.47)
LYMPHOCYTES NFR BLD AUTO: 22 % (ref 14–44)
MCH RBC QN AUTO: 28.3 PG (ref 26.8–34.3)
MCHC RBC AUTO-ENTMCNC: 32 G/DL (ref 31.4–37.4)
MCV RBC AUTO: 88 FL (ref 82–98)
MICROALBUMIN UR-MCNC: 5.8 MG/L (ref 0–20)
MICROALBUMIN/CREAT 24H UR: 6 MG/G CREATININE (ref 0–30)
MONOCYTES # BLD AUTO: 0.59 THOUSAND/ΜL (ref 0.17–1.22)
MONOCYTES NFR BLD AUTO: 9 % (ref 4–12)
NEUTROPHILS # BLD AUTO: 4.41 THOUSANDS/ΜL (ref 1.85–7.62)
NEUTS SEG NFR BLD AUTO: 65 % (ref 43–75)
NONHDLC SERPL-MCNC: 109 MG/DL
NRBC BLD AUTO-RTO: 0 /100 WBCS
PLATELET # BLD AUTO: 174 THOUSANDS/UL (ref 149–390)
PMV BLD AUTO: 10.8 FL (ref 8.9–12.7)
POTASSIUM SERPL-SCNC: 3.7 MMOL/L (ref 3.5–5.3)
PROT SERPL-MCNC: 7.3 G/DL (ref 6.4–8.2)
RBC # BLD AUTO: 4.74 MILLION/UL (ref 3.81–5.12)
SODIUM SERPL-SCNC: 147 MMOL/L (ref 136–145)
TRIGL SERPL-MCNC: 107 MG/DL
TSH SERPL DL<=0.05 MIU/L-ACNC: 1.03 UIU/ML (ref 0.36–3.74)
WBC # BLD AUTO: 6.81 THOUSAND/UL (ref 4.31–10.16)

## 2019-08-21 PROCEDURE — 80061 LIPID PANEL: CPT

## 2019-08-21 PROCEDURE — 82570 ASSAY OF URINE CREATININE: CPT | Performed by: PHYSICIAN ASSISTANT

## 2019-08-21 PROCEDURE — 36415 COLL VENOUS BLD VENIPUNCTURE: CPT

## 2019-08-21 PROCEDURE — 85025 COMPLETE CBC W/AUTO DIFF WBC: CPT

## 2019-08-21 PROCEDURE — 84443 ASSAY THYROID STIM HORMONE: CPT

## 2019-08-21 PROCEDURE — 82043 UR ALBUMIN QUANTITATIVE: CPT | Performed by: PHYSICIAN ASSISTANT

## 2019-08-21 PROCEDURE — 80053 COMPREHEN METABOLIC PANEL: CPT

## 2019-08-27 ENCOUNTER — OFFICE VISIT (OUTPATIENT)
Dept: FAMILY MEDICINE CLINIC | Facility: CLINIC | Age: 84
End: 2019-08-27
Payer: COMMERCIAL

## 2019-08-27 VITALS
TEMPERATURE: 97.9 F | SYSTOLIC BLOOD PRESSURE: 128 MMHG | RESPIRATION RATE: 18 BRPM | DIASTOLIC BLOOD PRESSURE: 72 MMHG | BODY MASS INDEX: 32.46 KG/M2 | HEART RATE: 69 BPM | OXYGEN SATURATION: 93 % | WEIGHT: 161 LBS | HEIGHT: 59 IN

## 2019-08-27 DIAGNOSIS — F41.9 ANXIETY: ICD-10-CM

## 2019-08-27 DIAGNOSIS — I10 ESSENTIAL HYPERTENSION: ICD-10-CM

## 2019-08-27 DIAGNOSIS — Z00.00 MEDICARE WELCOME EXAM: Primary | ICD-10-CM

## 2019-08-27 DIAGNOSIS — E78.2 MIXED HYPERLIPIDEMIA: ICD-10-CM

## 2019-08-27 PROCEDURE — 99397 PER PM REEVAL EST PAT 65+ YR: CPT | Performed by: FAMILY MEDICINE

## 2019-08-27 RX ORDER — ALPRAZOLAM 1 MG/1
1 TABLET ORAL
Qty: 30 TABLET | Refills: 0 | Status: SHIPPED | OUTPATIENT
Start: 2019-08-27 | End: 2019-09-25 | Stop reason: SDUPTHER

## 2019-08-27 NOTE — PROGRESS NOTES
Assessment and Plan:     Problem List Items Addressed This Visit        Other    Hyperlipidemia (Chronic)    Anxiety    Relevant Medications    ALPRAZolam (XANAX) 1 mg tablet      Other Visit Diagnoses     Medicare welcome exam    -  Primary    Essential hypertension             History of Present Illness:     Patient presents for Welcome to Medicare visit  She is doing well except for neck pain and swelling  She is scheduled to see her chiropractor tomorrow  She offers co complaints today  She refuses TDAP due to side effects  Patient Care Team:  Radha Márquez PA-C as PCP - DO Terry Hanson MD Lynnie Brim, PA-C Wendelin Crick, MD     Review of Systems:     Review of Systems   Constitutional: Negative  HENT: Negative  Eyes: Negative  Respiratory: Negative  Cardiovascular: Negative  Gastrointestinal: Negative  Negative for bowel incontinence  Endocrine: Negative  Genitourinary: Negative  Musculoskeletal:        Neck muscle knot   Skin: Negative  Allergic/Immunologic: Negative  Neurological: Negative  Hematological: Negative  Psychiatric/Behavioral: Negative  The patient is not nervous/anxious           Problem List:     Patient Active Problem List   Diagnosis    Status post total replacement of right hip    Benign essential hypertension    Hyperlipidemia    Elevated blood uric acid level    History of gout    Dextroscoliosis    Screening-pulmonary TB    Anemia    Anxiety    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 re-entry tachycardia) ([facility] [address] )    Dyslipidemia    Gout    Iron deficiency anemia    History of cardiac radiofrequency ablation      Past Medical and Surgical History:     Past Medical History:   Diagnosis Date    Arthritis     Cardiac disease     Chronic pain     Gout     Hypertension      Past Surgical History:   Procedure Laterality Date    APPENDECTOMY      ATRIAL ABLATION SURGERY      HYSTERECTOMY      JOINT REPLACEMENT  knees  KNEE ARTHROSCOPY      b/l knees    LAMINECTOMY      MT TOTAL HIP ARTHROPLASTY Right 7/17/2017    Procedure: TOTAL ANTERIOR HIP ARTHROPLASTY;  Surgeon: Jacob Shore MD;  Location: [facility];  Service: Orthopedics      Family History:     History reviewed  No pertinent family history  Social History:     Social History     Tobacco Use   Smoking Status Never Smoker   Smokeless Tobacco Never Used     Social History     Substance and Sexual Activity   Alcohol Use No     Social History     Substance and Sexual Activity   Drug Use No      Medications and Allergies:     Current Outpatient Medications   Medication Sig Dispense Refill    albuterol (PROVENTIL HFA,VENTOLIN HFA) 90 mcg/act inhaler Inhale 2 puffs every 6 (six) hours as needed for wheezing      allopurinol (ZYLOPRIM) 100 mg tablet Take 1 tablet (100 mg total) by mouth daily 90 tablet 1    ALPRAZolam (XANAX) 1 mg tablet Take 1 tablet (1 mg total) by mouth daily at bedtime as needed for sleep 30 tablet 0    amLODIPine (NORVASC) 5 mg tablet Take 1 tablet (5 mg total) by mouth daily 90 tablet 3    aspirin (ECOTRIN LOW STRENGTH) 81 mg EC tablet Take 1 tablet by mouth daily 1 tablet 0    atorvastatin (LIPITOR) 20 mg tablet TAKE 1 TABLET DAILY  90 tablet 0    clotrimazole-betamethasone (LOTRISONE) 1-0 05 % cream Apply topically 2 (two) times a day 30 g 0    colchicine (COLCRYS) 0 6 mg tablet Take 1 tablet by mouth daily  0    diclofenac-misoprostol (ARTHROTEC 75) 75-0 2 MG per tablet TAKE ONE (1) TABLET BY MOUTH TWO TIMES DAILY   180 tablet 2    docusate sodium (COLACE) 100 mg capsule Take 100 mg by mouth 2 (two) times a day as needed for constipation      EPINEPHrine (EPIPEN) 0 3 mg/0 3 mL SOAJ Inject as necessary 1 each 1    ferrous sulfate 325 (65 Fe) mg tablet Take by mouth      furosemide (LASIX) 20 mg tablet Take 1 tablet (20 mg total) by mouth 2 (two) times a day 180 tablet 1    Inulin (FIBER CHOICE PO) Take by mouth      loperamide (IMODIUM) 2 mg capsule Take 1 capsule (2 mg total) by mouth 4 (four) times a day as needed for diarrhea 30 capsule 0    metoprolol tartrate (LOPRESSOR) 100 mg tablet Take 1 tablet (100 mg total) by mouth 2 (two) times a day 180 tablet 1    Multiple Vitamin (MULTIVITAMIN) tablet Take 1 tablet by mouth daily      olmesartan (BENICAR) 40 mg tablet Take 1 tablet (40 mg total) by mouth daily 90 tablet 3    olopatadine HCl (PATADAY) 0 2 % opth drops Administer 1 drop to both eyes daily 2 5 mL 0    potassium chloride (K-DUR,KLOR-CON) 20 mEq tablet TAKE ONE (1) TABLET BY MOUTH DAILY WITH FOOD  90 tablet 1    triamcinolone (KENALOG) 0 025 % cream Apply topically 2 (two) times a day 30 g 0    Vitamins-Lipotropics (LIPO-FLAVONOID PLUS PO) Take 2 tablets by mouth daily         No current facility-administered medications for this visit  Allergies   Allergen Reactions    Bee Venom Anaphylaxis    Codeine GI Intolerance     Nausea/vomiting    Tetanus Immune Globulin Other (See Comments)    Tetanus Toxoid     Tetanus Toxoids       Immunizations:     Immunization History   Administered Date(s) Administered    Pneumococcal Conjugate 13-Valent 11/05/2015    Pneumococcal Polysaccharide PPV23 04/20/2009    Zoster 06/30/2015      Medicare Screening Tests and Risk Assessments:     Brady Rogers is here for her Initial Wellness visit  Health Risk Assessment:  Patient rates overall health as good  Patient feels that their physical health rating is Same  Eyesight was rated as Same  Hearing was rated as Same  Patient feels that their emotional and mental health rating is Same  Pain experienced by patient in the last 7 days has been None  Patient states that she has experienced no weight loss or gain in last 6 months  Emotional/Mental Health:  Patient has not been feeling nervous/anxious  PHQ-9 Depression Screening:    Frequency of the following problems over the past two weeks:      1   Little interest or pleasure in doing things: 0 - not at all      2  Feeling down, depressed, or hopeless: 0 - not at all  PHQ-2 Score: 0          Broken Bones/Falls: Fall Risk Assessment:    In the past year, patient has experienced: No history of falling in past year          Bladder/Bowel:  Patient has not leaked urine accidently in the last six months  Patient reports no loss of bowel control  Immunizations:  Patient has had a flu vaccination within the last year  Patient has received a pneumonia shot  Patient has received a shingles shot  Patient has not received tetanus/diphtheria shot  Home Safety:  Patient does not have trouble with stairs inside or outside of their home  Patient currently reports that there are no safety hazards present in home, working smoke alarms, working carbon monoxide detectors  Preventative Screenings:   No breast cancer screening performed, no colon cancer screen completed, cholesterol screen completed, 8/14/2019  glaucoma eye exam completed, 7/29/2019      Nutrition:  Current diet: Regular and Limited junk food with servings of the following:    Medications:  Patient is currently taking over-the-counter supplements  List of OTC medications includes: vitamins  Patient is able to manage medications  Lifestyle Choices:  Patient reports no tobacco use  Patient has not smoked or used tobacco in the past   Patient reports alcohol use  Alcohol use per week: one  Patient does not drive a vehicle  Patient wears seat belt  Current level of exercise of physical activity described by patient as: moderate          Activities of Daily Living:  Can get out of bed by his or her self, able to dress self, able to make own meals, able to do own shopping, able to bathe self, can do own laundry/housekeeping, can manage own money, pay bills and track expenses    Previous Hospitalizations:  No hospitalization or ED visit in past 12 months        Advanced Directives:  Patient has decided on a power of   Patient has spoken to designated power of   Patient has completed advanced directive  No exam data present     Physical Exam:     /72   Pulse 69   Temp 97 9 °F (36 6 °C)   Resp 18   Ht 4' 11" (1 499 m)   Wt 73 kg (161 lb)   SpO2 93%   BMI 32 52 kg/m²     Physical Exam   Constitutional: She is oriented to person, place, and time  She appears well-developed and well-nourished  HENT:   Head: Normocephalic and atraumatic  Right Ear: External ear normal    Left Ear: External ear normal    Eyes: Pupils are equal, round, and reactive to light  Neck: Normal range of motion  Neck supple  No thyromegaly present  Cardiovascular: Normal rate, regular rhythm and normal heart sounds  Exam reveals no gallop and no friction rub  Pulmonary/Chest: Effort normal and breath sounds normal  She has no wheezes  She has no rales  Abdominal: Soft  Bowel sounds are normal  There is no tenderness  Musculoskeletal: She exhibits no edema  Swelling noted on posterior neck with diffuse tenderness  Lymphadenopathy:     She has no cervical adenopathy  Neurological: She is alert and oriented to person, place, and time  No cranial nerve deficit  Skin: Skin is warm and dry  Psychiatric: She has a normal mood and affect  Nursing note and vitals reviewed

## 2019-08-30 DIAGNOSIS — E87.6 HYPOKALEMIA: ICD-10-CM

## 2019-08-30 DIAGNOSIS — E78.5 DYSLIPIDEMIA: ICD-10-CM

## 2019-08-30 RX ORDER — ATORVASTATIN CALCIUM 20 MG/1
TABLET, FILM COATED ORAL
Qty: 90 TABLET | Refills: 1 | Status: SHIPPED | OUTPATIENT
Start: 2019-08-30 | End: 2019-12-05 | Stop reason: SDUPTHER

## 2019-09-03 RX ORDER — POTASSIUM CHLORIDE 20 MEQ/1
TABLET, EXTENDED RELEASE ORAL
Qty: 90 TABLET | Refills: 1 | Status: SHIPPED | OUTPATIENT
Start: 2019-09-03 | End: 2020-02-27

## 2019-09-25 ENCOUNTER — OFFICE VISIT (OUTPATIENT)
Dept: FAMILY MEDICINE CLINIC | Facility: CLINIC | Age: 84
End: 2019-09-25
Payer: COMMERCIAL

## 2019-09-25 VITALS
WEIGHT: 164 LBS | SYSTOLIC BLOOD PRESSURE: 134 MMHG | HEART RATE: 60 BPM | DIASTOLIC BLOOD PRESSURE: 78 MMHG | HEIGHT: 59 IN | RESPIRATION RATE: 16 BRPM | TEMPERATURE: 98.2 F | BODY MASS INDEX: 33.06 KG/M2

## 2019-09-25 DIAGNOSIS — E78.5 DYSLIPIDEMIA: ICD-10-CM

## 2019-09-25 DIAGNOSIS — J30.1 SEASONAL ALLERGIC RHINITIS DUE TO POLLEN: Primary | ICD-10-CM

## 2019-09-25 DIAGNOSIS — F41.9 ANXIETY: ICD-10-CM

## 2019-09-25 DIAGNOSIS — I10 ESSENTIAL HYPERTENSION: ICD-10-CM

## 2019-09-25 PROCEDURE — 99213 OFFICE O/P EST LOW 20 MIN: CPT | Performed by: FAMILY MEDICINE

## 2019-09-25 RX ORDER — MOMETASONE FUROATE 50 UG/1
2 SPRAY, METERED NASAL DAILY
Qty: 1 ACT | Refills: 1 | Status: SHIPPED | OUTPATIENT
Start: 2019-09-25 | End: 2020-04-22

## 2019-09-25 RX ORDER — ALPRAZOLAM 1 MG/1
1 TABLET ORAL
Qty: 30 TABLET | Refills: 0 | Status: SHIPPED | OUTPATIENT
Start: 2019-09-25 | End: 2019-10-22 | Stop reason: SDUPTHER

## 2019-09-25 NOTE — PROGRESS NOTES
Assessment/Plan:     Diagnoses and all orders for this visit:    Seasonal allergic rhinitis due to pollen  -     mometasone (NASONEX) 50 mcg/act nasal spray; 2 sprays into each nostril daily    Anxiety  -     ALPRAZolam (XANAX) 1 mg tablet; Take 1 tablet (1 mg total) by mouth daily at bedtime as needed for sleep    Essential hypertension    Dyslipidemia        Add Nasonex  for allergic rhinitis  Continue current meds  RTC 1 month          Subjective:        Patient ID: Angel Small is a 80 y o  female  Chief Complaint   Patient presents with    Medication Refill     xanax    Neck Pain       79 yo female presents for monthly med refill  Today she c/o some "pain in my hump"  Also c/o cough and post nasal drip  Cough is dry        The following portions of the patient's history were reviewed and updated as appropriate: allergies, current medications, past family history, past medical history, past social history, past surgical history and problem list     Patient Active Problem List   Diagnosis    Status post total replacement of right hip    Benign essential hypertension    Hyperlipidemia    Elevated blood uric acid level    History of gout    Dextroscoliosis    Screening-pulmonary TB    Anemia    Anxiety    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 re-entry tachycardia) (HCC)    Dyslipidemia    Gout    Iron deficiency anemia    History of cardiac radiofrequency ablation       Current Outpatient Medications   Medication Sig Dispense Refill    albuterol (PROVENTIL HFA,VENTOLIN HFA) 90 mcg/act inhaler Inhale 2 puffs every 6 (six) hours as needed for wheezing      allopurinol (ZYLOPRIM) 100 mg tablet Take 1 tablet (100 mg total) by mouth daily 90 tablet 1    ALPRAZolam (XANAX) 1 mg tablet Take 1 tablet (1 mg total) by mouth daily at bedtime as needed for sleep 30 tablet 0    amLODIPine (NORVASC) 5 mg tablet Take 1 tablet (5 mg total) by mouth daily 90 tablet 3    aspirin (ECOTRIN LOW STRENGTH) 81 mg EC tablet Take 1 tablet by mouth daily 1 tablet 0    atorvastatin (LIPITOR) 20 mg tablet TAKE 1 TABLET DAILY 90 tablet 1    clotrimazole-betamethasone (LOTRISONE) 1-0 05 % cream Apply topically 2 (two) times a day 30 g 0    colchicine (COLCRYS) 0 6 mg tablet Take 1 tablet by mouth daily  0    diclofenac-misoprostol (ARTHROTEC 75) 75-0 2 MG per tablet TAKE ONE (1) TABLET BY MOUTH TWO TIMES DAILY  180 tablet 2    docusate sodium (COLACE) 100 mg capsule Take 100 mg by mouth 2 (two) times a day as needed for constipation      EPINEPHrine (EPIPEN) 0 3 mg/0 3 mL SOAJ Inject as necessary 1 each 1    ferrous sulfate 325 (65 Fe) mg tablet Take by mouth      furosemide (LASIX) 20 mg tablet Take 1 tablet (20 mg total) by mouth 2 (two) times a day 180 tablet 1    Inulin (FIBER CHOICE PO) Take by mouth      loperamide (IMODIUM) 2 mg capsule Take 1 capsule (2 mg total) by mouth 4 (four) times a day as needed for diarrhea 30 capsule 0    metoprolol tartrate (LOPRESSOR) 100 mg tablet Take 1 tablet (100 mg total) by mouth 2 (two) times a day 180 tablet 1    olmesartan (BENICAR) 40 mg tablet Take 1 tablet (40 mg total) by mouth daily 90 tablet 3    olopatadine HCl (PATADAY) 0 2 % opth drops Administer 1 drop to both eyes daily 2 5 mL 0    potassium chloride (K-DUR,KLOR-CON) 20 mEq tablet TAKE ONE (1) TABLET BY MOUTH DAILY WITH FOOD  90 tablet 1    triamcinolone (KENALOG) 0 025 % cream Apply topically 2 (two) times a day 30 g 0    Vitamins-Lipotropics (LIPO-FLAVONOID PLUS PO) Take 2 tablets by mouth daily        mometasone (NASONEX) 50 mcg/act nasal spray 2 sprays into each nostril daily 1 Act 1     No current facility-administered medications for this visit           Past Medical History:   Diagnosis Date    Arthritis     Cardiac disease     Chronic pain     Gout     Hypertension         Past Surgical History:   Procedure Laterality Date    APPENDECTOMY      ATRIAL ABLATION SURGERY      HYSTERECTOMY      JOINT REPLACEMENT knees    KNEE ARTHROSCOPY      b/l knees    LAMINECTOMY      LA TOTAL HIP ARTHROPLASTY Right 7/17/2017    Procedure: TOTAL ANTERIOR HIP ARTHROPLASTY;  Surgeon: Jonathan Yepez MD;  Location: [facility];  Service: Orthopedics        Social History     Socioeconomic History    Marital status: /Civil Union     Spouse name: Not on file    Number of children: Not on file    Years of education: Not on file    Highest education level: Not on file   Occupational History    Not on file   Social Needs    Financial resource strain: Not on file    Food insecurity:     Worry: Not on file     Inability: Not on file    Transportation needs:     Medical: Not on file     Non-medical: Not on file   Tobacco Use    Smoking status: Never Smoker    Smokeless tobacco: Never Used   Substance and Sexual Activity    Alcohol use: No    Drug use: No    Sexual activity: Not on file   Lifestyle    Physical activity:     Days per week: Not on file     Minutes per session: Not on file    Stress: Not on file   Relationships    Social connections:     Talks on phone: Not on file     Gets together: Not on file     Attends Bahai service: Not on file     Active member of club or organization: Not on file     Attends meetings of clubs or organizations: Not on file     Relationship status: Not on file    Intimate partner violence:     Fear of current or ex partner: Not on file     Emotionally abused: Not on file     Physically abused: Not on file     Forced sexual activity: Not on file   Other Topics Concern    Not on file   Social History Narrative    Not on file        Review of Systems   Constitutional: Negative  HENT: Negative  Eyes: Negative  Respiratory: Negative  Cardiovascular: Negative  Musculoskeletal: Positive for neck pain  Psychiatric/Behavioral: Positive for sleep disturbance           Objective:      /78 (BP Location: Left arm, Patient Position: Sitting, Cuff Size: Large)   Pulse 60 Temp 98 2 °F (36 8 °C) (Tympanic)   Resp 16   Ht 4' 11" (1 499 m)   Wt 74 4 kg (164 lb)   BMI 33 12 kg/m²          Physical Exam   Constitutional: She is oriented to person, place, and time  She appears well-developed and well-nourished  HENT:   Head: Normocephalic and atraumatic  Right Ear: External ear normal    Left Ear: External ear normal    Scant PND visible  Nasal mucosa is red and swollen  Eyes: Pupils are equal, round, and reactive to light  Neck: Neck supple  Cardiovascular: Normal rate, regular rhythm and normal heart sounds  Pulmonary/Chest: Effort normal and breath sounds normal    Musculoskeletal: She exhibits no edema  Lymphadenopathy:     She has no cervical adenopathy  Neurological: She is alert and oriented to person, place, and time  Skin: Skin is warm and dry  Psychiatric: She has a normal mood and affect  Nursing note and vitals reviewed

## 2019-09-27 DIAGNOSIS — M1A.9XX0 CHRONIC GOUT WITHOUT TOPHUS, UNSPECIFIED CAUSE, UNSPECIFIED SITE: ICD-10-CM

## 2019-09-27 DIAGNOSIS — I10 ESSENTIAL HYPERTENSION: ICD-10-CM

## 2019-09-27 RX ORDER — FUROSEMIDE 20 MG/1
20 TABLET ORAL 2 TIMES DAILY
Qty: 180 TABLET | Refills: 3 | Status: SHIPPED | OUTPATIENT
Start: 2019-09-27 | End: 2020-10-06 | Stop reason: SDUPTHER

## 2019-09-27 RX ORDER — ALLOPURINOL 100 MG/1
TABLET ORAL
Qty: 90 TABLET | Refills: 1 | Status: SHIPPED | OUTPATIENT
Start: 2019-09-27 | End: 2019-10-22

## 2019-09-27 RX ORDER — METOPROLOL TARTRATE 100 MG/1
100 TABLET ORAL 2 TIMES DAILY
Qty: 180 TABLET | Refills: 1 | Status: SHIPPED | OUTPATIENT
Start: 2019-09-27 | End: 2020-05-15

## 2019-10-22 ENCOUNTER — OFFICE VISIT (OUTPATIENT)
Dept: FAMILY MEDICINE CLINIC | Facility: CLINIC | Age: 84
End: 2019-10-22
Payer: COMMERCIAL

## 2019-10-22 VITALS
TEMPERATURE: 97.6 F | HEART RATE: 62 BPM | BODY MASS INDEX: 33.12 KG/M2 | OXYGEN SATURATION: 98 % | DIASTOLIC BLOOD PRESSURE: 82 MMHG | RESPIRATION RATE: 16 BRPM | SYSTOLIC BLOOD PRESSURE: 136 MMHG | HEIGHT: 59 IN

## 2019-10-22 DIAGNOSIS — F41.9 ANXIETY: ICD-10-CM

## 2019-10-22 DIAGNOSIS — R32 URINARY INCONTINENCE, UNSPECIFIED TYPE: Primary | ICD-10-CM

## 2019-10-22 LAB
BILIRUB UR QL STRIP: NEGATIVE
CLARITY UR: CLEAR
COLOR UR: YELLOW
GLUCOSE UR STRIP-MCNC: NEGATIVE MG/DL
HGB UR QL STRIP.AUTO: NEGATIVE
KETONES UR STRIP-MCNC: NEGATIVE MG/DL
LEUKOCYTE ESTERASE UR QL STRIP: NEGATIVE
NITRITE UR QL STRIP: NEGATIVE
PH UR STRIP.AUTO: 5 [PH]
PROT UR STRIP-MCNC: NEGATIVE MG/DL
SP GR UR STRIP.AUTO: 1.01 (ref 1–1.03)
UROBILINOGEN UR QL STRIP.AUTO: 0.2 E.U./DL

## 2019-10-22 PROCEDURE — 81003 URINALYSIS AUTO W/O SCOPE: CPT | Performed by: PHYSICIAN ASSISTANT

## 2019-10-22 PROCEDURE — 99213 OFFICE O/P EST LOW 20 MIN: CPT | Performed by: FAMILY MEDICINE

## 2019-10-22 RX ORDER — ALPRAZOLAM 1 MG/1
1 TABLET ORAL
Qty: 30 TABLET | Refills: 0 | Status: SHIPPED | OUTPATIENT
Start: 2019-10-22 | End: 2019-11-18 | Stop reason: SDUPTHER

## 2019-10-22 RX ORDER — OXYBUTYNIN CHLORIDE 5 MG/1
5 TABLET, EXTENDED RELEASE ORAL DAILY
Qty: 30 TABLET | Refills: 1 | Status: SHIPPED | OUTPATIENT
Start: 2019-10-22 | End: 2019-10-28

## 2019-10-22 NOTE — PROGRESS NOTES
Assessment/Plan:     Diagnoses and all orders for this visit:    Urinary incontinence, unspecified type  -     UA w Reflex to Microscopic w Reflex to Culture - Clinic Collect  -     oxybutynin (DITROPAN-XL) 5 mg 24 hr tablet; Take 1 tablet (5 mg total) by mouth daily    Anxiety  -     ALPRAZolam (XANAX) 1 mg tablet; Take 1 tablet (1 mg total) by mouth daily at bedtime as needed for sleep        Continue current meds  PMED checked by MA  Start Ditropan XL 5 mg daily  RTC 1 month or sooner if needed          Subjective:        Patient ID: Kojo Byrd is a 80 y o  female  Chief Complaint   Patient presents with    Medication Refill       79 yo female presents for med refill  Today she c/o UI x 20 years but no becoming more difficult to handle  She has tried Kegels  to no avail  No pain  No blood in urine  No other issues today        The following portions of the patient's history were reviewed and updated as appropriate: allergies, current medications, past family history, past medical history, past social history, past surgical history and problem list     Patient Active Problem List   Diagnosis    Status post total replacement of right hip    Benign essential hypertension    Hyperlipidemia    Elevated blood uric acid level    History of gout    Dextroscoliosis    Screening-pulmonary TB    Anemia    Anxiety    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 re-entry tachycardia) (HCC)    Dyslipidemia    Gout    Iron deficiency anemia    History of cardiac radiofrequency ablation       Current Outpatient Medications   Medication Sig Dispense Refill    albuterol (PROVENTIL HFA,VENTOLIN HFA) 90 mcg/act inhaler Inhale 2 puffs every 6 (six) hours as needed for wheezing      allopurinol (ZYLOPRIM) 100 mg tablet Take 1 tablet (100 mg total) by mouth daily 90 tablet 1    ALPRAZolam (XANAX) 1 mg tablet Take 1 tablet (1 mg total) by mouth daily at bedtime as needed for sleep 30 tablet 0    amLODIPine (NORVASC) 5 mg tablet Take 1 tablet (5 mg total) by mouth daily 90 tablet 3    aspirin (ECOTRIN LOW STRENGTH) 81 mg EC tablet Take 1 tablet by mouth daily 1 tablet 0    atorvastatin (LIPITOR) 20 mg tablet TAKE 1 TABLET DAILY 90 tablet 1    clotrimazole-betamethasone (LOTRISONE) 1-0 05 % cream Apply topically 2 (two) times a day 30 g 0    colchicine (COLCRYS) 0 6 mg tablet Take 1 tablet by mouth daily  0    diclofenac-misoprostol (ARTHROTEC 75) 75-0 2 MG per tablet TAKE ONE (1) TABLET BY MOUTH TWO TIMES DAILY  180 tablet 2    docusate sodium (COLACE) 100 mg capsule Take 100 mg by mouth 2 (two) times a day as needed for constipation      EPINEPHrine (EPIPEN) 0 3 mg/0 3 mL SOAJ Inject as necessary 1 each 1    ferrous sulfate 325 (65 Fe) mg tablet Take by mouth      furosemide (LASIX) 20 mg tablet Take 1 tablet (20 mg total) by mouth 2 (two) times a day 180 tablet 3    Inulin (FIBER CHOICE PO) Take by mouth      loperamide (IMODIUM) 2 mg capsule Take 1 capsule (2 mg total) by mouth 4 (four) times a day as needed for diarrhea 30 capsule 0    metoprolol tartrate (LOPRESSOR) 100 mg tablet Take 1 tablet (100 mg total) by mouth 2 (two) times a day 180 tablet 1    mometasone (NASONEX) 50 mcg/act nasal spray 2 sprays into each nostril daily 1 Act 1    olmesartan (BENICAR) 40 mg tablet Take 1 tablet (40 mg total) by mouth daily 90 tablet 3    olopatadine HCl (PATADAY) 0 2 % opth drops Administer 1 drop to both eyes daily 2 5 mL 0    potassium chloride (K-DUR,KLOR-CON) 20 mEq tablet TAKE ONE (1) TABLET BY MOUTH DAILY WITH FOOD  90 tablet 1    oxybutynin (DITROPAN-XL) 5 mg 24 hr tablet Take 1 tablet (5 mg total) by mouth daily 30 tablet 1     No current facility-administered medications for this visit           Past Medical History:   Diagnosis Date    Arthritis     Cardiac disease     Chronic pain     Gout     Hypertension         Past Surgical History:   Procedure Laterality Date    APPENDECTOMY      ATRIAL ABLATION SURGERY      HYSTERECTOMY      JOINT REPLACEMENT  knees    KNEE ARTHROSCOPY      b/l knees    LAMINECTOMY      AL TOTAL HIP ARTHROPLASTY Right 7/17/2017    Procedure: TOTAL ANTERIOR HIP ARTHROPLASTY;  Surgeon: Erlinda Messer MD;  Location: [facility];  Service: Orthopedics        Social History     Socioeconomic History    Marital status: /Civil Union     Spouse name: Not on file    Number of children: Not on file    Years of education: Not on file    Highest education level: Not on file   Occupational History    Not on file   Social Needs    Financial resource strain: Not on file    Food insecurity:     Worry: Not on file     Inability: Not on file    Transportation needs:     Medical: Not on file     Non-medical: Not on file   Tobacco Use    Smoking status: Never Smoker    Smokeless tobacco: Never Used   Substance and Sexual Activity    Alcohol use: No    Drug use: No    Sexual activity: Not on file   Lifestyle    Physical activity:     Days per week: Not on file     Minutes per session: Not on file    Stress: Not on file   Relationships    Social connections:     Talks on phone: Not on file     Gets together: Not on file     Attends Gnosticism service: Not on file     Active member of club or organization: Not on file     Attends meetings of clubs or organizations: Not on file     Relationship status: Not on file    Intimate partner violence:     Fear of current or ex partner: Not on file     Emotionally abused: Not on file     Physically abused: Not on file     Forced sexual activity: Not on file   Other Topics Concern    Not on file   Social History Narrative    Not on file        Review of Systems   Constitutional: Negative  HENT: Negative  Eyes: Negative  Respiratory: Negative  Cardiovascular: Negative  Genitourinary:        As per HPI   Psychiatric/Behavioral: Positive for sleep disturbance           Objective:      /82 (BP Location: Left arm, Patient Position: Sitting, Cuff Size: Large)   Pulse 62   Temp 97 6 °F (36 4 °C) (Tympanic)   Resp 16   Ht 4' 11" (1 499 m)   SpO2 98%   BMI 33 12 kg/m²          Physical Exam   Constitutional: She is oriented to person, place, and time  She appears well-developed and well-nourished  HENT:   Head: Normocephalic and atraumatic  Right Ear: External ear normal    Left Ear: External ear normal    Eyes: Pupils are equal, round, and reactive to light  Neck: Normal range of motion  Neck supple  Cardiovascular: Normal rate, regular rhythm and normal heart sounds  Pulmonary/Chest: Effort normal and breath sounds normal  She has no wheezes  She has no rales  Abdominal: Soft  Bowel sounds are normal  She exhibits no mass  There is no tenderness  Musculoskeletal: She exhibits no edema  Lymphadenopathy:     She has no cervical adenopathy  Neurological: She is alert and oriented to person, place, and time  Psychiatric: She has a normal mood and affect  Nursing note and vitals reviewed

## 2019-10-28 DIAGNOSIS — R32 URINARY INCONTINENCE, UNSPECIFIED TYPE: ICD-10-CM

## 2019-10-28 RX ORDER — OXYBUTYNIN CHLORIDE 10 MG/1
10 TABLET, EXTENDED RELEASE ORAL DAILY
Qty: 30 TABLET | Refills: 0 | Status: SHIPPED | OUTPATIENT
Start: 2019-10-28 | End: 2019-12-02 | Stop reason: SDUPTHER

## 2019-11-05 DIAGNOSIS — I10 BENIGN ESSENTIAL HYPERTENSION: ICD-10-CM

## 2019-11-05 RX ORDER — OLMESARTAN MEDOXOMIL 40 MG/1
TABLET ORAL
Qty: 90 TABLET | Refills: 2 | Status: SHIPPED | OUTPATIENT
Start: 2019-11-05 | End: 2019-12-20

## 2019-11-18 DIAGNOSIS — F41.9 ANXIETY: ICD-10-CM

## 2019-11-18 RX ORDER — ALPRAZOLAM 1 MG/1
1 TABLET ORAL
Qty: 30 TABLET | Refills: 0 | Status: SHIPPED | OUTPATIENT
Start: 2019-11-18 | End: 2019-12-20 | Stop reason: SDUPTHER

## 2019-12-02 DIAGNOSIS — R32 URINARY INCONTINENCE, UNSPECIFIED TYPE: ICD-10-CM

## 2019-12-05 DIAGNOSIS — E78.5 DYSLIPIDEMIA: ICD-10-CM

## 2019-12-05 DIAGNOSIS — R32 URINARY INCONTINENCE, UNSPECIFIED TYPE: ICD-10-CM

## 2019-12-05 RX ORDER — ATORVASTATIN CALCIUM 20 MG/1
20 TABLET, FILM COATED ORAL DAILY
Qty: 90 TABLET | Refills: 1 | Status: SHIPPED | OUTPATIENT
Start: 2019-12-05 | End: 2020-08-24

## 2019-12-05 RX ORDER — OXYBUTYNIN CHLORIDE 10 MG/1
10 TABLET, EXTENDED RELEASE ORAL DAILY
Qty: 90 TABLET | Refills: 1 | Status: SHIPPED | OUTPATIENT
Start: 2019-12-05 | End: 2020-04-22

## 2019-12-05 RX ORDER — OXYBUTYNIN CHLORIDE 10 MG/1
TABLET, EXTENDED RELEASE ORAL
Qty: 30 TABLET | Refills: 3 | Status: SHIPPED | OUTPATIENT
Start: 2019-12-05 | End: 2019-12-05 | Stop reason: SDUPTHER

## 2019-12-20 ENCOUNTER — OFFICE VISIT (OUTPATIENT)
Dept: FAMILY MEDICINE CLINIC | Facility: CLINIC | Age: 84
End: 2019-12-20
Payer: COMMERCIAL

## 2019-12-20 VITALS
HEIGHT: 59 IN | BODY MASS INDEX: 33.12 KG/M2 | OXYGEN SATURATION: 96 % | TEMPERATURE: 97.8 F | HEART RATE: 55 BPM | RESPIRATION RATE: 18 BRPM | DIASTOLIC BLOOD PRESSURE: 72 MMHG | SYSTOLIC BLOOD PRESSURE: 142 MMHG

## 2019-12-20 DIAGNOSIS — I10 ESSENTIAL HYPERTENSION: Primary | ICD-10-CM

## 2019-12-20 DIAGNOSIS — F41.9 ANXIETY: ICD-10-CM

## 2019-12-20 PROCEDURE — 99213 OFFICE O/P EST LOW 20 MIN: CPT | Performed by: FAMILY MEDICINE

## 2019-12-20 RX ORDER — ALPRAZOLAM 1 MG/1
1 TABLET ORAL
Qty: 30 TABLET | Refills: 0 | Status: SHIPPED | OUTPATIENT
Start: 2019-12-20 | End: 2020-01-17 | Stop reason: SDUPTHER

## 2019-12-20 NOTE — PROGRESS NOTES
Assessment/Plan:     Diagnoses and all orders for this visit:    Essential hypertension    Anxiety  -     ALPRAZolam (XANAX) 1 mg tablet; Take 1 tablet (1 mg total) by mouth daily at bedtime as needed for sleep        Continue current meds  PMED checked by MA  Discussed need for low sodium diet especially over the holidays  Recheck on pulse rate was 62 bpm   RTC 1 month          Subjective:        Patient ID: Emerald Leiva is a 80 y o  female  Chief Complaint   Patient presents with    Medication Refill     no complaints today       81 yo female presents for med refill  She is doing well  Reports 2 weeks ago had stomach virus and was sick for about 1 5 days  She is fine now  No complaints today        The following portions of the patient's history were reviewed and updated as appropriate: allergies, current medications, past family history, past medical history, past social history, past surgical history and problem list     Patient Active Problem List   Diagnosis    Status post total replacement of right hip    Benign essential hypertension    Hyperlipidemia    Elevated blood uric acid level    History of gout    Dextroscoliosis    Screening-pulmonary TB    Anemia    Anxiety    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 re-entry tachycardia) (HCC)    Dyslipidemia    Gout    Iron deficiency anemia    History of cardiac radiofrequency ablation       Current Outpatient Medications   Medication Sig Dispense Refill    albuterol (PROVENTIL HFA,VENTOLIN HFA) 90 mcg/act inhaler Inhale 2 puffs every 6 (six) hours as needed for wheezing      allopurinol (ZYLOPRIM) 100 mg tablet Take 1 tablet (100 mg total) by mouth daily 90 tablet 1    ALPRAZolam (XANAX) 1 mg tablet Take 1 tablet (1 mg total) by mouth daily at bedtime as needed for sleep 30 tablet 0    amLODIPine (NORVASC) 5 mg tablet Take 1 tablet (5 mg total) by mouth daily 90 tablet 3    aspirin (ECOTRIN LOW STRENGTH) 81 mg EC tablet Take 1 tablet by mouth daily 1 tablet 0    atorvastatin (LIPITOR) 20 mg tablet Take 1 tablet (20 mg total) by mouth daily 90 tablet 1    clotrimazole-betamethasone (LOTRISONE) 1-0 05 % cream Apply topically 2 (two) times a day 30 g 0    colchicine (COLCRYS) 0 6 mg tablet Take 1 tablet by mouth daily  0    diclofenac-misoprostol (ARTHROTEC 75) 75-0 2 MG per tablet TAKE ONE (1) TABLET BY MOUTH TWO TIMES DAILY  180 tablet 2    docusate sodium (COLACE) 100 mg capsule Take 100 mg by mouth 2 (two) times a day as needed for constipation      EPINEPHrine (EPIPEN) 0 3 mg/0 3 mL SOAJ Inject as necessary 1 each 1    ferrous sulfate 325 (65 Fe) mg tablet Take by mouth      furosemide (LASIX) 20 mg tablet Take 1 tablet (20 mg total) by mouth 2 (two) times a day 180 tablet 3    Inulin (FIBER CHOICE PO) Take by mouth      loperamide (IMODIUM) 2 mg capsule Take 1 capsule (2 mg total) by mouth 4 (four) times a day as needed for diarrhea (Patient not taking: Reported on 12/20/2019) 30 capsule 0    metoprolol tartrate (LOPRESSOR) 100 mg tablet Take 1 tablet (100 mg total) by mouth 2 (two) times a day 180 tablet 1    mometasone (NASONEX) 50 mcg/act nasal spray 2 sprays into each nostril daily (Patient not taking: Reported on 12/20/2019) 1 Act 1    olmesartan (BENICAR) 40 mg tablet Take 1 tablet (40 mg total) by mouth daily 90 tablet 3    olopatadine HCl (PATADAY) 0 2 % opth drops Administer 1 drop to both eyes daily 2 5 mL 0    oxybutynin (DITROPAN-XL) 10 MG 24 hr tablet Take 1 tablet (10 mg total) by mouth daily 90 tablet 1    potassium chloride (K-DUR,KLOR-CON) 20 mEq tablet TAKE ONE (1) TABLET BY MOUTH DAILY WITH FOOD  90 tablet 1     No current facility-administered medications for this visit           Past Medical History:   Diagnosis Date    Arthritis     Cardiac disease     Chronic pain     Gout     Hypertension         Past Surgical History:   Procedure Laterality Date    APPENDECTOMY      ATRIAL ABLATION SURGERY      HYSTERECTOMY  JOINT REPLACEMENT  knees    KNEE ARTHROSCOPY      b/l knees    LAMINECTOMY      [place] TOTAL HIP ARTHROPLASTY Right 7/17/2017    Procedure: TOTAL ANTERIOR HIP ARTHROPLASTY;  Surgeon: Mary Moody MD;  Location: [facility];  Service: Orthopedics        Social History     Socioeconomic History    Marital status: /Civil Union     Spouse name: Not on file    Number of children: Not on file    Years of education: Not on file    Highest education level: Not on file   Occupational History    Not on file   Social Needs    Financial resource strain: Not on file    Food insecurity:     Worry: Not on file     Inability: Not on file    Transportation needs:     Medical: Not on file     Non-medical: Not on file   Tobacco Use    Smoking status: Never Smoker    Smokeless tobacco: Never Used   Substance and Sexual Activity    Alcohol use: No    Drug use: No    Sexual activity: Not on file   Lifestyle    Physical activity:     Days per week: Not on file     Minutes per session: Not on file    Stress: Not on file   Relationships    Social connections:     Talks on phone: Not on file     Gets together: Not on file     Attends Restoration service: Not on file     Active member of club or organization: Not on file     Attends meetings of clubs or organizations: Not on file     Relationship status: Not on file    Intimate partner violence:     Fear of current or ex partner: Not on file     Emotionally abused: Not on file     Physically abused: Not on file     Forced sexual activity: Not on file   Other Topics Concern    Not on file   Social History Narrative    Not on file        Review of Systems   Constitutional: Negative  HENT: Negative  Eyes: Negative  Respiratory: Negative  Cardiovascular: Negative  Gastrointestinal:        Per HPI   Psychiatric/Behavioral: Positive for sleep disturbance           Objective:      /72   Pulse 55   Temp 97 8 °F (36 6 °C)   Resp 18   Ht 4' 11" (1 499 m)   SpO2 96%   BMI 33 12 kg/m²          Physical Exam   Constitutional: She is oriented to person, place, and time  She appears well-developed and well-nourished  HENT:   Head: Normocephalic and atraumatic  Right Ear: External ear normal    Left Ear: External ear normal    Eyes: Pupils are equal, round, and reactive to light  Neck: Normal range of motion  Neck supple  Cardiovascular: Normal rate and regular rhythm  Pulmonary/Chest: Effort normal and breath sounds normal  She has no wheezes  She has no rales  Musculoskeletal: She exhibits no edema  Lymphadenopathy:     She has no cervical adenopathy  Neurological: She is alert and oriented to person, place, and time  Skin: Skin is warm and dry  Psychiatric: She has a normal mood and affect  Nursing note and vitals reviewed  BMI Counseling: Body mass index is 33 12 kg/m²  The BMI is above normal  Nutrition recommendations include reducing portion sizes, decreasing overall calorie intake, 3-5 servings of fruits/vegetables daily, reducing fast food intake, consuming healthier snacks, decreasing soda and/or juice intake, moderation in carbohydrate intake and increasing intake of lean protein  Exercise recommendations include exercising 3-5 times per week

## 2020-01-08 ENCOUNTER — OFFICE VISIT (OUTPATIENT)
Dept: FAMILY MEDICINE CLINIC | Facility: CLINIC | Age: 85
End: 2020-01-08
Payer: COMMERCIAL

## 2020-01-08 VITALS
SYSTOLIC BLOOD PRESSURE: 142 MMHG | BODY MASS INDEX: 33.38 KG/M2 | WEIGHT: 165.6 LBS | HEART RATE: 60 BPM | HEIGHT: 59 IN | TEMPERATURE: 99 F | RESPIRATION RATE: 16 BRPM | DIASTOLIC BLOOD PRESSURE: 80 MMHG | OXYGEN SATURATION: 95 %

## 2020-01-08 DIAGNOSIS — K21.9 GASTROESOPHAGEAL REFLUX DISEASE, ESOPHAGITIS PRESENCE NOT SPECIFIED: Primary | ICD-10-CM

## 2020-01-08 PROCEDURE — 99213 OFFICE O/P EST LOW 20 MIN: CPT | Performed by: FAMILY MEDICINE

## 2020-01-08 RX ORDER — PANTOPRAZOLE SODIUM 40 MG/1
40 TABLET, DELAYED RELEASE ORAL DAILY
Qty: 30 TABLET | Refills: 0 | Status: SHIPPED | OUTPATIENT
Start: 2020-01-08 | End: 2020-02-10 | Stop reason: SDUPTHER

## 2020-01-08 RX ORDER — SUCRALFATE ORAL 1 G/10ML
1 SUSPENSION ORAL 4 TIMES DAILY
Qty: 420 ML | Refills: 0 | Status: SHIPPED | OUTPATIENT
Start: 2020-01-08 | End: 2020-04-22

## 2020-01-08 NOTE — PROGRESS NOTES
Assessment/Plan:     Diagnoses and all orders for this visit:    Gastroesophageal reflux disease, esophagitis presence not specified  -     sucralfate (CARAFATE) 1 g/10 mL suspension; Take 10 mL (1 g total) by mouth 4 (four) times a day  -     pantoprazole (PROTONIX) 40 mg tablet; Take 1 tablet (40 mg total) by mouth daily        Discussed need to stop Arthrotec as she has been on for many years  New meds as discussed  If no improvement over the next 7-10 days will refer to GI for EGD  RTC 1 week          Subjective:        Patient ID: Quirino Quijano is a 80 y o  female  Chief Complaint   Patient presents with    Cough     pt has had pneumonia in the past     GERD     Every time pt eats she has severe acid reflux and heart burn       79 yo female presents for heartburn and reflux x 3 days  She then started with cough that is non productive  No fever or chills  All foods are bothering her  She stopped eating and is drinking Gatorade only        The following portions of the patient's history were reviewed and updated as appropriate: allergies, current medications, past family history, past medical history, past social history, past surgical history and problem list     Patient Active Problem List   Diagnosis    Status post total replacement of right hip    Benign essential hypertension    Hyperlipidemia    Elevated blood uric acid level    History of gout    Dextroscoliosis    Screening-pulmonary TB    Anemia    Anxiety    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 re-entry tachycardia) (HCC)    Dyslipidemia    Gout    Iron deficiency anemia    History of cardiac radiofrequency ablation       Current Outpatient Medications   Medication Sig Dispense Refill    allopurinol (ZYLOPRIM) 100 mg tablet Take 1 tablet (100 mg total) by mouth daily 90 tablet 1    ALPRAZolam (XANAX) 1 mg tablet Take 1 tablet (1 mg total) by mouth daily at bedtime as needed for sleep 30 tablet 0    amLODIPine (NORVASC) 5 mg tablet Take 1 tablet (5 mg total) by mouth daily 90 tablet 3    aspirin (ECOTRIN LOW STRENGTH) 81 mg EC tablet Take 1 tablet by mouth daily 1 tablet 0    atorvastatin (LIPITOR) 20 mg tablet Take 1 tablet (20 mg total) by mouth daily 90 tablet 1    clotrimazole-betamethasone (LOTRISONE) 1-0 05 % cream Apply topically 2 (two) times a day 30 g 0    diclofenac-misoprostol (ARTHROTEC 75) 75-0 2 MG per tablet TAKE ONE (1) TABLET BY MOUTH TWO TIMES DAILY  180 tablet 2    docusate sodium (COLACE) 100 mg capsule Take 100 mg by mouth 2 (two) times a day as needed for constipation      EPINEPHrine (EPIPEN) 0 3 mg/0 3 mL SOAJ Inject as necessary 1 each 1    ferrous sulfate 325 (65 Fe) mg tablet Take by mouth      furosemide (LASIX) 20 mg tablet Take 1 tablet (20 mg total) by mouth 2 (two) times a day 180 tablet 3    Inulin (FIBER CHOICE PO) Take by mouth      metoprolol tartrate (LOPRESSOR) 100 mg tablet Take 1 tablet (100 mg total) by mouth 2 (two) times a day 180 tablet 1    olmesartan (BENICAR) 40 mg tablet Take 1 tablet (40 mg total) by mouth daily 90 tablet 3    olopatadine HCl (PATADAY) 0 2 % opth drops Administer 1 drop to both eyes daily 2 5 mL 0    oxybutynin (DITROPAN-XL) 10 MG 24 hr tablet Take 1 tablet (10 mg total) by mouth daily 90 tablet 1    potassium chloride (K-DUR,KLOR-CON) 20 mEq tablet TAKE ONE (1) TABLET BY MOUTH DAILY WITH FOOD   90 tablet 1    albuterol (PROVENTIL HFA,VENTOLIN HFA) 90 mcg/act inhaler Inhale 2 puffs every 6 (six) hours as needed for wheezing      loperamide (IMODIUM) 2 mg capsule Take 1 capsule (2 mg total) by mouth 4 (four) times a day as needed for diarrhea (Patient not taking: Reported on 12/20/2019) 30 capsule 0    mometasone (NASONEX) 50 mcg/act nasal spray 2 sprays into each nostril daily (Patient not taking: Reported on 12/20/2019) 1 Act 1    pantoprazole (PROTONIX) 40 mg tablet Take 1 tablet (40 mg total) by mouth daily 30 tablet 0    sucralfate (CARAFATE) 1 g/10 mL suspension Take 10 mL (1 g total) by mouth 4 (four) times a day 420 mL 0     No current facility-administered medications for this visit           Past Medical History:   Diagnosis Date    Arthritis     Cardiac disease     Chronic pain     Gout     Hypertension         Past Surgical History:   Procedure Laterality Date    APPENDECTOMY      ATRIAL ABLATION SURGERY      HYSTERECTOMY      JOINT REPLACEMENT  knees    KNEE ARTHROSCOPY      b/l knees    LAMINECTOMY      [place] TOTAL HIP ARTHROPLASTY Right 7/17/2017    Procedure: TOTAL ANTERIOR HIP ARTHROPLASTY;  Surgeon: Juaquin Lu MD;  Location: [facility];  Service: Orthopedics        Social History     Socioeconomic History    Marital status: /Civil Union     Spouse name: Not on file    Number of children: Not on file    Years of education: Not on file    Highest education level: Not on file   Occupational History    Not on file   Social Needs    Financial resource strain: Not on file    Food insecurity:     Worry: Not on file     Inability: Not on file    Transportation needs:     Medical: Not on file     Non-medical: Not on file   Tobacco Use    Smoking status: Never Smoker    Smokeless tobacco: Never Used   Substance and Sexual Activity    Alcohol use: No    Drug use: No    Sexual activity: Not on file   Lifestyle    Physical activity:     Days per week: Not on file     Minutes per session: Not on file    Stress: Not on file   Relationships    Social connections:     Talks on phone: Not on file     Gets together: Not on file     Attends Caodaism service: Not on file     Active member of club or organization: Not on file     Attends meetings of clubs or organizations: Not on file     Relationship status: Not on file    Intimate partner violence:     Fear of current or ex partner: Not on file     Emotionally abused: Not on file     Physically abused: Not on file     Forced sexual activity: Not on file   Other Topics Concern    Not on file   Social History Narrative    Not on file        Review of Systems   Constitutional: Negative  HENT: Negative  Eyes: Negative  Respiratory: Positive for cough  Cardiovascular: Negative  Gastrointestinal:        Per HPI   Psychiatric/Behavioral: Negative  Objective:      /80   Pulse 60   Temp 99 °F (37 2 °C) (Tympanic)   Resp 16   Ht 4' 11" (1 499 m)   Wt 75 1 kg (165 lb 9 6 oz)   SpO2 95%   BMI 33 45 kg/m²          Physical Exam   Constitutional: She appears well-developed and well-nourished  HENT:   Head: Normocephalic and atraumatic  Right Ear: External ear normal    Left Ear: External ear normal    Eyes: Pupils are equal, round, and reactive to light  Neck: Neck supple  Cardiovascular: Normal rate and regular rhythm  Pulmonary/Chest: Effort normal and breath sounds normal  She has no wheezes  She has no rales  Abdominal: Soft  Bowel sounds are normal  She exhibits no mass  Mild tenderness noted over 6th rib L side  Musculoskeletal: She exhibits no edema  Lymphadenopathy:     She has no cervical adenopathy  Psychiatric: She has a normal mood and affect  Nursing note and vitals reviewed

## 2020-01-17 ENCOUNTER — OFFICE VISIT (OUTPATIENT)
Dept: FAMILY MEDICINE CLINIC | Facility: CLINIC | Age: 85
End: 2020-01-17
Payer: COMMERCIAL

## 2020-01-17 VITALS
TEMPERATURE: 98.4 F | SYSTOLIC BLOOD PRESSURE: 142 MMHG | HEART RATE: 58 BPM | HEIGHT: 59 IN | OXYGEN SATURATION: 97 % | BODY MASS INDEX: 33.26 KG/M2 | RESPIRATION RATE: 16 BRPM | WEIGHT: 165 LBS | DIASTOLIC BLOOD PRESSURE: 80 MMHG

## 2020-01-17 DIAGNOSIS — F41.9 ANXIETY: ICD-10-CM

## 2020-01-17 DIAGNOSIS — I10 ESSENTIAL HYPERTENSION: Primary | ICD-10-CM

## 2020-01-17 PROCEDURE — 99213 OFFICE O/P EST LOW 20 MIN: CPT | Performed by: FAMILY MEDICINE

## 2020-01-17 RX ORDER — SUCRALFATE 1 G/10ML
SUSPENSION ORAL
COMMUNITY
Start: 2020-01-08 | End: 2020-04-22

## 2020-01-17 RX ORDER — ALPRAZOLAM 1 MG/1
1 TABLET ORAL
Qty: 30 TABLET | Refills: 0 | Status: SHIPPED | OUTPATIENT
Start: 2020-01-17 | End: 2020-02-14 | Stop reason: SDUPTHER

## 2020-01-17 NOTE — PROGRESS NOTES
Assessment/Plan:     Diagnoses and all orders for this visit:    Essential hypertension    Anxiety  -     ALPRAZolam (XANAX) 1 mg tablet; Take 1 tablet (1 mg total) by mouth daily at bedtime as needed for sleep    Other orders  -     CARAFATE 1 GM/10ML suspension        She will continue current meds  PMED checked by YAYA MENEZES 4 weeks          Subjective:        Patient ID: Ambreen Avelar is a 80 y o  female  Chief Complaint   Patient presents with    Hypertension       79 yo female presents for med refill  She is doing well  Previous c/o abdominal distress is "almost back to normal"  She did go back on the Arthrotec and has had no more issues  Insomnia continues  Discussed need to wean off Xanax or change dose  We did try in the  past  But she could not sleep on lower dose        The following portions of the patient's history were reviewed and updated as appropriate: allergies, current medications, past family history, past medical history, past social history, past surgical history and problem list     Patient Active Problem List   Diagnosis    Status post total replacement of right hip    Benign essential hypertension    Hyperlipidemia    Elevated blood uric acid level    History of gout    Dextroscoliosis    Screening-pulmonary TB    Anemia    Anxiety    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 re-entry tachycardia) (HCC)    Dyslipidemia    Gout    Iron deficiency anemia    History of cardiac radiofrequency ablation       Current Outpatient Medications   Medication Sig Dispense Refill    albuterol (PROVENTIL HFA,VENTOLIN HFA) 90 mcg/act inhaler Inhale 2 puffs every 6 (six) hours as needed for wheezing      allopurinol (ZYLOPRIM) 100 mg tablet Take 1 tablet (100 mg total) by mouth daily 90 tablet 1    ALPRAZolam (XANAX) 1 mg tablet Take 1 tablet (1 mg total) by mouth daily at bedtime as needed for sleep 30 tablet 0    amLODIPine (NORVASC) 5 mg tablet Take 1 tablet (5 mg total) by mouth daily 90 tablet 3    aspirin (ECOTRIN LOW STRENGTH) 81 mg EC tablet Take 1 tablet by mouth daily 1 tablet 0    atorvastatin (LIPITOR) 20 mg tablet Take 1 tablet (20 mg total) by mouth daily 90 tablet 1    CARAFATE 1 GM/10ML suspension       clotrimazole-betamethasone (LOTRISONE) 1-0 05 % cream Apply topically 2 (two) times a day 30 g 0    diclofenac-misoprostol (ARTHROTEC 75) 75-0 2 MG per tablet TAKE ONE (1) TABLET BY MOUTH TWO TIMES DAILY  180 tablet 2    docusate sodium (COLACE) 100 mg capsule Take 100 mg by mouth 2 (two) times a day as needed for constipation      EPINEPHrine (EPIPEN) 0 3 mg/0 3 mL SOAJ Inject as necessary 1 each 1    ferrous sulfate 325 (65 Fe) mg tablet Take by mouth      furosemide (LASIX) 20 mg tablet Take 1 tablet (20 mg total) by mouth 2 (two) times a day 180 tablet 3    Inulin (FIBER CHOICE PO) Take by mouth      loperamide (IMODIUM) 2 mg capsule Take 1 capsule (2 mg total) by mouth 4 (four) times a day as needed for diarrhea 30 capsule 0    metoprolol tartrate (LOPRESSOR) 100 mg tablet Take 1 tablet (100 mg total) by mouth 2 (two) times a day 180 tablet 1    olmesartan (BENICAR) 40 mg tablet Take 1 tablet (40 mg total) by mouth daily 90 tablet 3    olopatadine HCl (PATADAY) 0 2 % opth drops Administer 1 drop to both eyes daily 2 5 mL 0    oxybutynin (DITROPAN-XL) 10 MG 24 hr tablet Take 1 tablet (10 mg total) by mouth daily 90 tablet 1    pantoprazole (PROTONIX) 40 mg tablet Take 1 tablet (40 mg total) by mouth daily 30 tablet 0    potassium chloride (K-DUR,KLOR-CON) 20 mEq tablet TAKE ONE (1) TABLET BY MOUTH DAILY WITH FOOD  90 tablet 1    sucralfate (CARAFATE) 1 g/10 mL suspension Take 10 mL (1 g total) by mouth 4 (four) times a day 420 mL 0    mometasone (NASONEX) 50 mcg/act nasal spray 2 sprays into each nostril daily (Patient not taking: Reported on 12/20/2019) 1 Act 1     No current facility-administered medications for this visit           Past Medical History:   Diagnosis Date    Arthritis     Cardiac disease     Chronic pain     Gout     Hypertension         Past Surgical History:   Procedure Laterality Date    APPENDECTOMY      ATRIAL ABLATION SURGERY      HYSTERECTOMY      JOINT REPLACEMENT  knees    KNEE ARTHROSCOPY      b/l knees    LAMINECTOMY      [place] TOTAL HIP ARTHROPLASTY Right 7/17/2017    Procedure: TOTAL ANTERIOR HIP ARTHROPLASTY;  Surgeon: Adilene Phipps MD;  Location: [facility];  Service: Orthopedics        Social History     Socioeconomic History    Marital status: /Civil Union     Spouse name: Not on file    Number of children: Not on file    Years of education: Not on file    Highest education level: Not on file   Occupational History    Not on file   Social Needs    Financial resource strain: Not on file    Food insecurity:     Worry: Not on file     Inability: Not on file    Transportation needs:     Medical: Not on file     Non-medical: Not on file   Tobacco Use    Smoking status: Never Smoker    Smokeless tobacco: Never Used   Substance and Sexual Activity    Alcohol use: No    Drug use: No    Sexual activity: Not on file   Lifestyle    Physical activity:     Days per week: Not on file     Minutes per session: Not on file    Stress: Not on file   Relationships    Social connections:     Talks on phone: Not on file     Gets together: Not on file     Attends Jainism service: Not on file     Active member of club or organization: Not on file     Attends meetings of clubs or organizations: Not on file     Relationship status: Not on file    Intimate partner violence:     Fear of current or ex partner: Not on file     Emotionally abused: Not on file     Physically abused: Not on file     Forced sexual activity: Not on file   Other Topics Concern    Not on file   Social History Narrative    Not on file        Review of Systems   Constitutional: Negative  HENT: Negative  Eyes: Negative  Respiratory: Negative      Cardiovascular: Negative  Gastrointestinal:        Per HPI   Psychiatric/Behavioral: Positive for sleep disturbance  Objective:      /80   Pulse 58   Temp 98 4 °F (36 9 °C) (Tympanic)   Resp 16   Ht 4' 11" (1 499 m)   Wt 74 8 kg (165 lb)   SpO2 97%   BMI 33 33 kg/m²          Physical Exam   Constitutional: She is oriented to person, place, and time  She appears well-developed and well-nourished  HENT:   Head: Normocephalic and atraumatic  Right Ear: External ear normal    Left Ear: External ear normal    Eyes: Pupils are equal, round, and reactive to light  Neck: Neck supple  Cardiovascular: Normal rate and regular rhythm  Pulmonary/Chest: Effort normal and breath sounds normal  She has no wheezes  She has no rales  Musculoskeletal: She exhibits no edema  Lymphadenopathy:     She has no cervical adenopathy  Neurological: She is alert and oriented to person, place, and time  Skin: Skin is warm and dry  Psychiatric: She has a normal mood and affect  Nursing note and vitals reviewed

## 2020-02-10 DIAGNOSIS — K21.9 GASTROESOPHAGEAL REFLUX DISEASE, ESOPHAGITIS PRESENCE NOT SPECIFIED: ICD-10-CM

## 2020-02-10 RX ORDER — PANTOPRAZOLE SODIUM 40 MG/1
40 TABLET, DELAYED RELEASE ORAL DAILY
Qty: 30 TABLET | Refills: 1 | Status: SHIPPED | OUTPATIENT
Start: 2020-02-10 | End: 2020-04-22

## 2020-02-14 ENCOUNTER — OFFICE VISIT (OUTPATIENT)
Dept: FAMILY MEDICINE CLINIC | Facility: CLINIC | Age: 85
End: 2020-02-14
Payer: COMMERCIAL

## 2020-02-14 VITALS
HEIGHT: 59 IN | TEMPERATURE: 97.2 F | DIASTOLIC BLOOD PRESSURE: 94 MMHG | RESPIRATION RATE: 18 BRPM | BODY MASS INDEX: 33.33 KG/M2 | SYSTOLIC BLOOD PRESSURE: 152 MMHG | HEART RATE: 60 BPM

## 2020-02-14 DIAGNOSIS — F41.9 ANXIETY: ICD-10-CM

## 2020-02-14 DIAGNOSIS — I10 ESSENTIAL HYPERTENSION: Primary | ICD-10-CM

## 2020-02-14 PROCEDURE — 99213 OFFICE O/P EST LOW 20 MIN: CPT | Performed by: FAMILY MEDICINE

## 2020-02-14 RX ORDER — ALPRAZOLAM 1 MG/1
1 TABLET ORAL
Qty: 30 TABLET | Refills: 0 | Status: SHIPPED | OUTPATIENT
Start: 2020-02-14 | End: 2020-03-12 | Stop reason: SDUPTHER

## 2020-02-14 NOTE — PROGRESS NOTES
Assessment/Plan:     Diagnoses and all orders for this visit:    Essential hypertension    Anxiety  -     ALPRAZolam (XANAX) 1 mg tablet; Take 1 tablet (1 mg total) by mouth daily at bedtime as needed for sleep        Discussed b/p reading today  She is upset over abdominal pain and upcoming EGD  She will continue current meds  RTC 1 month          Subjective:        Patient ID: Harl Apgar is a 80 y o  female  Chief Complaint   Patient presents with    Hypertension    Medication Refill     xanax last filled 2020       81 yo female presents for med refill  She is doing well  She is scheduled for EGD next week for the episodic abdominal pain  No other complainst today        The following portions of the patient's history were reviewed and updated as appropriate: allergies, current medications, past family history, past medical history, past social history, past surgical history and problem list     Patient Active Problem List   Diagnosis    Status post total replacement of right hip    Benign essential hypertension    Hyperlipidemia    Elevated blood uric acid level    History of gout    Dextroscoliosis    Screening-pulmonary TB    Anemia    Anxiety    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 re-entry tachycardia) (HCC)    Dyslipidemia    Gout    Iron deficiency anemia    History of cardiac radiofrequency ablation       Current Outpatient Medications   Medication Sig Dispense Refill    albuterol (PROVENTIL HFA,VENTOLIN HFA) 90 mcg/act inhaler Inhale 2 puffs every 6 (six) hours as needed for wheezing      allopurinol (ZYLOPRIM) 100 mg tablet Take 1 tablet (100 mg total) by mouth daily 90 tablet 1    ALPRAZolam (XANAX) 1 mg tablet Take 1 tablet (1 mg total) by mouth daily at bedtime as needed for sleep 30 tablet 0    amLODIPine (NORVASC) 5 mg tablet Take 1 tablet (5 mg total) by mouth daily 90 tablet 3    aspirin (ECOTRIN LOW STRENGTH) 81 mg EC tablet Take 1 tablet by mouth daily 1 tablet 0    atorvastatin (LIPITOR) 20 mg tablet Take 1 tablet (20 mg total) by mouth daily 90 tablet 1    CARAFATE 1 GM/10ML suspension       clotrimazole-betamethasone (LOTRISONE) 1-0 05 % cream Apply topically 2 (two) times a day 30 g 0    diclofenac-misoprostol (ARTHROTEC 75) 75-0 2 MG per tablet TAKE ONE (1) TABLET BY MOUTH TWO TIMES DAILY  180 tablet 2    docusate sodium (COLACE) 100 mg capsule Take 100 mg by mouth 2 (two) times a day as needed for constipation      EPINEPHrine (EPIPEN) 0 3 mg/0 3 mL SOAJ Inject as necessary 1 each 1    ferrous sulfate 325 (65 Fe) mg tablet Take by mouth      furosemide (LASIX) 20 mg tablet Take 1 tablet (20 mg total) by mouth 2 (two) times a day 180 tablet 3    Inulin (FIBER CHOICE PO) Take by mouth      loperamide (IMODIUM) 2 mg capsule Take 1 capsule (2 mg total) by mouth 4 (four) times a day as needed for diarrhea 30 capsule 0    metoprolol tartrate (LOPRESSOR) 100 mg tablet Take 1 tablet (100 mg total) by mouth 2 (two) times a day 180 tablet 1    olmesartan (BENICAR) 40 mg tablet Take 1 tablet (40 mg total) by mouth daily 90 tablet 3    olopatadine HCl (PATADAY) 0 2 % opth drops Administer 1 drop to both eyes daily 2 5 mL 0    oxybutynin (DITROPAN-XL) 10 MG 24 hr tablet Take 1 tablet (10 mg total) by mouth daily 90 tablet 1    pantoprazole (PROTONIX) 40 mg tablet Take 1 tablet (40 mg total) by mouth daily 30 tablet 1    potassium chloride (K-DUR,KLOR-CON) 20 mEq tablet TAKE ONE (1) TABLET BY MOUTH DAILY WITH FOOD  90 tablet 1    sucralfate (CARAFATE) 1 g/10 mL suspension Take 10 mL (1 g total) by mouth 4 (four) times a day 420 mL 0    mometasone (NASONEX) 50 mcg/act nasal spray 2 sprays into each nostril daily (Patient not taking: Reported on 2/14/2020) 1 Act 1     No current facility-administered medications for this visit           Past Medical History:   Diagnosis Date    Arthritis     Cardiac disease     Chronic pain     Gout     Hypertension         Past Surgical History:   Procedure Laterality Date    APPENDECTOMY      ATRIAL ABLATION SURGERY      HYSTERECTOMY      JOINT REPLACEMENT  knees    KNEE ARTHROSCOPY      b/l knees    LAMINECTOMY      [place] TOTAL HIP ARTHROPLASTY Right 7/17/2017    Procedure: TOTAL ANTERIOR HIP ARTHROPLASTY;  Surgeon: Jazzy Ortiz MD;  Location: [facility];  Service: Orthopedics        Social History     Socioeconomic History    Marital status: /Civil Union     Spouse name: Not on file    Number of children: Not on file    Years of education: Not on file    Highest education level: Not on file   Occupational History    Not on file   Social Needs    Financial resource strain: Not on file    Food insecurity:     Worry: Not on file     Inability: Not on file    Transportation needs:     Medical: Not on file     Non-medical: Not on file   Tobacco Use    Smoking status: Never Smoker    Smokeless tobacco: Never Used   Substance and Sexual Activity    Alcohol use: No    Drug use: No    Sexual activity: Not on file   Lifestyle    Physical activity:     Days per week: Not on file     Minutes per session: Not on file    Stress: Not on file   Relationships    Social connections:     Talks on phone: Not on file     Gets together: Not on file     Attends Buddhism service: Not on file     Active member of club or organization: Not on file     Attends meetings of clubs or organizations: Not on file     Relationship status: Not on file    Intimate partner violence:     Fear of current or ex partner: Not on file     Emotionally abused: Not on file     Physically abused: Not on file     Forced sexual activity: Not on file   Other Topics Concern    Not on file   Social History Narrative    Not on file        Review of Systems   Constitutional: Negative  HENT: Negative  Eyes: Negative  Respiratory: Negative  Cardiovascular: Negative  Gastrointestinal: Positive for abdominal pain     Psychiatric/Behavioral: Positive for sleep disturbance  Objective:      /94   Pulse 60   Temp (!) 97 2 °F (36 2 °C) (Tympanic)   Resp 18   Ht 4' 11" (1 499 m)   BMI 33 33 kg/m²          Physical Exam   Constitutional: She is oriented to person, place, and time  She appears well-developed and well-nourished  HENT:   Head: Normocephalic and atraumatic  Right Ear: External ear normal    Left Ear: External ear normal    Eyes: Pupils are equal, round, and reactive to light  Neck: Normal range of motion  Neck supple  No thyromegaly present  Cardiovascular: Normal rate, regular rhythm and normal heart sounds  Pulmonary/Chest: Effort normal and breath sounds normal  She has no wheezes  She has no rales  Musculoskeletal: She exhibits no edema  Lymphadenopathy:     She has no cervical adenopathy  Neurological: She is alert and oriented to person, place, and time  No cranial nerve deficit  Skin: Skin is warm and dry  Psychiatric: She has a normal mood and affect  Nursing note and vitals reviewed

## 2020-02-27 DIAGNOSIS — E87.6 HYPOKALEMIA: ICD-10-CM

## 2020-02-27 RX ORDER — POTASSIUM CHLORIDE 20 MEQ/1
TABLET, EXTENDED RELEASE ORAL
Qty: 90 TABLET | Refills: 3 | Status: SHIPPED | OUTPATIENT
Start: 2020-02-27 | End: 2020-10-20

## 2020-03-12 ENCOUNTER — OFFICE VISIT (OUTPATIENT)
Dept: FAMILY MEDICINE CLINIC | Facility: CLINIC | Age: 85
End: 2020-03-12
Payer: COMMERCIAL

## 2020-03-12 VITALS
HEART RATE: 62 BPM | HEIGHT: 59 IN | RESPIRATION RATE: 18 BRPM | BODY MASS INDEX: 33.33 KG/M2 | SYSTOLIC BLOOD PRESSURE: 152 MMHG | DIASTOLIC BLOOD PRESSURE: 78 MMHG | TEMPERATURE: 98.6 F | OXYGEN SATURATION: 92 %

## 2020-03-12 DIAGNOSIS — T78.2XXD ANAPHYLAXIS, SUBSEQUENT ENCOUNTER: ICD-10-CM

## 2020-03-12 DIAGNOSIS — F41.9 ANXIETY: ICD-10-CM

## 2020-03-12 DIAGNOSIS — I47.1 AVNRT (AV NODAL RE-ENTRY TACHYCARDIA) (HCC): ICD-10-CM

## 2020-03-12 PROCEDURE — 99213 OFFICE O/P EST LOW 20 MIN: CPT | Performed by: FAMILY MEDICINE

## 2020-03-12 RX ORDER — EPINEPHRINE 0.3 MG/.3ML
INJECTION SUBCUTANEOUS
Qty: 2 EACH | Refills: 1 | Status: SHIPPED | OUTPATIENT
Start: 2020-03-12 | End: 2021-05-12 | Stop reason: SDUPTHER

## 2020-03-12 RX ORDER — OMEPRAZOLE 20 MG/1
CAPSULE, DELAYED RELEASE ORAL
COMMUNITY
Start: 2020-02-20 | End: 2020-12-31 | Stop reason: ALTCHOICE

## 2020-03-12 RX ORDER — ALPRAZOLAM 1 MG/1
1 TABLET ORAL
Qty: 30 TABLET | Refills: 0 | Status: SHIPPED | OUTPATIENT
Start: 2020-03-12 | End: 2020-04-20 | Stop reason: SDUPTHER

## 2020-03-12 NOTE — PROGRESS NOTES
Assessment/Plan:     Diagnoses and all orders for this visit:    Anaphylaxis, subsequent encounter  -     EPINEPHrine (EPIPEN) 0 3 mg/0 3 mL SOAJ; Inject as necessary    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 re-entry tachycardia) (HCC)    Anxiety  -     ALPRAZolam (XANAX) 1 mg tablet; Take 1 tablet (1 mg total) by mouth daily at bedtime as needed for sleep    Other orders  -     omeprazole (PriLOSEC) 20 mg delayed release capsule        Continue current meds  Follow up with Cardiology as scheduled  Will attempt to get  Records from GI  RTC 1 month          Subjective:        Patient ID: Esther Jiemnez is a 80 y o  female  Chief Complaint   Patient presents with    Follow-up       79 yo female presents for med refill  She is going well  No complaints today  She did have EGD last month and prescribed Prilosec and has been feeling better        The following portions of the patient's history were reviewed and updated as appropriate: allergies, current medications, past family history, past medical history, past social history, past surgical history and problem list     Patient Active Problem List   Diagnosis    Status post total replacement of right hip    Benign essential hypertension    Hyperlipidemia    Elevated blood uric acid level    History of gout    Dextroscoliosis    Screening-pulmonary TB    Anemia    Anxiety    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 re-entry tachycardia) (HCC)    Dyslipidemia    Gout    Iron deficiency anemia    History of cardiac radiofrequency ablation       Current Outpatient Medications   Medication Sig Dispense Refill    albuterol (PROVENTIL HFA,VENTOLIN HFA) 90 mcg/act inhaler Inhale 2 puffs every 6 (six) hours as needed for wheezing      allopurinol (ZYLOPRIM) 100 mg tablet Take 1 tablet (100 mg total) by mouth daily 90 tablet 1    ALPRAZolam (XANAX) 1 mg tablet Take 1 tablet (1 mg total) by mouth daily at bedtime as needed for sleep 30 tablet 0    amLODIPine (NORVASC) 5 mg tablet Take 1 tablet (5 mg total) by mouth daily 90 tablet 3    aspirin (ECOTRIN LOW STRENGTH) 81 mg EC tablet Take 1 tablet by mouth daily 1 tablet 0    atorvastatin (LIPITOR) 20 mg tablet Take 1 tablet (20 mg total) by mouth daily 90 tablet 1    CARAFATE 1 GM/10ML suspension       clotrimazole-betamethasone (LOTRISONE) 1-0 05 % cream Apply topically 2 (two) times a day 30 g 0    diclofenac-misoprostol (ARTHROTEC 75) 75-0 2 MG per tablet TAKE ONE (1) TABLET BY MOUTH TWO TIMES DAILY  180 tablet 2    docusate sodium (COLACE) 100 mg capsule Take 100 mg by mouth 2 (two) times a day as needed for constipation      EPINEPHrine (EPIPEN) 0 3 mg/0 3 mL SOAJ Inject as necessary 2 each 1    ferrous sulfate 325 (65 Fe) mg tablet Take by mouth      furosemide (LASIX) 20 mg tablet Take 1 tablet (20 mg total) by mouth 2 (two) times a day 180 tablet 3    Inulin (FIBER CHOICE PO) Take by mouth      loperamide (IMODIUM) 2 mg capsule Take 1 capsule (2 mg total) by mouth 4 (four) times a day as needed for diarrhea 30 capsule 0    metoprolol tartrate (LOPRESSOR) 100 mg tablet Take 1 tablet (100 mg total) by mouth 2 (two) times a day 180 tablet 1    olmesartan (BENICAR) 40 mg tablet Take 1 tablet (40 mg total) by mouth daily 90 tablet 3    olopatadine HCl (PATADAY) 0 2 % opth drops Administer 1 drop to both eyes daily 2 5 mL 0    omeprazole (PriLOSEC) 20 mg delayed release capsule       oxybutynin (DITROPAN-XL) 10 MG 24 hr tablet Take 1 tablet (10 mg total) by mouth daily 90 tablet 1    potassium chloride (K-DUR,KLOR-CON) 20 mEq tablet TAKE ONE (1) TABLET BY MOUTH DAILY WITH FOOD   90 tablet 3    sucralfate (CARAFATE) 1 g/10 mL suspension Take 10 mL (1 g total) by mouth 4 (four) times a day 420 mL 0    mometasone (NASONEX) 50 mcg/act nasal spray 2 sprays into each nostril daily (Patient not taking: Reported on 2/14/2020) 1 Act 1    pantoprazole (PROTONIX) 40 mg tablet Take 1 tablet (40 mg total) by mouth daily (Patient not taking: Reported on 3/12/2020) 30 tablet 1     No current facility-administered medications for this visit           Past Medical History:   Diagnosis Date    Arthritis     Cardiac disease     Chronic pain     Gout     Hypertension         Past Surgical History:   Procedure Laterality Date    APPENDECTOMY      ATRIAL ABLATION SURGERY      HYSTERECTOMY      JOINT REPLACEMENT  knees    KNEE ARTHROSCOPY      b/l knees    LAMINECTOMY      [place] TOTAL HIP ARTHROPLASTY Right 7/17/2017    Procedure: TOTAL ANTERIOR HIP ARTHROPLASTY;  Surgeon: Jazzy Ortiz MD;  Location: [facility];  Service: Orthopedics        Social History     Socioeconomic History    Marital status: /Civil Union     Spouse name: Not on file    Number of children: Not on file    Years of education: Not on file    Highest education level: Not on file   Occupational History    Not on file   Social Needs    Financial resource strain: Not on file    Food insecurity:     Worry: Not on file     Inability: Not on file    Transportation needs:     Medical: Not on file     Non-medical: Not on file   Tobacco Use    Smoking status: Never Smoker    Smokeless tobacco: Never Used   Substance and Sexual Activity    Alcohol use: No    Drug use: No    Sexual activity: Not on file   Lifestyle    Physical activity:     Days per week: Not on file     Minutes per session: Not on file    Stress: Not on file   Relationships    Social connections:     Talks on phone: Not on file     Gets together: Not on file     Attends Pentecostal service: Not on file     Active member of club or organization: Not on file     Attends meetings of clubs or organizations: Not on file     Relationship status: Not on file    Intimate partner violence:     Fear of current or ex partner: Not on file     Emotionally abused: Not on file     Physically abused: Not on file     Forced sexual activity: Not on file   Other Topics Concern    Not on file   Social History Narrative    Not on file    Review of Systems   Constitutional: Negative  HENT: Negative  Eyes: Negative  Respiratory: Negative  Cardiovascular: Negative  Gastrointestinal:        Per HPI   Psychiatric/Behavioral: Positive for sleep disturbance  Objective:      /78   Pulse 62   Temp 98 6 °F (37 °C) (Tympanic)   Resp 18   Ht 4' 11" (1 499 m)   SpO2 92%   BMI 33 33 kg/m²          Physical Exam   Constitutional: She is oriented to person, place, and time  She appears well-developed and well-nourished  HENT:   Head: Normocephalic and atraumatic  Right Ear: External ear normal    Left Ear: External ear normal    Eyes: Pupils are equal, round, and reactive to light  Neck: Neck supple  Cardiovascular: Normal rate, regular rhythm and normal heart sounds  Pulmonary/Chest: Effort normal and breath sounds normal  She has no wheezes  She has no rales  Musculoskeletal: She exhibits no edema  Lymphadenopathy:     She has no cervical adenopathy  Neurological: She is alert and oriented to person, place, and time  Psychiatric: She has a normal mood and affect  Nursing note and vitals reviewed

## 2020-03-14 DIAGNOSIS — R06.02 SOB (SHORTNESS OF BREATH) ON EXERTION: Primary | ICD-10-CM

## 2020-03-21 DIAGNOSIS — I10 ESSENTIAL HYPERTENSION: ICD-10-CM

## 2020-03-30 ENCOUNTER — TELEMEDICINE (OUTPATIENT)
Dept: FAMILY MEDICINE CLINIC | Facility: CLINIC | Age: 85
End: 2020-03-30
Payer: COMMERCIAL

## 2020-03-30 DIAGNOSIS — R05.9 COUGH IN ADULT PATIENT: Primary | ICD-10-CM

## 2020-03-30 PROCEDURE — G0071 COMM SVCS BY RHC/FQHC 5 MIN: HCPCS | Performed by: FAMILY MEDICINE

## 2020-03-30 RX ORDER — BENZONATATE 200 MG/1
200 CAPSULE ORAL 3 TIMES DAILY PRN
Qty: 20 CAPSULE | Refills: 0 | Status: SHIPPED | OUTPATIENT
Start: 2020-03-30 | End: 2020-04-22

## 2020-03-30 RX ORDER — PENICILLIN V POTASSIUM 250 MG/1
250 TABLET ORAL 3 TIMES DAILY
Qty: 21 TABLET | Refills: 0 | Status: SHIPPED | OUTPATIENT
Start: 2020-03-30 | End: 2020-04-06

## 2020-03-30 NOTE — PROGRESS NOTES
Virtual Brief Visit    Problem List Items Addressed This Visit     None      Visit Diagnoses     Cough in adult patient    -  Primary    Relevant Medications    penicillin V potassium (VEETID) 250 mg tablet    benzonatate (TESSALON) 200 MG capsule          BMI Counseling: There is no height or weight on file to calculate BMI  The BMI is above normal  Nutrition recommendations include decreasing portion sizes, encouraging healthy choices of fruits and vegetables, decreasing fast food intake, consuming healthier snacks, limiting drinks that contain sugar, moderation in carbohydrate intake and increasing intake of lean protein  Exercise recommendations include strength training exercises  No pharmacotherapy was ordered  Reason for visit is cough    Encounter provider Sergey Frankel PA-C    Provider located at [place] [address]  [address]      Recent Visits  No visits were found meeting these conditions  Showing recent visits within past 7 days and meeting all other requirements     Today's Visits  Date Type Provider Dept   03/30/20 Telemedicine [facility] today's visits and meeting all other requirements     Future Appointments  Date Type Provider Dept   03/30/20 Telemedicine [facility]   Showing future appointments within next 150 days and meeting all other requirements        After connecting through telephone, the patient was identified by name and date of birth  Ce Escobedo was informed that this is a telemedicine visit and that the visit is being conducted through telephone  My office door was closed  No one else was in the room  She acknowledged consent and understanding of privacy and security of the video platform  The patient has agreed to participate and understands they can discontinue the visit at any time      Patient is aware this is a billable service  Subjective  Jason Arguelles is a 80 y o  female  This is a tele[phone visit  Today she c/o cough x 1 month  She had this at last office visit  'It is dry  No fever or chills, No SOB  Denies any sneezing or nasal congestion  Past Medical History:   Diagnosis Date    Arthritis     Cardiac disease     Chronic pain     Gout     Hypertension        Past Surgical History:   Procedure Laterality Date    APPENDECTOMY      ATRIAL ABLATION SURGERY      HYSTERECTOMY      JOINT REPLACEMENT  knees    KNEE ARTHROSCOPY      b/l knees    LAMINECTOMY      AL TOTAL HIP ARTHROPLASTY Right 7/17/2017    Procedure: TOTAL ANTERIOR HIP ARTHROPLASTY;  Surgeon: Lisha Mckeon MD;  Location: [facility];  Service: Orthopedics       Current Outpatient Medications   Medication Sig Dispense Refill    allopurinol (ZYLOPRIM) 100 mg tablet Take 1 tablet (100 mg total) by mouth daily 90 tablet 1    ALPRAZolam (XANAX) 1 mg tablet Take 1 tablet (1 mg total) by mouth daily at bedtime as needed for sleep 30 tablet 0    amLODIPine (NORVASC) 5 mg tablet Take 1 tablet (5 mg total) by mouth daily 90 tablet 3    aspirin (ECOTRIN LOW STRENGTH) 81 mg EC tablet Take 1 tablet by mouth daily 1 tablet 0    atorvastatin (LIPITOR) 20 mg tablet Take 1 tablet (20 mg total) by mouth daily 90 tablet 1    CARAFATE 1 GM/10ML suspension       clotrimazole-betamethasone (LOTRISONE) 1-0 05 % cream Apply topically 2 (two) times a day 30 g 0    diclofenac-misoprostol (ARTHROTEC 75) 75-0 2 MG per tablet TAKE ONE (1) TABLET BY MOUTH TWO TIMES DAILY   180 tablet 2    docusate sodium (COLACE) 100 mg capsule Take 100 mg by mouth 2 (two) times a day as needed for constipation      EPINEPHrine (EPIPEN) 0 3 mg/0 3 mL SOAJ Inject as necessary 2 each 1    ferrous sulfate 325 (65 Fe) mg tablet Take by mouth      furosemide (LASIX) 20 mg tablet Take 1 tablet (20 mg total) by mouth 2 (two) times a day 180 tablet 3    Inulin (FIBER CHOICE PO) Take by mouth      loperamide (IMODIUM) 2 mg capsule Take 1 capsule (2 mg total) by mouth 4 (four) times a day as needed for diarrhea 30 capsule 0    metoprolol tartrate (LOPRESSOR) 100 mg tablet Take 1 tablet (100 mg total) by mouth 2 (two) times a day 180 tablet 1    olmesartan (BENICAR) 40 mg tablet Take 1 tablet (40 mg total) by mouth daily 90 tablet 3    olopatadine HCl (PATADAY) 0 2 % opth drops Administer 1 drop to both eyes daily 2 5 mL 0    omeprazole (PriLOSEC) 20 mg delayed release capsule       oxybutynin (DITROPAN-XL) 10 MG 24 hr tablet Take 1 tablet (10 mg total) by mouth daily 90 tablet 1    potassium chloride (K-DUR,KLOR-CON) 20 mEq tablet TAKE ONE (1) TABLET BY MOUTH DAILY WITH FOOD  90 tablet 3    sucralfate (CARAFATE) 1 g/10 mL suspension Take 10 mL (1 g total) by mouth 4 (four) times a day 420 mL 0    VENTOLIN  (90 Base) MCG/ACT inhaler INHALE 1 TO 2 PUFFS EVERY 6 HOURS AS NEEDED  18 g 3    benzonatate (TESSALON) 200 MG capsule Take 1 capsule (200 mg total) by mouth 3 (three) times a day as needed for cough 20 capsule 0    mometasone (NASONEX) 50 mcg/act nasal spray 2 sprays into each nostril daily (Patient not taking: Reported on 3/30/2020) 1 Act 1    pantoprazole (PROTONIX) 40 mg tablet Take 1 tablet (40 mg total) by mouth daily (Patient not taking: Reported on 3/12/2020) 30 tablet 1    penicillin V potassium (VEETID) 250 mg tablet Take 1 tablet (250 mg total) by mouth 3 (three) times a day for 7 days 21 tablet 0     No current facility-administered medications for this visit  Allergies   Allergen Reactions    Bee Venom Anaphylaxis    Codeine GI Intolerance     Nausea/vomiting    Tetanus Immune Globulin Other (See Comments)    Tetanus Toxoid     Tetanus Toxoids        Review of Systems   Constitutional: Negative  HENT: Negative  Eyes: Negative  Respiratory: Positive for cough  Cardiovascular: Negative  Psychiatric/Behavioral: Negative            I spent 10  minutes with the patient during this visit  no chills/no vomiting/no fever

## 2020-04-03 DIAGNOSIS — M19.90 ARTHRITIS: ICD-10-CM

## 2020-04-06 RX ORDER — DICLOFENAC SODIUM AND MISOPROSTOL 75; 200 MG/1; UG/1
TABLET, DELAYED RELEASE ORAL
Qty: 180 TABLET | Refills: 3 | Status: SHIPPED | OUTPATIENT
Start: 2020-04-06 | End: 2020-10-12 | Stop reason: SDUPTHER

## 2020-04-20 ENCOUNTER — TELEMEDICINE (OUTPATIENT)
Dept: FAMILY MEDICINE CLINIC | Facility: CLINIC | Age: 85
End: 2020-04-20
Payer: COMMERCIAL

## 2020-04-20 DIAGNOSIS — I10 ESSENTIAL HYPERTENSION: Primary | ICD-10-CM

## 2020-04-20 DIAGNOSIS — F41.9 ANXIETY: ICD-10-CM

## 2020-04-20 PROCEDURE — G0071 COMM SVCS BY RHC/FQHC 5 MIN: HCPCS | Performed by: FAMILY MEDICINE

## 2020-04-20 RX ORDER — ALPRAZOLAM 1 MG/1
1 TABLET ORAL
Qty: 30 TABLET | Refills: 0 | Status: SHIPPED | OUTPATIENT
Start: 2020-04-20 | End: 2020-05-11 | Stop reason: SDUPTHER

## 2020-04-23 ENCOUNTER — TELEMEDICINE (OUTPATIENT)
Dept: CARDIOLOGY CLINIC | Facility: CLINIC | Age: 85
End: 2020-04-23
Payer: COMMERCIAL

## 2020-04-23 VITALS
HEIGHT: 59 IN | SYSTOLIC BLOOD PRESSURE: 120 MMHG | BODY MASS INDEX: 32.66 KG/M2 | WEIGHT: 162 LBS | HEART RATE: 70 BPM | DIASTOLIC BLOOD PRESSURE: 66 MMHG

## 2020-04-23 DIAGNOSIS — E78.5 DYSLIPIDEMIA: ICD-10-CM

## 2020-04-23 DIAGNOSIS — Z98.890 HISTORY OF CARDIAC RADIOFREQUENCY ABLATION: ICD-10-CM

## 2020-04-23 DIAGNOSIS — I47.1 AVNRT (AV NODAL RE-ENTRY TACHYCARDIA) (HCC): ICD-10-CM

## 2020-04-23 DIAGNOSIS — I10 BENIGN ESSENTIAL HYPERTENSION: Primary | ICD-10-CM

## 2020-04-23 PROCEDURE — 99442 PR PHYS/QHP TELEPHONE EVALUATION 11-20 MIN: CPT | Performed by: INTERNAL MEDICINE

## 2020-04-23 RX ORDER — UBIDECARENONE 100 MG
100 CAPSULE ORAL DAILY
COMMUNITY

## 2020-05-11 ENCOUNTER — TELEMEDICINE (OUTPATIENT)
Dept: FAMILY MEDICINE CLINIC | Facility: CLINIC | Age: 85
End: 2020-05-11
Payer: COMMERCIAL

## 2020-05-11 DIAGNOSIS — F41.9 ANXIETY: ICD-10-CM

## 2020-05-11 DIAGNOSIS — I10 ESSENTIAL HYPERTENSION: Primary | ICD-10-CM

## 2020-05-11 PROCEDURE — G0071 COMM SVCS BY RHC/FQHC 5 MIN: HCPCS | Performed by: FAMILY MEDICINE

## 2020-05-11 RX ORDER — ALPRAZOLAM 1 MG/1
1 TABLET ORAL
Qty: 30 TABLET | Refills: 0 | Status: SHIPPED | OUTPATIENT
Start: 2020-05-11 | End: 2020-06-09 | Stop reason: SDUPTHER

## 2020-05-15 RX ORDER — METOPROLOL TARTRATE 100 MG/1
100 TABLET ORAL 2 TIMES DAILY
Qty: 180 TABLET | Refills: 0 | Status: SHIPPED | OUTPATIENT
Start: 2020-05-15 | End: 2020-10-05

## 2020-06-09 ENCOUNTER — OFFICE VISIT (OUTPATIENT)
Dept: FAMILY MEDICINE CLINIC | Facility: CLINIC | Age: 85
End: 2020-06-09
Payer: COMMERCIAL

## 2020-06-09 VITALS
RESPIRATION RATE: 18 BRPM | TEMPERATURE: 97.6 F | HEART RATE: 68 BPM | DIASTOLIC BLOOD PRESSURE: 72 MMHG | HEIGHT: 59 IN | SYSTOLIC BLOOD PRESSURE: 134 MMHG | BODY MASS INDEX: 32.72 KG/M2 | OXYGEN SATURATION: 94 %

## 2020-06-09 DIAGNOSIS — N39.46 MIXED STRESS AND URGE URINARY INCONTINENCE: Primary | ICD-10-CM

## 2020-06-09 DIAGNOSIS — I10 ESSENTIAL HYPERTENSION: ICD-10-CM

## 2020-06-09 DIAGNOSIS — F41.9 ANXIETY: ICD-10-CM

## 2020-06-09 PROCEDURE — 99213 OFFICE O/P EST LOW 20 MIN: CPT | Performed by: FAMILY MEDICINE

## 2020-06-09 RX ORDER — ALPRAZOLAM 1 MG/1
1 TABLET ORAL
Qty: 30 TABLET | Refills: 0 | Status: SHIPPED | OUTPATIENT
Start: 2020-06-09 | End: 2020-07-08 | Stop reason: SDUPTHER

## 2020-06-22 ENCOUNTER — TELEPHONE (OUTPATIENT)
Dept: FAMILY MEDICINE CLINIC | Facility: CLINIC | Age: 85
End: 2020-06-22

## 2020-06-22 DIAGNOSIS — N39.46 MIXED STRESS AND URGE URINARY INCONTINENCE: Primary | ICD-10-CM

## 2020-06-22 RX ORDER — TOLTERODINE 4 MG/1
4 CAPSULE, EXTENDED RELEASE ORAL DAILY
Qty: 30 CAPSULE | Refills: 1 | Status: SHIPPED | OUTPATIENT
Start: 2020-06-22 | End: 2020-08-24

## 2020-07-03 DIAGNOSIS — K21.9 GASTROESOPHAGEAL REFLUX DISEASE, ESOPHAGITIS PRESENCE NOT SPECIFIED: ICD-10-CM

## 2020-07-07 RX ORDER — PANTOPRAZOLE SODIUM 40 MG/1
TABLET, DELAYED RELEASE ORAL
Qty: 30 TABLET | Refills: 0 | Status: SHIPPED | OUTPATIENT
Start: 2020-07-07 | End: 2020-08-10

## 2020-07-08 ENCOUNTER — OFFICE VISIT (OUTPATIENT)
Dept: FAMILY MEDICINE CLINIC | Facility: CLINIC | Age: 85
End: 2020-07-08
Payer: COMMERCIAL

## 2020-07-08 VITALS
SYSTOLIC BLOOD PRESSURE: 132 MMHG | HEART RATE: 63 BPM | DIASTOLIC BLOOD PRESSURE: 80 MMHG | OXYGEN SATURATION: 97 % | TEMPERATURE: 98.4 F

## 2020-07-08 DIAGNOSIS — N39.46 MIXED STRESS AND URGE URINARY INCONTINENCE: ICD-10-CM

## 2020-07-08 DIAGNOSIS — F51.01 PRIMARY INSOMNIA: Primary | ICD-10-CM

## 2020-07-08 PROCEDURE — 99213 OFFICE O/P EST LOW 20 MIN: CPT | Performed by: FAMILY MEDICINE

## 2020-07-08 RX ORDER — ALPRAZOLAM 1 MG/1
1 TABLET ORAL
Qty: 30 TABLET | Refills: 0 | Status: SHIPPED | OUTPATIENT
Start: 2020-07-08 | End: 2020-08-14 | Stop reason: SDUPTHER

## 2020-07-08 NOTE — PROGRESS NOTES
Assessment/Plan:     Diagnoses and all orders for this visit:    Primary insomnia  -     ALPRAZolam (XANAX) 1 mg tablet; Take 1 tablet (1 mg total) by mouth daily at bedtime as needed for sleep    Mixed stress and urge urinary incontinence        - PDMP reviewed, refill sent  - Continue tolterodine and follow up as scheduled    Return in about 22 days (around 7/30/2020) for Next scheduled follow up  Subjective:        Patient ID: Lily Castañeda is a 80 y o  female  Chief Complaint   Patient presents with    Medication Refill     pt needs xanax refilled, also is out of tolteridone but feels like it may need to be increased       Yoselyn Colin is an 80year old female presenting for medication refill  Patient has been managed on xanax 1 mg daily at bedtime for insomnia  Reports good control with this medication  States that she has tried to sleep without taking it and will only sleep for 1 hour and then be up for the rest of the night  Patient was recently started on tolterodine 4 mg daily for stress/urge urinary incontinence  Reports that this medication helps some  She has tried other medications in the past without relief        The following portions of the patient's history were reviewed and updated as appropriate: allergies, current medications, past family history, past medical history, past social history, past surgical history and problem list     Patient Active Problem List   Diagnosis    Status post total replacement of right hip    Benign essential hypertension    Hyperlipidemia    Elevated blood uric acid level    History of gout    Dextroscoliosis    Screening-pulmonary TB    Anemia    Anxiety    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 re-entry tachycardia) (HCC)    Dyslipidemia    Gout    Iron deficiency anemia    History of cardiac radiofrequency ablation    Primary insomnia       Current Outpatient Medications   Medication Sig Dispense Refill    ALPRAZolam (XANAX) 1 mg tablet Take 1 tablet (1 mg total) by mouth daily at bedtime as needed for sleep 30 tablet 0    amLODIPine (NORVASC) 5 mg tablet Take 1 tablet (5 mg total) by mouth daily 90 tablet 3    aspirin (ECOTRIN LOW STRENGTH) 81 mg EC tablet Take 1 tablet by mouth daily 1 tablet 0    atorvastatin (LIPITOR) 20 mg tablet Take 1 tablet (20 mg total) by mouth daily 90 tablet 1    co-enzyme Q-10 100 mg capsule Take 100 mg by mouth daily      diclofenac-misoprostol (ARTHROTEC 75) 75-0 2 MG per tablet TAKE ONE (1) TABLET BY MOUTH TWO TIMES DAILY  180 tablet 3    docusate sodium (COLACE) 100 mg capsule Take 100 mg by mouth 2 (two) times a day as needed for constipation      EPINEPHrine (EPIPEN) 0 3 mg/0 3 mL SOAJ Inject as necessary 2 each 1    ferrous sulfate 325 (65 Fe) mg tablet Take by mouth      furosemide (LASIX) 20 mg tablet Take 1 tablet (20 mg total) by mouth 2 (two) times a day 180 tablet 3    Inulin (FIBER CHOICE PO) Take by mouth      metoprolol tartrate (LOPRESSOR) 100 mg tablet Take 1 tablet (100 mg total) by mouth 2 (two) times a day 180 tablet 0    olmesartan (BENICAR) 40 mg tablet Take 1 tablet (40 mg total) by mouth daily 90 tablet 3    omeprazole (PriLOSEC) 20 mg delayed release capsule       pantoprazole (PROTONIX) 40 mg tablet TAKE (1) TABLET BY MOUTH DAILY  30 tablet 0    potassium chloride (K-DUR,KLOR-CON) 20 mEq tablet TAKE ONE (1) TABLET BY MOUTH DAILY WITH FOOD  90 tablet 3    tolterodine (DETROL LA) 4 mg 24 hr capsule Take 1 capsule (4 mg total) by mouth daily 30 capsule 1    VENTOLIN  (90 Base) MCG/ACT inhaler INHALE 1 TO 2 PUFFS EVERY 6 HOURS AS NEEDED  18 g 3     No current facility-administered medications for this visit           Past Medical History:   Diagnosis Date    Arthritis     Cardiac disease     Chronic pain     Gout     Hypertension         Past Surgical History:   Procedure Laterality Date    APPENDECTOMY      ATRIAL ABLATION SURGERY      HYSTERECTOMY      JOINT REPLACEMENT  knees    KNEE ARTHROSCOPY      b/l knees    LAMINECTOMY      [place] TOTAL HIP ARTHROPLASTY Right 7/17/2017    Procedure: TOTAL ANTERIOR HIP ARTHROPLASTY;  Surgeon: Milagros Reis MD;  Location: [facility];  Service: Orthopedics        Social History     Socioeconomic History    Marital status: /Civil Union     Spouse name: Not on file    Number of children: Not on file    Years of education: Not on file    Highest education level: Not on file   Occupational History    Not on file   Social Needs    Financial resource strain: Not on file    Food insecurity:     Worry: Not on file     Inability: Not on file    Transportation needs:     Medical: Not on file     Non-medical: Not on file   Tobacco Use    Smoking status: Never Smoker    Smokeless tobacco: Never Used   Substance and Sexual Activity    Alcohol use: No    Drug use: No    Sexual activity: Not on file   Lifestyle    Physical activity:     Days per week: Not on file     Minutes per session: Not on file    Stress: Not on file   Relationships    Social connections:     Talks on phone: Not on file     Gets together: Not on file     Attends [employer] service: Not on file     Active member of club or organization: Not on file     Attends meetings of clubs or organizations: Not on file     Relationship status: Not on file    Intimate partner violence:     Fear of current or ex partner: Not on file     Emotionally abused: Not on file     Physically abused: Not on file     Forced sexual activity: Not on file   Other Topics Concern    Not on file   Social History Narrative    Not on file        Review of Systems   Constitutional: Negative for appetite change, chills, diaphoresis, fever and unexpected weight change  Respiratory: Negative for cough, chest tightness, shortness of breath and wheezing  Cardiovascular: Negative for chest pain, palpitations and leg swelling     Gastrointestinal: Negative for abdominal pain, blood in stool, constipation, diarrhea, nausea and vomiting  Genitourinary: Positive for frequency and urgency  Negative for difficulty urinating, dysuria and hematuria  Skin: Negative for rash and wound  Neurological: Negative for dizziness, syncope, weakness, light-headedness and headaches  Psychiatric/Behavioral: Negative for dysphoric mood, self-injury, sleep disturbance and suicidal ideas  The patient is not nervous/anxious  Objective:      /80   Pulse 63   Temp 98 4 °F (36 9 °C) (Tympanic)   SpO2 97%          Physical Exam   Constitutional: She is oriented to person, place, and time  She appears well-developed and well-nourished  No distress  HENT:   Head: Normocephalic and atraumatic  Cardiovascular: Normal rate, regular rhythm and normal heart sounds  No murmur heard  Pulmonary/Chest: Effort normal and breath sounds normal  No respiratory distress  She has no wheezes  Musculoskeletal: She exhibits no edema  Neurological: She is alert and oriented to person, place, and time  Skin: Skin is warm and dry  Psychiatric: She has a normal mood and affect  Her behavior is normal    Nursing note and vitals reviewed

## 2020-08-08 DIAGNOSIS — K21.9 GASTROESOPHAGEAL REFLUX DISEASE, ESOPHAGITIS PRESENCE NOT SPECIFIED: ICD-10-CM

## 2020-08-10 RX ORDER — PANTOPRAZOLE SODIUM 40 MG/1
TABLET, DELAYED RELEASE ORAL
Qty: 90 TABLET | Refills: 1 | Status: SHIPPED | OUTPATIENT
Start: 2020-08-10 | End: 2020-10-15 | Stop reason: ALTCHOICE

## 2020-08-14 ENCOUNTER — TELEMEDICINE (OUTPATIENT)
Dept: FAMILY MEDICINE CLINIC | Facility: CLINIC | Age: 85
End: 2020-08-14
Payer: COMMERCIAL

## 2020-08-14 DIAGNOSIS — F41.9 ANXIETY: Primary | ICD-10-CM

## 2020-08-14 DIAGNOSIS — F51.01 PRIMARY INSOMNIA: ICD-10-CM

## 2020-08-14 PROCEDURE — G0071 COMM SVCS BY RHC/FQHC 5 MIN: HCPCS | Performed by: FAMILY MEDICINE

## 2020-08-14 RX ORDER — ALPRAZOLAM 1 MG/1
1 TABLET ORAL
Qty: 30 TABLET | Refills: 0 | Status: SHIPPED | OUTPATIENT
Start: 2020-08-14 | End: 2020-09-18 | Stop reason: SDUPTHER

## 2020-08-14 NOTE — PROGRESS NOTES
Virtual Brief Visit    Assessment/Plan:    Problem List Items Addressed This Visit        Other    Anxiety - Primary      Xanex sent by Dr Linnette Santana          Reason for visit is   Chief Complaint   Patient presents with    Medication Refill     Patient need refill on xanas has one more pill left  Encounter provider Shena Kiran MD    Provider located at [address] [address]    Recent Visits  No visits were found meeting these conditions  Showing recent visits within past 7 days and meeting all other requirements     Today's Visits  Date Type Provider Dept   08/14/20 Telemedicine Shena Kiran  [address] today's visits and meeting all other requirements     Future Appointments  No visits were found meeting these conditions  Showing future appointments within next 150 days and meeting all other requirements        After connecting through telephone, the patient was identified by name and date of birth  Kaila Bennett was informed that this is a telemedicine visit and that the visit is being conducted through telephone  My office door was closed  No one else was in the room  She acknowledged consent and understanding of privacy and security of the platform  The patient has agreed to participate and understands she can discontinue the visit at any time  It was my intent to perform this visit via video technology but the patient was not able to do a video connection so the visit was completed via audio telephone only  Patient is aware this is a billable service  Subjective    Kaila Bennett is a 80 y o  female who presents for medication refill  Patient is requesting refill on her Xanax which she takes at night for insomnia  States she has been on this medication for many years, it works well for her she has tried to wean off in the past with no luck    She has been seeing the [facility] routinely who has been managing this medication for her  She believes the current dose is adequate to control her symptoms  PDMP  reviewed by myself and Dr Marv Singh shows no illicit activity  HPI     Past Medical History:   Diagnosis Date    Arthritis     Cardiac disease     Chronic pain     Gout     Hypertension        Past Surgical History:   Procedure Laterality Date    APPENDECTOMY      ATRIAL ABLATION SURGERY      HYSTERECTOMY      JOINT REPLACEMENT  knees    KNEE ARTHROSCOPY      b/l knees    LAMINECTOMY      [place] TOTAL HIP ARTHROPLASTY Right 7/17/2017    Procedure: TOTAL ANTERIOR HIP ARTHROPLASTY;  Surgeon: Sofia Dent MD;  Location: [facility];  Service: Orthopedics       Current Outpatient Medications   Medication Sig Dispense Refill    ALPRAZolam (XANAX) 1 mg tablet Take 1 tablet (1 mg total) by mouth daily at bedtime as needed for sleep 30 tablet 0    amLODIPine (NORVASC) 5 mg tablet Take 1 tablet (5 mg total) by mouth daily 90 tablet 3    aspirin (ECOTRIN LOW STRENGTH) 81 mg EC tablet Take 1 tablet by mouth daily 1 tablet 0    atorvastatin (LIPITOR) 20 mg tablet Take 1 tablet (20 mg total) by mouth daily 90 tablet 1    co-enzyme Q-10 100 mg capsule Take 100 mg by mouth daily      diclofenac-misoprostol (ARTHROTEC 75) 75-0 2 MG per tablet TAKE ONE (1) TABLET BY MOUTH TWO TIMES DAILY   180 tablet 3    docusate sodium (COLACE) 100 mg capsule Take 100 mg by mouth 2 (two) times a day as needed for constipation      EPINEPHrine (EPIPEN) 0 3 mg/0 3 mL SOAJ Inject as necessary 2 each 1    ferrous sulfate 325 (65 Fe) mg tablet Take by mouth      furosemide (LASIX) 20 mg tablet Take 1 tablet (20 mg total) by mouth 2 (two) times a day 180 tablet 3    Inulin (FIBER CHOICE PO) Take by mouth      metoprolol tartrate (LOPRESSOR) 100 mg tablet Take 1 tablet (100 mg total) by mouth 2 (two) times a day 180 tablet 0    olmesartan (BENICAR) 40 mg tablet Take 1 tablet (40 mg total) by mouth daily 90 tablet 3    omeprazole (PriLOSEC) 20 mg delayed release capsule       pantoprazole (PROTONIX) 40 mg tablet TAKE (1) TABLET BY MOUTH DAILY  90 tablet 1    potassium chloride (K-DUR,KLOR-CON) 20 mEq tablet TAKE ONE (1) TABLET BY MOUTH DAILY WITH FOOD  90 tablet 3    tolterodine (DETROL LA) 4 mg 24 hr capsule Take 1 capsule (4 mg total) by mouth daily 30 capsule 1    VENTOLIN  (90 Base) MCG/ACT inhaler INHALE 1 TO 2 PUFFS EVERY 6 HOURS AS NEEDED  18 g 3     No current facility-administered medications for this visit  Allergies   Allergen Reactions    Bee Venom Anaphylaxis    Codeine GI Intolerance     Nausea/vomiting    Tetanus Immune Globulin Other (See Comments)    Tetanus Toxoid     Tetanus Toxoids        Review of Systems   Constitutional: Negative for activity change, appetite change, chills, fatigue and fever  HENT: Negative for congestion, dental problem, drooling, ear discharge, ear pain, facial swelling, postnasal drip, rhinorrhea and sinus pain  Eyes: Negative for photophobia, pain, discharge and itching  Respiratory: Negative for apnea, cough, chest tightness and shortness of breath  Cardiovascular: Negative for chest pain and leg swelling  Gastrointestinal: Negative for abdominal distention, abdominal pain, anal bleeding, constipation, diarrhea and nausea  Endocrine: Negative for cold intolerance, heat intolerance and polydipsia  Genitourinary: Negative for difficulty urinating  Musculoskeletal: Negative for arthralgias, gait problem, joint swelling and myalgias  Skin: Negative for color change and pallor  Allergic/Immunologic: Negative for immunocompromised state  Neurological: Negative for dizziness, seizures, facial asymmetry, weakness, light-headedness, numbness and headaches  Psychiatric/Behavioral: Negative for agitation, behavioral problems, confusion, decreased concentration and dysphoric mood         There were no vitals filed for this visit       I spent 15 minutes directly with the patient during this visit    VIRTUAL VISIT DISCLAIMER    Artis Esteban acknowledges that she has consented to an online visit or consultation  She understands that the online visit is based solely on information provided by her, and that, in the absence of a face-to-face physical evaluation by the physician, the diagnosis she receives is both limited and provisional in terms of accuracy and completeness  This is not intended to replace a full medical face-to-face evaluation by the physician  Artis Esteban understands and accepts these terms

## 2020-08-22 DIAGNOSIS — I10 BENIGN ESSENTIAL HTN: ICD-10-CM

## 2020-08-22 DIAGNOSIS — E78.5 DYSLIPIDEMIA: ICD-10-CM

## 2020-08-22 DIAGNOSIS — N39.46 MIXED STRESS AND URGE URINARY INCONTINENCE: ICD-10-CM

## 2020-08-23 RX ORDER — AMLODIPINE BESYLATE 5 MG/1
5 TABLET ORAL DAILY
Qty: 90 TABLET | Refills: 0 | Status: SHIPPED | OUTPATIENT
Start: 2020-08-23 | End: 2020-11-25 | Stop reason: SDUPTHER

## 2020-08-24 RX ORDER — TOLTERODINE 4 MG/1
CAPSULE, EXTENDED RELEASE ORAL
Qty: 90 CAPSULE | Refills: 0 | Status: SHIPPED | OUTPATIENT
Start: 2020-08-24 | End: 2020-10-01

## 2020-08-24 RX ORDER — ATORVASTATIN CALCIUM 20 MG/1
20 TABLET, FILM COATED ORAL DAILY
Qty: 90 TABLET | Refills: 0 | Status: SHIPPED | OUTPATIENT
Start: 2020-08-24 | End: 2020-10-20

## 2020-09-18 ENCOUNTER — TELEMEDICINE (OUTPATIENT)
Dept: FAMILY MEDICINE CLINIC | Facility: CLINIC | Age: 85
End: 2020-09-18
Payer: COMMERCIAL

## 2020-09-18 DIAGNOSIS — F51.01 PRIMARY INSOMNIA: ICD-10-CM

## 2020-09-18 PROCEDURE — G0071 COMM SVCS BY RHC/FQHC 5 MIN: HCPCS | Performed by: FAMILY MEDICINE

## 2020-09-18 RX ORDER — OXYBUTYNIN CHLORIDE 5 MG/1
5 TABLET ORAL 3 TIMES DAILY
COMMUNITY
End: 2020-10-01 | Stop reason: ALTCHOICE

## 2020-09-18 RX ORDER — ALPRAZOLAM 1 MG/1
1 TABLET ORAL
Qty: 30 TABLET | Refills: 0 | Status: SHIPPED | OUTPATIENT
Start: 2020-09-18 | End: 2020-10-15 | Stop reason: SDUPTHER

## 2020-09-18 NOTE — PROGRESS NOTES
Virtual Brief Visit    Assessment/Plan:    Problem List Items Addressed This Visit        Other    Primary insomnia    Relevant Medications    ALPRAZolam (XANAX) 1 mg tablet        RTC 1 month for in office appt  Reason for visit is med refill  Chief Complaint   Patient presents with    Follow-up    Insomnia    Medication Refill        Encounter provider Piotr Avalos PA-C    Provider located at [address] [address]    Recent Visits  No visits were found meeting these conditions  Showing recent visits within past 7 days and meeting all other requirements     Today's Visits  Date Type Provider Dept   09/18/20 Telemedicine Piotr Avalos PA-C [facility] [address] [facility] today's visits and meeting all other requirements     Future Appointments  No visits were found meeting these conditions  Showing future appointments within next 150 days and meeting all other requirements        After connecting through telephone, the patient was identified by name and date of birth  Jeanella Siemens was informed that this is a telemedicine visit and that the visit is being conducted through telephone  My office door was closed  No one else was in the room  She acknowledged consent and understanding of privacy and security of the platform  The patient has agreed to participate and understands she can discontinue the visit at any time  Patient is aware this is a billable service  Subjective    Jeanella Siemens is a 80 y o  female in need of med refill  It was my intent to perform this visit via video technology but the patient was not able to do a video connection so the visit was completed via audio telephone only  She is doing well but can not sleep with med  No complaints      HPI     Past Medical History:   Diagnosis Date    Arthritis     Cardiac disease     Chronic pain     Gout     Hypertension        Past Surgical History:   Procedure Laterality Date    APPENDECTOMY      ATRIAL ABLATION SURGERY      HYSTERECTOMY      JOINT REPLACEMENT  knees    KNEE ARTHROSCOPY      b/l knees    LAMINECTOMY      [place] TOTAL HIP ARTHROPLASTY Right 7/17/2017    Procedure: TOTAL ANTERIOR HIP ARTHROPLASTY;  Surgeon: Jennifer Tellez MD;  Location: [facility];  Service: Orthopedics       Current Outpatient Medications   Medication Sig Dispense Refill    ALPRAZolam (XANAX) 1 mg tablet Take 1 tablet (1 mg total) by mouth daily at bedtime as needed for sleep 30 tablet 0    amLODIPine (NORVASC) 5 mg tablet Take 1 tablet (5 mg total) by mouth daily 90 tablet 0    aspirin (ECOTRIN LOW STRENGTH) 81 mg EC tablet Take 1 tablet by mouth daily 1 tablet 0    atorvastatin (LIPITOR) 20 mg tablet Take 1 tablet (20 mg total) by mouth daily 90 tablet 0    co-enzyme Q-10 100 mg capsule Take 100 mg by mouth daily      diclofenac-misoprostol (ARTHROTEC 75) 75-0 2 MG per tablet TAKE ONE (1) TABLET BY MOUTH TWO TIMES DAILY  180 tablet 3    docusate sodium (COLACE) 100 mg capsule Take 100 mg by mouth 2 (two) times a day as needed for constipation      EPINEPHrine (EPIPEN) 0 3 mg/0 3 mL SOAJ Inject as necessary 2 each 1    ferrous sulfate 325 (65 Fe) mg tablet Take by mouth      furosemide (LASIX) 20 mg tablet Take 1 tablet (20 mg total) by mouth 2 (two) times a day 180 tablet 3    Inulin (FIBER CHOICE PO) Take by mouth      metoprolol tartrate (LOPRESSOR) 100 mg tablet Take 1 tablet (100 mg total) by mouth 2 (two) times a day 180 tablet 0    olmesartan (BENICAR) 40 mg tablet Take 1 tablet (40 mg total) by mouth daily 90 tablet 3    omeprazole (PriLOSEC) 20 mg delayed release capsule       oxybutynin (DITROPAN) 5 mg tablet Take 5 mg by mouth 3 (three) times a day      pantoprazole (PROTONIX) 40 mg tablet TAKE (1) TABLET BY MOUTH DAILY   90 tablet 1    potassium chloride (K-DUR,KLOR-CON) 20 mEq tablet TAKE ONE (1) TABLET BY MOUTH DAILY WITH FOOD  90 tablet 3    tolterodine (DETROL LA) 4 mg 24 hr capsule TAKE (1) CAPSULE DAILY  90 capsule 0    VENTOLIN  (90 Base) MCG/ACT inhaler INHALE 1 TO 2 PUFFS EVERY 6 HOURS AS NEEDED  18 g 3     No current facility-administered medications for this visit  Allergies   Allergen Reactions    Bee Venom Anaphylaxis    Codeine GI Intolerance     Nausea/vomiting    Tetanus Immune Globulin Other (See Comments)    Tetanus Toxoid     Tetanus Toxoids        Review of Systems   Constitutional: Negative  HENT: Negative  Eyes: Negative  Respiratory: Negative  Cardiovascular: Negative  Psychiatric/Behavioral: Positive for sleep disturbance  There were no vitals filed for this visit  I spent 8 minutes directly with the patient during this visit    VIRTUAL VISIT DISCLAIMER    Lauren Levy acknowledges that she has consented to an online visit or consultation  She understands that the online visit is based solely on information provided by her, and that, in the absence of a face-to-face physical evaluation by the physician, the diagnosis she receives is both limited and provisional in terms of accuracy and completeness  This is not intended to replace a full medical face-to-face evaluation by the physician  Lauren Levy understands and accepts these terms

## 2020-10-01 DIAGNOSIS — N39.46 MIXED STRESS AND URGE URINARY INCONTINENCE: ICD-10-CM

## 2020-10-01 RX ORDER — TOLTERODINE 4 MG/1
CAPSULE, EXTENDED RELEASE ORAL
Qty: 90 CAPSULE | Refills: 0 | Status: SHIPPED | OUTPATIENT
Start: 2020-10-01 | End: 2020-10-15 | Stop reason: ALTCHOICE

## 2020-10-03 DIAGNOSIS — I10 ESSENTIAL HYPERTENSION: ICD-10-CM

## 2020-10-05 RX ORDER — METOPROLOL TARTRATE 100 MG/1
TABLET ORAL
Qty: 180 TABLET | Refills: 0 | Status: SHIPPED | OUTPATIENT
Start: 2020-10-05 | End: 2020-12-15

## 2020-10-06 DIAGNOSIS — I10 ESSENTIAL HYPERTENSION: ICD-10-CM

## 2020-10-06 RX ORDER — FUROSEMIDE 20 MG/1
20 TABLET ORAL 2 TIMES DAILY
Qty: 180 TABLET | Refills: 3 | Status: SHIPPED | OUTPATIENT
Start: 2020-10-06 | End: 2021-01-04 | Stop reason: SDUPTHER

## 2020-10-12 DIAGNOSIS — M19.90 ARTHRITIS: ICD-10-CM

## 2020-10-12 DIAGNOSIS — I10 BENIGN ESSENTIAL HYPERTENSION: ICD-10-CM

## 2020-10-12 RX ORDER — OLMESARTAN MEDOXOMIL 40 MG/1
40 TABLET ORAL DAILY
Qty: 90 TABLET | Refills: 3 | Status: SHIPPED | OUTPATIENT
Start: 2020-10-12 | End: 2020-10-20

## 2020-10-12 RX ORDER — DICLOFENAC SODIUM AND MISOPROSTOL 75; 200 MG/1; UG/1
1 TABLET, DELAYED RELEASE ORAL 2 TIMES DAILY
Qty: 180 TABLET | Refills: 3 | Status: SHIPPED | OUTPATIENT
Start: 2020-10-12 | End: 2020-10-20

## 2020-10-15 ENCOUNTER — OFFICE VISIT (OUTPATIENT)
Dept: FAMILY MEDICINE CLINIC | Facility: CLINIC | Age: 85
End: 2020-10-15
Payer: COMMERCIAL

## 2020-10-15 VITALS
HEIGHT: 59 IN | WEIGHT: 167 LBS | SYSTOLIC BLOOD PRESSURE: 130 MMHG | BODY MASS INDEX: 33.67 KG/M2 | HEART RATE: 64 BPM | TEMPERATURE: 98.9 F | OXYGEN SATURATION: 96 % | DIASTOLIC BLOOD PRESSURE: 86 MMHG | RESPIRATION RATE: 16 BRPM

## 2020-10-15 DIAGNOSIS — Z00.00 MEDICARE ANNUAL WELLNESS VISIT, SUBSEQUENT: ICD-10-CM

## 2020-10-15 DIAGNOSIS — R32 URINARY INCONTINENCE, UNSPECIFIED TYPE: Primary | ICD-10-CM

## 2020-10-15 DIAGNOSIS — F51.01 PRIMARY INSOMNIA: ICD-10-CM

## 2020-10-15 PROCEDURE — G0439 PPPS, SUBSEQ VISIT: HCPCS | Performed by: PHYSICIAN ASSISTANT

## 2020-10-15 RX ORDER — ALPRAZOLAM 1 MG/1
1 TABLET ORAL
Qty: 30 TABLET | Refills: 1 | Status: SHIPPED | OUTPATIENT
Start: 2020-10-15 | End: 2020-12-14 | Stop reason: SDUPTHER

## 2020-10-15 RX ORDER — MIRABEGRON 25 MG/1
25 TABLET, FILM COATED, EXTENDED RELEASE ORAL DAILY
Qty: 90 TABLET | Refills: 1 | Status: SHIPPED | OUTPATIENT
Start: 2020-10-15 | End: 2020-12-31 | Stop reason: ALTCHOICE

## 2020-10-20 DIAGNOSIS — I10 ESSENTIAL HYPERTENSION: ICD-10-CM

## 2020-10-20 DIAGNOSIS — E78.5 DYSLIPIDEMIA: ICD-10-CM

## 2020-10-20 DIAGNOSIS — M19.90 ARTHRITIS: ICD-10-CM

## 2020-10-20 DIAGNOSIS — I10 BENIGN ESSENTIAL HYPERTENSION: ICD-10-CM

## 2020-10-20 DIAGNOSIS — E87.6 HYPOKALEMIA: ICD-10-CM

## 2020-10-20 RX ORDER — DICLOFENAC SODIUM AND MISOPROSTOL 75; 200 MG/1; UG/1
TABLET, DELAYED RELEASE ORAL
Qty: 180 TABLET | Refills: 0 | Status: SHIPPED | OUTPATIENT
Start: 2020-10-20 | End: 2020-12-31

## 2020-10-20 RX ORDER — POTASSIUM CHLORIDE 20 MEQ/1
TABLET, EXTENDED RELEASE ORAL
Qty: 50 TABLET | Refills: 0 | Status: SHIPPED | OUTPATIENT
Start: 2020-10-20 | End: 2020-12-31

## 2020-10-20 RX ORDER — METOPROLOL TARTRATE 100 MG/1
TABLET ORAL
Qty: 180 TABLET | Refills: 0 | OUTPATIENT
Start: 2020-10-20

## 2020-10-20 RX ORDER — OLMESARTAN MEDOXOMIL 40 MG/1
TABLET ORAL
Qty: 90 TABLET | Refills: 0 | Status: SHIPPED | OUTPATIENT
Start: 2020-10-20 | End: 2021-01-28

## 2020-10-20 RX ORDER — ATORVASTATIN CALCIUM 20 MG/1
TABLET, FILM COATED ORAL
Qty: 90 TABLET | Refills: 0 | Status: SHIPPED | OUTPATIENT
Start: 2020-10-20 | End: 2020-12-31

## 2020-11-18 ENCOUNTER — CLINICAL SUPPORT (OUTPATIENT)
Dept: FAMILY MEDICINE CLINIC | Facility: CLINIC | Age: 85
End: 2020-11-18
Payer: COMMERCIAL

## 2020-11-18 DIAGNOSIS — Z23 NEED FOR INFLUENZA VACCINATION: Primary | ICD-10-CM

## 2020-11-18 PROCEDURE — 90471 IMMUNIZATION ADMIN: CPT | Performed by: FAMILY MEDICINE

## 2020-11-23 ENCOUNTER — HOSPITAL ENCOUNTER (EMERGENCY)
Facility: HOSPITAL | Age: 85
Discharge: HOME | End: 2020-11-23
Attending: EMERGENCY MEDICINE | Admitting: EMERGENCY MEDICINE
Payer: COMMERCIAL

## 2020-11-23 ENCOUNTER — APPOINTMENT (EMERGENCY)
Dept: CT IMAGING | Facility: HOSPITAL | Age: 85
End: 2020-11-23
Payer: COMMERCIAL

## 2020-11-23 VITALS
HEART RATE: 80 BPM | SYSTOLIC BLOOD PRESSURE: 210 MMHG | WEIGHT: 162.48 LBS | RESPIRATION RATE: 20 BRPM | BODY MASS INDEX: 32.82 KG/M2 | OXYGEN SATURATION: 94 % | TEMPERATURE: 97.7 F | DIASTOLIC BLOOD PRESSURE: 97 MMHG

## 2020-11-23 DIAGNOSIS — R10.9 ABDOMINAL PAIN: Primary | ICD-10-CM

## 2020-11-23 DIAGNOSIS — K52.9 GASTROENTERITIS: ICD-10-CM

## 2020-11-23 LAB
ALBUMIN SERPL BCP-MCNC: 4 G/DL (ref 3.5–5)
ALP SERPL-CCNC: 128 U/L (ref 46–116)
ALT SERPL W P-5'-P-CCNC: 34 U/L (ref 12–78)
ANION GAP SERPL CALCULATED.3IONS-SCNC: 10 MMOL/L (ref 4–13)
AST SERPL W P-5'-P-CCNC: 18 U/L (ref 5–45)
BACTERIA UR QL AUTO: NORMAL /HPF
BASOPHILS # BLD AUTO: 0.06 THOUSANDS/ΜL (ref 0–0.1)
BASOPHILS NFR BLD AUTO: 0 % (ref 0–1)
BILIRUB SERPL-MCNC: 0.7 MG/DL (ref 0.2–1)
BILIRUB UR QL STRIP: NEGATIVE
BUN SERPL-MCNC: 12 MG/DL (ref 5–25)
CALCIUM SERPL-MCNC: 9.9 MG/DL (ref 8.3–10.1)
CHLORIDE SERPL-SCNC: 102 MMOL/L (ref 100–108)
CLARITY UR: ABNORMAL
CO2 SERPL-SCNC: 28 MMOL/L (ref 21–32)
COLOR UR: YELLOW
CREAT SERPL-MCNC: 1 MG/DL (ref 0.6–1.3)
EOSINOPHIL # BLD AUTO: 0.01 THOUSAND/ΜL (ref 0–0.61)
EOSINOPHIL NFR BLD AUTO: 0 % (ref 0–6)
ERYTHROCYTE [DISTWIDTH] IN BLOOD BY AUTOMATED COUNT: 14.3 % (ref 11.6–15.1)
GFR SERPL CREATININE-BSD FRML MDRD: 51 ML/MIN/1.73SQ M
GLUCOSE SERPL-MCNC: 148 MG/DL (ref 65–140)
GLUCOSE UR STRIP-MCNC: NEGATIVE MG/DL
HCT VFR BLD AUTO: 45 % (ref 34.8–46.1)
HGB BLD-MCNC: 14.5 G/DL (ref 11.5–15.4)
HGB UR QL STRIP.AUTO: ABNORMAL
IMM GRANULOCYTES # BLD AUTO: 0.05 THOUSAND/UL (ref 0–0.2)
IMM GRANULOCYTES NFR BLD AUTO: 0 % (ref 0–2)
KETONES UR STRIP-MCNC: NEGATIVE MG/DL
LEUKOCYTE ESTERASE UR QL STRIP: NEGATIVE
LIPASE SERPL-CCNC: 71 U/L (ref 73–393)
LYMPHOCYTES # BLD AUTO: 0.97 THOUSANDS/ΜL (ref 0.6–4.47)
LYMPHOCYTES NFR BLD AUTO: 7 % (ref 14–44)
MCH RBC QN AUTO: 27.6 PG (ref 26.8–34.3)
MCHC RBC AUTO-ENTMCNC: 32.2 G/DL (ref 31.4–37.4)
MCV RBC AUTO: 86 FL (ref 82–98)
MONOCYTES # BLD AUTO: 0.75 THOUSAND/ΜL (ref 0.17–1.22)
MONOCYTES NFR BLD AUTO: 6 % (ref 4–12)
NEUTROPHILS # BLD AUTO: 11.57 THOUSANDS/ΜL (ref 1.85–7.62)
NEUTS SEG NFR BLD AUTO: 87 % (ref 43–75)
NITRITE UR QL STRIP: NEGATIVE
NON-SQ EPI CELLS URNS QL MICRO: NORMAL /HPF
NRBC BLD AUTO-RTO: 0 /100 WBCS
PH UR STRIP.AUTO: 7.5 [PH]
PLATELET # BLD AUTO: 194 THOUSANDS/UL (ref 149–390)
PMV BLD AUTO: 9.7 FL (ref 8.9–12.7)
POTASSIUM SERPL-SCNC: 3.4 MMOL/L (ref 3.5–5.3)
PROT SERPL-MCNC: 8.3 G/DL (ref 6.4–8.2)
PROT UR STRIP-MCNC: ABNORMAL MG/DL
RBC # BLD AUTO: 5.25 MILLION/UL (ref 3.81–5.12)
RBC #/AREA URNS AUTO: NORMAL /HPF
SODIUM SERPL-SCNC: 140 MMOL/L (ref 136–145)
SP GR UR STRIP.AUTO: 1.01 (ref 1–1.03)
TROPONIN I SERPL-MCNC: <0.02 NG/ML
UROBILINOGEN UR QL STRIP.AUTO: 0.2 E.U./DL
WBC # BLD AUTO: 13.41 THOUSAND/UL (ref 4.31–10.16)
WBC #/AREA URNS AUTO: NORMAL /HPF

## 2020-11-23 PROCEDURE — 96376 TX/PRO/DX INJ SAME DRUG ADON: CPT

## 2020-11-23 PROCEDURE — 36415 COLL VENOUS BLD VENIPUNCTURE: CPT | Performed by: EMERGENCY MEDICINE

## 2020-11-23 PROCEDURE — 83690 ASSAY OF LIPASE: CPT | Performed by: EMERGENCY MEDICINE

## 2020-11-23 PROCEDURE — 71260 CT THORAX DX C+: CPT

## 2020-11-23 PROCEDURE — G1004 CDSM NDSC: HCPCS

## 2020-11-23 PROCEDURE — 80053 COMPREHEN METABOLIC PANEL: CPT | Performed by: EMERGENCY MEDICINE

## 2020-11-23 PROCEDURE — U0003 INFECTIOUS AGENT DETECTION BY NUCLEIC ACID (DNA OR RNA); SEVERE ACUTE RESPIRATORY SYNDROME CORONAVIRUS 2 (SARS-COV-2) (CORONAVIRUS DISEASE [COVID-19]), AMPLIFIED PROBE TECHNIQUE, MAKING USE OF HIGH THROUGHPUT TECHNOLOGIES AS DESCRIBED BY CMS-2020-01-R: HCPCS | Performed by: EMERGENCY MEDICINE

## 2020-11-23 PROCEDURE — 84484 ASSAY OF TROPONIN QUANT: CPT | Performed by: EMERGENCY MEDICINE

## 2020-11-23 PROCEDURE — 99285 EMERGENCY DEPT VISIT HI MDM: CPT

## 2020-11-23 PROCEDURE — 74177 CT ABD & PELVIS W/CONTRAST: CPT

## 2020-11-23 PROCEDURE — 96374 THER/PROPH/DIAG INJ IV PUSH: CPT

## 2020-11-23 PROCEDURE — 96375 TX/PRO/DX INJ NEW DRUG ADDON: CPT

## 2020-11-23 PROCEDURE — 81001 URINALYSIS AUTO W/SCOPE: CPT | Performed by: EMERGENCY MEDICINE

## 2020-11-23 PROCEDURE — 85025 COMPLETE CBC W/AUTO DIFF WBC: CPT | Performed by: EMERGENCY MEDICINE

## 2020-11-23 PROCEDURE — 99285 EMERGENCY DEPT VISIT HI MDM: CPT | Performed by: EMERGENCY MEDICINE

## 2020-11-23 PROCEDURE — 93005 ELECTROCARDIOGRAM TRACING: CPT

## 2020-11-23 RX ORDER — ONDANSETRON 2 MG/ML
4 INJECTION INTRAMUSCULAR; INTRAVENOUS ONCE
Status: COMPLETED | OUTPATIENT
Start: 2020-11-23 | End: 2020-11-23

## 2020-11-23 RX ORDER — ONDANSETRON 4 MG/1
4 TABLET, ORALLY DISINTEGRATING ORAL EVERY 6 HOURS PRN
Qty: 20 TABLET | Refills: 0 | Status: SHIPPED | OUTPATIENT
Start: 2020-11-23 | End: 2021-09-23

## 2020-11-23 RX ORDER — FENTANYL CITRATE 50 UG/ML
12.5 INJECTION, SOLUTION INTRAMUSCULAR; INTRAVENOUS ONCE
Status: COMPLETED | OUTPATIENT
Start: 2020-11-23 | End: 2020-11-23

## 2020-11-23 RX ADMIN — FENTANYL CITRATE 12.5 MCG: 0.05 INJECTION, SOLUTION INTRAMUSCULAR; INTRAVENOUS at 09:42

## 2020-11-23 RX ADMIN — ONDANSETRON 4 MG: 2 INJECTION INTRAMUSCULAR; INTRAVENOUS at 08:10

## 2020-11-23 RX ADMIN — FENTANYL CITRATE 12.5 MCG: 0.05 INJECTION, SOLUTION INTRAMUSCULAR; INTRAVENOUS at 08:12

## 2020-11-23 RX ADMIN — SODIUM CHLORIDE 250 ML: 0.9 INJECTION, SOLUTION INTRAVENOUS at 08:09

## 2020-11-23 RX ADMIN — IOHEXOL 100 ML: 350 INJECTION, SOLUTION INTRAVENOUS at 09:06

## 2020-11-24 ENCOUNTER — TELEPHONE (OUTPATIENT)
Dept: FAMILY MEDICINE CLINIC | Facility: CLINIC | Age: 85
End: 2020-11-24

## 2020-11-24 LAB
ATRIAL RATE: 78 BPM
P AXIS: 50 DEGREES
PR INTERVAL: 144 MS
QRS AXIS: -11 DEGREES
QRSD INTERVAL: 88 MS
QT INTERVAL: 400 MS
QTC INTERVAL: 456 MS
SARS-COV-2 RNA SPEC QL NAA+PROBE: NOT DETECTED
T WAVE AXIS: 36 DEGREES
VENTRICULAR RATE: 78 BPM

## 2020-11-24 PROCEDURE — 93010 ELECTROCARDIOGRAM REPORT: CPT | Performed by: INTERNAL MEDICINE

## 2020-11-25 ENCOUNTER — TELEMEDICINE (OUTPATIENT)
Dept: CARDIOLOGY CLINIC | Facility: HOSPITAL | Age: 85
End: 2020-11-25
Payer: COMMERCIAL

## 2020-11-25 VITALS
HEART RATE: 73 BPM | BODY MASS INDEX: 32.9 KG/M2 | TEMPERATURE: 98.6 F | DIASTOLIC BLOOD PRESSURE: 80 MMHG | WEIGHT: 162.9 LBS | SYSTOLIC BLOOD PRESSURE: 160 MMHG | OXYGEN SATURATION: 96 %

## 2020-11-25 DIAGNOSIS — I10 BENIGN ESSENTIAL HYPERTENSION: Primary | ICD-10-CM

## 2020-11-25 DIAGNOSIS — E78.5 DYSLIPIDEMIA: ICD-10-CM

## 2020-11-25 DIAGNOSIS — I10 BENIGN ESSENTIAL HTN: ICD-10-CM

## 2020-11-25 DIAGNOSIS — I47.1 AVNRT (AV NODAL RE-ENTRY TACHYCARDIA) (HCC): ICD-10-CM

## 2020-11-25 DIAGNOSIS — Z98.890 HISTORY OF CARDIAC RADIOFREQUENCY ABLATION: ICD-10-CM

## 2020-11-25 PROCEDURE — 99442 PR PHYS/QHP TELEPHONE EVALUATION 11-20 MIN: CPT | Performed by: INTERNAL MEDICINE

## 2020-11-25 RX ORDER — AMLODIPINE BESYLATE 10 MG/1
10 TABLET ORAL DAILY
Qty: 90 TABLET | Refills: 3 | Status: SHIPPED | OUTPATIENT
Start: 2020-11-25 | End: 2021-06-25 | Stop reason: SDUPTHER

## 2020-12-14 ENCOUNTER — OFFICE VISIT (OUTPATIENT)
Dept: FAMILY MEDICINE CLINIC | Facility: CLINIC | Age: 85
End: 2020-12-14
Payer: COMMERCIAL

## 2020-12-14 VITALS
OXYGEN SATURATION: 97 % | HEART RATE: 70 BPM | DIASTOLIC BLOOD PRESSURE: 90 MMHG | BODY MASS INDEX: 32.9 KG/M2 | HEIGHT: 59 IN | SYSTOLIC BLOOD PRESSURE: 168 MMHG

## 2020-12-14 DIAGNOSIS — E78.5 DYSLIPIDEMIA: ICD-10-CM

## 2020-12-14 DIAGNOSIS — R53.83 FATIGUE, UNSPECIFIED TYPE: ICD-10-CM

## 2020-12-14 DIAGNOSIS — I10 ESSENTIAL HYPERTENSION: ICD-10-CM

## 2020-12-14 DIAGNOSIS — F51.01 PRIMARY INSOMNIA: ICD-10-CM

## 2020-12-14 DIAGNOSIS — R53.83 FATIGUE, UNSPECIFIED TYPE: Primary | ICD-10-CM

## 2020-12-14 LAB
CHOLEST SERPL-MCNC: 157 MG/DL (ref 50–200)
HDLC SERPL-MCNC: 33 MG/DL
LDLC SERPL CALC-MCNC: 89 MG/DL (ref 0–100)
TRIGL SERPL-MCNC: 173 MG/DL
TSH SERPL DL<=0.05 MIU/L-ACNC: 1.83 UIU/ML (ref 0.36–3.74)

## 2020-12-14 PROCEDURE — 84443 ASSAY THYROID STIM HORMONE: CPT | Performed by: INTERNAL MEDICINE

## 2020-12-14 PROCEDURE — 99213 OFFICE O/P EST LOW 20 MIN: CPT | Performed by: FAMILY MEDICINE

## 2020-12-14 PROCEDURE — 80061 LIPID PANEL: CPT | Performed by: INTERNAL MEDICINE

## 2020-12-14 RX ORDER — PANTOPRAZOLE SODIUM 40 MG/1
TABLET, DELAYED RELEASE ORAL
COMMUNITY
Start: 2020-10-26 | End: 2020-12-31

## 2020-12-14 RX ORDER — ALPRAZOLAM 1 MG/1
1 TABLET ORAL
Qty: 30 TABLET | Refills: 1 | Status: SHIPPED | OUTPATIENT
Start: 2020-12-14 | End: 2021-02-12 | Stop reason: SDUPTHER

## 2020-12-15 ENCOUNTER — TELEPHONE (OUTPATIENT)
Dept: FAMILY MEDICINE CLINIC | Facility: CLINIC | Age: 85
End: 2020-12-15

## 2020-12-15 DIAGNOSIS — I10 BENIGN ESSENTIAL HYPERTENSION: ICD-10-CM

## 2020-12-15 RX ORDER — CARVEDILOL 25 MG/1
25 TABLET ORAL 2 TIMES DAILY WITH MEALS
Qty: 180 TABLET | Refills: 3 | Status: SHIPPED | OUTPATIENT
Start: 2020-12-15 | End: 2021-06-25 | Stop reason: SDUPTHER

## 2020-12-28 ENCOUNTER — DOCUMENTATION (OUTPATIENT)
Dept: FAMILY MEDICINE CLINIC | Facility: CLINIC | Age: 85
End: 2020-12-28

## 2020-12-28 DIAGNOSIS — R21 SKIN RASH: ICD-10-CM

## 2020-12-28 RX ORDER — CLOTRIMAZOLE AND BETAMETHASONE DIPROPIONATE 10; .64 MG/G; MG/G
CREAM TOPICAL 2 TIMES DAILY
Qty: 30 G | Refills: 0 | Status: SHIPPED | OUTPATIENT
Start: 2020-12-28 | End: 2021-09-23

## 2020-12-30 DIAGNOSIS — K21.9 GASTROESOPHAGEAL REFLUX DISEASE, UNSPECIFIED WHETHER ESOPHAGITIS PRESENT: Primary | ICD-10-CM

## 2020-12-30 DIAGNOSIS — M19.90 ARTHRITIS: ICD-10-CM

## 2020-12-30 DIAGNOSIS — E78.5 DYSLIPIDEMIA: ICD-10-CM

## 2020-12-30 DIAGNOSIS — N39.46 MIXED STRESS AND URGE URINARY INCONTINENCE: ICD-10-CM

## 2020-12-30 DIAGNOSIS — E87.6 HYPOKALEMIA: ICD-10-CM

## 2020-12-31 RX ORDER — PANTOPRAZOLE SODIUM 40 MG/1
TABLET, DELAYED RELEASE ORAL
Qty: 30 TABLET | Refills: 3 | Status: SHIPPED | OUTPATIENT
Start: 2020-12-31 | End: 2021-01-28

## 2020-12-31 RX ORDER — TOLTERODINE 4 MG/1
CAPSULE, EXTENDED RELEASE ORAL
Qty: 60 CAPSULE | Refills: 2 | Status: SHIPPED | OUTPATIENT
Start: 2020-12-31 | End: 2021-03-29

## 2020-12-31 RX ORDER — ATORVASTATIN CALCIUM 20 MG/1
TABLET, FILM COATED ORAL
Qty: 60 TABLET | Refills: 4 | Status: SHIPPED | OUTPATIENT
Start: 2020-12-31 | End: 2021-06-25

## 2020-12-31 RX ORDER — POTASSIUM CHLORIDE 20 MEQ/1
TABLET, EXTENDED RELEASE ORAL
Qty: 60 TABLET | Refills: 2 | Status: SHIPPED | OUTPATIENT
Start: 2020-12-31 | End: 2021-03-29

## 2020-12-31 RX ORDER — DICLOFENAC SODIUM AND MISOPROSTOL 75; 200 MG/1; UG/1
TABLET, DELAYED RELEASE ORAL
Qty: 150 TABLET | Refills: 4 | Status: SHIPPED | OUTPATIENT
Start: 2020-12-31 | End: 2021-06-25

## 2021-01-01 RX ORDER — FUROSEMIDE 20 MG/1
20 TABLET ORAL 2 TIMES DAILY
Qty: 180 TABLET | Refills: 0 | OUTPATIENT
Start: 2021-01-01

## 2021-01-04 DIAGNOSIS — I10 ESSENTIAL HYPERTENSION: ICD-10-CM

## 2021-01-04 RX ORDER — FUROSEMIDE 20 MG/1
20 TABLET ORAL DAILY
Qty: 90 TABLET | Refills: 1 | Status: SHIPPED | OUTPATIENT
Start: 2021-01-04 | End: 2021-06-25

## 2021-01-28 DIAGNOSIS — K21.9 GASTROESOPHAGEAL REFLUX DISEASE, UNSPECIFIED WHETHER ESOPHAGITIS PRESENT: ICD-10-CM

## 2021-01-28 DIAGNOSIS — I10 BENIGN ESSENTIAL HYPERTENSION: ICD-10-CM

## 2021-01-28 RX ORDER — PANTOPRAZOLE SODIUM 40 MG/1
TABLET, DELAYED RELEASE ORAL
Qty: 90 TABLET | Refills: 3 | Status: SHIPPED | OUTPATIENT
Start: 2021-01-28 | End: 2022-01-19 | Stop reason: SDUPTHER

## 2021-01-28 RX ORDER — OLMESARTAN MEDOXOMIL 40 MG/1
TABLET ORAL
Qty: 90 TABLET | Refills: 3 | Status: SHIPPED | OUTPATIENT
Start: 2021-01-28 | End: 2021-12-29

## 2021-02-12 ENCOUNTER — OFFICE VISIT (OUTPATIENT)
Dept: FAMILY MEDICINE CLINIC | Facility: CLINIC | Age: 86
End: 2021-02-12
Payer: COMMERCIAL

## 2021-02-12 VITALS
WEIGHT: 161.6 LBS | DIASTOLIC BLOOD PRESSURE: 76 MMHG | OXYGEN SATURATION: 95 % | TEMPERATURE: 97.5 F | HEART RATE: 76 BPM | BODY MASS INDEX: 32.58 KG/M2 | SYSTOLIC BLOOD PRESSURE: 138 MMHG | RESPIRATION RATE: 16 BRPM | HEIGHT: 59 IN

## 2021-02-12 DIAGNOSIS — F51.01 PRIMARY INSOMNIA: ICD-10-CM

## 2021-02-12 PROCEDURE — 99213 OFFICE O/P EST LOW 20 MIN: CPT | Performed by: FAMILY MEDICINE

## 2021-02-12 RX ORDER — ALPRAZOLAM 1 MG/1
1 TABLET ORAL
Qty: 30 TABLET | Refills: 1 | Status: SHIPPED | OUTPATIENT
Start: 2021-02-12 | End: 2021-03-11 | Stop reason: SDUPTHER

## 2021-02-12 NOTE — PROGRESS NOTES
Assessment/Plan/Follow up information       Diagnosis ICD-10-CM Associated Orders   1  Primary insomnia  F51 01 ALPRAZolam (XANAX) 1 mg tablet            Recent lab work, imagining, and imaging reports reviewed on today's visit with patient, appropriate follow up was initiated if needed  Patient was counseled/educated regarding their diagnosis, and the associated plan  They agreed with the plan, all questions and concerns were answered/addressed  Advised to contact me or the office with any concerns or questions  In the event of an emergency, and unable to contact a provider they are to go to the emergency room  Subjective    HPI:  80-year-old female presents to the office for medication refill on her Xanax  States she has been on this medication for many many years and it works well for her and she has no intention or interesting down titrated  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 which is treated with the Xanax  Denies any side effects states she feels the dose is adequate denies any other concerns at this visit      Review of Systems   Constitutional: Negative for activity change, appetite change, chills, fatigue and fever  HENT: Negative for congestion, dental problem, drooling, ear discharge, ear pain, facial swelling, postnasal drip, rhinorrhea and sinus pain  Eyes: Negative for photophobia, pain, discharge and itching  Respiratory: Negative for apnea, cough, chest tightness and shortness of breath  Cardiovascular: Negative for chest pain and leg swelling  Gastrointestinal: Negative for abdominal distention, abdominal pain, anal bleeding, constipation, diarrhea and nausea  Endocrine: Negative for cold intolerance, heat intolerance and polydipsia  Genitourinary: Negative for difficulty urinating  Musculoskeletal: Negative for arthralgias, gait problem, joint swelling and myalgias  Skin: Negative for color change and pallor     Allergic/Immunologic: Negative for immunocompromised state  Neurological: Negative for dizziness, seizures, facial asymmetry, weakness, light-headedness, numbness and headaches  Psychiatric/Behavioral: Negative for agitation, behavioral problems, confusion, decreased concentration and dysphoric mood  Objective    Vitals:    02/12/21 1115   BP: 138/76   Pulse: 76   Resp: 16   Temp: 97 5 °F (36 4 °C)   SpO2: 95%         Physical Exam  Constitutional:       General: She is not in acute distress  Appearance: She is well-developed  HENT:      Head: Normocephalic and atraumatic  Eyes:      Conjunctiva/sclera: Conjunctivae normal       Pupils: Pupils are equal, round, and reactive to light  Neck:      Musculoskeletal: Normal range of motion and neck supple  Cardiovascular:      Rate and Rhythm: Normal rate and regular rhythm  Heart sounds: Normal heart sounds  No murmur  No friction rub  Pulmonary:      Effort: Pulmonary effort is normal       Breath sounds: Normal breath sounds  Abdominal:      General: Bowel sounds are normal       Palpations: Abdomen is soft  Musculoskeletal: Normal range of motion  Skin:     General: Skin is warm  Capillary Refill: Capillary refill takes less than 2 seconds  Neurological:      Mental Status: She is alert and oriented to person, place, and time  Motor: No abnormal muscle tone  Coordination: Coordination normal    Psychiatric:         Behavior: Behavior normal          Thought Content: Thought content normal             Portions of the record may have been created with voice recognition software  Occasional wrong word or "sound a like" substitutions may have occurred due to the inherent limitations of voice recognition software  Read the chart carefully and recognize, using context, where substitutions have occurred  Contact me with any questions         Erika Cosby MD 02/12/21

## 2021-03-11 ENCOUNTER — OFFICE VISIT (OUTPATIENT)
Dept: FAMILY MEDICINE CLINIC | Facility: CLINIC | Age: 86
End: 2021-03-11
Payer: COMMERCIAL

## 2021-03-11 VITALS
DIASTOLIC BLOOD PRESSURE: 76 MMHG | OXYGEN SATURATION: 96 % | SYSTOLIC BLOOD PRESSURE: 138 MMHG | TEMPERATURE: 98.8 F | HEART RATE: 82 BPM

## 2021-03-11 DIAGNOSIS — F51.01 PRIMARY INSOMNIA: ICD-10-CM

## 2021-03-11 DIAGNOSIS — I47.1 AVNRT (AV NODAL RE-ENTRY TACHYCARDIA) (HCC): ICD-10-CM

## 2021-03-11 DIAGNOSIS — N30.00 ACUTE CYSTITIS WITHOUT HEMATURIA: Primary | ICD-10-CM

## 2021-03-11 PROCEDURE — 99213 OFFICE O/P EST LOW 20 MIN: CPT | Performed by: FAMILY MEDICINE

## 2021-03-11 RX ORDER — CIPROFLOXACIN 250 MG/1
250 TABLET, FILM COATED ORAL EVERY 12 HOURS SCHEDULED
Qty: 10 TABLET | Refills: 0 | Status: SHIPPED | OUTPATIENT
Start: 2021-03-11 | End: 2021-03-16

## 2021-03-11 RX ORDER — ALPRAZOLAM 1 MG/1
1 TABLET ORAL
Qty: 30 TABLET | Refills: 1 | Status: SHIPPED | OUTPATIENT
Start: 2021-03-11 | End: 2021-05-12 | Stop reason: SDUPTHER

## 2021-03-11 NOTE — PROGRESS NOTES
Assessment/Plan:     Diagnoses and all orders for this visit:    Acute cystitis without hematuria  -     ciprofloxacin (CIPRO) 250 mg tablet; Take 1 tablet (250 mg total) by mouth every 12 (twelve) hours for 5 days    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 re-entry tachycardia) ([facility] [address] )    Primary insomnia  -     ALPRAZolam (XANAX) 1 mg tablet; Take 1 tablet (1 mg total) by mouth daily at bedtime as needed for sleep          She was unable to urinate in office  Will treat presumptively  Continue current meds  PMED checked by MA  Return in about 2 months (around 5/11/2021)  Subjective:        Patient ID: Woody Cockayne is a 80 y o  female  Chief Complaint   Patient presents with    Medication Refill    Urinary Tract Infection     burning on urination, might not be able to give a specimen but will try        81 yo female presents for med refill  Today she c/o urinary burning x 3 weeks  Admits to frequency  No abdominal or back pain  No fever or chills  No other issues today        The following portions of the patient's history were reviewed and updated as appropriate: allergies, current medications, past family history, past medical history, past social history, past surgical history and problem list     Patient Active Problem List   Diagnosis    Status post total replacement of right hip    Benign essential hypertension    Hyperlipidemia    Elevated blood uric acid level    History of gout    Dextroscoliosis    Screening-pulmonary TB    Anemia    Anxiety    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 re-entry tachycardia) (HCC)    Dyslipidemia    Gout    Iron deficiency anemia    History of cardiac radiofrequency ablation    Primary insomnia       Current Outpatient Medications   Medication Sig Dispense Refill    ALPRAZolam (XANAX) 1 mg tablet Take 1 tablet (1 mg total) by mouth daily at bedtime as needed for sleep 30 tablet 1    amLODIPine (NORVASC) 10 mg tablet Take 1 tablet (10 mg total) by mouth daily 90 tablet 3    atorvastatin (LIPITOR) 20 mg tablet TAKE (1) TABLET BY MOUTH DAILY  60 tablet 4    carvedilol (COREG) 25 mg tablet Take 1 tablet (25 mg total) by mouth 2 (two) times a day with meals 180 tablet 3    clotrimazole-betamethasone (LOTRISONE) 1-0 05 % cream Apply topically 2 (two) times a day 30 g 0    co-enzyme Q-10 100 mg capsule Take 100 mg by mouth daily      diclofenac-misoprostol (ARTHROTEC 75) 75-0 2 MG per tablet TAKE ONE (1) TABLET BY MOUTH TWO TIMES DAILY  150 tablet 4    docusate sodium (COLACE) 100 mg capsule Take 100 mg by mouth 2 (two) times a day as needed for constipation      EPINEPHrine (EPIPEN) 0 3 mg/0 3 mL SOAJ Inject as necessary 2 each 1    ferrous sulfate 325 (65 Fe) mg tablet Take by mouth      furosemide (LASIX) 20 mg tablet Take 1 tablet (20 mg total) by mouth daily 90 tablet 1    Inulin (FIBER CHOICE PO) Take by mouth      olmesartan (BENICAR) 40 mg tablet TAKE ONE (1) TABLET BY MOUTH DAILY 90 tablet 3    ondansetron (ZOFRAN-ODT) 4 mg disintegrating tablet Take 1 tablet (4 mg total) by mouth every 6 (six) hours as needed for nausea 20 tablet 0    pantoprazole (PROTONIX) 40 mg tablet TAKE (1) TABLET BY MOUTH DAILY  90 tablet 3    potassium chloride (K-DUR,KLOR-CON) 20 mEq tablet TAKE ONE (1) TABLET BY MOUTH DAILY WITH FOOD  60 tablet 2    tolterodine (DETROL LA) 4 mg 24 hr capsule TAKE (1) CAPSULE DAILY  60 capsule 2    VENTOLIN  (90 Base) MCG/ACT inhaler INHALE 1 TO 2 PUFFS EVERY 6 HOURS AS NEEDED  18 g 3    aspirin (ECOTRIN LOW STRENGTH) 81 mg EC tablet Take 1 tablet by mouth daily (Patient not taking: Reported on 11/25/2020) 1 tablet 0    ciprofloxacin (CIPRO) 250 mg tablet Take 1 tablet (250 mg total) by mouth every 12 (twelve) hours for 5 days 10 tablet 0     No current facility-administered medications for this visit           Past Medical History:   Diagnosis Date    Anemia     Arthritis     Cardiac disease     Chronic pain     Gout     Hypercholesterolemia     Hypertension     Insomnia         Past Surgical History:   Procedure Laterality Date    APPENDECTOMY      ATRIAL ABLATION SURGERY      HYSTERECTOMY      JOINT REPLACEMENT  knees    KNEE ARTHROSCOPY      b/l knees    LAMINECTOMY      OK TOTAL HIP ARTHROPLASTY Right 7/17/2017    Procedure: TOTAL ANTERIOR HIP ARTHROPLASTY;  Surgeon: Alyse Chen MD;  Location: [facility];  Service: Orthopedics        Social History     Socioeconomic History    Marital status: /Civil Union     Spouse name: Not on file    Number of children: Not on file    Years of education: Not on file    Highest education level: Not on file   Occupational History    Not on file   Social Needs    Financial resource strain: Not on file    Food insecurity     Worry: Not on file     Inability: Not on file   [employer] needs     Medical: Not on file     Non-medical: Not on file   Tobacco Use    Smoking status: Never Smoker    Smokeless tobacco: Never Used   Substance and Sexual Activity    Alcohol use: No    Drug use: No    Sexual activity: Not Currently   Lifestyle    Physical activity     Days per week: Not on file     Minutes per session: Not on file    Stress: Not on file   Relationships    Social connections     Talks on phone: Not on file     Gets together: Not on file     Attends Church service: Not on file     Active member of club or organization: Not on file     Attends meetings of clubs or organizations: Not on file     Relationship status: Not on file    Intimate partner violence     Fear of current or ex partner: Not on file     Emotionally abused: Not on file     Physically abused: Not on file     Forced sexual activity: Not on file   Other Topics Concern    Not on file   Social History Narrative    Not on file        Review of Systems   Constitutional: Negative  HENT: Negative  Eyes: Negative  Respiratory: Negative  Cardiovascular: Negative  Genitourinary: Positive for dysuria  Psychiatric/Behavioral: Positive for sleep disturbance           Objective:      /76   Pulse 82   Temp 98 8 °F (37 1 °C) (Tympanic)   SpO2 96%          Physical Exam

## 2021-03-26 DIAGNOSIS — E87.6 HYPOKALEMIA: ICD-10-CM

## 2021-03-26 DIAGNOSIS — N39.46 MIXED STRESS AND URGE URINARY INCONTINENCE: ICD-10-CM

## 2021-03-29 RX ORDER — TOLTERODINE 4 MG/1
CAPSULE, EXTENDED RELEASE ORAL
Qty: 90 CAPSULE | Refills: 4 | Status: SHIPPED | OUTPATIENT
Start: 2021-03-29 | End: 2021-09-23 | Stop reason: SDUPTHER

## 2021-03-29 RX ORDER — POTASSIUM CHLORIDE 20 MEQ/1
TABLET, EXTENDED RELEASE ORAL
Qty: 90 TABLET | Refills: 4 | Status: SHIPPED | OUTPATIENT
Start: 2021-03-29 | End: 2021-09-23 | Stop reason: SDUPTHER

## 2021-04-29 ENCOUNTER — TELEPHONE (OUTPATIENT)
Dept: FAMILY MEDICINE CLINIC | Facility: CLINIC | Age: 86
End: 2021-04-29

## 2021-04-29 NOTE — TELEPHONE ENCOUNTER
No  Needs to give it time  Dont want to prescribe if it be food poisoing or early bowl obstruction  Is anyone else ill ?

## 2021-04-29 NOTE — TELEPHONE ENCOUNTER
Patient called stating she had diarrhea since 1 am last night and wants to know if she can have something for this  She states she never had this before

## 2021-05-04 ENCOUNTER — DOCUMENTATION (OUTPATIENT)
Dept: FAMILY MEDICINE CLINIC | Facility: CLINIC | Age: 86
End: 2021-05-04

## 2021-05-04 NOTE — PROGRESS NOTES
PATIENT CALLED OFFICE TO REPORT THAT SHE HAS HAD WATERY DIARRHEA SINCE 4/28/21 AT LEAST 3 X PER DAY  SHE GOT THE J&J VACCINE ON 4/10/21 AND FEELS THAT THIS IS THE CAUSE OF HER PROBLEM  SHE HAS DONE CLEAR LIQUIDS AND VERÓNICA SEED TEA BUT THAT DID NOTHING FOR THE MUCOUS DIARRHEA AND CRAMPING  THERE IS NO BLOOD IN HER STOOL AND SHE HAS NO FEVER  SHE HAS KEPT HER IN TAKE UP AND DOES NOT FEEL THAT SHE IS DEHYDRATED  IT WAS ADVISED THAT SHE GET SOME IMMODIUM, GO BACK ON CLEAR LIQUIDS FOR 24 HOURS AND THEN START THE BRAT DIET    IF HER SYMPTOMS DO NOT SUBSIDE OR IF HER CONDITION WORSENS SHE IS TO LET US KNOW IMMEDIATELY OR GO TO THE E R  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

## 2021-05-10 ENCOUNTER — RA CDI HCC (OUTPATIENT)
Dept: OTHER | Facility: HOSPITAL | Age: 86
End: 2021-05-10

## 2021-05-10 NOTE — PROGRESS NOTES
[place] [id number]  coding opportunities             Chart reviewed, (number of) suggestions sent to provider: 1     Problem listed updated   Provider Accepted, (number of) suggestions accepted: 1                 [place] [address]  coding opportunities             Chart reviewed, (number of) suggestions sent to provider: 1    N18 30  Chronic kidney disease stage 3 -  eGFR values going back to 2018 have been in the stage 3 CKD range

## 2021-05-11 PROBLEM — N18.30 CHRONIC KIDNEY DISEASE, STAGE III (MODERATE) (HCC): Status: ACTIVE | Noted: 2021-05-11

## 2021-05-11 NOTE — PROGRESS NOTES
Assessment/Plan/Follow up information      No diagnosis found  Recent lab work, imagining, and imaging reports reviewed on today's visit with patient, appropriate follow up was initiated if needed  Patient was counseled/educated regarding their diagnosis, and the associated plan  They agreed with the plan, all questions and concerns were answered/addressed  Advised to contact me or the office with any concerns or questions  In the event of an emergency, and unable to contact a provider they are to go to the emergency room  Subjective    HPI:      Review of Systems         Objective    There were no vitals filed for this visit  Physical Exam       Portions of the record may have been created with voice recognition software  Occasional wrong word or "sound a like" substitutions may have occurred due to the inherent limitations of voice recognition software  Read the chart carefully and recognize, using context, where substitutions have occurred  Contact me with any questions         Hussein Moncada MD 05/11/21

## 2021-05-11 NOTE — PROGRESS NOTES
Assessment/Plan/Follow up information       Diagnosis ICD-10-CM Associated Orders   1  Anxiety  F41 9 Toxicology screen, urine   2  Primary insomnia  F51 01 ALPRAZolam (XANAX) 1 mg tablet   3  Anaphylaxis, subsequent encounter  T78  2XXD EPINEPHrine (EPIPEN) 0 3 mg/0 3 mL SOAJ   4  Active drug dependence ([facility] [address] )  F19 20         PDMP reviewed myself with no red flag symptoms  Attending physician will send for refill on benzodiazepine       Recent lab work, imagining, and imaging reports reviewed on today's visit with patient, appropriate follow up was initiated if needed  Patient was counseled/educated regarding their diagnosis, and the associated plan  They agreed with the plan, all questions and concerns were answered/addressed  Advised to contact me or the office with any concerns or questions  In the event of an emergency, and unable to contact a provider they are to go to the emergency room  Subjective    HPI: 70-year-old female presents to the office for medication refill on her Xanax  States she has been on this medication for many many years and it works well for her and she has no intention or interesting down titrated  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 which is treated with the Xanax  Denies any side effects states she feels the dose is adequate denies any other concerns at this visit    Review of Systems   Constitutional: Negative for activity change, appetite change, chills, fatigue and fever  HENT: Negative for congestion, dental problem, drooling, ear discharge, ear pain, facial swelling, postnasal drip, rhinorrhea and sinus pain  Eyes: Negative for photophobia, pain, discharge and itching  Respiratory: Negative for apnea, cough, chest tightness and shortness of breath  Cardiovascular: Negative for chest pain and leg swelling  Gastrointestinal: Negative for abdominal distention, abdominal pain, anal bleeding, constipation, diarrhea and nausea     Endocrine: Negative for cold intolerance, heat intolerance and polydipsia  Genitourinary: Negative for difficulty urinating  Musculoskeletal: Negative for arthralgias, gait problem, joint swelling and myalgias  Skin: Negative for color change and pallor  Allergic/Immunologic: Negative for immunocompromised state  Neurological: Negative for dizziness, seizures, facial asymmetry, weakness, light-headedness, numbness and headaches  Psychiatric/Behavioral: Negative for agitation, behavioral problems, confusion, decreased concentration and dysphoric mood  Objective    Vitals:    05/12/21 0900   BP: 138/84   Pulse: 72   Resp: 16   Temp: (!) 97 °F (36 1 °C)   SpO2: 95%         Physical Exam  Constitutional:       General: She is not in acute distress  Appearance: She is well-developed  HENT:      Head: Normocephalic and atraumatic  Eyes:      Conjunctiva/sclera: Conjunctivae normal       Pupils: Pupils are equal, round, and reactive to light  Neck:      Musculoskeletal: Normal range of motion and neck supple  Cardiovascular:      Rate and Rhythm: Normal rate and regular rhythm  Heart sounds: Normal heart sounds  No murmur  No friction rub  Pulmonary:      Effort: Pulmonary effort is normal       Breath sounds: Normal breath sounds  Abdominal:      General: Bowel sounds are normal       Palpations: Abdomen is soft  Musculoskeletal: Normal range of motion  Skin:     General: Skin is warm  Capillary Refill: Capillary refill takes less than 2 seconds  Neurological:      Mental Status: She is alert and oriented to person, place, and time  Motor: No abnormal muscle tone  Coordination: Coordination normal    Psychiatric:         Behavior: Behavior normal          Thought Content: Thought content normal             Portions of the record may have been created with voice recognition software   Occasional wrong word or "sound a like" substitutions may have occurred due to the inherent limitations of voice recognition software  Read the chart carefully and recognize, using context, where substitutions have occurred  Contact me with any questions         Karla Márquez MD 05/12/21

## 2021-05-12 ENCOUNTER — OFFICE VISIT (OUTPATIENT)
Dept: FAMILY MEDICINE CLINIC | Facility: CLINIC | Age: 86
End: 2021-05-12
Payer: COMMERCIAL

## 2021-05-12 VITALS
SYSTOLIC BLOOD PRESSURE: 138 MMHG | OXYGEN SATURATION: 95 % | DIASTOLIC BLOOD PRESSURE: 84 MMHG | HEART RATE: 72 BPM | RESPIRATION RATE: 16 BRPM | TEMPERATURE: 97 F

## 2021-05-12 DIAGNOSIS — F51.01 PRIMARY INSOMNIA: ICD-10-CM

## 2021-05-12 DIAGNOSIS — F19.20: ICD-10-CM

## 2021-05-12 DIAGNOSIS — T78.2XXD ANAPHYLAXIS, SUBSEQUENT ENCOUNTER: ICD-10-CM

## 2021-05-12 DIAGNOSIS — F41.9 ANXIETY: Primary | ICD-10-CM

## 2021-05-12 PROCEDURE — 99213 OFFICE O/P EST LOW 20 MIN: CPT | Performed by: FAMILY MEDICINE

## 2021-05-12 PROCEDURE — 80307 DRUG TEST PRSMV CHEM ANLYZR: CPT | Performed by: STUDENT IN AN ORGANIZED HEALTH CARE EDUCATION/TRAINING PROGRAM

## 2021-05-12 RX ORDER — EPINEPHRINE 0.3 MG/.3ML
INJECTION SUBCUTANEOUS
Qty: 2 EACH | Refills: 1 | Status: SHIPPED | OUTPATIENT
Start: 2021-05-12

## 2021-05-12 RX ORDER — ALPRAZOLAM 1 MG/1
1 TABLET ORAL
Qty: 30 TABLET | Refills: 1 | Status: SHIPPED | OUTPATIENT
Start: 2021-05-12 | End: 2021-06-09 | Stop reason: SDUPTHER

## 2021-05-18 LAB
AMPHETAMINES UR QL SCN: NEGATIVE NG/ML
BARBITURATES UR QL SCN: NEGATIVE NG/ML
BENZODIAZ UR QL: NEGATIVE
BZE UR QL: NEGATIVE NG/ML
CANNABINOIDS UR QL SCN: NEGATIVE NG/ML
METHADONE UR QL SCN: NEGATIVE NG/ML
OPIATES UR QL: NEGATIVE NG/ML
PCP UR QL: NEGATIVE NG/ML
PROPOXYPH UR QL SCN: NEGATIVE NG/ML

## 2021-05-20 ENCOUNTER — TELEPHONE (OUTPATIENT)
Dept: FAMILY MEDICINE CLINIC | Facility: CLINIC | Age: 86
End: 2021-05-20

## 2021-05-20 NOTE — TELEPHONE ENCOUNTER
PATIENT CALLED WITH THE CC OF MY FEET AND LEGS ARE SWOLLEN"  SHE KNOWS HER LASIX IS ORDERED FOR ONCE DAILY, BUT FOR THE LONGEST TIME SHE AND PAOLO SNOWDEN PA-C HAVE HAD AN UNDERSTANDING THAT IF SHE NEEDED TO TAKE ONE BID AT TIMES SHE WAS ALLOWED  SHE HAS BEEN TAKING IT THAT WAY FOR AWHILE, STOPPED 3 DAYS AGO AND LOW AND BEHOLD THE LEGS ARE UP AGAIN  THEY FEEL HEAVY AND TIRED  BY MED DAY, IT KEEPS HER FROM DOING WHAT SHE WANTS TO DO   FEELS SO MUCH BETTER TAKING IT BID  WILL YOU GIVE HER AN ORDER TO TAKE IT ON A REGULAR BASIS BID?

## 2021-06-09 ENCOUNTER — APPOINTMENT (OUTPATIENT)
Dept: LAB | Facility: MEDICAL CENTER | Age: 86
End: 2021-06-09
Payer: COMMERCIAL

## 2021-06-09 ENCOUNTER — OFFICE VISIT (OUTPATIENT)
Dept: FAMILY MEDICINE CLINIC | Facility: CLINIC | Age: 86
End: 2021-06-09
Payer: COMMERCIAL

## 2021-06-09 VITALS
OXYGEN SATURATION: 94 % | SYSTOLIC BLOOD PRESSURE: 126 MMHG | DIASTOLIC BLOOD PRESSURE: 78 MMHG | HEART RATE: 81 BPM | TEMPERATURE: 99 F | BODY MASS INDEX: 32.64 KG/M2 | HEIGHT: 59 IN

## 2021-06-09 DIAGNOSIS — R53.83 FATIGUE, UNSPECIFIED TYPE: ICD-10-CM

## 2021-06-09 DIAGNOSIS — F51.01 PRIMARY INSOMNIA: ICD-10-CM

## 2021-06-09 DIAGNOSIS — G47.00 INSOMNIA, UNSPECIFIED TYPE: ICD-10-CM

## 2021-06-09 DIAGNOSIS — R60.0 BILATERAL LOWER EXTREMITY EDEMA: ICD-10-CM

## 2021-06-09 DIAGNOSIS — Z76.0 ENCOUNTER FOR MEDICATION REFILL: ICD-10-CM

## 2021-06-09 DIAGNOSIS — R60.0 BILATERAL LOWER EXTREMITY EDEMA: Primary | ICD-10-CM

## 2021-06-09 LAB
ALBUMIN SERPL BCP-MCNC: 4.1 G/DL (ref 3.5–5)
ALP SERPL-CCNC: 92 U/L (ref 46–116)
ALT SERPL W P-5'-P-CCNC: 31 U/L (ref 12–78)
ANION GAP SERPL CALCULATED.3IONS-SCNC: 6 MMOL/L (ref 4–13)
AST SERPL W P-5'-P-CCNC: 19 U/L (ref 5–45)
BILIRUB SERPL-MCNC: 0.67 MG/DL (ref 0.2–1)
BUN SERPL-MCNC: 36 MG/DL (ref 5–25)
CALCIUM SERPL-MCNC: 9.5 MG/DL (ref 8.3–10.1)
CHLORIDE SERPL-SCNC: 110 MMOL/L (ref 100–108)
CO2 SERPL-SCNC: 28 MMOL/L (ref 21–32)
CREAT SERPL-MCNC: 1.28 MG/DL (ref 0.6–1.3)
FERRITIN SERPL-MCNC: 311 NG/ML (ref 8–388)
GFR SERPL CREATININE-BSD FRML MDRD: 38 ML/MIN/1.73SQ M
GLUCOSE SERPL-MCNC: 100 MG/DL (ref 65–140)
IRON SATN MFR SERPL: 24 %
IRON SERPL-MCNC: 59 UG/DL (ref 50–170)
POTASSIUM SERPL-SCNC: 4.4 MMOL/L (ref 3.5–5.3)
PROT SERPL-MCNC: 8 G/DL (ref 6.4–8.2)
PROT UR-MCNC: <6 MG/DL
SODIUM SERPL-SCNC: 144 MMOL/L (ref 136–145)
TIBC SERPL-MCNC: 251 UG/DL (ref 250–450)
TSH SERPL DL<=0.05 MIU/L-ACNC: 0.66 UIU/ML (ref 0.36–3.74)

## 2021-06-09 PROCEDURE — 82728 ASSAY OF FERRITIN: CPT

## 2021-06-09 PROCEDURE — 80053 COMPREHEN METABOLIC PANEL: CPT

## 2021-06-09 PROCEDURE — 84156 ASSAY OF PROTEIN URINE: CPT | Performed by: SURGERY

## 2021-06-09 PROCEDURE — 84443 ASSAY THYROID STIM HORMONE: CPT

## 2021-06-09 PROCEDURE — 83540 ASSAY OF IRON: CPT

## 2021-06-09 PROCEDURE — 36415 COLL VENOUS BLD VENIPUNCTURE: CPT

## 2021-06-09 PROCEDURE — 99213 OFFICE O/P EST LOW 20 MIN: CPT | Performed by: FAMILY MEDICINE

## 2021-06-09 PROCEDURE — 83550 IRON BINDING TEST: CPT

## 2021-06-09 RX ORDER — ALPRAZOLAM 1 MG/1
1 TABLET ORAL
Qty: 30 TABLET | Refills: 1 | Status: SHIPPED | OUTPATIENT
Start: 2021-06-09 | End: 2021-08-11 | Stop reason: SDUPTHER

## 2021-06-09 NOTE — PROGRESS NOTES
Assessment/Plan:   Case discussed with Dr Barron Kenyon    Will refill Xanax prescription  Discussed with patient some possible causes of lower extremity edema, to check TSH, CMP and urine protein  Last Echo in 2017 with Ef of 60%  Will recheck ECHO  Pt agreeable with plan  Pt states she will also elevate her legs more frequently  Pt states she will get labs today  I will call her with results and discuss additional recommendations  Diagnoses and all orders for this visit:    Bilateral lower extremity edema  -     Protein, urine, random  -     TSH, 3rd generation; Future  -     Comprehensive metabolic panel; Future  -     Echo complete with contrast if indicated; Future    Encounter for medication refill    Insomnia, unspecified type          Subjective:     Patient ID: Radha Silva is a 80 y o  female  HPI  Garcia Adams is an 80year old female with a history of insomnia, HTN, CKD III who presents to the office today for medication refill  She has been taking 1mg of Xanax po HS for many many years  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 gets about 1 hour of sleep at night  With the medication she falls asleep at 10pm and stays asleep until 6am, awakening feeling well rested  She does take a nap in the afternoon  She also c/o bilateral lower extremity swelling  States she has increased her lasix dose from 20mg po daily to 40mg po daily for the past few weeks without improvement in the swelling  States they are less swollen in the morning and increase in swelling as the day progresses  She does elevate her legs for about 1 hour in the afternoon  She is unable to put ALECIA stockings on r/t previous back surgery, states her  cannot put them on her either  She has tried stockings with a zipper, but states they are not tight enough to help with the swelling  Denies recent travel, surgery  No warmth, erythema noted      Review of Systems   Constitutional: Negative for activity change, appetite change, chills, fatigue, fever and unexpected weight change  Respiratory: Negative for cough, chest tightness, shortness of breath and wheezing  Cardiovascular: Positive for leg swelling  Negative for chest pain and palpitations  Endocrine: Negative for polydipsia, polyphagia and polyuria  Skin: Negative for color change  Neurological: Negative for dizziness, weakness and headaches  All other systems reviewed and are negative  Objective:     Physical Exam  Vitals signs and nursing note reviewed  Constitutional:       General: She is not in acute distress  Appearance: Normal appearance  She is normal weight  She is not ill-appearing or toxic-appearing  Cardiovascular:      Rate and Rhythm: Normal rate and regular rhythm  Pulses: Normal pulses  Heart sounds: Normal heart sounds  No murmur  Comments: Trace edema b/l  Pulmonary:      Effort: Pulmonary effort is normal  No respiratory distress  Breath sounds: Normal breath sounds  No stridor  No wheezing, rhonchi or rales  Chest:      Chest wall: No tenderness  Musculoskeletal: Normal range of motion  Right lower leg: Edema present  Left lower leg: Edema present  Skin:     General: Skin is warm and dry  Neurological:      Mental Status: She is alert             Wt Readings from Last 3 Encounters:   02/12/21 73 3 kg (161 lb 9 6 oz)   11/25/20 73 9 kg (162 lb 14 4 oz)   11/23/20 73 7 kg (162 lb 7 7 oz)     Temp Readings from Last 3 Encounters:   06/09/21 99 °F (37 2 °C)   05/12/21 (!) 97 °F (36 1 °C)   03/11/21 98 8 °F (37 1 °C) (Tympanic)     BP Readings from Last 3 Encounters:   06/09/21 126/78   05/12/21 138/84   03/11/21 138/76     Pulse Readings from Last 3 Encounters:   06/09/21 81   05/12/21 72   03/11/21 82

## 2021-06-17 ENCOUNTER — HOSPITAL ENCOUNTER (OUTPATIENT)
Dept: NON INVASIVE DIAGNOSTICS | Facility: HOSPITAL | Age: 86
Discharge: HOME | End: 2021-06-17
Payer: COMMERCIAL

## 2021-06-17 DIAGNOSIS — R60.0 BILATERAL LOWER EXTREMITY EDEMA: ICD-10-CM

## 2021-06-17 PROCEDURE — 93306 TTE W/DOPPLER COMPLETE: CPT

## 2021-06-17 PROCEDURE — 93306 TTE W/DOPPLER COMPLETE: CPT | Performed by: INTERNAL MEDICINE

## 2021-06-18 ENCOUNTER — TELEPHONE (OUTPATIENT)
Dept: OTHER | Facility: HOSPITAL | Age: 86
End: 2021-06-18

## 2021-06-25 DIAGNOSIS — I10 ESSENTIAL HYPERTENSION: ICD-10-CM

## 2021-06-25 DIAGNOSIS — E78.5 DYSLIPIDEMIA: ICD-10-CM

## 2021-06-25 DIAGNOSIS — M19.90 ARTHRITIS: ICD-10-CM

## 2021-06-25 DIAGNOSIS — I10 BENIGN ESSENTIAL HYPERTENSION: ICD-10-CM

## 2021-06-25 RX ORDER — DICLOFENAC SODIUM AND MISOPROSTOL 75; 200 MG/1; UG/1
TABLET, DELAYED RELEASE ORAL
Qty: 180 TABLET | Refills: 4 | Status: SHIPPED | OUTPATIENT
Start: 2021-06-25 | End: 2021-12-30

## 2021-06-25 RX ORDER — CARVEDILOL 25 MG/1
25 TABLET ORAL 2 TIMES DAILY WITH MEALS
Qty: 180 TABLET | Refills: 4 | Status: SHIPPED | OUTPATIENT
Start: 2021-06-25 | End: 2022-07-01

## 2021-06-25 RX ORDER — FUROSEMIDE 20 MG/1
TABLET ORAL
Qty: 90 TABLET | Refills: 4 | Status: SHIPPED | OUTPATIENT
Start: 2021-06-25

## 2021-06-25 RX ORDER — AMLODIPINE BESYLATE 10 MG/1
10 TABLET ORAL DAILY
Qty: 90 TABLET | Refills: 4 | Status: SHIPPED | OUTPATIENT
Start: 2021-06-25 | End: 2022-07-01

## 2021-06-25 RX ORDER — ATORVASTATIN CALCIUM 20 MG/1
TABLET, FILM COATED ORAL
Qty: 90 TABLET | Refills: 4 | Status: SHIPPED | OUTPATIENT
Start: 2021-06-25

## 2021-07-02 DIAGNOSIS — F51.01 PRIMARY INSOMNIA: ICD-10-CM

## 2021-07-02 NOTE — TELEPHONE ENCOUNTER
Patient called to refill her alprazolam 1 mg tablets  She stated that she thought she had no more refills left and I informed her that she has one more so she would like to work on getting the renewal for the prescription

## 2021-07-06 RX ORDER — ALPRAZOLAM 1 MG/1
1 TABLET ORAL
Qty: 30 TABLET | Refills: 0 | Status: CANCELLED | OUTPATIENT
Start: 2021-07-06

## 2021-08-11 ENCOUNTER — OFFICE VISIT (OUTPATIENT)
Dept: FAMILY MEDICINE CLINIC | Facility: CLINIC | Age: 86
End: 2021-08-11
Payer: COMMERCIAL

## 2021-08-11 VITALS
OXYGEN SATURATION: 96 % | TEMPERATURE: 98 F | WEIGHT: 155 LBS | HEIGHT: 59 IN | HEART RATE: 68 BPM | SYSTOLIC BLOOD PRESSURE: 116 MMHG | BODY MASS INDEX: 31.25 KG/M2 | DIASTOLIC BLOOD PRESSURE: 74 MMHG

## 2021-08-11 DIAGNOSIS — F51.01 PRIMARY INSOMNIA: Primary | ICD-10-CM

## 2021-08-11 PROCEDURE — 99213 OFFICE O/P EST LOW 20 MIN: CPT | Performed by: FAMILY MEDICINE

## 2021-08-11 RX ORDER — ALPRAZOLAM 0.25 MG/1
0.75 TABLET ORAL
Qty: 30 TABLET | Refills: 0 | Status: SHIPPED | OUTPATIENT
Start: 2021-08-11 | End: 2021-08-31 | Stop reason: SDUPTHER

## 2021-08-11 RX ORDER — TRAZODONE HYDROCHLORIDE 50 MG/1
50 TABLET ORAL
Qty: 30 TABLET | Refills: 1 | Status: SHIPPED | OUTPATIENT
Start: 2021-08-11 | End: 2021-09-23

## 2021-08-11 RX ORDER — ALPRAZOLAM 0.25 MG/1
TABLET ORAL
Qty: 14 TABLET | Refills: 0 | Status: SHIPPED | OUTPATIENT
Start: 2021-08-11 | End: 2021-08-11

## 2021-08-11 NOTE — PATIENT INSTRUCTIONS
Insomnia   AMBULATORY CARE:   Insomnia  is a condition that makes it hard to fall or stay asleep  Lack of sleep can lead to attention or memory problems during the day  You may also be gonzalez, depressed, clumsy, or have headaches  Contact your healthcare provider if:   · Your symptoms do not get better, or they get worse  · You begin to use drugs or alcohol to fall asleep  · You have questions or concerns about your condition or care  Medicines:   · Medicines  may help you sleep more regularly or help you feel less anxious  · Take your medicine as directed  Contact your healthcare provider if you think your medicine is not helping or if you have side effects  Tell him or her if you are allergic to any medicine  Keep a list of the medicines, vitamins, and herbs you take  Include the amounts, and when and why you take them  Bring the list or the pill bottles to follow-up visits  Carry your medicine list with you in case of an emergency  What you can do to improve your sleep:   · Create a sleep schedule  Try to go to sleep and wake up at the same times every day  Keep a record of your sleep patterns, and any sleeping problems you have  Bring the record to follow-up visits with healthcare providers  · Do not take naps  Naps could make it hard for you to fall asleep at bedtime  · Keep your bedroom cool, quiet, and dark  Turn on white noise, such as a fan, to help you relax  Do not use your bed for any activity that will keep you awake  Do not read, exercise, eat, or watch TV in your bedroom  · Get up if you do not fall asleep within 20 minutes  Move to another room and do something relaxing until you become sleepy  · Limit caffeine, alcohol, and food to earlier in the day  Only drink caffeine in the morning  Do not drink alcohol within 6 hours of bedtime  Do not eat a heavy meal right before you go to bed  · Exercise regularly  Daily exercise may help you sleep better   Do not exercise within 4 hours of bedtime  Follow up with your healthcare provider as directed: Your healthcare provider may refer you for cognitive behavioral therapy  A behavioral therapist may help you find ways to relax, decrease stress, and improve sleep  Write down your questions so you remember to ask them during your visits  © Copyright [employer] 2021 Information is for End User's use only and may not be sold, redistributed or otherwise used for commercial purposes  All illustrations and images included in CareNotes® are the copyrighted property of [employer]  or [facility] [employer]   The above information is an  only  It is not intended as medical advice for individual conditions or treatments  Talk to your doctor, nurse or pharmacist before following any medical regimen to see if it is safe and effective for you

## 2021-08-11 NOTE — PROGRESS NOTES
Assessment/Plan:     Diagnoses and all orders for this visit:    Primary insomnia  Comments:  Wean down alprazolam by 0 25 mg each month (0 75 mg August, 0 50 mg September, 0 25 mg October)  Start trazodone  Follow up monthly to reassess  Orders:  -     traZODone (DESYREL) 50 mg tablet; Take 1 tablet (50 mg total) by mouth daily at bedtime  -     Discontinue: ALPRAZolam (XANAX) 0 25 mg tablet; Take 2 tablets (0 5 mg total) by mouth daily at bedtime as needed for sleep for 7 days, THEN 1 tablet (0 25 mg total) daily at bedtime as needed for sleep for up to 7 days   -     ALPRAZolam (XANAX) 0 25 mg tablet; Take 3 tablets (0 75 mg total) by mouth daily at bedtime as needed for anxiety        PDMP Reviewed  Return in about 4 weeks (around 9/8/2021) for Recheck insomnia  Subjective:        Patient ID: Bert Sanchez is a 80 y o  female  Chief Complaint   Patient presents with    Medication Refill     xanax       77-year-old female with PMH HTN, CKD 3, HLD, NICA, and dextroscoliosis presents for medication refill on her Xanax  States she has been on this medication for many many years and it works well for her  Is willing to try trazodone in place of alprazolam at this time  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 which has been treated with the Xanax  Denies any side effects states she feels the dose is adequate denies any other concerns at this visit  Had lengthy discussion about weaning alprazolam and starting trazodone to help with sleeping without the side effects and potential risks associated with benzodiazepines  Patient open to starting this today  Will follow up in 1 month to assess effectiveness  Patient reports she goes to sleep at 8:30 p m  and wakes at 6 a m  without frequent wakenings  States she also takes a half hour nap in afternoon  Is active besides these periods of rest  Does not consume caffeine after noon   Drinks a shot of blackberry hannah daily from November to April as her "flu shot" but does not consume alcohol at any other time  Denies feelings of anxiety and depression during the day  Will follow up with cardiology as needed  Sees Dr Shamar Jacob  The following portions of the patient's history were reviewed and updated as appropriate: allergies, current medications, past family history, past medical history, past social history, past surgical history and problem list     Patient Active Problem List   Diagnosis    Status post total replacement of right hip    Benign essential hypertension    Hyperlipidemia    Elevated blood uric acid level    History of gout    Dextroscoliosis    Screening-pulmonary TB    Anemia    Anxiety    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 re-entry tachycardia) ([facility] [place] [address] )    Dyslipidemia    Gout    Iron deficiency anemia    History of cardiac radiofrequency ablation    Primary insomnia    Chronic kidney disease, stage III (moderate) ([facility])    Active drug dependence ([facility])       Current Outpatient Medications   Medication Sig Dispense Refill    amLODIPine (NORVASC) 10 mg tablet Take 1 tablet (10 mg total) by mouth daily 90 tablet 4    aspirin (ECOTRIN LOW STRENGTH) 81 mg EC tablet Take 1 tablet by mouth daily 1 tablet 0    atorvastatin (LIPITOR) 20 mg tablet TAKE (1) TABLET BY MOUTH DAILY  90 tablet 4    carvedilol (COREG) 25 mg tablet Take 1 tablet (25 mg total) by mouth 2 (two) times a day with meals 180 tablet 4    clotrimazole-betamethasone (LOTRISONE) 1-0 05 % cream Apply topically 2 (two) times a day 30 g 0    co-enzyme Q-10 100 mg capsule Take 100 mg by mouth daily      diclofenac-misoprostol (ARTHROTEC 75) 75-0 2 MG per tablet TAKE ONE (1) TABLET BY MOUTH TWO TIMES DAILY   180 tablet 4    docusate sodium (COLACE) 100 mg capsule Take 100 mg by mouth 2 (two) times a day as needed for constipation      EPINEPHrine (EPIPEN) 0 3 mg/0 3 mL SOAJ Inject as necessary 2 each 1    ferrous sulfate 325 (65 Fe) mg tablet Take by mouth      furosemide (LASIX) 20 mg tablet TAKE (1) TABLET BY MOUTH DAILY  90 tablet 4    Inulin (FIBER CHOICE PO) Take by mouth      olmesartan (BENICAR) 40 mg tablet TAKE ONE (1) TABLET BY MOUTH DAILY 90 tablet 3    ondansetron (ZOFRAN-ODT) 4 mg disintegrating tablet Take 1 tablet (4 mg total) by mouth every 6 (six) hours as needed for nausea 20 tablet 0    pantoprazole (PROTONIX) 40 mg tablet TAKE (1) TABLET BY MOUTH DAILY  90 tablet 3    potassium chloride (K-DUR,KLOR-CON) 20 mEq tablet TAKE ONE (1) TABLET BY MOUTH DAILY WITH FOOD  90 tablet 4    tolterodine (DETROL LA) 4 mg 24 hr capsule TAKE (1) CAPSULE DAILY  90 capsule 4    VENTOLIN  (90 Base) MCG/ACT inhaler INHALE 1 TO 2 PUFFS EVERY 6 HOURS AS NEEDED  18 g 3    ALPRAZolam (XANAX) 0 25 mg tablet Take 3 tablets (0 75 mg total) by mouth daily at bedtime as needed for anxiety 30 tablet 0    traZODone (DESYREL) 50 mg tablet Take 1 tablet (50 mg total) by mouth daily at bedtime 30 tablet 1     No current facility-administered medications for this visit          Past Medical History:   Diagnosis Date    Anemia     Arthritis     Cardiac disease     Chronic pain     Gout     Hypercholesterolemia     Hypertension     Insomnia         Past Surgical History:   Procedure Laterality Date    APPENDECTOMY      ATRIAL ABLATION SURGERY      HYSTERECTOMY      JOINT REPLACEMENT  knees    KNEE ARTHROSCOPY      b/l knees    LAMINECTOMY      IA TOTAL HIP ARTHROPLASTY Right 7/17/2017    Procedure: TOTAL ANTERIOR HIP ARTHROPLASTY;  Surgeon: Nimisha Ceballos MD;  Location: [facility];  Service: Orthopedics        Social History     Socioeconomic History    Marital status: /Civil Union     Spouse name: Not on file    Number of children: Not on file    Years of education: Not on file    Highest education level: Not on file   Occupational History    Not on file   Tobacco Use    Smoking status: Never Smoker    Smokeless tobacco: Never Used   Vaping Use    Vaping Use: Never used Substance and Sexual Activity    Alcohol use: No    Drug use: No    Sexual activity: Not Currently   Other Topics Concern    Not on file   Social History Narrative    Not on file     Social Determinants of Health     Financial Resource Strain:     Difficulty of Paying Living Expenses:    Food Insecurity:     Worried About Running Out of Food in the Last Year:     [address] in the Last Year:    Transportation Needs:     Lack of Transportation (Medical):  Lack of Transportation (Non-Medical):    Physical Activity:     Days of Exercise per Week:     Minutes of Exercise per Session:    Stress:     Feeling of Stress :    Social Connections:     Frequency of Communication with Friends and Family:     Frequency of Social Gatherings with Friends and Family:     Attends [employer] Services:     Active Member of Clubs or Organizations:     Attends Club or Organization Meetings:     Marital Status:    Intimate Partner Violence:     Fear of Current or Ex-Partner:     Emotionally Abused:     Physically Abused:     Sexually Abused:    Review of Systems   Constitutional: Negative for activity change, appetite change, fatigue and unexpected weight change  HENT: Negative for congestion, ear pain, hearing loss, nosebleeds, rhinorrhea, sinus pain and trouble swallowing  Eyes: Negative for photophobia  Respiratory: Negative for choking, shortness of breath and wheezing  Cardiovascular: Positive for leg swelling (at end of day)  Negative for chest pain and palpitations  Gastrointestinal: Negative for abdominal pain, blood in stool, constipation, diarrhea and nausea  Endocrine: Negative for polydipsia, polyphagia and polyuria  Genitourinary: Negative for difficulty urinating, dysuria, frequency, hematuria and urgency  Musculoskeletal: Negative for arthralgias, back pain and myalgias  Skin: Negative for color change, rash and wound     Neurological: Negative for dizziness, tremors, syncope, weakness and headaches  Psychiatric/Behavioral: Negative for agitation, confusion, self-injury and suicidal ideas  Objective:      /74   Pulse 68   Temp 98 °F (36 7 °C)   Ht 4' 11" (1 499 m)   Wt 70 3 kg (155 lb)   SpO2 96%   BMI 31 31 kg/m²          Physical Exam  Vitals and nursing note reviewed  Constitutional:       Appearance: Normal appearance  She is obese  HENT:      Head: Normocephalic and atraumatic  Nose: Nose normal       Mouth/Throat:      Mouth: Mucous membranes are moist    Eyes:      Conjunctiva/sclera: Conjunctivae normal       Pupils: Pupils are equal, round, and reactive to light  Cardiovascular:      Rate and Rhythm: Normal rate and regular rhythm  Pulmonary:      Effort: Pulmonary effort is normal       Breath sounds: Normal breath sounds  Abdominal:      General: Abdomen is flat  Bowel sounds are normal       Palpations: Abdomen is soft  Genitourinary:     Comments: Exam deferred  Musculoskeletal:         General: Normal range of motion  Cervical back: Normal range of motion and neck supple  Skin:     General: Skin is warm and dry  Capillary Refill: Capillary refill takes less than 2 seconds  Neurological:      General: No focal deficit present  Mental Status: She is alert and oriented to person, place, and time     Psychiatric:         Mood and Affect: Mood normal          Behavior: Behavior normal

## 2021-08-31 ENCOUNTER — OFFICE VISIT (OUTPATIENT)
Dept: FAMILY MEDICINE CLINIC | Facility: CLINIC | Age: 86
End: 2021-08-31
Payer: COMMERCIAL

## 2021-08-31 VITALS
HEIGHT: 59 IN | DIASTOLIC BLOOD PRESSURE: 70 MMHG | TEMPERATURE: 98.1 F | OXYGEN SATURATION: 96 % | RESPIRATION RATE: 18 BRPM | BODY MASS INDEX: 30.24 KG/M2 | WEIGHT: 150 LBS | HEART RATE: 71 BPM | SYSTOLIC BLOOD PRESSURE: 118 MMHG

## 2021-08-31 DIAGNOSIS — H61.21 IMPACTED CERUMEN OF RIGHT EAR: Primary | ICD-10-CM

## 2021-08-31 DIAGNOSIS — F51.01 PRIMARY INSOMNIA: ICD-10-CM

## 2021-08-31 PROCEDURE — 99213 OFFICE O/P EST LOW 20 MIN: CPT | Performed by: FAMILY MEDICINE

## 2021-08-31 RX ORDER — ALPRAZOLAM 0.5 MG/1
0.5 TABLET ORAL
Qty: 30 TABLET | Refills: 0 | Status: SHIPPED | OUTPATIENT
Start: 2021-08-31 | End: 2021-09-23

## 2021-08-31 NOTE — PROGRESS NOTES
Assessment/Plan:    No problem-specific Assessment & Plan notes found for this encounter  Diagnoses and all orders for this visit:    Impacted cerumen of right ear    Primary insomnia  Comments:  Wean down alprazolam by 0 25 mg each month (0 75 mg August, 0 50 mg September, 0 25 mg October)  Start trazodone  Follow up monthly to reassess  Orders:  -     ALPRAZolam (XANAX) 0 5 mg tablet; Take 1 tablet (0 5 mg total) by mouth daily at bedtime as needed for sleep    Other orders  -     Ear cerumen removal        -right ear flushed without issues  Patient verbalized improved hearing  See procedure note  -PDMP reviewed without concern  will continue to decrease xanax to 0 5 mg per plan established by previous provider  Peeween Aid is agreeable to this plan  She will return in 1 month for re-evaluation or sooner if concerns arise    Subjective:      Patient ID: Dolores Ruiz is a 80 y o  female who presents for evaluation of cerumen impaction  Peeween Aid notes having difficulty hearing from her right ear  She has a history of cerumen impaction  She has been using debrox drops without relief  She is hoping to have her ears flushed  Her left ear is currently not bothering her  She has no other URI like symptoms  She is requesting a refill on her xanax  She was recently decreased to 0 75 mg qsh and started on trazodone  She notes the 0 75 mg dose of xanax was working well for her  She has been out of that medication for a few night and states the trazodone is not helping  She has been on xanax for ~20 years  HPI    The following portions of the patient's history were reviewed and updated as appropriate: allergies, current medications, past family history, past medical history, past social history, past surgical history and problem list     Review of Systems   HENT: Positive for ear discharge and ear pain  Negative for congestion, postnasal drip, rhinorrhea, sinus pressure, sinus pain and sneezing      Eyes: Negative for pain and discharge  Respiratory: Negative for cough and shortness of breath  Cardiovascular: Negative for chest pain and palpitations  Psychiatric/Behavioral: Positive for sleep disturbance  Objective:      /70   Pulse 71   Temp 98 1 °F (36 7 °C)   Resp 18   Ht 4' 11" (1 499 m)   Wt 68 kg (150 lb)   SpO2 96%   BMI 30 30 kg/m²          Physical Exam  Constitutional:       General: She is not in acute distress  Appearance: Normal appearance  She is well-developed  HENT:      Head: Normocephalic and atraumatic  Right Ear: There is impacted cerumen  Left Ear: There is no impacted cerumen  Ears:      Comments: Minimal cerumen in left ear  Eyes:      Pupils: Pupils are equal, round, and reactive to light  Cardiovascular:      Rate and Rhythm: Normal rate and regular rhythm  Heart sounds: Normal heart sounds  No murmur heard  Pulmonary:      Effort: Pulmonary effort is normal  No respiratory distress  Breath sounds: Normal breath sounds  No wheezing  Abdominal:      General: Bowel sounds are normal  There is no distension  Palpations: Abdomen is soft  Tenderness: There is no abdominal tenderness  Skin:     General: Skin is warm and dry  Capillary Refill: Capillary refill takes less than 2 seconds  Neurological:      General: No focal deficit present  Mental Status: She is alert and oriented to person, place, and time  Mental status is at baseline  Psychiatric:         Mood and Affect: Mood normal          Behavior: Behavior normal          Judgment: Judgment normal            Ear cerumen removal    Date/Time: 8/31/2021 10:15 AM  Performed by: Angelica Padilla PA-C  Authorized by: Angelica Padilla PA-C   [place] Protocol:  Procedure performed by:  Consent: Verbal consent obtained    Risks and benefits: risks, benefits and alternatives were discussed  Consent given by: patient  Time out: Immediately prior to procedure a "time out" was called to verify the correct patient, procedure, equipment, support staff and site/side marked as required  Timeout called at: 8/31/2021 10:00 AM   Patient understanding: patient states understanding of the procedure being performed  Patient identity confirmed: verbally with patient      Procedure details:     Location:  R ear    Procedure type: irrigation with instrumentation      Instrumentation: curette      Approach:  External  Post-procedure details:     Complication:  None    Hearing quality:  Improved    Patient tolerance of procedure:   Tolerated well, no immediate complications

## 2021-09-23 ENCOUNTER — OFFICE VISIT (OUTPATIENT)
Dept: FAMILY MEDICINE CLINIC | Facility: CLINIC | Age: 86
End: 2021-09-23
Payer: COMMERCIAL

## 2021-09-23 VITALS
WEIGHT: 147 LBS | TEMPERATURE: 97.3 F | HEIGHT: 59 IN | BODY MASS INDEX: 29.64 KG/M2 | RESPIRATION RATE: 18 BRPM | SYSTOLIC BLOOD PRESSURE: 122 MMHG | OXYGEN SATURATION: 96 % | DIASTOLIC BLOOD PRESSURE: 80 MMHG | HEART RATE: 76 BPM

## 2021-09-23 DIAGNOSIS — H93.13 TINNITUS OF BOTH EARS: ICD-10-CM

## 2021-09-23 DIAGNOSIS — F51.01 PRIMARY INSOMNIA: Primary | ICD-10-CM

## 2021-09-23 DIAGNOSIS — E87.6 HYPOKALEMIA: ICD-10-CM

## 2021-09-23 DIAGNOSIS — N39.46 MIXED STRESS AND URGE URINARY INCONTINENCE: ICD-10-CM

## 2021-09-23 PROBLEM — F19.20 ACTIVE DRUG DEPENDENCE (HCC): Status: RESOLVED | Noted: 2021-05-12 | Resolved: 2021-09-23

## 2021-09-23 PROCEDURE — 99213 OFFICE O/P EST LOW 20 MIN: CPT | Performed by: FAMILY MEDICINE

## 2021-09-23 RX ORDER — POTASSIUM CHLORIDE 20 MEQ/1
20 TABLET, EXTENDED RELEASE ORAL DAILY
Qty: 90 TABLET | Refills: 4 | Status: SHIPPED | OUTPATIENT
Start: 2021-09-23

## 2021-09-23 RX ORDER — TOLTERODINE 4 MG/1
4 CAPSULE, EXTENDED RELEASE ORAL DAILY
Qty: 90 CAPSULE | Refills: 4 | Status: SHIPPED | OUTPATIENT
Start: 2021-09-23 | End: 2022-04-04

## 2021-09-23 NOTE — PROGRESS NOTES
Assessment/Plan:    No problem-specific Assessment & Plan notes found for this encounter  Diagnoses and all orders for this visit:    Primary insomnia    Hypokalemia  -     potassium chloride (K-DUR,KLOR-CON) 20 mEq tablet; Take 1 tablet (20 mEq total) by mouth daily    Mixed stress and urge urinary incontinence  -     tolterodine (DETROL LA) 4 mg 24 hr capsule; Take 1 capsule (4 mg total) by mouth daily    Tinnitus of both ears        -continue current medications at current doses  -RTC 3 months for AWV or sooner if concerns arise     Subjective:      Patient ID: Ana Maria Hurtado is a 80 y o  female with a PMH of HTN, CKD stage 3, HLD, NICA, dextroscoliosis, insomnia presents for follow up  She was able to wean herself off her xanax  She notes insomnia and agitation the first 2 night but reports the last several nights she was able to sleep well, only waking to urinate  She d/c the trazodone as well  Her BP is at goal on her current regimen  Denies headaches, dizziness, CP, SOB, changes in vision  She notes long standing tinnitus, L ear > R  This has been going on for several years  She states she has noticed it more at night since it takes her longer to fall asleep  Denies cerumen impaction  She states she needs a refill on her potassium and detrol  HPI    The following portions of the patient's history were reviewed and updated as appropriate: allergies, current medications, past family history, past medical history, past social history, past surgical history and problem list     Review of Systems   Constitutional: Negative  HENT: Positive for tinnitus  Negative for congestion, ear discharge, ear pain, postnasal drip, rhinorrhea, sinus pressure, sinus pain, sneezing, sore throat and voice change  Respiratory: Negative for cough, chest tightness, shortness of breath and wheezing  Cardiovascular: Negative for chest pain and palpitations     Neurological: Negative for dizziness, syncope and light-headedness  Psychiatric/Behavioral: Negative for agitation and sleep disturbance  The patient is not nervous/anxious  Objective:      /80   Pulse 76   Temp (!) 97 3 °F (36 3 °C)   Resp 18   Ht 4' 11" (1 499 m)   Wt 66 7 kg (147 lb)   SpO2 96%   BMI 29 69 kg/m²          Physical Exam  Constitutional:       General: She is not in acute distress  Appearance: Normal appearance  She is well-developed  She is not ill-appearing or toxic-appearing  HENT:      Head: Normocephalic and atraumatic  Right Ear: Tympanic membrane, ear canal and external ear normal  There is no impacted cerumen  Left Ear: Tympanic membrane, ear canal and external ear normal  There is no impacted cerumen  Nose: Nose normal    Eyes:      Pupils: Pupils are equal, round, and reactive to light  Cardiovascular:      Rate and Rhythm: Normal rate and regular rhythm  Pulses: Normal pulses  Heart sounds: Normal heart sounds  No murmur heard  Pulmonary:      Effort: Pulmonary effort is normal  No respiratory distress  Breath sounds: Normal breath sounds  No stridor  No wheezing or rhonchi  Abdominal:      General: Bowel sounds are normal  There is no distension  Palpations: Abdomen is soft  Tenderness: There is no abdominal tenderness  Musculoskeletal:      Right lower leg: No edema  Left lower leg: No edema  Skin:     General: Skin is warm and dry  Capillary Refill: Capillary refill takes less than 2 seconds  Neurological:      Mental Status: She is alert and oriented to person, place, and time  Mental status is at baseline  Psychiatric:         Mood and Affect: Mood normal          Behavior: Behavior normal          Thought Content:  Thought content normal          Judgment: Judgment normal

## 2021-09-27 ENCOUNTER — TELEPHONE (OUTPATIENT)
Dept: FAMILY MEDICINE CLINIC | Facility: CLINIC | Age: 86
End: 2021-09-27

## 2021-09-27 DIAGNOSIS — H93.13 TINNITUS OF BOTH EARS: Primary | ICD-10-CM

## 2021-09-27 NOTE — TELEPHONE ENCOUNTER
Patient called asking if it's possible for an ENT referral  She said she has ringing or buzzing in the ears and it is currently getting worse  I seen she mentioned something going on with her ears last visit with you so I figured I would send message to you  Thank you! Patient also mentioned she really doesn't want to come down here for another appt if possible since she had just been here  Thanks again!

## 2021-10-01 ENCOUNTER — OFFICE VISIT (OUTPATIENT)
Dept: AUDIOLOGY | Facility: CLINIC | Age: 86
End: 2021-10-01
Payer: COMMERCIAL

## 2021-10-01 ENCOUNTER — OFFICE VISIT (OUTPATIENT)
Dept: OTOLARYNGOLOGY | Facility: CLINIC | Age: 86
End: 2021-10-01
Payer: COMMERCIAL

## 2021-10-01 VITALS
WEIGHT: 144 LBS | BODY MASS INDEX: 29.03 KG/M2 | TEMPERATURE: 97.6 F | DIASTOLIC BLOOD PRESSURE: 72 MMHG | OXYGEN SATURATION: 97 % | HEIGHT: 59 IN | SYSTOLIC BLOOD PRESSURE: 124 MMHG | HEART RATE: 65 BPM

## 2021-10-01 DIAGNOSIS — H90.3 SENSORY HEARING LOSS, BILATERAL: Primary | ICD-10-CM

## 2021-10-01 DIAGNOSIS — H93.13 TINNITUS OF BOTH EARS: Primary | ICD-10-CM

## 2021-10-01 DIAGNOSIS — H90.3 SENSORINEURAL HEARING LOSS (SNHL) OF BOTH EARS: ICD-10-CM

## 2021-10-01 DIAGNOSIS — H93.13 TINNITUS OF BOTH EARS: ICD-10-CM

## 2021-10-01 PROCEDURE — 92567 TYMPANOMETRY: CPT | Performed by: AUDIOLOGIST-HEARING AID FITTER

## 2021-10-01 PROCEDURE — 99203 OFFICE O/P NEW LOW 30 MIN: CPT | Performed by: OTOLARYNGOLOGY

## 2021-10-01 PROCEDURE — 1160F RVW MEDS BY RX/DR IN RCRD: CPT | Performed by: OTOLARYNGOLOGY

## 2021-10-01 PROCEDURE — 1036F TOBACCO NON-USER: CPT | Performed by: OTOLARYNGOLOGY

## 2021-10-01 PROCEDURE — 92557 COMPREHENSIVE HEARING TEST: CPT | Performed by: AUDIOLOGIST-HEARING AID FITTER

## 2021-10-28 ENCOUNTER — TELEPHONE (OUTPATIENT)
Dept: FAMILY MEDICINE CLINIC | Facility: CLINIC | Age: 86
End: 2021-10-28

## 2021-10-29 DIAGNOSIS — F51.01 PRIMARY INSOMNIA: Primary | ICD-10-CM

## 2021-10-29 RX ORDER — TRAZODONE HYDROCHLORIDE 50 MG/1
50 TABLET ORAL
Qty: 30 TABLET | Refills: 2 | Status: SHIPPED | OUTPATIENT
Start: 2021-10-29 | End: 2022-01-19 | Stop reason: SDUPTHER

## 2021-12-10 ENCOUNTER — RA CDI HCC (OUTPATIENT)
Dept: OTHER | Facility: HOSPITAL | Age: 86
End: 2021-12-10

## 2021-12-29 DIAGNOSIS — M19.90 ARTHRITIS: ICD-10-CM

## 2021-12-29 DIAGNOSIS — I10 BENIGN ESSENTIAL HYPERTENSION: ICD-10-CM

## 2021-12-29 RX ORDER — OLMESARTAN MEDOXOMIL 40 MG/1
TABLET ORAL
Qty: 90 TABLET | Refills: 4 | Status: SHIPPED | OUTPATIENT
Start: 2021-12-29

## 2021-12-30 RX ORDER — DICLOFENAC SODIUM AND MISOPROSTOL 75; 200 MG/1; UG/1
TABLET, DELAYED RELEASE ORAL
Qty: 180 TABLET | Refills: 4 | Status: SHIPPED | OUTPATIENT
Start: 2021-12-30 | End: 2022-07-22 | Stop reason: ALTCHOICE

## 2022-01-12 ENCOUNTER — OFFICE VISIT (OUTPATIENT)
Dept: FAMILY MEDICINE CLINIC | Facility: CLINIC | Age: 87
End: 2022-01-12
Payer: COMMERCIAL

## 2022-01-12 VITALS
SYSTOLIC BLOOD PRESSURE: 116 MMHG | BODY MASS INDEX: 28.18 KG/M2 | DIASTOLIC BLOOD PRESSURE: 72 MMHG | RESPIRATION RATE: 16 BRPM | OXYGEN SATURATION: 95 % | WEIGHT: 139.8 LBS | HEART RATE: 75 BPM | HEIGHT: 59 IN | TEMPERATURE: 98.1 F

## 2022-01-12 DIAGNOSIS — Z00.00 MEDICARE ANNUAL WELLNESS VISIT, SUBSEQUENT: Primary | ICD-10-CM

## 2022-01-12 PROCEDURE — G0439 PPPS, SUBSEQ VISIT: HCPCS | Performed by: FAMILY MEDICINE

## 2022-01-12 NOTE — PROGRESS NOTES
Assessment and Plan:     Problem List Items Addressed This Visit     None      Visit Diagnoses     Medicare annual wellness visit, subsequent    -  Primary         Received flu shot at outside pharmacy      BMI Counseling: Body mass index is 28 24 kg/m²  The BMI is above normal  Nutrition recommendations include decreasing portion sizes, encouraging healthy choices of fruits and vegetables, decreasing fast food intake, moderation in carbohydrate intake and increasing intake of lean protein  No pharmacotherapy was ordered  Rationale for BMI follow-up plan is due to patient being overweight or obese  Depression Screening and Follow-up Plan: Patient was screened for depression during today's encounter  They screened negative with a PHQ-2 score of 0  Preventive health issues were discussed with patient, and age appropriate screening tests were ordered as noted in patient's After Visit Summary  Personalized health advice and appropriate referrals for health education or preventive services given if needed, as noted in patient's After Visit Summary       History of Present Illness:     Patient presents for Medicare Annual Wellness visit    Patient Care Team:  Colton Hernandez PA-C as PCP - General (Family Medicine)  Aniyah Javed MD as PCP - [address] [place] [address] (RTE)  Aniyah Javed MD as PCP - PCP-[facility] ([facility]E)  DO Levi Ogden MD Lockie Leavell, MD     Problem List:     Patient Active Problem List   Diagnosis    Status post total replacement of right hip    Benign essential hypertension    Hyperlipidemia    Elevated blood uric acid level    History of gout    Dextroscoliosis    Screening-pulmonary TB    Anemia    Anxiety    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 [facility]entry [facility]    Dyslipidemia    Gout    Iron deficiency anemia    History of cardiac radiofrequency ablation    Primary insomnia    Chronic kidney disease, stage III (moderate) ([facility] )      Past Medical and Surgical History:     Past Medical History:   Diagnosis Date    Anemia     Arthritis     Cardiac disease     Chronic pain     Gout     Hypercholesterolemia     Hypertension     Insomnia      Past Surgical History:   Procedure Laterality Date    APPENDECTOMY      ATRIAL ABLATION SURGERY      HYSTERECTOMY      JOINT REPLACEMENT  knees    KNEE ARTHROSCOPY      b/l knees    LAMINECTOMY      RI TOTAL HIP ARTHROPLASTY Right 7/17/2017    Procedure: TOTAL ANTERIOR HIP ARTHROPLASTY;  Surgeon: Robbie House MD;  Location: [facility];  Service: Orthopedics      Family History:     History reviewed  No pertinent family history  Social History:     Social History     Socioeconomic History    Marital status: /Civil Union     Spouse name: None    Number of children: None    Years of education: None    Highest education level: None   Occupational History    None   Tobacco Use    Smoking status: Never Smoker    Smokeless tobacco: Never Used   Vaping Use    Vaping Use: Never used   Substance and Sexual Activity    Alcohol use: No    Drug use: No    Sexual activity: Not Currently   Other Topics Concern    None   Social History Narrative    None     Social Determinants of Health     Financial Resource Strain: Not on file   Food Insecurity: Not on file   Transportation Needs: Not on file   Physical Activity: Not on file   Stress: Not on file   Social Connections: Not on file   Intimate Partner Violence: Not on file   Housing Stability: Not on file      Medications and Allergies:     Current Outpatient Medications   Medication Sig Dispense Refill    amLODIPine (NORVASC) 10 mg tablet Take 1 tablet (10 mg total) by mouth daily 90 tablet 4    atorvastatin (LIPITOR) 20 mg tablet TAKE (1) TABLET BY MOUTH DAILY   90 tablet 4    carvedilol (COREG) 25 mg tablet Take 1 tablet (25 mg total) by mouth 2 (two) times a day with meals 180 tablet 4    co-enzyme Q-10 100 mg capsule Take 100 mg by mouth daily      diclofenac-misoprostol (ARTHROTEC 75) 75-0 2 MG per tablet TAKE ONE (1) TABLET BY MOUTH TWO TIMES DAILY  180 tablet 4    docusate sodium (COLACE) 100 mg capsule Take 100 mg by mouth 2 (two) times a day as needed for constipation      EPINEPHrine (EPIPEN) 0 3 mg/0 3 mL SOAJ Inject as necessary 2 each 1    furosemide (LASIX) 20 mg tablet TAKE (1) TABLET BY MOUTH DAILY  90 tablet 4    Inulin (FIBER CHOICE PO) Take by mouth      olmesartan (BENICAR) 40 mg tablet TAKE (1) TABLET BY MOUTH DAILY  90 tablet 4    pantoprazole (PROTONIX) 40 mg tablet TAKE (1) TABLET BY MOUTH DAILY  90 tablet 3    potassium chloride (K-DUR,KLOR-CON) 20 mEq tablet Take 1 tablet (20 mEq total) by mouth daily 90 tablet 4    tolterodine (DETROL LA) 4 mg 24 hr capsule Take 1 capsule (4 mg total) by mouth daily 90 capsule 4    traZODone (DESYREL) 50 mg tablet Take 1 tablet (50 mg total) by mouth daily at bedtime 30 tablet 2    VENTOLIN  (90 Base) MCG/ACT inhaler INHALE 1 TO 2 PUFFS EVERY 6 HOURS AS NEEDED  18 g 3     No current facility-administered medications for this visit  Allergies   Allergen Reactions    Bee Venom Anaphylaxis    Codeine GI Intolerance     Nausea/vomiting    Tetanus Immune Globulin Other (See Comments)    Tetanus Toxoid     Tetanus Toxoids       Immunizations:     Immunization History   Administered Date(s) Administered    COVID-19 J&J (Drea) vaccine 0 5 mL 11/01/2021    Influenza, high dose seasonal 0 7 mL 11/18/2020    Pneumococcal Conjugate 13-Valent 11/05/2015    Pneumococcal Polysaccharide PPV23 04/20/2009    SARS-CoV-2 / COVID-19 04/10/2021    Zoster 06/30/2015      Health Maintenance: There are no preventive care reminders to display for this patient        Topic Date Due    DTaP,Tdap,and Td Vaccines (1 - Tdap) Never done    Influenza Vaccine (1) 09/01/2021    COVID-19 Vaccine (2 - Booster for Drea series) 12/27/2021      Medicare Health Risk Assessment:     /72   Pulse 75   Temp 98 1 °F (36 7 °C)   Resp 16   Ht 4' 11" (1 499 m)   Wt 63 4 kg (139 lb 12 8 oz)   SpO2 95%   BMI 28 24 kg/m²      Erik Dawson is here for her Subsequent Wellness visit  Last Medicare Wellness visit information reviewed, patient interviewed and updates made to the record today  Health Risk Assessment:   Patient rates overall health as excellent  Patient feels that their physical health rating is much better  Patient is very satisfied with their life  Eyesight was rated as slightly worse  Hearing was rated as slightly worse  Patient feels that their emotional and mental health rating is same  Patients states they are never, rarely angry  Patient states they are never, rarely unusually tired/fatigued  Pain experienced in the last 7 days has been none  Patient states that she has experienced weight loss or gain in last 6 months  Depression Screening:   PHQ-2 Score: 0      Fall Risk Screening: In the past year, patient has experienced: history of falling in past year    Number of falls: 2 or more  Injured during fall?: No      Urinary Incontinence Screening:   Patient has leaked urine accidently in the last six months  Home Safety:  Patient has trouble with stairs inside or outside of their home  Patient has working smoke alarms and has working carbon monoxide detector  Home safety hazards include: medications that cause fatigue  Nutrition:   Current diet is Regular  Medications:   Patient is not currently taking any over-the-counter supplements  Patient is able to manage medications  Activities of Daily Living (ADLs)/Instrumental Activities of Daily Living (IADLs):   Walk and transfer into and out of bed and chair?: Yes  Dress and groom yourself?: Yes    Bathe or shower yourself?: Yes    Feed yourself?  Yes  Do your laundry/housekeeping?: Yes  Manage your money, pay your bills and track your expenses?: Yes  Make your own meals?: Yes    Do your own shopping?: Yes    Previous Hospitalizations:   Any hospitalizations or ED visits within the last 12 months?: No      Advance Care Planning:   Living will: Yes    Durable POA for healthcare: Yes    Advanced directive: Yes      PREVENTIVE SCREENINGS      Cardiovascular Screening:    General: Screening Not Indicated and History Lipid Disorder      Diabetes Screening:     General: Screening Current      Colorectal Cancer Screening:     General: Screening Not Indicated      Cervical Cancer Screening:    General: Screening Not Indicated      Lung Cancer Screening:     General: Screening Not Indicated    Screening, Brief Intervention, and Referral to Treatment (SBIRT)    Screening  Typical number of drinks in a day: 1  Typical number of drinks in a week: 7  Interpretation: Low risk drinking behavior      Single Item Drug Screening:  How often have you used an illegal drug (including marijuana) or a prescription medication for non-medical reasons in the past year? never    Single Item Drug Screen Score: 0  Interpretation: Negative screen for possible drug use disorder      Keena Pérez MD

## 2022-01-12 NOTE — PATIENT INSTRUCTIONS
Medicare Preventive Visit Patient Instructions  Thank you for completing your Welcome to Medicare Visit or Medicare Annual Wellness Visit today  Your next wellness visit will be due in one year (1/13/2023)  The screening/preventive services that you may require over the next 5-10 years are detailed below  Some tests may not apply to you based off risk factors and/or age  Screening tests ordered at today's visit but not completed yet may show as past due  Also, please note that scanned in results may not display below  Preventive Screenings:  Service Recommendations Previous Testing/Comments   Colorectal Cancer Screening  * Colonoscopy    * Fecal Occult Blood Test (FOBT)/Fecal Immunochemical Test (FIT)  * Fecal DNA/Cologuard Test  * Flexible Sigmoidoscopy Age: 54-65 years old   Colonoscopy: every 10 years (may be performed more frequently if at higher risk)  OR  FOBT/FIT: every 1 year  OR  Cologuard: every 3 years  OR  Sigmoidoscopy: every 5 years  Screening may be recommended earlier than age 48 if at higher risk for colorectal cancer  Also, an individualized decision between you and your healthcare provider will decide whether screening between the ages of 74-80 would be appropriate  Colonoscopy: Not on file  FOBT/FIT: Not on file  Cologuard: Not on file  Sigmoidoscopy: Not on file    Screening Not Indicated     Breast Cancer Screening Age: 36 years old  Frequency: every 1-2 years  Not required if history of left and right mastectomy Mammogram: Not on file        Cervical Cancer Screening Between the ages of 21-29, pap smear recommended once every 3 years  Between the ages of 33-67, can perform pap smear with HPV co-testing every 5 years     Recommendations may differ for women with a history of total hysterectomy, cervical cancer, or abnormal pap smears in past  Pap Smear: Not on file    Screening Not Indicated   Hepatitis C Screening Once for adults born between 1945 and 1965  More frequently in patients at high risk for Hepatitis C Hep C Antibody: Not on file        Diabetes Screening 1-2 times per year if you're at risk for diabetes or have pre-diabetes Fasting glucose: 92 mg/dL   A1C: 5 6 %    Screening Current   Cholesterol Screening Once every 5 years if you don't have a lipid disorder  May order more often based on risk factors  Lipid panel: 12/14/2020    Screening Not Indicated  History Lipid Disorder     Other Preventive Screenings Covered by Medicare:  1  Abdominal Aortic Aneurysm (AAA) Screening: covered once if your at risk  You're considered to be at risk if you have a family history of AAA  2  Lung Cancer Screening: covers low dose CT scan once per year if you meet all of the following conditions: (1) Age 50-69; (2) No signs or symptoms of lung cancer; (3) Current smoker or have quit smoking within the last 15 years; (4) You have a tobacco smoking history of at least 30 pack years (packs per day multiplied by number of years you smoked); (5) You get a written order from a healthcare provider  3  Glaucoma Screening: covered annually if you're considered high risk: (1) You have diabetes OR (2) Family history of glaucoma OR (3)  aged 48 and older OR (3)  American aged 72 and older  3  Osteoporosis Screening: covered every 2 years if you meet one of the following conditions: (1) You're estrogen deficient and at risk for osteoporosis based off medical history and other findings; (2) Have a vertebral abnormality; (3) On glucocorticoid therapy for more than 3 months; (4) Have primary hyperparathyroidism; (5) On osteoporosis medications and need to assess response to drug therapy  · Last bone density test (DXA Scan): Not on file  5  HIV Screening: covered annually if you're between the age of 12-76  Also covered annually if you are younger than 13 and older than 72 with risk factors for HIV infection   For pregnant patients, it is covered up to 3 times per pregnancy  Immunizations:  Immunization Recommendations   Influenza Vaccine Annual influenza vaccination during flu season is recommended for all persons aged >= 6 months who do not have contraindications   Pneumococcal Vaccine (Prevnar and Pneumovax)  * Prevnar = PCV13  * Pneumovax = PPSV23   Adults 25-60 years old: 1-3 doses may be recommended based on certain risk factors  Adults 72 years old: Prevnar (PCV13) vaccine recommended followed by Pneumovax (PPSV23) vaccine  If already received PPSV23 since turning 65, then PCV13 recommended at least one year after PPSV23 dose  Hepatitis B Vaccine 3 dose series if at intermediate or high risk (ex: diabetes, end stage renal disease, liver disease)   Tetanus (Td) Vaccine - COST NOT COVERED BY MEDICARE PART B Following completion of primary series, a booster dose should be given every 10 years to maintain immunity against tetanus  Td may also be given as tetanus wound prophylaxis  Tdap Vaccine - COST NOT COVERED BY MEDICARE PART B Recommended at least once for all adults  For pregnant patients, recommended with each pregnancy  Shingles Vaccine (Shingrix) - COST NOT COVERED BY MEDICARE PART B  2 shot series recommended in those aged 48 and above     Health Maintenance Due:  There are no preventive care reminders to display for this patient  Immunizations Due:      Topic Date Due    DTaP,Tdap,and Td Vaccines (1 - Tdap) Never done    Influenza Vaccine (1) 09/01/2021    COVID-19 Vaccine (2 - Booster for Drea series) 12/27/2021     Advance Directives   What are advance directives? Advance directives are legal documents that state your wishes and plans for medical care  These plans are made ahead of time in case you lose your ability to make decisions for yourself  Advance directives can apply to any medical decision, such as the treatments you want, and if you want to donate organs  What are the types of advance directives?   There are many types of advance directives, and each state has rules about how to use them  You may choose a combination of any of the following:  · Living will: This is a written record of the treatment you want  You can also choose which treatments you do not want, which to limit, and which to stop at a certain time  This includes surgery, medicine, IV fluid, and tube feedings  · Durable power of  for healthcare [facility]): This is a written record that states who you want to make healthcare choices for you when you are unable to make them for yourself  This person, called a proxy, is usually a family member or a friend  You may choose more than 1 proxy  · Do not resuscitate (DNR) order:  A DNR order is used in case your heart stops beating or you stop breathing  It is a request not to have certain forms of treatment, such as CPR  A DNR order may be included in other types of advance directives  · Medical directive: This covers the care that you want if you are in a coma, near death, or unable to make decisions for yourself  You can list the treatments you want for each condition  Treatment may include pain medicine, surgery, blood transfusions, dialysis, IV or tube feedings, and a ventilator (breathing machine)  · Values history: This document has questions about your views, beliefs, and how you feel and think about life  This information can help others choose the care that you would choose  Why are advance directives important? An advance directive helps you control your care  Although spoken wishes may be used, it is better to have your wishes written down  Spoken wishes can be misunderstood, or not followed  Treatments may be given even if you do not want them  An advance directive may make it easier for your family to make difficult choices about your care  Fall Prevention    Fall prevention  includes ways to make your home and other areas safer  It also includes ways you can move more carefully to prevent a fall   Health conditions that cause changes in your blood pressure, vision, or muscle strength and coordination may increase your risk for falls  Medicines may also increase your risk for falls if they make you dizzy, weak, or sleepy  Fall prevention tips:   · Stand or sit up slowly  · Use assistive devices as directed  · Wear shoes that fit well and have soles that   · Wear a personal alarm  · Stay active  · Manage your medical conditions  Home Safety Tips:  · Add items to prevent falls in the bathroom  · Keep paths clear  · Install bright lights in your home  · Keep items you use often on shelves within reach  · Paint or place reflective tape on the edges of your stairs  Urinary Incontinence   Urinary incontinence (UI)  is when you lose control of your bladder  UI develops because your bladder cannot store or empty urine properly  The 3 most common types of UI are stress incontinence, urge incontinence, or both  Medicines:   · May be given to help strengthen your bladder control  Report any side effects of medication to your healthcare provider  Do pelvic muscle exercises often:  Your pelvic muscles help you stop urinating  Squeeze these muscles tight for 5 seconds, then relax for 5 seconds  Gradually work up to squeezing for 10 seconds  Do 3 sets of 15 repetitions a day, or as directed  This will help strengthen your pelvic muscles and improve bladder control  Train your bladder:  Go to the bathroom at set times, such as every 2 hours, even if you do not feel the urge to go  You can also try to hold your urine when you feel the urge to go  For example, hold your urine for 5 minutes when you feel the urge to go  As that becomes easier, hold your urine for 10 minutes  Self-care:   · Keep a UI record  Write down how often you leak urine and how much you leak  Make a note of what you were doing when you leaked urine  · Drink liquids as directed   You may need to limit the amount of liquid you drink to help control your urine leakage  Do not drink any liquid right before you go to bed  Limit or do not have drinks that contain caffeine or alcohol  · Prevent constipation  Eat a variety of high-fiber foods  Good examples are high-fiber cereals, beans, vegetables, and whole-grain breads  Walking is the best way to trigger your intestines to have a bowel movement  · Exercise regularly and maintain a healthy weight  Weight loss and exercise will decrease pressure on your bladder and help you control your leakage  · Use a catheter as directed  to help empty your bladder  A catheter is a tiny, plastic tube that is put into your bladder to drain your urine  · Go to behavior therapy as directed  Behavior therapy may be used to help you learn to control your urge to urinate  Weight Management   Why it is important to manage your weight:  Being overweight increases your risk of health conditions such as heart disease, high blood pressure, type 2 diabetes, and certain types of cancer  It can also increase your risk for osteoarthritis, sleep apnea, and other respiratory problems  Aim for a slow, steady weight loss  Even a small amount of weight loss can lower your risk of health problems  How to lose weight safely:  A safe and healthy way to lose weight is to eat fewer calories and get regular exercise  You can lose up about 1 pound a week by decreasing the number of calories you eat by 500 calories each day  Healthy meal plan for weight management:  A healthy meal plan includes a variety of foods, contains fewer calories, and helps you stay healthy  A healthy meal plan includes the following:  · Eat whole-grain foods more often  A healthy meal plan should contain fiber  Fiber is the part of grains, fruits, and vegetables that is not broken down by your body  Whole-grain foods are healthy and provide extra fiber in your diet   Some examples of whole-grain foods are whole-wheat breads and pastas, oatmeal, brown rice, and bulgur  · Eat a variety of vegetables every day  Include dark, leafy greens such as spinach, kale, kena greens, and mustard greens  Eat yellow and orange vegetables such as carrots, sweet potatoes, and winter squash  · Eat a variety of fruits every day  Choose fresh or canned fruit (canned in its own juice or light syrup) instead of juice  Fruit juice has very little or no fiber  · Eat low-fat dairy foods  Drink fat-free (skim) milk or 1% milk  Eat fat-free yogurt and low-fat cottage cheese  Try low-fat cheeses such as mozzarella and other reduced-fat cheeses  · Choose meat and other protein foods that are low in fat  Choose beans or other legumes such as split peas or lentils  Choose fish, skinless poultry (chicken or turkey), or lean cuts of red meat (beef or pork)  Before you cook meat or poultry, cut off any visible fat  · Use less fat and oil  Try baking foods instead of frying them  Add less fat, such as margarine, sour cream, regular salad dressing and mayonnaise to foods  Eat fewer high-fat foods  Some examples of high-fat foods include french fries, doughnuts, ice cream, and cakes  · Eat fewer sweets  Limit foods and drinks that are high in sugar  This includes candy, cookies, regular soda, and sweetened drinks  Exercise:  Exercise at least 30 minutes per day on most days of the week  Some examples of exercise include walking, biking, dancing, and swimming  You can also fit in more physical activity by taking the stairs instead of the elevator or parking farther away from stores  Ask your healthcare provider about the best exercise plan for you  © Copyright [employer] 2018 Information is for End User's use only and may not be sold, redistributed or otherwise used for commercial purposes   All illustrations and images included in CareNotes® are the copyrighted property of [employer]  or [address] [place] [address]

## 2022-01-19 DIAGNOSIS — K21.9 GASTROESOPHAGEAL REFLUX DISEASE, UNSPECIFIED WHETHER ESOPHAGITIS PRESENT: ICD-10-CM

## 2022-01-19 DIAGNOSIS — F51.01 PRIMARY INSOMNIA: ICD-10-CM

## 2022-01-19 RX ORDER — TRAZODONE HYDROCHLORIDE 50 MG/1
50 TABLET ORAL
Qty: 30 TABLET | Refills: 2 | Status: SHIPPED | OUTPATIENT
Start: 2022-01-19 | End: 2022-04-04

## 2022-01-19 RX ORDER — PANTOPRAZOLE SODIUM 40 MG/1
40 TABLET, DELAYED RELEASE ORAL DAILY
Qty: 90 TABLET | Refills: 3 | Status: SHIPPED | OUTPATIENT
Start: 2022-01-19

## 2022-03-23 ENCOUNTER — OFFICE VISIT (OUTPATIENT)
Dept: FAMILY MEDICINE CLINIC | Facility: CLINIC | Age: 87
End: 2022-03-23
Payer: COMMERCIAL

## 2022-03-23 VITALS
BODY MASS INDEX: 43.06 KG/M2 | OXYGEN SATURATION: 96 % | RESPIRATION RATE: 16 BRPM | WEIGHT: 213.6 LBS | DIASTOLIC BLOOD PRESSURE: 68 MMHG | TEMPERATURE: 97.8 F | HEIGHT: 59 IN | HEART RATE: 64 BPM | SYSTOLIC BLOOD PRESSURE: 118 MMHG

## 2022-03-23 DIAGNOSIS — T50.905A ADVERSE EFFECT OF DRUG, INITIAL ENCOUNTER: Primary | ICD-10-CM

## 2022-03-23 PROCEDURE — 99213 OFFICE O/P EST LOW 20 MIN: CPT | Performed by: FAMILY MEDICINE

## 2022-03-23 NOTE — PROGRESS NOTES
Assessment/Plan/Follow up information       Diagnosis ICD-10-CM Associated Orders   1  Adverse effect of drug, initial encounter  [id number]         Given patient's age and medical history I believe it is reasonable to discontinue her statin and her iron medication at this time  It also appears she has not had lab work ordered in quite some time advised she have lab work completed prior to next visit  Patient in agreement with the plan, all questions and concerns were answered/addressed  Advised to contact me or the office with any concerns or questions  In the event of an emergency, and unable to contact a provider they are to go to the emergency room  Subjective    HPI:  This an 80-year-old female presents the office today with several questions regarding her medication regimen  Patient currently takes iron supplementation for presumptive iron deficiency anemia and reports significant constipation and clean colored stools she is curious whether she can stop taking her iron medication  Additionally patient states she has a large pill burden and is curious whether she can discontinue her Lipitor as well given her recent lipid panel  She also reports that she has bilateral visual disturbances which he describes floaters that she want to make her PCP aware of however she does have an appointment with Ophthalmology this upcoming week         Review of Systems   Constitutional: Negative for activity change, appetite change, chills, fatigue and fever  HENT: Negative for congestion, dental problem, drooling, ear discharge, ear pain, facial swelling, postnasal drip, rhinorrhea and sinus pain  Eyes: Negative for photophobia, pain, discharge and itching  Respiratory: Negative for apnea, cough, chest tightness and shortness of breath  Cardiovascular: Negative for chest pain and leg swelling     Gastrointestinal: Negative for abdominal distention, abdominal pain, anal bleeding, constipation, diarrhea and nausea  Endocrine: Negative for cold intolerance, heat intolerance and polydipsia  Genitourinary: Negative for difficulty urinating  Musculoskeletal: Negative for arthralgias, gait problem, joint swelling and myalgias  Skin: Negative for color change and pallor  Allergic/Immunologic: Negative for immunocompromised state  Neurological: Negative for dizziness, seizures, facial asymmetry, weakness, light-headedness, numbness and headaches  Psychiatric/Behavioral: Negative for agitation, behavioral problems, confusion, decreased concentration and dysphoric mood  All other systems reviewed and are negative  Objective    Vitals:    03/23/22 1102   BP: 118/68   Pulse: 64   Resp: 16   Temp: 97 8 °F (36 6 °C)   SpO2: 96%         Physical Exam  Vitals and nursing note reviewed  Constitutional:       General: She is not in acute distress  Appearance: She is well-developed  HENT:      Head: Normocephalic and atraumatic  Eyes:      Conjunctiva/sclera: Conjunctivae normal       Pupils: Pupils are equal, round, and reactive to light  Cardiovascular:      Rate and Rhythm: Normal rate and regular rhythm  Heart sounds: Normal heart sounds  No murmur heard  No friction rub  Pulmonary:      Effort: Pulmonary effort is normal       Breath sounds: Normal breath sounds  Abdominal:      General: Bowel sounds are normal       Palpations: Abdomen is soft  Musculoskeletal:         General: Normal range of motion  Cervical back: Normal range of motion and neck supple  Skin:     General: Skin is warm  Capillary Refill: Capillary refill takes less than 2 seconds  Neurological:      Mental Status: She is alert and oriented to person, place, and time  Motor: No abnormal muscle tone  Coordination: Coordination normal    Psychiatric:         Behavior: Behavior normal          Thought Content:  Thought content normal             Portions of the record may have been created with voice recognition software  Occasional wrong word or "sound a like" substitutions may have occurred due to the inherent limitations of voice recognition software  Read the chart carefully and recognize, using context, where substitutions have occurred  Contact me with any questions         Lizz Briceno MD 03/23/22

## 2022-03-31 DIAGNOSIS — N39.46 MIXED STRESS AND URGE URINARY INCONTINENCE: ICD-10-CM

## 2022-03-31 DIAGNOSIS — F51.01 PRIMARY INSOMNIA: ICD-10-CM

## 2022-04-04 RX ORDER — TOLTERODINE 4 MG/1
4 CAPSULE, EXTENDED RELEASE ORAL DAILY
Qty: 90 CAPSULE | Refills: 4 | Status: SHIPPED | OUTPATIENT
Start: 2022-04-04

## 2022-04-04 RX ORDER — TRAZODONE HYDROCHLORIDE 50 MG/1
50 TABLET ORAL
Qty: 30 TABLET | Refills: 4 | Status: SHIPPED | OUTPATIENT
Start: 2022-04-04 | End: 2022-07-22 | Stop reason: ALTCHOICE

## 2022-04-27 ENCOUNTER — OFFICE VISIT (OUTPATIENT)
Dept: DENTISTRY | Facility: CLINIC | Age: 87
End: 2022-04-27
Payer: COMMERCIAL

## 2022-04-27 VITALS — DIASTOLIC BLOOD PRESSURE: 81 MMHG | SYSTOLIC BLOOD PRESSURE: 132 MMHG | HEART RATE: 79 BPM

## 2022-04-27 DIAGNOSIS — K03.6 ACCRETIONS ON TEETH: Primary | ICD-10-CM

## 2022-04-27 PROCEDURE — D1110 PROPHYLAXIS - ADULT: HCPCS | Performed by: DENTAL HYGIENIST

## 2022-04-27 PROCEDURE — D0210 INTRAORAL - COMPLETE SERIES OF RADIOGRAPHIC IMAGES: HCPCS | Performed by: DENTAL HYGIENIST

## 2022-04-27 PROCEDURE — D0190 SCREENING OF A PATIENT: HCPCS | Performed by: DENTAL HYGIENIST

## 2022-04-27 PROCEDURE — D1330 ORAL HYGIENE INSTRUCTIONS: HCPCS | Performed by: DENTAL HYGIENIST

## 2022-04-27 NOTE — PROGRESS NOTES
Adult Prophy  Chief Complaint   Patient presents with    Routine Oral Cleaning    Last cleaning was about 3 years ago  The patient states she was always regular with care before  FUD- I loose  Patient will call insurance to see if a new one would be covered  Patient states she has some sensitivity on LR side and thinks she needs a filling  Showed the patient her sub calc and bone loss we discussed her periodontal disease  Patient understands  Rec  proxa-brushes and flossing daily  Patient will work on better interproximal care  Gave pt a pillow and did not recline back far  Patient did well  Method Used:  · Prophy Method Used: Ultrasonic Scaling  · Hand Scaling  · Polished  · Flossed  Used Releaf     Radiographs Taken in [facility]is: (Taken to assess periodontal health)  · Complete mouth series  On lower teeth    Intra/Extra Oral Cancer Screening:  · Within normal limits    Oral Hygiene:  · Fair    Plaque:  · Light    Calculus:  · Moderate    Bleeding:  · Light  · Moderate    Stain:  · Light    Periodontal Charting: Showed patient sub calc specks and bone height  · Full probing    Periodontal Classification:  · Generalized  · Moderate  · Severe  · Periodontal Disease    Oral Hygiene Instruction:    Tejinder Elizalde over daily routine and c-shaped flossing  Discussed Oral systemic link and role of plaque in gum disease  No orders of the defined types were placed in this encounter         Next Visit:  6 Month prophy  Dentist visit- comp and resin- if time

## 2022-04-28 ENCOUNTER — OFFICE VISIT (OUTPATIENT)
Dept: DENTISTRY | Facility: CLINIC | Age: 87
End: 2022-04-28
Payer: COMMERCIAL

## 2022-04-28 VITALS — SYSTOLIC BLOOD PRESSURE: 114 MMHG | DIASTOLIC BLOOD PRESSURE: 63 MMHG | HEART RATE: 76 BPM

## 2022-04-28 DIAGNOSIS — Z01.21 ENCOUNTER FOR DENTAL EXAMINATION AND CLEANING WITH ABNORMAL FINDINGS: Primary | ICD-10-CM

## 2022-04-28 DIAGNOSIS — K02.9 TOOTH DECAY: ICD-10-CM

## 2022-04-28 PROCEDURE — D0150 COMPREHENSIVE ORAL EVALUATION - NEW OR ESTABLISHED PATIENT: HCPCS | Performed by: STUDENT IN AN ORGANIZED HEALTH CARE EDUCATION/TRAINING PROGRAM

## 2022-04-28 PROCEDURE — D2391 RESIN-BASED COMPOSITE - 1 SURFACE, POSTERIOR: HCPCS | Performed by: STUDENT IN AN ORGANIZED HEALTH CARE EDUCATION/TRAINING PROGRAM

## 2022-04-28 NOTE — PROGRESS NOTES
Comprehensive Exam    Martin Holguin presents for a comprehensive exam  Verbal consent for treatment given in addition to the forms  Reviewed health history - Patient is ASA    Consents signed: Yes    Perio: Gingivitis  Pain Scale: 2  Caries Assessment: medium  Radiographs:  Films are current    Oral Hygiene instruction reviewed and given  Hygiene recall visits recommended to the patient  Treatment Plan:  1  Periodontal therapy: prophy  2  Caries control: resins  3  Occlusal evaluation: patient needs a lower partial to be made and an upper full denture remade    Composite Filling    Martin Holguin presents for composite filling  PMH reviewed, no changes  Discussed with patient need for RCT if pulp exposure occurs or in future if pulp is inflamed  Pt understands and consents  Applied topical benzocaine, administered 1/2 carps 4% articaine 1:100k epi via mental block    Prepped tooth #28 with 330 carbide on high speed  Caries removed with round carbide on slow speed  Caries deep  Placed sectional matrix  Isolation with cotton rolls and dri-angles    Limelight placed on deepest part of prep  Cured for 20 secs  Etch with 37% H2PO4, rinse, dry  Applied Adhese with 20 second scrub once, gentle air dry and light cured for 10s  Restored with Tetric bulk meghna shade A2 and light cured  Refined with finishing burs, polished with enhance point  Verified occlusion and contacts  Pt left satisfied  Prognosis is Good  Referrals needed: pt will call insurance to find participating dental providers that can make her dentures     Next visit: Recall

## 2022-06-07 NOTE — PROGRESS NOTES
History and Physical  Josue Solano 80 y o  female MRN: [id number]      Assessment:   Insomnia    Plan:  Continue current meds  RTC 1 month or sooner if needed      Chief Complaint   Patient presents with    Medication Refill        HPI:  Josue Solano is a 80 y o  female who presents for med refill  She is doing well   recently had knee replacement and is making her nuts  No other complaints today  Historical Information   Past Medical History:   Diagnosis Date    Arthritis     Cardiac disease     Chronic pain     Gout     Hypertension      Past Surgical History:   Procedure Laterality Date    APPENDECTOMY      ATRIAL ABLATION SURGERY      HYSTERECTOMY      JOINT REPLACEMENT  knees    KNEE ARTHROSCOPY      b/l knees    LAMINECTOMY      [place] TOTAL HIP ARTHROPLASTY Right 7/17/2017    Procedure: TOTAL ANTERIOR HIP ARTHROPLASTY;  Surgeon: Oliver Amaro MD;  Location: [facility];  Service: Orthopedics     Social History   History   Alcohol Use No     History   Drug Use No     History   Smoking Status    Never Smoker   Smokeless Tobacco    Never Used     No family history on file      Meds/Allergies   Allergies   Allergen Reactions    Bee Venom Anaphylaxis    Codeine      Nausea/vomiting    Tetanus Toxoid     Tetanus Toxoids        Meds:    Current Outpatient Prescriptions:     albuterol (PROVENTIL HFA,VENTOLIN HFA) 90 mcg/act inhaler, Inhale 2 puffs every 6 (six) hours as needed for wheezing, Disp: , Rfl:     allopurinol (ZYLOPRIM) 100 mg tablet, Take 1 tablet (100 mg total) by mouth daily, Disp: 90 tablet, Rfl: 1    ALPRAZolam (XANAX) 1 mg tablet, Take 1 tablet (1 mg total) by mouth daily at bedtime as needed for sleep, Disp: 30 tablet, Rfl: 0    aspirin (ECOTRIN LOW STRENGTH) 81 mg EC tablet, Take 1 tablet by mouth daily, Disp: 1 tablet, Rfl: 0    atorvastatin (LIPITOR) 20 mg tablet, Take 20 mg by mouth daily, Disp: , Rfl:     colchicine (COLCRYS) 0 6 mg tablet, Take 1 tablet by mouth daily, Disp: , Rfl: 0    diclofenac-misoprostol (ARTHROTEC 75) 75-0 2 MG per tablet, Take 1 tablet by mouth 2 (two) times a day, Disp: 60 tablet, Rfl: 3    docusate sodium (COLACE) 100 mg capsule, Take 100 mg by mouth 2 (two) times a day as needed for constipation, Disp: , Rfl:     EPINEPHrine (EPIPEN) 0 3 mg/0 3 mL SOAJ, Inject as necessary, Disp: 1 each, Rfl: 1    ferrous sulfate 325 (65 Fe) mg tablet, Take by mouth, Disp: , Rfl:     furosemide (LASIX) 20 mg tablet, TAKE 1 TABLET TWICE DAILY AS NEEDED, Disp: 180 tablet, Rfl: 0    Inulin (FIBER CHOICE PO), Take by mouth, Disp: , Rfl:     metoprolol tartrate (LOPRESSOR) 100 mg tablet, Take 100 mg by mouth 2 (two) times a day  , Disp: , Rfl:     Multiple Vitamin (MULTIVITAMIN) tablet, Take 1 tablet by mouth daily, Disp: , Rfl:     olmesartan (BENICAR) 40 mg tablet, Take by mouth, Disp: , Rfl:     potassium chloride (K-DUR,KLOR-CON) 20 mEq tablet, Take by mouth, Disp: , Rfl:     triamcinolone (KENALOG) 0 025 % cream, Apply topically 2 (two) times a day, Disp: 30 g, Rfl: 0    Vitamins-Lipotropics (LIPO-FLAVONOID PLUS PO), Take 2 tablets by mouth daily  , Disp: , Rfl:       REVIEW OF SYSTEMS  Review of Systems   Constitutional: Negative  HENT: Negative  Eyes: Negative  Respiratory: Negative  Cardiovascular: Negative  Gastrointestinal: Negative  Endocrine: Negative  Genitourinary: Negative  Musculoskeletal: Negative  Skin: Negative  Allergic/Immunologic: Negative  Neurological: Negative  Hematological: Negative  Psychiatric/Behavioral: Positive for sleep disturbance  Current Vitals:   Blood Pressure: 128/82 (09/27/18 1110)  Pulse: 61 (09/27/18 1110)  Temperature: 98 2 °F (36 8 °C) (09/27/18 1110)  Respirations: 16 (09/27/18 1110)  Height: 4' 11" (149 9 cm) (09/27/18 1110)  Weight - Scale: 71 2 kg (157 lb) (09/27/18 1110)  SpO2: 93 % (09/27/18 1110)  Body mass index is 31 71 kg/m²        PHYSICAL EXAMS:  Physical Exam   Constitutional: She is oriented to person, place, and time  She appears well-developed and well-nourished  HENT:   Head: Normocephalic and atraumatic  Right Ear: External ear normal    Left Ear: External ear normal    Eyes: Pupils are equal, round, and reactive to light  Neck: Normal range of motion  Neck supple  No thyromegaly present  Cardiovascular: Normal rate and regular rhythm  Pulmonary/Chest: Effort normal and breath sounds normal  She has no wheezes  She has no rales  Musculoskeletal: She exhibits no edema  Neurological: She is alert and oriented to person, place, and time  No cranial nerve deficit  Skin: Skin is warm and dry  Psychiatric: She has a normal mood and affect  Lab Results:          Dion Funes PA-C  9/27/2018, 11:31 AM Yes

## 2022-06-30 DIAGNOSIS — I10 BENIGN ESSENTIAL HYPERTENSION: ICD-10-CM

## 2022-07-01 RX ORDER — CARVEDILOL 25 MG/1
25 TABLET ORAL 2 TIMES DAILY WITH MEALS
Qty: 180 TABLET | Refills: 4 | Status: SHIPPED | OUTPATIENT
Start: 2022-07-01

## 2022-07-01 RX ORDER — AMLODIPINE BESYLATE 10 MG/1
10 TABLET ORAL DAILY
Qty: 90 TABLET | Refills: 4 | Status: SHIPPED | OUTPATIENT
Start: 2022-07-01

## 2022-07-22 ENCOUNTER — OFFICE VISIT (OUTPATIENT)
Dept: FAMILY MEDICINE CLINIC | Facility: CLINIC | Age: 87
End: 2022-07-22
Payer: COMMERCIAL

## 2022-07-22 VITALS
RESPIRATION RATE: 16 BRPM | SYSTOLIC BLOOD PRESSURE: 118 MMHG | DIASTOLIC BLOOD PRESSURE: 72 MMHG | TEMPERATURE: 98.2 F | HEART RATE: 70 BPM | OXYGEN SATURATION: 94 %

## 2022-07-22 DIAGNOSIS — I10 ESSENTIAL HYPERTENSION: Primary | ICD-10-CM

## 2022-07-22 DIAGNOSIS — N18.32 STAGE 3B CHRONIC KIDNEY DISEASE (HCC): ICD-10-CM

## 2022-07-22 DIAGNOSIS — E78.5 DYSLIPIDEMIA: ICD-10-CM

## 2022-07-22 PROCEDURE — 99214 OFFICE O/P EST MOD 30 MIN: CPT | Performed by: FAMILY MEDICINE

## 2022-07-22 NOTE — PROGRESS NOTES
Assessment/Plan     Diagnoses and all orders for this visit:    Essential hypertension  Comments:  stable  BP at goal at this time  cont current medications  labs as below  Orders:  -     Comprehensive metabolic panel; Future    Dyslipidemia  Comments:  reviewed previous lab results  recheck fasting lipid panel  plan to stop statin if unchanged  Orders:  -     Lipid panel; Future    Stage 3b chronic kidney disease ([facility] [address] )  Comments:  recheck CMP  advised to stop arthrotec to avoid further renal damage  Orders:  -     CBC and differential; Future  -     Comprehensive metabolic panel; Future      The patient indicates understanding of these issues and agrees with the plan  Return in about 3 months (around 10/22/2022) for Next scheduled follow up  Subjective     Patient Id: Kiara Cason is a 80 y o  female    HPI    Patient here today for follow-up appointment  She reports she is doing well   She has no concerns or complaints today  Her HTN is well-controlled  Notes good adherence with medications without reported side effects  Last LDL <100  Taking statin daily without side effects  Hx of CKD stage 3  She does take NSAID daily      Review of Systems   Constitutional: Negative for chills and fever  Respiratory: Negative for shortness of breath  Cardiovascular: Negative for chest pain  Gastrointestinal: Negative for abdominal pain, constipation, diarrhea, nausea and vomiting  Genitourinary: Negative for dysuria  Musculoskeletal: Negative for arthralgias and myalgias  Skin: Negative for rash  Neurological: Negative for headaches  Psychiatric/Behavioral: Negative for dysphoric mood  All other systems reviewed and are negative        Past Medical History:   Diagnosis Date    Anemia     Arthritis     Cardiac disease     Chronic pain     Gout     Hypercholesterolemia     Hypertension     Insomnia        Past Surgical History:   Procedure Laterality Date    APPENDECTOMY      ATRIAL ABLATION SURGERY      HYSTERECTOMY      JOINT REPLACEMENT  knees    KNEE ARTHROSCOPY      b/l knees    LAMINECTOMY      [place] TOTAL HIP ARTHROPLASTY Right 7/17/2017    Procedure: TOTAL ANTERIOR HIP ARTHROPLASTY;  Surgeon: Jacquelyn Neil MD;  Location: [facility];  Service: Orthopedics       History reviewed  No pertinent family history  Allergies   Allergen Reactions    Bee Venom Anaphylaxis    Codeine GI Intolerance     Nausea/vomiting    Tetanus Immune Globulin Other (See Comments)    Tetanus Toxoid     Tetanus Toxoids          Current Outpatient Medications:     amLODIPine (NORVASC) 10 mg tablet, Take 1 tablet (10 mg total) by mouth daily, Disp: 90 tablet, Rfl: 4    atorvastatin (LIPITOR) 20 mg tablet, TAKE (1) TABLET BY MOUTH DAILY  , Disp: 90 tablet, Rfl: 4    carvedilol (COREG) 25 mg tablet, Take 1 tablet (25 mg total) by mouth 2 (two) times a day with meals, Disp: 180 tablet, Rfl: 4    co-enzyme Q-10 100 mg capsule, Take 100 mg by mouth daily, Disp: , Rfl:     docusate sodium (COLACE) 100 mg capsule, Take 100 mg by mouth 2 (two) times a day as needed for constipation, Disp: , Rfl:     EPINEPHrine (EPIPEN) 0 3 mg/0 3 mL SOAJ, Inject as necessary, Disp: 2 each, Rfl: 1    furosemide (LASIX) 20 mg tablet, TAKE (1) TABLET BY MOUTH DAILY  , Disp: 90 tablet, Rfl: 4    Inulin (FIBER CHOICE PO), Take by mouth, Disp: , Rfl:     olmesartan (BENICAR) 40 mg tablet, TAKE (1) TABLET BY MOUTH DAILY  , Disp: 90 tablet, Rfl: 4    pantoprazole (PROTONIX) 40 mg tablet, Take 1 tablet (40 mg total) by mouth daily, Disp: 90 tablet, Rfl: 3    potassium chloride (K-DUR,KLOR-CON) 20 mEq tablet, Take 1 tablet (20 mEq total) by mouth daily, Disp: 90 tablet, Rfl: 4    tolterodine (DETROL LA) 4 mg 24 hr capsule, Take 1 capsule (4 mg total) by mouth daily, Disp: 90 capsule, Rfl: 4    VENTOLIN  (90 Base) MCG/ACT inhaler, INHALE 1 TO 2 PUFFS EVERY 6 HOURS AS NEEDED , Disp: 18 g, Rfl: 3    Objective     Vitals: 07/22/22 1104   BP: 118/72   Pulse: 70   Resp: 16   Temp: 98 2 °F (36 8 °C)   SpO2: 94%       Physical Exam  Vitals and nursing note reviewed  Constitutional:       General: She is not in acute distress  Appearance: Normal appearance  She is well-developed  She is not ill-appearing or toxic-appearing  HENT:      Head: Normocephalic and atraumatic  Right Ear: Tympanic membrane, ear canal and external ear normal       Left Ear: Tympanic membrane, ear canal and external ear normal       Nose: Nose normal       Mouth/Throat:      Mouth: Mucous membranes are moist       Pharynx: Oropharynx is clear  Eyes:      Extraocular Movements: Extraocular movements intact  Conjunctiva/sclera: Conjunctivae normal       Pupils: Pupils are equal, round, and reactive to light  Neck:      Thyroid: No thyromegaly  Cardiovascular:      Rate and Rhythm: Normal rate and regular rhythm  Heart sounds: Normal heart sounds  No murmur heard  Pulmonary:      Effort: Pulmonary effort is normal  No respiratory distress  Breath sounds: Normal breath sounds  No wheezing  Abdominal:      Palpations: Abdomen is soft  Tenderness: There is no abdominal tenderness  Genitourinary:     Comments: Exam deferred  Musculoskeletal:      Cervical back: Neck supple  Right lower leg: No edema  Left lower leg: No edema  Skin:     General: Skin is warm  Neurological:      General: No focal deficit present  Mental Status: She is alert and oriented to person, place, and time     Psychiatric:         Mood and Affect: Mood normal          Behavior: Behavior normal          Shannon Brush

## 2022-07-25 ENCOUNTER — APPOINTMENT (OUTPATIENT)
Dept: LAB | Facility: MEDICAL CENTER | Age: 87
End: 2022-07-25
Payer: COMMERCIAL

## 2022-07-25 DIAGNOSIS — N18.32 STAGE 3B CHRONIC KIDNEY DISEASE (HCC): ICD-10-CM

## 2022-07-25 DIAGNOSIS — E78.5 DYSLIPIDEMIA: ICD-10-CM

## 2022-07-25 DIAGNOSIS — I10 ESSENTIAL HYPERTENSION: ICD-10-CM

## 2022-07-25 LAB
ALBUMIN SERPL BCP-MCNC: 3.5 G/DL (ref 3.5–5)
ALP SERPL-CCNC: 76 U/L (ref 46–116)
ALT SERPL W P-5'-P-CCNC: 34 U/L (ref 12–78)
ANION GAP SERPL CALCULATED.3IONS-SCNC: 4 MMOL/L (ref 4–13)
AST SERPL W P-5'-P-CCNC: 19 U/L (ref 5–45)
BASOPHILS # BLD AUTO: 0.05 THOUSANDS/ΜL (ref 0–0.1)
BASOPHILS NFR BLD AUTO: 1 % (ref 0–1)
BILIRUB SERPL-MCNC: 1.04 MG/DL (ref 0.2–1)
BUN SERPL-MCNC: 30 MG/DL (ref 5–25)
CALCIUM SERPL-MCNC: 9.3 MG/DL (ref 8.3–10.1)
CHLORIDE SERPL-SCNC: 113 MMOL/L (ref 96–108)
CHOLEST SERPL-MCNC: 127 MG/DL
CO2 SERPL-SCNC: 26 MMOL/L (ref 21–32)
CREAT SERPL-MCNC: 0.97 MG/DL (ref 0.6–1.3)
EOSINOPHIL # BLD AUTO: 0.3 THOUSAND/ΜL (ref 0–0.61)
EOSINOPHIL NFR BLD AUTO: 4 % (ref 0–6)
ERYTHROCYTE [DISTWIDTH] IN BLOOD BY AUTOMATED COUNT: 13.6 % (ref 11.6–15.1)
GFR SERPL CREATININE-BSD FRML MDRD: 52 ML/MIN/1.73SQ M
GLUCOSE P FAST SERPL-MCNC: 93 MG/DL (ref 65–99)
HCT VFR BLD AUTO: 40.1 % (ref 34.8–46.1)
HDLC SERPL-MCNC: 43 MG/DL
HGB BLD-MCNC: 12.5 G/DL (ref 11.5–15.4)
IMM GRANULOCYTES # BLD AUTO: 0.02 THOUSAND/UL (ref 0–0.2)
IMM GRANULOCYTES NFR BLD AUTO: 0 % (ref 0–2)
LDLC SERPL CALC-MCNC: 72 MG/DL (ref 0–100)
LYMPHOCYTES # BLD AUTO: 1.36 THOUSANDS/ΜL (ref 0.6–4.47)
LYMPHOCYTES NFR BLD AUTO: 19 % (ref 14–44)
MCH RBC QN AUTO: 28 PG (ref 26.8–34.3)
MCHC RBC AUTO-ENTMCNC: 31.2 G/DL (ref 31.4–37.4)
MCV RBC AUTO: 90 FL (ref 82–98)
MONOCYTES # BLD AUTO: 0.66 THOUSAND/ΜL (ref 0.17–1.22)
MONOCYTES NFR BLD AUTO: 9 % (ref 4–12)
NEUTROPHILS # BLD AUTO: 4.95 THOUSANDS/ΜL (ref 1.85–7.62)
NEUTS SEG NFR BLD AUTO: 67 % (ref 43–75)
NONHDLC SERPL-MCNC: 84 MG/DL
NRBC BLD AUTO-RTO: 0 /100 WBCS
PLATELET # BLD AUTO: 161 THOUSANDS/UL (ref 149–390)
PMV BLD AUTO: 10.6 FL (ref 8.9–12.7)
POTASSIUM SERPL-SCNC: 4.2 MMOL/L (ref 3.5–5.3)
PROT SERPL-MCNC: 7.3 G/DL (ref 6.4–8.4)
RBC # BLD AUTO: 4.47 MILLION/UL (ref 3.81–5.12)
SODIUM SERPL-SCNC: 143 MMOL/L (ref 135–147)
TRIGL SERPL-MCNC: 62 MG/DL
WBC # BLD AUTO: 7.34 THOUSAND/UL (ref 4.31–10.16)

## 2022-07-25 PROCEDURE — 85025 COMPLETE CBC W/AUTO DIFF WBC: CPT

## 2022-07-25 PROCEDURE — 80053 COMPREHEN METABOLIC PANEL: CPT

## 2022-07-25 PROCEDURE — 36415 COLL VENOUS BLD VENIPUNCTURE: CPT

## 2022-07-25 PROCEDURE — 80061 LIPID PANEL: CPT

## 2022-07-28 ENCOUNTER — TELEPHONE (OUTPATIENT)
Dept: FAMILY MEDICINE CLINIC | Facility: CLINIC | Age: 87
End: 2022-07-28

## 2022-07-28 NOTE — TELEPHONE ENCOUNTER
I CALLED PATIENT TO LET HER KNOW THAT DR Sherwin Cunningham WANTED HER TO D/C HER ARTHROTEC DUE TO HER KIDNEY FUNCTION  PATIENT WAS NOT VERY HAPPY  SHE SAID STOPPING THAT MEDICATION WILL BRING HER TO HER KNEES  SHE ASKED IF DR Sherwin Cunningham WOULD WAIT AND REVIEW HER MOST RECENT LABS AND THEN MAKE A DECISION ABOUT WHETHER OR NOT SHE COULD CONTINUE IT, EVEN IF IT WAS ONCE A DAY INSTEAD OF TWICE  THIS IS THE ONLY THING THAT IS KEEPING HER ACTIVE AND MOVING AT  HER AGE

## 2022-08-01 ENCOUNTER — DOCUMENTATION (OUTPATIENT)
Dept: FAMILY MEDICINE CLINIC | Facility: CLINIC | Age: 87
End: 2022-08-01

## 2022-08-01 NOTE — PROGRESS NOTES
Checking to see if Dr Harshal Jovel had the opportunity to review the note in patients chart regarding her Kidney function tests and her desire to contineu the NSAID ?

## 2022-09-21 ENCOUNTER — OFFICE VISIT (OUTPATIENT)
Dept: FAMILY MEDICINE CLINIC | Facility: CLINIC | Age: 87
End: 2022-09-21
Payer: COMMERCIAL

## 2022-09-21 VITALS
BODY MASS INDEX: 25.36 KG/M2 | OXYGEN SATURATION: 96 % | HEIGHT: 60 IN | HEART RATE: 94 BPM | RESPIRATION RATE: 20 BRPM | DIASTOLIC BLOOD PRESSURE: 70 MMHG | TEMPERATURE: 97.3 F | SYSTOLIC BLOOD PRESSURE: 132 MMHG | WEIGHT: 129.2 LBS

## 2022-09-21 DIAGNOSIS — F51.01 PRIMARY INSOMNIA: ICD-10-CM

## 2022-09-21 DIAGNOSIS — U09.9 POST COVID-19 CONDITION, UNSPECIFIED: Primary | ICD-10-CM

## 2022-09-21 DIAGNOSIS — I10 BENIGN ESSENTIAL HYPERTENSION: ICD-10-CM

## 2022-09-21 DIAGNOSIS — E78.5 DYSLIPIDEMIA: ICD-10-CM

## 2022-09-21 PROCEDURE — 99213 OFFICE O/P EST LOW 20 MIN: CPT | Performed by: FAMILY MEDICINE

## 2022-09-21 RX ORDER — GUAIFENESIN 100 MG/5ML
200 SYRUP ORAL 3 TIMES DAILY PRN
Qty: 120 ML | Refills: 0 | Status: SHIPPED | OUTPATIENT
Start: 2022-09-21

## 2022-09-21 RX ORDER — TRAZODONE HYDROCHLORIDE 50 MG/1
TABLET ORAL
COMMUNITY
Start: 2022-08-26 | End: 2022-10-10 | Stop reason: SDUPTHER

## 2022-09-21 RX ORDER — PREDNISONE 20 MG/1
TABLET ORAL
COMMUNITY
Start: 2022-09-09

## 2022-09-21 RX ORDER — DOXYCYCLINE HYCLATE 100 MG/1
CAPSULE ORAL
COMMUNITY
Start: 2022-09-09

## 2022-09-21 NOTE — PROGRESS NOTES
FAMILY MEDICINE OFFICE VISIT  [facility]      NAME: Ranjeet Gusman  AGE: 80 y o  SEX: female    DATE OF ENCOUNTER: 9/21/2022    Assessment and Plan     1  Post covid-19 condition, unspecified  Comments:  unproductive cough  Orders:  -     guaiFENesin (ROBITUSSIN) 100 MG/5ML oral liquid; Take 10 mL (200 mg total) by mouth 3 (three) times a day as needed for cough  2  Dyslipidemia      Continue Lipitor 20mg daily    3  Benign essential hypertension        Continue Norvasc 10mg daily    4  Primary insomnia  Comments:  Do not use alcohol before bed  Not interested to discuss medication as a sleep aid  Chief Complaint     Chief Complaint   Patient presents with    Follow-up    COVID-19     States that she had severe chest congestion        History of Present Illness     Patient is 81 yo F presented to office with cough after Covid -19 infection  Wet cough, non-productive, mild but persistent cough since she had COVID infection  Denies fever, chest pain, sob, post tussive vomiting  Tried otc tussi without any relief  Also reports problems falling asleep and staying asleep  Tried lot of medications in the past  Reports that one shot of Ora before bed helps her to go to sleep  The following portions of the patient's history were reviewed and updated as appropriate: allergies, current medications, past family history, past medical history, past social history, past surgical history and problem list     Review of Systems     Review of Systems   Constitutional: Negative for activity change, appetite change, chills, fever and unexpected weight change  HENT: Negative for ear pain, hearing loss, mouth sores, rhinorrhea, sneezing and sore throat  Eyes: Negative for pain and visual disturbance  Respiratory: Positive for cough  Negative for chest tightness, shortness of breath and wheezing  Cardiovascular: Negative for chest pain and palpitations     Gastrointestinal: Negative for abdominal pain, constipation, diarrhea, nausea and vomiting  Endocrine: Negative for cold intolerance, heat intolerance, polydipsia, polyphagia and polyuria  Genitourinary: Negative for dysuria, frequency, hematuria and urgency  Skin: Negative for color change and rash  Neurological: Negative for dizziness, tremors, seizures, syncope and light-headedness  Psychiatric/Behavioral: Positive for sleep disturbance  All other systems reviewed and are negative  Active Problem List     Patient Active Problem List   Diagnosis    Status post total replacement of right hip    Benign essential hypertension    Hyperlipidemia    Elevated blood uric acid level    History of gout    Dextroscoliosis    Screening-pulmonary TB    Anemia    Anxiety    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 re-entry tachycardia) ([facility]    Dyslipidemia    Gout    Iron deficiency anemia    History of cardiac radiofrequency ablation    Primary insomnia    Chronic kidney disease, stage III (moderate) ([facility])       Objective     /70   Pulse 94   Temp (!) 97 3 °F (36 3 °C)   Resp 20   Ht 5' (1 524 m)   Wt 58 6 kg (129 lb 3 2 oz)   SpO2 96%   BMI 25 23 kg/m²     Physical Exam  Vitals and nursing note reviewed  Exam conducted with a chaperone present  Constitutional:       General: She is not in acute distress  Appearance: Normal appearance  HENT:      Head: Normocephalic and atraumatic  Right Ear: External ear normal       Left Ear: External ear normal       Nose: Nose normal  No congestion or rhinorrhea  Mouth/Throat:      Mouth: Mucous membranes are moist       Pharynx: Oropharynx is clear  Eyes:      General: No scleral icterus  Right eye: No discharge  Left eye: No discharge  Extraocular Movements: Extraocular movements intact  Conjunctiva/sclera: Conjunctivae normal    Cardiovascular:      Rate and Rhythm: Normal rate and regular rhythm  Pulses: Normal pulses        Heart sounds: Normal heart sounds  No murmur heard  Pulmonary:      Effort: Pulmonary effort is normal       Breath sounds: Normal breath sounds  No wheezing  Abdominal:      General: Abdomen is flat  Bowel sounds are normal       Palpations: Abdomen is soft  Tenderness: There is no abdominal tenderness  Musculoskeletal:         General: Normal range of motion  Cervical back: Normal range of motion and neck supple  Right lower leg: No edema  Left lower leg: No edema  Skin:     General: Skin is warm and dry  Capillary Refill: Capillary refill takes less than 2 seconds  Findings: No rash  Neurological:      General: No focal deficit present  Mental Status: She is alert and oriented to person, place, and time  Psychiatric:         Mood and Affect: Mood normal          Behavior: Behavior normal          Pertinent Laboratory/Diagnostic Studies:            Current Medications     Current Outpatient Medications:     amLODIPine (NORVASC) 10 mg tablet, Take 1 tablet (10 mg total) by mouth daily, Disp: 90 tablet, Rfl: 4    atorvastatin (LIPITOR) 20 mg tablet, TAKE (1) TABLET BY MOUTH DAILY  , Disp: 90 tablet, Rfl: 4    carvedilol (COREG) 25 mg tablet, Take 1 tablet (25 mg total) by mouth 2 (two) times a day with meals, Disp: 180 tablet, Rfl: 4    co-enzyme Q-10 100 mg capsule, Take 100 mg by mouth daily, Disp: , Rfl:     docusate sodium (COLACE) 100 mg capsule, Take 100 mg by mouth 2 (two) times a day as needed for constipation, Disp: , Rfl:     doxycycline hyclate (VIBRAMYCIN) 100 mg capsule, , Disp: , Rfl:     EPINEPHrine (EPIPEN) 0 3 mg/0 3 mL SOAJ, Inject as necessary, Disp: 2 each, Rfl: 1    furosemide (LASIX) 20 mg tablet, TAKE (1) TABLET BY MOUTH DAILY  , Disp: 90 tablet, Rfl: 4    guaiFENesin (ROBITUSSIN) 100 MG/5ML oral liquid, Take 10 mL (200 mg total) by mouth 3 (three) times a day as needed for cough (Patient not taking: Reported on 9/23/2022), Disp: 120 mL, Rfl: 0   Inulin (FIBER CHOICE PO), Take by mouth, Disp: , Rfl:     olmesartan (BENICAR) 40 mg tablet, TAKE (1) TABLET BY MOUTH DAILY  , Disp: 90 tablet, Rfl: 4    pantoprazole (PROTONIX) 40 mg tablet, Take 1 tablet (40 mg total) by mouth daily, Disp: 90 tablet, Rfl: 3    potassium chloride (K-DUR,KLOR-CON) 20 mEq tablet, Take 1 tablet (20 mEq total) by mouth daily, Disp: 90 tablet, Rfl: 4    tolterodine (DETROL LA) 4 mg 24 hr capsule, Take 1 capsule (4 mg total) by mouth daily, Disp: 90 capsule, Rfl: 4    traZODone (DESYREL) 50 mg tablet, , Disp: , Rfl:     VENTOLIN  (90 Base) MCG/ACT inhaler, INHALE 1 TO 2 PUFFS EVERY 6 HOURS AS NEEDED , Disp: 18 g, Rfl: 3    predniSONE 20 mg tablet, , Disp: , Rfl:     Health Maintenance     Health Maintenance   Topic Date Due    Falls: Plan of Care  Never done    COVID-19 Vaccine (2 - Booster for Aisle50 series) 12/27/2021    Influenza Vaccine (1) 09/01/2022    Depression Screening  01/12/2023    Dental Prophylaxis  10/28/2022    Dental Periodic Exam  10/29/2022    Urinary Incontinence Screening  01/12/2023    Medicare Annual Wellness Visit (AWV)  01/12/2023    Dental X-Ray: Bitewings  04/28/2023    Fall Risk  07/22/2023    BMI: Followup Plan  09/21/2023    BMI: Adult  09/23/2023    Dental X-Ray: Full Mouth  04/28/2025    Pneumococcal Vaccine: 65+ Years  Completed    HIB Vaccine  Aged Out    Hepatitis B Vaccine  Aged Out    IPV Vaccine  Aged Out    Hepatitis A Vaccine  Aged Out    Meningococcal ACWY Vaccine  Aged Out    HPV Vaccine  Aged [employer] History   Administered Date(s) Administered    COVID-19 J&J ([employer]) vaccine 0 5 mL 11/01/2021    COVID-19, unspecified 04/10/2021    INFLUENZA 11/23/2021    Influenza, high dose seasonal 0 7 mL 11/18/2020    Pneumococcal Conjugate 13-Valent 11/05/2015    Pneumococcal Polysaccharide PPV23 04/20/2009    Zoster 06/30/2015     BMI Counseling: Body mass index is 25 23 kg/m²   The BMI is above normal  Nutrition recommendations include decreasing portion sizes, encouraging healthy choices of fruits and vegetables, decreasing fast food intake, consuming healthier snacks, limiting drinks that contain sugar, moderation in carbohydrate intake, increasing intake of lean protein, reducing intake of saturated and trans fat and reducing intake of cholesterol  Exercise recommendations include exercising 3-5 times per week  No pharmacotherapy was ordered  Rationale for BMI follow-up plan is due to patient being overweight or obese            Liliam Adams MD   Family Medicine  PGY-2  9/28/2022 5:47 PM

## 2022-09-22 NOTE — PROGRESS NOTES
Cardiology Follow Up    Ivone Galaviz  1935  [id number]  [facility] [id number]  [phone]  [phone]    1  Benign essential hypertension     2  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 re-entry tachycardia) ([facility] [address] )     3  History of cardiac radiofrequency ablation     4  Dyslipidemia         Discussion/Summary:  Palpitations:  Patient had a few episodes of palpitations with reported heart rate in the 130's in the setting of COVID-19 infection with subsequent resolution   EKG today shows sinus rhythm, HR 87 bpm  Suggested 1 week zio patch - patient would like to hold off unless symptoms reoccur which is reasonable  Will continue conservative management with minimal testing  Continue carvedilol 25 mg BID    Hypertension:  Well controlled  Continue present regimen    Dyslipidemia:  Lipids are well controlled  Continue atorvastatin 20 mg daily    She will RTO in 6 months with Dr Yonis Rosas or sooner if necessary  She will call with any concerns  Interval History:   Ivone Galaviz is a 80 y o  female with hypertension, dyslipidemia, AVNRT with prior ablation who presents to the office today for follow up  She was diagnosed with COVID-19 earlier this month  During her illness she had episodes of tachycardia  Her watch reported heart rates in the 130's  During this time she was symptomatic with palpitations  She felt mild associated shortness of breath  Denies any chest pain/pressure/discomfort, lightheadedness, or syncope  This occurred a few times a day for several days  Her last episode was a few days ago  She does carry a history of paroxysmal SVT with prior ablation  She feels these palpitations were somewhat different than what she remembers with SVT  She also noticed minimal urinary output which has now resolved  Otherwise, she has been feeling well  She remains active in the home and takes care of her    She denies any exertional chest pain/pressure/discomfort or shortness of breath  Her lower extremity edema has been stable  She denies lightheadedness, dizziness, and syncope  She denies orthopnea and PND  Her appetite is now improving       Medical Problems             Problem List     Status post total replacement of right hip    Benign essential hypertension    Hyperlipidemia (Chronic)    Elevated blood uric acid level    History of gout    Dextroscoliosis    Screening-pulmonary TB    Anemia    Anxiety    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 re-entry tachycardia) (HCC)    Dyslipidemia    Gout    Iron deficiency anemia    History of cardiac radiofrequency ablation    Primary insomnia    Chronic kidney disease, stage III (moderate) (HCC)    Overview Signed 5/11/2021 12:19 PM by Luanne Funez     According to ICD10 guidelines, provider accepted          Lab Results   Component Value Date    EGFR 52 07/25/2022    EGFR 38 06/09/2021    EGFR 51 11/23/2020    CREATININE 0 97 07/25/2022    CREATININE 1 28 06/09/2021    CREATININE 1 00 11/23/2020                   Past Medical History:   Diagnosis Date    Anemia     Arthritis     Cardiac disease     Chronic pain     Gout     Hypercholesterolemia     Hypertension     Insomnia      Social History     Socioeconomic History    Marital status: /Civil Union     Spouse name: Not on file    Number of children: Not on file    Years of education: Not on file    Highest education level: Not on file   Occupational History    Not on file   Tobacco Use    Smoking status: Never Smoker    Smokeless tobacco: Never Used   Vaping Use    Vaping Use: Never used   Substance and Sexual Activity    Alcohol use: No    Drug use: No    Sexual activity: Yes     Partners: Male   Other Topics Concern    Not on file   Social History Narrative    Not on file     Social Determinants of Health     Financial Resource Strain: Not on file   Food Insecurity: Not on file   Transportation Needs: Not on file   Physical Activity: Not on file   Stress: Not on file   Social Connections: Not on file   Intimate Partner Violence: Not on file   Housing Stability: Not on file      History reviewed  No pertinent family history  Past Surgical History:   Procedure Laterality Date    APPENDECTOMY      ATRIAL ABLATION SURGERY      HYSTERECTOMY      JOINT REPLACEMENT  knees    KNEE ARTHROSCOPY      b/l knees    LAMINECTOMY      LA TOTAL HIP ARTHROPLASTY Right 7/17/2017    Procedure: TOTAL ANTERIOR HIP ARTHROPLASTY;  Surgeon: Sofia Dent MD;  Location: [facility];  Service: Orthopedics       Current Outpatient Medications:     amLODIPine (NORVASC) 10 mg tablet, Take 1 tablet (10 mg total) by mouth daily, Disp: 90 tablet, Rfl: 4    atorvastatin (LIPITOR) 20 mg tablet, TAKE (1) TABLET BY MOUTH DAILY  , Disp: 90 tablet, Rfl: 4    carvedilol (COREG) 25 mg tablet, Take 1 tablet (25 mg total) by mouth 2 (two) times a day with meals, Disp: 180 tablet, Rfl: 4    co-enzyme Q-10 100 mg capsule, Take 100 mg by mouth daily, Disp: , Rfl:     docusate sodium (COLACE) 100 mg capsule, Take 100 mg by mouth 2 (two) times a day as needed for constipation, Disp: , Rfl:     doxycycline hyclate (VIBRAMYCIN) 100 mg capsule, , Disp: , Rfl:     EPINEPHrine (EPIPEN) 0 3 mg/0 3 mL SOAJ, Inject as necessary, Disp: 2 each, Rfl: 1    furosemide (LASIX) 20 mg tablet, TAKE (1) TABLET BY MOUTH DAILY  , Disp: 90 tablet, Rfl: 4    olmesartan (BENICAR) 40 mg tablet, TAKE (1) TABLET BY MOUTH DAILY  , Disp: 90 tablet, Rfl: 4    pantoprazole (PROTONIX) 40 mg tablet, Take 1 tablet (40 mg total) by mouth daily, Disp: 90 tablet, Rfl: 3    potassium chloride (K-DUR,KLOR-CON) 20 mEq tablet, Take 1 tablet (20 mEq total) by mouth daily, Disp: 90 tablet, Rfl: 4    tolterodine (DETROL LA) 4 mg 24 hr capsule, Take 1 capsule (4 mg total) by mouth daily, Disp: 90 capsule, Rfl: 4    traZODone (DESYREL) 50 mg tablet, , Disp: , Rfl:     VENTOLIN  (90 Base) MCG/ACT inhaler, INHALE 1 TO 2 PUFFS EVERY 6 HOURS AS NEEDED , Disp: 18 g, Rfl: 3    guaiFENesin (ROBITUSSIN) 100 MG/5ML oral liquid, Take 10 mL (200 mg total) by mouth 3 (three) times a day as needed for cough (Patient not taking: Reported on 9/23/2022), Disp: 120 mL, Rfl: 0    Inulin (FIBER CHOICE PO), Take by mouth, Disp: , Rfl:     predniSONE 20 mg tablet, , Disp: , Rfl:   Allergies   Allergen Reactions    Bee Venom Anaphylaxis    Codeine GI Intolerance     Nausea/vomiting    Tetanus Immune Globulin Other (See Comments)    Tetanus Toxoid     Tetanus Toxoids        Labs:     Chemistry        Component Value Date/Time     11/13/2015 1253    K 4 2 07/25/2022 0720    K 3 3 (L) 11/13/2015 1253     (H) 07/25/2022 0720     11/13/2015 1253    CO2 26 07/25/2022 0720    CO2 28 8 11/13/2015 1253    BUN 30 (H) 07/25/2022 0720    BUN 24 11/13/2015 1253    CREATININE 0 97 07/25/2022 0720    CREATININE 0 85 11/13/2015 1253        Component Value Date/Time    CALCIUM 9 3 07/25/2022 0720    CALCIUM 9 0 11/13/2015 1253    ALKPHOS 76 07/25/2022 0720    ALKPHOS 83 11/13/2015 1253    AST 19 07/25/2022 0720    AST 20 11/13/2015 1253    ALT 34 07/25/2022 0720    ALT 36 11/13/2015 1253    BILITOT 0 63 11/13/2015 1253            Lab Results   Component Value Date    CHOL 201 11/13/2015    CHOL 183 11/12/2014     Lab Results   Component Value Date    HDL 43 (L) 07/25/2022    HDL 33 (L) 12/14/2020    HDL 41 08/21/2019     Lab Results   Component Value Date    LDLCALC 72 07/25/2022    LDLCALC 89 12/14/2020    LDLCALC 88 08/21/2019     Lab Results   Component Value Date    TRIG 62 07/25/2022    TRIG 173 (H) 12/14/2020    TRIG 107 08/21/2019     No results found for: CHOLHDL    Imaging: No results found  ECG:  Normal sinus rhythm  Left axis deviation  Poor anterior R wave progression    Review of Systems   Cardiovascular: Positive for palpitations  All other systems reviewed and are negative        Vitals:    09/23/22 1405   BP: 119/60 Pulse: 87     Vitals:    09/23/22 1405   Weight: 59 6 kg (131 lb 6 4 oz)     Height: 5' (152 4 cm)   Body mass index is 25 66 kg/m²  Physical Exam:  Physical Exam  Vitals reviewed  Constitutional:       General: She is not in acute distress  Appearance: She is not diaphoretic  HENT:      Head: Normocephalic and atraumatic  Eyes:      Pupils: Pupils are equal, round, and reactive to light  Neck:      Vascular: No carotid bruit  Cardiovascular:      Rate and Rhythm: Normal rate and regular rhythm  Pulses:           Radial pulses are 2+ on the right side and 2+ on the left side  Heart sounds: S1 normal and S2 normal  No murmur heard  Pulmonary:      Effort: Pulmonary effort is normal  No respiratory distress  Breath sounds: Normal breath sounds  No wheezing or rales  Abdominal:      General: There is no distension  Palpations: Abdomen is soft  Tenderness: There is no abdominal tenderness  Musculoskeletal:         General: No deformity  Normal range of motion  Cervical back: Normal range of motion  Right lower leg: Edema (trace edema in bilateral lower extremities) present  Left lower leg: Edema present  Skin:     General: Skin is warm and dry  Findings: No erythema  Neurological:      General: No focal deficit present  Mental Status: She is alert and oriented to person, place, and time     Psychiatric:         Mood and Affect: Mood normal          Behavior: Behavior normal

## 2022-09-23 ENCOUNTER — OFFICE VISIT (OUTPATIENT)
Dept: CARDIOLOGY CLINIC | Facility: HOSPITAL | Age: 87
End: 2022-09-23
Payer: COMMERCIAL

## 2022-09-23 VITALS
HEART RATE: 87 BPM | SYSTOLIC BLOOD PRESSURE: 119 MMHG | HEIGHT: 60 IN | BODY MASS INDEX: 25.8 KG/M2 | DIASTOLIC BLOOD PRESSURE: 60 MMHG | WEIGHT: 131.4 LBS

## 2022-09-23 DIAGNOSIS — I47.1 AVNRT (AV NODAL RE-ENTRY TACHYCARDIA) (HCC): ICD-10-CM

## 2022-09-23 DIAGNOSIS — Z98.890 HISTORY OF CARDIAC RADIOFREQUENCY ABLATION: ICD-10-CM

## 2022-09-23 DIAGNOSIS — E78.5 DYSLIPIDEMIA: ICD-10-CM

## 2022-09-23 DIAGNOSIS — I10 BENIGN ESSENTIAL HYPERTENSION: Primary | ICD-10-CM

## 2022-09-23 PROCEDURE — 93000 ELECTROCARDIOGRAM COMPLETE: CPT | Performed by: PHYSICIAN ASSISTANT

## 2022-09-23 PROCEDURE — 1160F RVW MEDS BY RX/DR IN RCRD: CPT | Performed by: PHYSICIAN ASSISTANT

## 2022-09-23 PROCEDURE — 99214 OFFICE O/P EST MOD 30 MIN: CPT | Performed by: PHYSICIAN ASSISTANT

## 2022-10-10 ENCOUNTER — OFFICE VISIT (OUTPATIENT)
Dept: FAMILY MEDICINE CLINIC | Facility: CLINIC | Age: 87
End: 2022-10-10
Payer: COMMERCIAL

## 2022-10-10 VITALS
TEMPERATURE: 97.3 F | SYSTOLIC BLOOD PRESSURE: 124 MMHG | HEART RATE: 74 BPM | WEIGHT: 135.6 LBS | DIASTOLIC BLOOD PRESSURE: 60 MMHG | HEIGHT: 60 IN | OXYGEN SATURATION: 96 % | RESPIRATION RATE: 18 BRPM | BODY MASS INDEX: 26.62 KG/M2

## 2022-10-10 DIAGNOSIS — E78.5 DYSLIPIDEMIA: ICD-10-CM

## 2022-10-10 DIAGNOSIS — F51.01 PRIMARY INSOMNIA: ICD-10-CM

## 2022-10-10 DIAGNOSIS — E87.6 HYPOKALEMIA: ICD-10-CM

## 2022-10-10 DIAGNOSIS — Z23 NEED FOR IMMUNIZATION AGAINST INFLUENZA: ICD-10-CM

## 2022-10-10 DIAGNOSIS — I10 ESSENTIAL HYPERTENSION: Primary | ICD-10-CM

## 2022-10-10 DIAGNOSIS — K21.9 GASTROESOPHAGEAL REFLUX DISEASE, UNSPECIFIED WHETHER ESOPHAGITIS PRESENT: ICD-10-CM

## 2022-10-10 PROCEDURE — 99213 OFFICE O/P EST LOW 20 MIN: CPT | Performed by: FAMILY MEDICINE

## 2022-10-10 PROCEDURE — G0008 ADMIN INFLUENZA VIRUS VAC: HCPCS | Performed by: FAMILY MEDICINE

## 2022-10-10 PROCEDURE — 90662 IIV NO PRSV INCREASED AG IM: CPT

## 2022-10-10 RX ORDER — ATORVASTATIN CALCIUM 20 MG/1
20 TABLET, FILM COATED ORAL DAILY
Qty: 90 TABLET | Refills: 3 | Status: SHIPPED | OUTPATIENT
Start: 2022-10-10

## 2022-10-10 RX ORDER — POTASSIUM CHLORIDE 20 MEQ/1
20 TABLET, EXTENDED RELEASE ORAL DAILY
Qty: 90 TABLET | Refills: 3 | Status: SHIPPED | OUTPATIENT
Start: 2022-10-10

## 2022-10-10 RX ORDER — FUROSEMIDE 20 MG/1
20 TABLET ORAL DAILY
Qty: 90 TABLET | Refills: 3 | Status: SHIPPED | OUTPATIENT
Start: 2022-10-10

## 2022-10-10 RX ORDER — PANTOPRAZOLE SODIUM 40 MG/1
40 TABLET, DELAYED RELEASE ORAL DAILY
Qty: 90 TABLET | Refills: 0 | Status: SHIPPED | OUTPATIENT
Start: 2022-10-10

## 2022-10-10 RX ORDER — TRAZODONE HYDROCHLORIDE 50 MG/1
50 TABLET ORAL
Qty: 90 TABLET | Refills: 1 | Status: SHIPPED | OUTPATIENT
Start: 2022-10-10

## 2022-10-10 NOTE — PROGRESS NOTES
Assessment/Plan     Diagnoses and all orders for this visit:    Essential hypertension  -     furosemide (LASIX) 20 mg tablet; Take 1 tablet (20 mg total) by mouth daily    Dyslipidemia  -     atorvastatin (LIPITOR) 20 mg tablet; Take 1 tablet (20 mg total) by mouth daily    Hypokalemia  -     potassium chloride (K-DUR,KLOR-CON) 20 mEq tablet; Take 1 tablet (20 mEq total) by mouth daily    Gastroesophageal reflux disease, unspecified whether esophagitis present  -     pantoprazole (PROTONIX) 40 mg tablet; Take 1 tablet (40 mg total) by mouth daily    Need for immunization against influenza  -     Flu Vaccine High Dose Split Preservative Free IM    Primary insomnia  -     traZODone (DESYREL) 50 mg tablet; Take 1 tablet (50 mg total) by mouth daily at bedtime      Patient doing well at this time  Medication refills provided as above  Will consider discontinuing statin at future visit  Patient would like to continue for now  Flu vaccine provided today  Patient in agreement with plan  Follow-up in 3 months  Subjective     Patient Id: Martín Tang is a 80 y o  female    HPI    Patient presents today for follow-up appointment and medication refills  She reports she is overall doing well and has no concerns or complaints today  Her current medications have been stable and she seems to be tolerating them well without side effects  She does continue to take Lasix for lower extremity edema  She has been on this medication for several years and has had no issues  She does take a supplemental 20 mEq of potassium daily as well  Her blood pressure is stable at this time  She does continue to take a statin for cardiovascular risk reduction  She does continue to see Cardiology as well due to her history of AVNRT  No other concerns or complaints today  Review of Systems   Constitutional: Negative for chills and fever  Respiratory: Negative for cough, chest tightness, shortness of breath and wheezing  Cardiovascular: Negative for chest pain, palpitations and leg swelling  Gastrointestinal: Negative for abdominal pain, constipation, diarrhea, nausea and vomiting  Genitourinary: Negative for dysuria  Musculoskeletal: Negative for arthralgias and myalgias  Skin: Negative for rash  Neurological: Negative for headaches  Psychiatric/Behavioral: Negative for dysphoric mood  All other systems reviewed and are negative  Past Medical History:   Diagnosis Date   • Anemia    • Arthritis    • Cardiac disease    • Chronic pain    • Gout    • Hypercholesterolemia    • Hypertension    • Insomnia        Past Surgical History:   Procedure Laterality Date   • APPENDECTOMY     • ATRIAL ABLATION SURGERY     • HYSTERECTOMY     • JOINT REPLACEMENT  knees   • KNEE ARTHROSCOPY      b/l knees   • LAMINECTOMY     • CA TOTAL HIP ARTHROPLASTY Right 7/17/2017    Procedure: TOTAL ANTERIOR HIP ARTHROPLASTY;  Surgeon: Rachel Sales MD;  Location: [facility];  Service: Orthopedics       History reviewed  No pertinent family history      Allergies   Allergen Reactions   • Bee Venom Anaphylaxis   • Codeine GI Intolerance     Nausea/vomiting   • Tetanus Immune Globulin Other (See Comments)   • Tetanus Toxoid    • Tetanus Toxoids          Current Outpatient Medications:   •  amLODIPine (NORVASC) 10 mg tablet, Take 1 tablet (10 mg total) by mouth daily, Disp: 90 tablet, Rfl: 4  •  atorvastatin (LIPITOR) 20 mg tablet, Take 1 tablet (20 mg total) by mouth daily, Disp: 90 tablet, Rfl: 3  •  carvedilol (COREG) 25 mg tablet, Take 1 tablet (25 mg total) by mouth 2 (two) times a day with meals, Disp: 180 tablet, Rfl: 4  •  co-enzyme Q-10 100 mg capsule, Take 100 mg by mouth daily, Disp: , Rfl:   •  docusate sodium (COLACE) 100 mg capsule, Take 100 mg by mouth 2 (two) times a day as needed for constipation, Disp: , Rfl:   •  doxycycline hyclate (VIBRAMYCIN) 100 mg capsule, , Disp: , Rfl:   •  EPINEPHrine (EPIPEN) 0 3 mg/0 3 mL SOAJ, Inject as necessary, Disp: 2 each, Rfl: 1  •  furosemide (LASIX) 20 mg tablet, Take 1 tablet (20 mg total) by mouth daily, Disp: 90 tablet, Rfl: 3  •  Inulin (FIBER CHOICE PO), Take by mouth, Disp: , Rfl:   •  olmesartan (BENICAR) 40 mg tablet, TAKE (1) TABLET BY MOUTH DAILY  , Disp: 90 tablet, Rfl: 4  •  pantoprazole (PROTONIX) 40 mg tablet, Take 1 tablet (40 mg total) by mouth daily, Disp: 90 tablet, Rfl: 0  •  potassium chloride (K-DUR,KLOR-CON) 20 mEq tablet, Take 1 tablet (20 mEq total) by mouth daily, Disp: 90 tablet, Rfl: 3  •  tolterodine (DETROL LA) 4 mg 24 hr capsule, Take 1 capsule (4 mg total) by mouth daily, Disp: 90 capsule, Rfl: 4  •  traZODone (DESYREL) 50 mg tablet, Take 1 tablet (50 mg total) by mouth daily at bedtime, Disp: 90 tablet, Rfl: 1  •  VENTOLIN  (90 Base) MCG/ACT inhaler, INHALE 1 TO 2 PUFFS EVERY 6 HOURS AS NEEDED , Disp: 18 g, Rfl: 3  •  guaiFENesin (ROBITUSSIN) 100 MG/5ML oral liquid, Take 10 mL (200 mg total) by mouth 3 (three) times a day as needed for cough (Patient not taking: No sig reported), Disp: 120 mL, Rfl: 0  •  predniSONE 20 mg tablet, , Disp: , Rfl:     Objective     Vitals:    10/10/22 1107   BP: 124/60   Pulse: 74   Resp: 18   Temp: (!) 97 3 °F (36 3 °C)   SpO2: 96%   Weight: 61 5 kg (135 lb 9 6 oz)   Height: 5' (1 524 m)       Physical Exam  Vitals and nursing note reviewed  Constitutional:       General: She is not in acute distress  Appearance: Normal appearance  She is normal weight  She is not ill-appearing or toxic-appearing  HENT:      Head: Normocephalic and atraumatic  Eyes:      Extraocular Movements: Extraocular movements intact  Cardiovascular:      Rate and Rhythm: Normal rate  Pulmonary:      Effort: Pulmonary effort is normal  No respiratory distress  Musculoskeletal:      Right lower leg: No edema  Left lower leg: No edema  Neurological:      Mental Status: She is alert and oriented to person, place, and time   Mental status is at baseline     Psychiatric:         Mood and Affect: Mood normal          Behavior: Behavior normal          [address]

## 2022-11-02 ENCOUNTER — OFFICE VISIT (OUTPATIENT)
Dept: DENTISTRY | Facility: CLINIC | Age: 87
End: 2022-11-02

## 2022-11-02 VITALS — SYSTOLIC BLOOD PRESSURE: 125 MMHG | DIASTOLIC BLOOD PRESSURE: 77 MMHG | HEART RATE: 67 BPM

## 2022-11-02 DIAGNOSIS — K03.6 ACCRETIONS ON TEETH: Primary | ICD-10-CM

## 2022-11-02 NOTE — PROGRESS NOTES
Adult Prophy      Chief Complaint   Patient presents with   • Routine Oral Cleaning   Pt is not going to pursue a new denture  Cleaned FUD in ultrasonic  Pt has been using proxabrushes  Her electric brush broke but she plans on getting a new one  Gave pt a pillow and did not recline back far   Patient did well      Method Used:  · Prophy Method Used: Ultrasonic Scaling  · Hand Scaling  · Polished  · Flossed       Radiographs Taken in Dexis: (Taken to assess periodontal health)  None     Intra/Extra Oral Cancer Screening:  · Within normal limits     Oral Hygiene:  · Fair     Plaque:  · Light     Calculus:  · Moderate     Bleeding:  · Light  · Moderate     Stain:  · Light     Periodontal Charting:   · Spot probing     Periodontal Classification:  · Generalized  · Moderate  · Severe  · Periodontal Disease     Oral Hygiene Instruction:    Erlinda Flores over daily routine and c-shaped flossing       Next Visit:  6 Month prophy and periodic

## 2022-12-05 ENCOUNTER — OFFICE VISIT (OUTPATIENT)
Dept: CARDIOLOGY CLINIC | Facility: HOSPITAL | Age: 87
End: 2022-12-05

## 2022-12-05 VITALS
DIASTOLIC BLOOD PRESSURE: 68 MMHG | HEIGHT: 60 IN | OXYGEN SATURATION: 95 % | WEIGHT: 133.4 LBS | SYSTOLIC BLOOD PRESSURE: 126 MMHG | BODY MASS INDEX: 26.19 KG/M2 | HEART RATE: 68 BPM

## 2022-12-05 DIAGNOSIS — Z98.890 HISTORY OF CARDIAC RADIOFREQUENCY ABLATION: ICD-10-CM

## 2022-12-05 DIAGNOSIS — E78.5 DYSLIPIDEMIA: ICD-10-CM

## 2022-12-05 DIAGNOSIS — I47.1 AVNRT (AV NODAL RE-ENTRY TACHYCARDIA) (HCC): Primary | ICD-10-CM

## 2022-12-05 DIAGNOSIS — I10 BENIGN ESSENTIAL HYPERTENSION: ICD-10-CM

## 2022-12-05 NOTE — PROGRESS NOTES
Cardiology Follow Up    Alla Reyes  1935  [phone]  [address] [facility] [place] [address] [place]  [place] [address]  [phone]    1  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 re-entry tachycardia) ([facility] [address] )        2  History of cardiac radiofrequency ablation        3  Benign essential hypertension        4  Dyslipidemia            Discussion/Summary:  Mrs Timi Hernandez is a pleasant 72-year-old who was presents to the office today for routine follow-up  She was last seen by me in 2020  She feels well and offers no cardiopulmonary complaints        Her blood pressure is well controlled in the office today  No changes were made to her antihypertensive medication regimen  A low-salt diet was reinforced      Her most recent lipids are acceptable on her current statin regimen to which no changes were made      She has no symptoms suggestive of recurrent SVT  She will remain on her current AV horace blocking regimen      She had an echocardiogram done in 2021 which was unremarkable except for mild pulmonary hypertension  She is otherwise asymptomatic and no testing is advised      I will see her back in the office in six months or sooner if deemed necessary      Interval History:   Mrs Timi Hernandez is a pleasant 72-year-old who presents to the office today for routine follow-up  She was last seen by me in 2020 and more recently by our advanced practitioner  She feels well  She does not perform much formal physical activity  With the activity she does perform she denies any exertional chest pain or shortness of breath  She denies any signs or symptoms of congestive heart failure including increasing lower extremity edema, paroxysmal nocturnal dyspnea, orthopnea, acute weight gain or increasing abdominal girth  She denies lightheadedness, syncope or presyncope  She denies palpitations  She denies symptoms of claudication        Medical Problems     Problem List     Status post total replacement of right hip    Benign essential hypertension    Hyperlipidemia (Chronic)    Elevated blood uric acid level    History of gout    Dextroscoliosis    Screening-pulmonary TB    Anemia    Anxiety    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 re-entry tachycardia) (HCC)    Dyslipidemia    Gout    Iron deficiency anemia    History of cardiac radiofrequency ablation    Primary insomnia    Chronic kidney disease, stage III (moderate) (HCC)    Overview Signed 5/11/2021 12:19 PM by Luanne Funez     According to ICD10 guidelines, provider accepted          Lab Results   Component Value Date    EGFR 52 07/25/2022    EGFR 38 06/09/2021    EGFR 51 11/23/2020    CREATININE 0 97 07/25/2022    CREATININE 1 28 06/09/2021    CREATININE 1 00 11/23/2020             Past Medical History:   Diagnosis Date   • Anemia    • Arthritis    • Cardiac disease    • Chronic pain    • Gout    • Hypercholesterolemia    • Hypertension    • Insomnia      Social History     Socioeconomic History   • Marital status: /Civil Union     Spouse name: Not on file   • Number of children: Not on file   • Years of education: Not on file   • Highest education level: Not on file   Occupational History   • Not on file   Tobacco Use   • Smoking status: Never   • Smokeless tobacco: Never   Vaping Use   • Vaping Use: Never used   Substance and Sexual Activity   • Alcohol use: No   • Drug use: No   • Sexual activity: Yes     Partners: Male   Other Topics Concern   • Not on file   Social History Narrative   • Not on file     Social Determinants of Health     Financial Resource Strain: Not on file   Food Insecurity: Not on file   Transportation Needs: Not on file   Physical Activity: Not on file   Stress: Not on file   Social Connections: Not on file   Intimate Partner Violence: Not on file   Housing Stability: Not on file      History reviewed  No pertinent family history    Past Surgical History:   Procedure Laterality Date   • APPENDECTOMY     • ATRIAL ABLATION SURGERY     • HYSTERECTOMY     • JOINT REPLACEMENT  knees   • KNEE ARTHROSCOPY      b/l knees   • LAMINECTOMY     • CA TOTAL HIP ARTHROPLASTY Right 7/17/2017    Procedure: TOTAL ANTERIOR HIP ARTHROPLASTY;  Surgeon: Gerard Wong MD;  Location: [facility];  Service: Orthopedics       Current Outpatient Medications:   •  amLODIPine (NORVASC) 10 mg tablet, Take 1 tablet (10 mg total) by mouth daily, Disp: 90 tablet, Rfl: 4  •  atorvastatin (LIPITOR) 20 mg tablet, Take 1 tablet (20 mg total) by mouth daily, Disp: 90 tablet, Rfl: 3  •  carvedilol (COREG) 25 mg tablet, Take 1 tablet (25 mg total) by mouth 2 (two) times a day with meals, Disp: 180 tablet, Rfl: 4  •  co-enzyme Q-10 100 mg capsule, Take 100 mg by mouth daily, Disp: , Rfl:   •  docusate sodium (COLACE) 100 mg capsule, Take 100 mg by mouth 2 (two) times a day as needed for constipation, Disp: , Rfl:   •  doxycycline hyclate (VIBRAMYCIN) 100 mg capsule, , Disp: , Rfl:   •  EPINEPHrine (EPIPEN) 0 3 mg/0 3 mL SOAJ, Inject as necessary, Disp: 2 each, Rfl: 1  •  furosemide (LASIX) 20 mg tablet, Take 1 tablet (20 mg total) by mouth daily, Disp: 90 tablet, Rfl: 3  •  guaiFENesin (ROBITUSSIN) 100 MG/5ML oral liquid, Take 10 mL (200 mg total) by mouth 3 (three) times a day as needed for cough, Disp: 120 mL, Rfl: 0  •  Inulin (FIBER CHOICE PO), Take by mouth, Disp: , Rfl:   •  olmesartan (BENICAR) 40 mg tablet, TAKE (1) TABLET BY MOUTH DAILY  , Disp: 90 tablet, Rfl: 4  •  pantoprazole (PROTONIX) 40 mg tablet, Take 1 tablet (40 mg total) by mouth daily, Disp: 90 tablet, Rfl: 0  •  potassium chloride (K-DUR,KLOR-CON) 20 mEq tablet, Take 1 tablet (20 mEq total) by mouth daily, Disp: 90 tablet, Rfl: 3  •  tolterodine (DETROL LA) 4 mg 24 hr capsule, Take 1 capsule (4 mg total) by mouth daily, Disp: 90 capsule, Rfl: 4  •  traZODone (DESYREL) 50 mg tablet, Take 1 tablet (50 mg total) by mouth daily at bedtime, Disp: 90 tablet, Rfl: 1  • VENTOLIN  (90 Base) MCG/ACT inhaler, INHALE 1 TO 2 PUFFS EVERY 6 HOURS AS NEEDED , Disp: 18 g, Rfl: 3  •  predniSONE 20 mg tablet, , Disp: , Rfl:   Allergies   Allergen Reactions   • Bee Venom Anaphylaxis   • Codeine GI Intolerance     Nausea/vomiting   • Tetanus Immune Globulin Other (See Comments)   • Tetanus Toxoid    • Tetanus Toxoids        Labs:     Chemistry        Component Value Date/Time     11/13/2015 1253    K 4 2 07/25/2022 0720    K 3 3 (L) 11/13/2015 1253     (H) 07/25/2022 0720     11/13/2015 1253    CO2 26 07/25/2022 0720    CO2 28 8 11/13/2015 1253    BUN 30 (H) 07/25/2022 0720    BUN 24 11/13/2015 1253    CREATININE 0 97 07/25/2022 0720    CREATININE 0 85 11/13/2015 1253        Component Value Date/Time    CALCIUM 9 3 07/25/2022 0720    CALCIUM 9 0 11/13/2015 1253    ALKPHOS 76 07/25/2022 0720    ALKPHOS 83 11/13/2015 1253    AST 19 07/25/2022 0720    AST 20 11/13/2015 1253    ALT 34 07/25/2022 0720    ALT 36 11/13/2015 1253    BILITOT 0 63 11/13/2015 1253            Lab Results   Component Value Date    CHOL 201 11/13/2015    CHOL 183 11/12/2014     Lab Results   Component Value Date    HDL 43 (L) 07/25/2022    HDL 33 (L) 12/14/2020    HDL 41 08/21/2019     Lab Results   Component Value Date    LDLCALC 72 07/25/2022    LDLCALC 89 12/14/2020    LDLCALC 88 08/21/2019     Lab Results   Component Value Date    TRIG 62 07/25/2022    TRIG 173 (H) 12/14/2020    TRIG 107 08/21/2019     No results found for: CHOLHDL    Imaging: No results found  Review of Systems   Cardiovascular: Negative for chest pain, claudication, cyanosis, dyspnea on exertion, irregular heartbeat and near-syncope  All other systems reviewed and are negative  Vitals:    12/05/22 0750   BP: 126/68   Pulse: 68   SpO2: 95%     Vitals:    12/05/22 0750   Weight: 60 5 kg (133 lb 6 4 oz)     Height: 5' (152 4 cm)   Body mass index is 26 05 kg/m²      Physical Exam:   General appearance:  Appears stated age, alert, well appearing and in no distress  HEENT:  PERRLA, EOMI, no scleral icterus, no conjunctival pallor  NECK:  Supple, No elevated JVP, no thyromegaly, no carotid bruits  HEART:  Regular rate and rhythm, normal S1/S2, early peaking BON RUSB   LUNGS:  Clear to auscultation bilaterally  ABDOMEN:  Soft, non-tender, positive bowel sounds, no rebound or guarding, no organomegaly   EXTREMITIES:  No edema  VASCULAR:  Normal pedal pulses   SKIN: No lesions or rashes on exposed skin  NEURO:  CN II-XII intact, no focal deficits

## 2022-12-29 DIAGNOSIS — F51.01 PRIMARY INSOMNIA: ICD-10-CM

## 2022-12-29 DIAGNOSIS — K21.9 GASTROESOPHAGEAL REFLUX DISEASE, UNSPECIFIED WHETHER ESOPHAGITIS PRESENT: ICD-10-CM

## 2022-12-29 DIAGNOSIS — M19.90 ARTHRITIS: Primary | ICD-10-CM

## 2022-12-30 RX ORDER — TRAZODONE HYDROCHLORIDE 50 MG/1
50 TABLET ORAL
Qty: 90 TABLET | Refills: 4 | Status: SHIPPED | OUTPATIENT
Start: 2022-12-30

## 2022-12-30 RX ORDER — PANTOPRAZOLE SODIUM 40 MG/1
40 TABLET, DELAYED RELEASE ORAL DAILY
Qty: 90 TABLET | Refills: 4 | Status: SHIPPED | OUTPATIENT
Start: 2022-12-30

## 2022-12-30 RX ORDER — DICLOFENAC SODIUM AND MISOPROSTOL 75; 200 MG/1; UG/1
TABLET, DELAYED RELEASE ORAL
Qty: 180 TABLET | Refills: 4 | Status: SHIPPED | OUTPATIENT
Start: 2022-12-30

## 2023-01-06 ENCOUNTER — TELEPHONE (OUTPATIENT)
Dept: OTHER | Facility: OTHER | Age: 88
End: 2023-01-06

## 2023-01-06 NOTE — TELEPHONE ENCOUNTER
Patient is calling regarding cancelling an appointment      Date/Time:1/9/23 at 9am    Patient was rescheduled: YES [] NO [x]    Patient requesting call back to reschedule: YES [] NO [x]    Patient already has another appointment scheduled for 1/27/23

## 2023-01-24 DIAGNOSIS — U09.9 POST COVID-19 CONDITION, UNSPECIFIED: ICD-10-CM

## 2023-01-24 RX ORDER — GUAIFENESIN 100 MG/5ML
LIQUID ORAL
Qty: 120 ML | Refills: 2 | Status: SHIPPED | OUTPATIENT
Start: 2023-01-24

## 2023-01-26 ENCOUNTER — RA CDI HCC (OUTPATIENT)
Dept: OTHER | Facility: HOSPITAL | Age: 88
End: 2023-01-26

## 2023-01-26 NOTE — PROGRESS NOTES
[facility]  coding opportunities       Chart reviewed, no opportunity found:   [address]        Patients Insurance     Medicare Insurance: [employer]

## 2023-01-27 ENCOUNTER — OFFICE VISIT (OUTPATIENT)
Dept: FAMILY MEDICINE CLINIC | Facility: CLINIC | Age: 88
End: 2023-01-27

## 2023-01-27 VITALS
HEART RATE: 69 BPM | SYSTOLIC BLOOD PRESSURE: 123 MMHG | DIASTOLIC BLOOD PRESSURE: 72 MMHG | WEIGHT: 137 LBS | RESPIRATION RATE: 18 BRPM | TEMPERATURE: 98 F | OXYGEN SATURATION: 94 % | BODY MASS INDEX: 26.76 KG/M2

## 2023-01-27 DIAGNOSIS — H90.3 SENSORINEURAL HEARING LOSS (SNHL) OF BOTH EARS: ICD-10-CM

## 2023-01-27 DIAGNOSIS — Z00.00 MEDICARE ANNUAL WELLNESS VISIT, SUBSEQUENT: Primary | ICD-10-CM

## 2023-01-27 NOTE — PROGRESS NOTES
Assessment and Plan:     Problem List Items Addressed This Visit    None  Visit Diagnoses     Medicare annual wellness visit, subsequent    -  Primary    Relevant Orders    Comprehensive metabolic panel    Lipid Panel with Direct LDL reflex    Vitamin D 25 hydroxy    CBC and Platelet    Sensorineural hearing loss (SNHL) of both ears        Relevant Orders    Ambulatory Referral to Audiology    Body mass index (BMI) 26 0-26 9, adult               Preventive health issues were discussed with patient, and age appropriate screening tests were ordered as noted in patient's After Visit Summary  Personalized health advice and appropriate referrals for health education or preventive services given if needed, as noted in patient's After Visit Summary  Falls Plan of Care: Balance, strength, and gait training instructions were provided  The patient indicates understanding of these issues and agrees with the plan  Return in 6 months (on 7/27/2023)  History of Present Illness:     Patient presents for a Medicare Wellness Visit    Reports concerns with vision and hearing today  Notes tinnitus for several years off and on  Had hearing screen last year  No hearing aids  Recently got new bifocals  Feels as though vision is no better  No visual field loss or floaters  Otherwise doing well today  Patient Care Team:  Arian Samuel DO as PCP - General (Family Medicine)  Ania Velazquez MD as PCP - [address] [place] [address] (RTE)  [facility]  DO Zaida Milligan MD Ival Rain, MD     Review of Systems:     Review of Systems   Constitutional: Negative for chills and fever  HENT: Positive for hearing loss and tinnitus  Eyes: Positive for visual disturbance  Respiratory: Negative for shortness of breath  Cardiovascular: Negative for chest pain  Gastrointestinal: Negative for constipation, diarrhea, nausea and vomiting     Genitourinary: Negative for difficulty urinating and dysuria  All other systems reviewed and are negative  Problem List:     Patient Active Problem List   Diagnosis   • Status post total replacement of right hip   • Benign essential hypertension   • Hyperlipidemia   • Elevated blood uric acid level   • History of gout   • Dextroscoliosis   • Screening-pulmonary TB   • Anemia   • Anxiety   • 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 re-entry tachycardia) (HCC)   • Dyslipidemia   • Gout   • Iron deficiency anemia   • History of cardiac radiofrequency ablation   • Primary insomnia   • Chronic kidney disease, stage III (moderate) (HCC)      Past Medical and Surgical History:     Past Medical History:   Diagnosis Date   • Anemia    • Arthritis    • Cardiac disease    • Chronic pain    • Gout    • Hypercholesterolemia    • Hypertension    • Insomnia      Past Surgical History:   Procedure Laterality Date   • APPENDECTOMY     • ATRIAL ABLATION SURGERY     • HYSTERECTOMY     • JOINT REPLACEMENT  knees   • KNEE ARTHROSCOPY      b/l knees   • LAMINECTOMY     • MI ARTHRP ACETBLR/PROX FEM PROSTC AGRFT/ALGRFT Right 7/17/2017    Procedure: TOTAL ANTERIOR HIP ARTHROPLASTY;  Surgeon: Ayah Vernon MD;  [facility]  Service: Orthopedics      Family History:     History reviewed  No pertinent family history     Social History:     Social History     Socioeconomic History   • Marital status: /Civil Union     Spouse name: None   • Number of children: None   • Years of education: None   • Highest education level: None   Occupational History   • None   Tobacco Use   • Smoking status: Never   • Smokeless tobacco: Never   Vaping Use   • Vaping Use: Never used   Substance and Sexual Activity   • Alcohol use: No   • Drug use: No   • Sexual activity: Yes     Partners: Male   Other Topics Concern   • None   Social History Narrative   • None     Social Determinants of Health     Financial Resource Strain: Not on file   Food Insecurity: Not on file   Transportation Needs: Not on file   Physical Activity: Not on file   Stress: Not on file   Social Connections: Not on file   Intimate Partner Violence: Not on file   Housing Stability: Not on file      Medications and Allergies:     Current Outpatient Medications   Medication Sig Dispense Refill   • amLODIPine (NORVASC) 10 mg tablet Take 1 tablet (10 mg total) by mouth daily 90 tablet 4   • atorvastatin (LIPITOR) 20 mg tablet Take 1 tablet (20 mg total) by mouth daily 90 tablet 3   • carvedilol (COREG) 25 mg tablet Take 1 tablet (25 mg total) by mouth 2 (two) times a day with meals 180 tablet 4   • co-enzyme Q-10 100 mg capsule Take 100 mg by mouth daily     • diclofenac-misoprostol (ARTHROTEC 75) 75-0 2 MG per tablet TAKE ONE (1) TABLET BY MOUTH TWO TIMES DAILY  180 tablet 4   • docusate sodium (COLACE) 100 mg capsule Take 100 mg by mouth 2 (two) times a day as needed for constipation     • doxycycline hyclate (VIBRAMYCIN) 100 mg capsule      • EPINEPHrine (EPIPEN) 0 3 mg/0 3 mL SOAJ Inject as necessary 2 each 1   • furosemide (LASIX) 20 mg tablet Take 1 tablet (20 mg total) by mouth daily 90 tablet 3   • olmesartan (BENICAR) 40 mg tablet TAKE (1) TABLET BY MOUTH DAILY  90 tablet 4   • pantoprazole (PROTONIX) 40 mg tablet Take 1 tablet (40 mg total) by mouth daily 90 tablet 4   • potassium chloride (K-DUR,KLOR-CON) 20 mEq tablet Take 1 tablet (20 mEq total) by mouth daily 90 tablet 3   • Siltussin  MG/5ML oral liquid Take 10 mL (200 mg total) by mouth 3 (three) times a day as needed for cough 120 mL 2   • tolterodine (DETROL LA) 4 mg 24 hr capsule Take 1 capsule (4 mg total) by mouth daily 90 capsule 4   • traZODone (DESYREL) 50 mg tablet Take 1 tablet (50 mg total) by mouth daily at bedtime 90 tablet 4   • VENTOLIN  (90 Base) MCG/ACT inhaler INHALE 1 TO 2 PUFFS EVERY 6 HOURS AS NEEDED   18 g 3   • Inulin (FIBER CHOICE PO) Take by mouth     • predniSONE 20 mg tablet  (Patient not taking: Reported on 9/21/2022) No current facility-administered medications for this visit  Allergies   Allergen Reactions   • Bee Venom Anaphylaxis   • Codeine GI Intolerance     Nausea/vomiting   • Tetanus Immune Globulin Other (See Comments)   • Tetanus Toxoid    • Tetanus Toxoids       Immunizations:     Immunization History   Administered Date(s) Administered   • COVID-19 J&J (Drea) vaccine 0 5 mL 11/01/2021   • COVID-19, unspecified 04/10/2021   • INFLUENZA 11/23/2021   • Influenza, high dose seasonal 0 7 mL 11/18/2020, 10/10/2022   • Pneumococcal Conjugate 13-Valent 11/05/2015   • Pneumococcal Polysaccharide PPV23 04/20/2009   • Zoster 06/30/2015      Health Maintenance: There are no preventive care reminders to display for this patient  Topic Date Due   • COVID-19 Vaccine (2 - Booster for Drea series) 12/27/2021      Medicare Screening Tests and Risk Assessments:     Efra Ospina is here for her Subsequent Wellness visit  Last Medicare Wellness visit information reviewed, patient interviewed and updates made to the record today  Health Risk Assessment:   Patient rates overall health as fair  Patient feels that their physical health rating is much better  Patient is satisfied with their life  Eyesight was rated as much worse  Hearing was rated as much worse  Patient feels that their emotional and mental health rating is much better  Patients states they are never, rarely angry  Patient states they are sometimes unusually tired/fatigued  Pain experienced in the last 7 days has been some  Patient's pain rating has been 4/10  Patient states that she has experienced no weight loss or gain in last 6 months  Depression Screening:   PHQ-2 Score: 0      Fall Risk Screening:    In the past year, patient has experienced: history of falling in past year    Number of falls: 2 or more  Injured during fall?: No    Feels unsteady when standing or walking?: Yes    Worried about falling?: Yes      Home Safety:  Patient has trouble with stairs inside or outside of their home  Patient has working smoke alarms and has working carbon monoxide detector  Home safety hazards include: none  Nutrition:   Current diet is Regular  Medications:   Patient is currently taking over-the-counter supplements  OTC medications include: see medication list  Patient is able to manage medications  Activities of Daily Living (ADLs)/Instrumental Activities of Daily Living (IADLs):   Walk and transfer into and out of bed and chair?: Yes  Dress and groom yourself?: Yes    Bathe or shower yourself?: Yes    Feed yourself? Yes  Do your laundry/housekeeping?: Yes  Manage your money, pay your bills and track your expenses?: Yes  Make your own meals?: Yes    Do your own shopping?: Yes    Previous Hospitalizations:   Any hospitalizations or ED visits within the last 12 months?: No      Advance Care Planning:   Living will: Yes    Durable POA for healthcare: Yes    Advanced directive: Yes    Advanced directive counseling given: Yes      Cognitive Screening:   Provider or family/friend/caregiver concerned regarding cognition?: No    PREVENTIVE SCREENINGS      Cardiovascular Screening:    General: Screening Not Indicated and History Lipid Disorder      Diabetes Screening:     General: Screening Current      Colorectal Cancer Screening:     General: Screening Not Indicated      Breast Cancer Screening:     General: Screening Not Indicated      Cervical Cancer Screening:    General: Screening Not Indicated      Abdominal Aortic Aneurysm (AAA) Screening:        General: Screening Not Indicated      Lung Cancer Screening:     General: Screening Not Indicated      Hepatitis C Screening:    General: Screening Not Indicated    Screening, Brief Intervention, and Referral to Treatment (SBIRT)    Screening  Typical number of drinks in a day: 0  Typical number of drinks in a week: 2  Interpretation: Low risk drinking behavior      Single Item Drug Screening:  How often have you used an illegal drug (including marijuana) or a prescription medication for non-medical reasons in the past year? never    Single Item Drug Screen Score: 0  Interpretation: Negative screen for possible drug use disorder    No results found  Physical Exam:     /72   Pulse 69   Temp 98 °F (36 7 °C)   Resp 18   Wt 62 1 kg (137 lb)   SpO2 94%   BMI 26 76 kg/m²     Physical Exam  Vitals and nursing note reviewed  Constitutional:       General: She is not in acute distress  Appearance: She is well-developed  She is not ill-appearing or toxic-appearing  HENT:      Head: Normocephalic and atraumatic  Right Ear: Tympanic membrane, ear canal and external ear normal  There is no impacted cerumen  Left Ear: Tympanic membrane, ear canal and external ear normal  There is no impacted cerumen  Nose: Nose normal       Mouth/Throat:      Mouth: Mucous membranes are moist       Pharynx: Oropharynx is clear  Eyes:      Extraocular Movements: Extraocular movements intact  Conjunctiva/sclera: Conjunctivae normal       Pupils: Pupils are equal, round, and reactive to light  Neck:      Thyroid: No thyromegaly  Cardiovascular:      Rate and Rhythm: Normal rate and regular rhythm  Heart sounds: Normal heart sounds  No murmur heard  Pulmonary:      Effort: Pulmonary effort is normal  No respiratory distress  Breath sounds: Normal breath sounds  No wheezing  Abdominal:      Palpations: Abdomen is soft  Tenderness: There is no abdominal tenderness  There is no guarding or rebound  Genitourinary:     Comments: Exam deferred  Musculoskeletal:      Cervical back: Neck supple  Right lower leg: No edema  Left lower leg: No edema  Skin:     General: Skin is warm  Neurological:      General: No focal deficit present  Mental Status: She is alert and oriented to person, place, and time  Mental status is at baseline     Psychiatric:         Mood and Affect: Mood normal          Behavior: Behavior normal          Thought Content:  Thought content normal          Judgment: Judgment normal           Ortiz Ge, DO

## 2023-01-27 NOTE — PATIENT INSTRUCTIONS
Medicare Preventive Visit Patient Instructions  Thank you for completing your Welcome to Medicare Visit or Medicare Annual Wellness Visit today  Your next wellness visit will be due in one year (1/28/2024)  The screening/preventive services that you may require over the next 5-10 years are detailed below  Some tests may not apply to you based off risk factors and/or age  Screening tests ordered at today's visit but not completed yet may show as past due  Also, please note that scanned in results may not display below  Preventive Screenings:  Service Recommendations Previous Testing/Comments   Colorectal Cancer Screening  * Colonoscopy    * Fecal Occult Blood Test (FOBT)/Fecal Immunochemical Test (FIT)  * Fecal DNA/Cologuard Test  * Flexible Sigmoidoscopy Age: 39-70 years old   Colonoscopy: every 10 years (may be performed more frequently if at higher risk)  OR  FOBT/FIT: every 1 year  OR  Cologuard: every 3 years  OR  Sigmoidoscopy: every 5 years  Screening may be recommended earlier than age 39 if at higher risk for colorectal cancer  Also, an individualized decision between you and your healthcare provider will decide whether screening between the ages of 74-80 would be appropriate  Colonoscopy: Not on file  FOBT/FIT: Not on file  Cologuard: Not on file  Sigmoidoscopy: Not on file          Breast Cancer Screening Age: 36 years old  Frequency: every 1-2 years  Not required if history of left and right mastectomy Mammogram: Not on file        Cervical Cancer Screening Between the ages of 21-29, pap smear recommended once every 3 years  Between the ages of 33-67, can perform pap smear with HPV co-testing every 5 years     Recommendations may differ for women with a history of total hysterectomy, cervical cancer, or abnormal pap smears in past  Pap Smear: Not on file        Hepatitis C Screening Once for adults born between [facility]  More frequently in patients at high risk for Hepatitis C Hep C Antibody: Not on file        Diabetes Screening 1-2 times per year if you're at risk for diabetes or have pre-diabetes Fasting glucose: 93 mg/dL (7/25/2022)  A1C: No results in last 5 years (No results in last 5 years)      Cholesterol Screening Once every 5 years if you don't have a lipid disorder  May order more often based on risk factors  Lipid panel: 07/25/2022          Other Preventive Screenings Covered by Medicare:  1  Abdominal Aortic Aneurysm (AAA) Screening: covered once if your at risk  You're considered to be at risk if you have a family history of AAA  2  Lung Cancer Screening: covers low dose CT scan once per year if you meet all of the following conditions: (1) Age 50-69; (2) No signs or symptoms of lung cancer; (3) Current smoker or have quit smoking within the last 15 years; (4) You have a tobacco smoking history of at least 20 pack years (packs per day multiplied by number of years you smoked); (5) You get a written order from a healthcare provider  3  Glaucoma Screening: covered annually if you're considered high risk: (1) You have diabetes OR (2) Family history of glaucoma OR (3)  aged 48 and older OR (3)  American aged 72 and older  3  Osteoporosis Screening: covered every 2 years if you meet one of the following conditions: (1) You're estrogen deficient and at risk for osteoporosis based off medical history and other findings; (2) Have a vertebral abnormality; (3) On glucocorticoid therapy for more than 3 months; (4) Have primary hyperparathyroidism; (5) On osteoporosis medications and need to assess response to drug therapy  · Last bone density test (DXA Scan): Not on file  5  HIV Screening: covered annually if you're between the age of 12-76  Also covered annually if you are younger than 13 and older than 72 with risk factors for HIV infection  For pregnant patients, it is covered up to 3 times per pregnancy      Immunizations:  Immunization Recommendations   Influenza Vaccine Annual influenza vaccination during flu season is recommended for all persons aged >= 6 months who do not have contraindications   Pneumococcal Vaccine   * Pneumococcal conjugate vaccine = PCV13 (Prevnar 13), PCV15 (Vaxneuvance), PCV20 (Prevnar 20)  * Pneumococcal polysaccharide vaccine = PPSV23 (Pneumovax) Adults 25-60 years old: 1-3 doses may be recommended based on certain risk factors  Adults 72 years old: 1-2 doses may be recommended based off what pneumonia vaccine you previously received   Hepatitis B Vaccine 3 dose series if at intermediate or high risk (ex: diabetes, end stage renal disease, liver disease)   Tetanus (Td) Vaccine - COST NOT COVERED BY MEDICARE PART B Following completion of primary series, a booster dose should be given every 10 years to maintain immunity against tetanus  Td may also be given as tetanus wound prophylaxis  Tdap Vaccine - COST NOT COVERED BY MEDICARE PART B Recommended at least once for all adults  For pregnant patients, recommended with each pregnancy  Shingles Vaccine (Shingrix) - COST NOT COVERED BY MEDICARE PART B  2 shot series recommended in those aged 48 and above     Health Maintenance Due:  There are no preventive care reminders to display for this patient  Immunizations Due:      Topic Date Due   • COVID-19 Vaccine (2 - Booster for Invesdor series) 12/27/2021     Advance Directives   What are advance directives? Advance directives are legal documents that state your wishes and plans for medical care  These plans are made ahead of time in case you lose your ability to make decisions for yourself  Advance directives can apply to any medical decision, such as the treatments you want, and if you want to donate organs  What are the types of advance directives? There are many types of advance directives, and each state has rules about how to use them  You may choose a combination of any of the following:  · Living will:   This is a written record of the treatment you want  You can also choose which treatments you do not want, which to limit, and which to stop at a certain time  This includes surgery, medicine, IV fluid, and tube feedings  · Durable power of  for healthcare [facility]): This is a written record that states who you want to make healthcare choices for you when you are unable to make them for yourself  This person, called a proxy, is usually a family member or a friend  You may choose more than 1 proxy  · Do not resuscitate (DNR) order:  A DNR order is used in case your heart stops beating or you stop breathing  It is a request not to have certain forms of treatment, such as CPR  A DNR order may be included in other types of advance directives  · Medical directive: This covers the care that you want if you are in a coma, near death, or unable to make decisions for yourself  You can list the treatments you want for each condition  Treatment may include pain medicine, surgery, blood transfusions, dialysis, IV or tube feedings, and a ventilator (breathing machine)  · Values history: This document has questions about your views, beliefs, and how you feel and think about life  This information can help others choose the care that you would choose  Why are advance directives important? An advance directive helps you control your care  Although spoken wishes may be used, it is better to have your wishes written down  Spoken wishes can be misunderstood, or not followed  Treatments may be given even if you do not want them  An advance directive may make it easier for your family to make difficult choices about your care  Weight Management   Why it is important to manage your weight:  Being overweight increases your risk of health conditions such as heart disease, high blood pressure, type 2 diabetes, and certain types of cancer  It can also increase your risk for osteoarthritis, sleep apnea, and other respiratory problems   Aim for a slow, steady weight loss  Even a small amount of weight loss can lower your risk of health problems  How to lose weight safely:  A safe and healthy way to lose weight is to eat fewer calories and get regular exercise  You can lose up about 1 pound a week by decreasing the number of calories you eat by 500 calories each day  Healthy meal plan for weight management:  A healthy meal plan includes a variety of foods, contains fewer calories, and helps you stay healthy  A healthy meal plan includes the following:  · Eat whole-grain foods more often  A healthy meal plan should contain fiber  Fiber is the part of grains, fruits, and vegetables that is not broken down by your body  Whole-grain foods are healthy and provide extra fiber in your diet  Some examples of whole-grain foods are whole-wheat breads and pastas, oatmeal, brown rice, and bulgur  · Eat a variety of vegetables every day  Include dark, leafy greens such as spinach, kale, kena greens, and mustard greens  Eat yellow and orange vegetables such as carrots, sweet potatoes, and winter squash  · Eat a variety of fruits every day  Choose fresh or canned fruit (canned in its own juice or light syrup) instead of juice  Fruit juice has very little or no fiber  · Eat low-fat dairy foods  Drink fat-free (skim) milk or 1% milk  Eat fat-free yogurt and low-fat cottage cheese  Try low-fat cheeses such as mozzarella and other reduced-fat cheeses  · Choose meat and other protein foods that are low in fat  Choose beans or other legumes such as split peas or lentils  Choose fish, skinless poultry (chicken or turkey), or lean cuts of red meat (beef or pork)  Before you cook meat or poultry, cut off any visible fat  · Use less fat and oil  Try baking foods instead of frying them  Add less fat, such as margarine, sour cream, regular salad dressing and mayonnaise to foods  Eat fewer high-fat foods   Some examples of high-fat foods include french fries, doughnuts, ice cream, and cakes  · Eat fewer sweets  Limit foods and drinks that are high in sugar  This includes candy, cookies, regular soda, and sweetened drinks  Exercise:  Exercise at least 30 minutes per day on most days of the week  Some examples of exercise include walking, biking, dancing, and swimming  You can also fit in more physical activity by taking the stairs instead of the elevator or parking farther away from stores  Ask your healthcare provider about the best exercise plan for you  © Copyright [employer] 2018 Information is for End User's use only and may not be sold, redistributed or otherwise used for commercial purposes   All illustrations and images included in CareNotes® are the copyrighted property of [employer]  or [address] [place] [address]

## 2023-01-30 ENCOUNTER — APPOINTMENT (OUTPATIENT)
Dept: LAB | Facility: MEDICAL CENTER | Age: 88
End: 2023-01-30

## 2023-01-30 DIAGNOSIS — Z00.00 MEDICARE ANNUAL WELLNESS VISIT, SUBSEQUENT: ICD-10-CM

## 2023-01-30 LAB
25(OH)D3 SERPL-MCNC: 42.6 NG/ML (ref 30–100)
ALBUMIN SERPL BCP-MCNC: 3.6 G/DL (ref 3.5–5)
ALP SERPL-CCNC: 74 U/L (ref 46–116)
ALT SERPL W P-5'-P-CCNC: 28 U/L (ref 12–78)
ANION GAP SERPL CALCULATED.3IONS-SCNC: 7 MMOL/L (ref 4–13)
AST SERPL W P-5'-P-CCNC: 21 U/L (ref 5–45)
BILIRUB SERPL-MCNC: 0.51 MG/DL (ref 0.2–1)
BUN SERPL-MCNC: 28 MG/DL (ref 5–25)
CALCIUM SERPL-MCNC: 9.5 MG/DL (ref 8.3–10.1)
CHLORIDE SERPL-SCNC: 112 MMOL/L (ref 96–108)
CHOLEST SERPL-MCNC: 127 MG/DL
CO2 SERPL-SCNC: 26 MMOL/L (ref 21–32)
CREAT SERPL-MCNC: 0.83 MG/DL (ref 0.6–1.3)
ERYTHROCYTE [DISTWIDTH] IN BLOOD BY AUTOMATED COUNT: 14.4 % (ref 11.6–15.1)
GFR SERPL CREATININE-BSD FRML MDRD: 63 ML/MIN/1.73SQ M
GLUCOSE P FAST SERPL-MCNC: 83 MG/DL (ref 65–99)
HCT VFR BLD AUTO: 39.1 % (ref 34.8–46.1)
HDLC SERPL-MCNC: 54 MG/DL
HGB BLD-MCNC: 12.2 G/DL (ref 11.5–15.4)
LDLC SERPL CALC-MCNC: 64 MG/DL (ref 0–100)
MCH RBC QN AUTO: 27.4 PG (ref 26.8–34.3)
MCHC RBC AUTO-ENTMCNC: 31.2 G/DL (ref 31.4–37.4)
MCV RBC AUTO: 88 FL (ref 82–98)
PLATELET # BLD AUTO: 225 THOUSANDS/UL (ref 149–390)
PMV BLD AUTO: 10.6 FL (ref 8.9–12.7)
POTASSIUM SERPL-SCNC: 4 MMOL/L (ref 3.5–5.3)
PROT SERPL-MCNC: 7.5 G/DL (ref 6.4–8.4)
RBC # BLD AUTO: 4.45 MILLION/UL (ref 3.81–5.12)
SODIUM SERPL-SCNC: 145 MMOL/L (ref 135–147)
TRIGL SERPL-MCNC: 46 MG/DL
WBC # BLD AUTO: 8.65 THOUSAND/UL (ref 4.31–10.16)

## 2023-02-02 ENCOUNTER — OFFICE VISIT (OUTPATIENT)
Dept: AUDIOLOGY | Facility: CLINIC | Age: 88
End: 2023-02-02

## 2023-02-02 ENCOUNTER — TELEPHONE (OUTPATIENT)
Dept: FAMILY MEDICINE CLINIC | Facility: CLINIC | Age: 88
End: 2023-02-02

## 2023-02-02 DIAGNOSIS — H90.3 SENSORINEURAL HEARING LOSS (SNHL) OF BOTH EARS: ICD-10-CM

## 2023-02-02 NOTE — PROGRESS NOTES
HEARING EVALUATION    Name:  Melany Melendez  :  1935  Age:  80 y o  Date of Evaluation: 23     History: Difficulty Understanding  Reason for visit: Melany Melendez is being seen today at the request of Dr Kaylin Jung for an evaluation of hearing  Patient reports she feels her hearing has gotten worse since her last evaluation  Bilateral, bothersome tinnitus reported  EVALUATION:    Otoscopic Evaluation:   Right Ear: Clear and healthy ear canal and tympanic membrane   Left Ear: Clear ear canal, significant scarring on TM     Tympanometry:   Right: Type As - reduced compliance   Left: Type B - middle ear disorder (consistent with last evaluation)     Audiogram Results:  Pure tone testing revealed a 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

## 2023-03-30 DIAGNOSIS — I10 BENIGN ESSENTIAL HYPERTENSION: ICD-10-CM

## 2023-03-30 RX ORDER — OLMESARTAN MEDOXOMIL 40 MG/1
TABLET ORAL
Qty: 90 TABLET | Refills: 3 | Status: SHIPPED | OUTPATIENT
Start: 2023-03-30

## 2023-04-26 ENCOUNTER — HOSPITAL ENCOUNTER (EMERGENCY)
Facility: HOSPITAL | Age: 88
Discharge: HOME | End: 2023-04-26
Attending: EMERGENCY MEDICINE

## 2023-04-26 ENCOUNTER — APPOINTMENT (EMERGENCY)
Dept: CT IMAGING | Facility: HOSPITAL | Age: 88
End: 2023-04-26

## 2023-04-26 VITALS
DIASTOLIC BLOOD PRESSURE: 78 MMHG | RESPIRATION RATE: 18 BRPM | WEIGHT: 138 LBS | HEIGHT: 59 IN | BODY MASS INDEX: 27.82 KG/M2 | TEMPERATURE: 97.7 F | OXYGEN SATURATION: 94 % | HEART RATE: 75 BPM | SYSTOLIC BLOOD PRESSURE: 135 MMHG

## 2023-04-26 DIAGNOSIS — R79.89 ELEVATED BRAIN NATRIURETIC PEPTIDE (BNP) LEVEL: ICD-10-CM

## 2023-04-26 DIAGNOSIS — R06.00 DYSPNEA: Primary | ICD-10-CM

## 2023-04-26 LAB
ANION GAP SERPL CALCULATED.3IONS-SCNC: 10 MMOL/L (ref 4–13)
APTT PPP: 33 SECONDS (ref 23–37)
BASOPHILS # BLD AUTO: 0.07 THOUSANDS/ΜL (ref 0–0.1)
BASOPHILS NFR BLD AUTO: 1 % (ref 0–1)
BNP SERPL-MCNC: 107 PG/ML (ref 0–100)
BUN SERPL-MCNC: 23 MG/DL (ref 5–25)
CALCIUM SERPL-MCNC: 9.5 MG/DL (ref 8.4–10.2)
CARDIAC TROPONIN I PNL SERPL HS: 7 NG/L
CHLORIDE SERPL-SCNC: 108 MMOL/L (ref 96–108)
CO2 SERPL-SCNC: 25 MMOL/L (ref 21–32)
CREAT SERPL-MCNC: 0.83 MG/DL (ref 0.6–1.3)
D DIMER PPP FEU-MCNC: 2.28 UG/ML FEU
EOSINOPHIL # BLD AUTO: 0.3 THOUSAND/ΜL (ref 0–0.61)
EOSINOPHIL NFR BLD AUTO: 3 % (ref 0–6)
ERYTHROCYTE [DISTWIDTH] IN BLOOD BY AUTOMATED COUNT: 13.5 % (ref 11.6–15.1)
GFR SERPL CREATININE-BSD FRML MDRD: 63 ML/MIN/1.73SQ M
GLUCOSE SERPL-MCNC: 115 MG/DL (ref 65–140)
HCT VFR BLD AUTO: 40.8 % (ref 34.8–46.1)
HGB BLD-MCNC: 13.5 G/DL (ref 11.5–15.4)
IMM GRANULOCYTES # BLD AUTO: 0.03 THOUSAND/UL (ref 0–0.2)
IMM GRANULOCYTES NFR BLD AUTO: 0 % (ref 0–2)
INR PPP: 1.1 (ref 0.84–1.19)
LYMPHOCYTES # BLD AUTO: 1.22 THOUSANDS/ΜL (ref 0.6–4.47)
LYMPHOCYTES NFR BLD AUTO: 11 % (ref 14–44)
MCH RBC QN AUTO: 28.5 PG (ref 26.8–34.3)
MCHC RBC AUTO-ENTMCNC: 33.1 G/DL (ref 31.4–37.4)
MCV RBC AUTO: 86 FL (ref 82–98)
MONOCYTES # BLD AUTO: 0.71 THOUSAND/ΜL (ref 0.17–1.22)
MONOCYTES NFR BLD AUTO: 7 % (ref 4–12)
NEUTROPHILS # BLD AUTO: 8.37 THOUSANDS/ΜL (ref 1.85–7.62)
NEUTS SEG NFR BLD AUTO: 78 % (ref 43–75)
NRBC BLD AUTO-RTO: 0 /100 WBCS
PLATELET # BLD AUTO: 268 THOUSANDS/UL (ref 149–390)
PMV BLD AUTO: 9.2 FL (ref 8.9–12.7)
POTASSIUM SERPL-SCNC: 3.5 MMOL/L (ref 3.5–5.3)
PROCALCITONIN SERPL-MCNC: 0.05 NG/ML
PROTHROMBIN TIME: 14.4 SECONDS (ref 11.6–14.5)
RBC # BLD AUTO: 4.74 MILLION/UL (ref 3.81–5.12)
SODIUM SERPL-SCNC: 143 MMOL/L (ref 135–147)
WBC # BLD AUTO: 10.7 THOUSAND/UL (ref 4.31–10.16)

## 2023-04-26 RX ORDER — PREDNISONE 20 MG/1
40 TABLET ORAL ONCE
Status: COMPLETED | OUTPATIENT
Start: 2023-04-26 | End: 2023-04-26

## 2023-04-26 RX ORDER — IPRATROPIUM BROMIDE AND ALBUTEROL SULFATE 2.5; .5 MG/3ML; MG/3ML
3 SOLUTION RESPIRATORY (INHALATION) ONCE
Status: COMPLETED | OUTPATIENT
Start: 2023-04-26 | End: 2023-04-26

## 2023-04-26 RX ADMIN — PREDNISONE 40 MG: 20 TABLET ORAL at 09:35

## 2023-04-26 RX ADMIN — IPRATROPIUM BROMIDE AND ALBUTEROL SULFATE 3 ML: .5; 3 SOLUTION RESPIRATORY (INHALATION) at 07:39

## 2023-04-26 RX ADMIN — IOHEXOL 85 ML: 350 INJECTION, SOLUTION INTRAVENOUS at 08:42

## 2023-04-26 NOTE — ED PROVIDER NOTES
Final Diagnosis:  1  Dyspnea    2  Elevated brain natriuretic peptide (BNP) level        Chief Complaint   Patient presents with    Asthma     Pt c/o being sob starting this morning, pt states she has asthma and she used her prn inhaler with no relief     HPI  Patient presents for evaluation of shortness of breath  Patient states that this started about 4 AM   She tells me that she took multiple of her breathing treatments at home but still feels that she is having some difficulty catching her breath  She denies any chest pain, nausea vomiting, shortness of breath, cough, congestion  She states she has otherwise been taking her medications  Review of records show the patient was seen approximately week ago at a [facility] care for a left arm fracture  At that time she was placed in a splint  Patient tells me that she remove the splint herself in a small brace for support  Has not followed up with orthopedics yet  Patient had an echo done on June 17, 2021  At that time EF was 60% with grade 1 diastolic dysfunction  Unless otherwise specified:  - No language barrier    - History obtained from patient  - There are no limitations to the history obtained  - Previous charting was reviewed    PMH:   has a past medical history of Anemia, Arthritis, Cardiac disease, Chronic pain, Gout, Hypercholesterolemia, Hypertension, and Insomnia  PSH:   has a past surgical history that includes Atrial ablation surgery; Laminectomy; Appendectomy; Hysterectomy; Knee arthroscopy; Joint replacement (knees); and pr arthrp acetblr/prox fem prostc agrft/algrft (Right, 7/17/2017)  ROS:  Review of Systems   - 13 point ROS was performed and all are normal unless stated in the history above  - Nursing note reviewed  Vitals reviewed  - Orders placed by myself and/or advanced practitioner / resident          PE:   Vitals:    04/26/23 0730 04/26/23 0800 04/26/23 0830 04/26/23 0900   BP: 163/72 157/68 161/67 142/66   BP "Location: Left arm Left arm Left arm Left arm   Pulse: 85 71 71 74   Resp: 18 16 18 20   Temp: 97 7 °F (36 5 °C)      TempSrc: Temporal      SpO2: 94% 93% 94% 93%   Weight:  62 6 kg (138 lb)     Height:  4' 11\" (1 499 m)       Vitals reviewed by me  Patient appears nondistressed in bed  Calm percent  No extra work of breathing  Saturating in the high 90s on room air  No tachypnea  Abdomen is soft    No significant lower extremity edema  Mild tenderness to palpation on right which patient attributes to her fall a week ago  Left wrist was removed from her brace  There is swelling and bruising  She has good range of motion in her digits  Good cap refill  No significant navicular tenderness on palpation  Unless otherwise specified above:    General: VS reviewed  Appears in NAD    Head: Normocephalic, atraumatic  Eyes: EOM-I      ENT: Atraumatic external nose and ears  No drooling  Neck: No JVD  CV: No pallor noted  Lungs:   No tachypnea  No respiratory distress    Abdomen:  Soft, non-tender, non-distended    MSK:   No obvious deformity    Skin: Dry, intact  No obvious rash  Psychiatric/Behavioral: Appropriate mood and affect   Exam: deferred    Physical Exam     Procedures   A:  - Nursing note reviewed  ED Course as of 04/26/23 0933   Wed Apr 26, 2023   3030 Procedure Note: EKG  Date/Time: 04/26/23 7:39 AM   Interpreted by: Tyrell Marin  Indications / Diagnosis: Shortness of breath  ECG reviewed by me, the ED Provider: yes   The EKG demonstrates:  Rhythm: normal sinus  Intervals: normal intervals  Axis: normal axis  QRS/Blocks: normal QRS  ST Changes: When compared to prior approximately 2 years ago there is some mild T wave flattening in 1, aVL, and in some of the precordial leads  No clear morphology changes  No acute ST Changes, no STD/GUS  No diffuse elevations to indicate pericarditis    No coved ST elevations greater than 2mm with negative T waves in " V1-3 to indicate concern for brugada  No biphasic T waves in V2, V3 to indicate Wellens (critical stenosis of LAD)  No elevation in aVR or deviation when compared to V1 (can be associated with ST depression in I,II, V4-6 when left main occlusion is present)  CTA ED chest PE study   Final Result      1  Moderately compromised by motion, no central pulmonary emboli to the segmental level  2  Sub-5 mm right-sided pulmonary nodules suspected  Evaluation of the lungs limited by motion  Based on current Fleischner Society 2017 Guidelines on incidental pulmonary nodule, no routine follow-up is needed if the patient is low risk  If the patient    is high risk, optional follow-up chest CT at 12 months can be considered  3  Fibroglandular stroma throughout both breasts appears slightly more prominent than previous study although overall symmetric   Correlate with physical exam       4  Dilated main pulmonary artery suggesting pulmonary hypertension      Workstation performed: [id number]           Orders Placed This Encounter   Procedures    CTA ED chest PE study    CBC and differential    Protime-INR    APTT    Basic metabolic panel    D-Dimer    HS Troponin 0hr (reflex protocol)    B-Type Natriuretic Peptide(BNP)    Procalcitonin    Insert peripheral IV     Labs Reviewed   CBC AND DIFFERENTIAL - Abnormal       Result Value Ref Range Status    WBC 10 70 (*) 4 31 - 10 16 Thousand/uL Final    RBC 4 74  3 81 - 5 12 Million/uL Final    Hemoglobin 13 5  11 5 - 15 4 g/dL Final    Hematocrit 40 8  34 8 - 46 1 % Final    MCV 86  82 - 98 fL Final    MCH 28 5  26 8 - 34 3 pg Final    MCHC 33 1  31 4 - 37 4 g/dL Final    RDW 13 5  11 6 - 15 1 % Final    MPV 9 2  8 9 - 12 7 fL Final    Platelets 360  589 - 390 Thousands/uL Final    nRBC 0  /100 WBCs Final    Neutrophils Relative 78 (*) 43 - 75 % Final    Immat GRANS % 0  0 - 2 % Final    Lymphocytes Relative 11 (*) 14 - 44 % Final    Monocytes Relative "7  4 - 12 % Final    Eosinophils Relative 3  0 - 6 % Final    Basophils Relative 1  0 - 1 % Final    Neutrophils Absolute 8 37 (*) 1 85 - 7 62 Thousands/µL Final    Immature Grans Absolute 0 03  0 00 - 0 20 Thousand/uL Final    Lymphocytes Absolute 1 22  0 60 - 4 47 Thousands/µL Final    Monocytes Absolute 0 71  0 17 - 1 22 Thousand/µL Final    Eosinophils Absolute 0 30  0 00 - 0 61 Thousand/µL Final    Basophils Absolute 0 07  0 00 - 0 10 Thousands/µL Final   D-DIMER, QUANTITATIVE - Abnormal    D-Dimer, Quant 2 28 (*) <0 50 ug/ml FEU Final    Comment: Reference and upper limits to exclude DVT and PE are the same  Do not use to exclude if clinical symptoms are present  Pregnant women:  1st trimester:  <0 22 - 1 06 ug/ml FEU  2nd trimester:  <0 22 - 1 88 ug/ml FEU  3rd trimester:   0 24 - 3 28 ug/ml FEU    Note: Normal ranges may not apply to patients who are transgender, non-binary, or whose legal sex, sex at birth, and gender identity differ  Narrative: In the evaluation for possible pulmonary embolism, in the appropriate (Well's Score of 4 or less) patient, the age adjusted d-dimer cutoff for this patient can be calculated as:    Age x 0 01 (in ug/mL) for Age-adjusted D-dimer exclusion threshold for a patient over 50 years  B-TYPE NATRIURETIC PEPTIDE (BNP) - Abnormal     (*) 0 - 100 pg/mL Final   PROTIME-INR - Normal    Protime 14 4  11 6 - 14 5 seconds Final    INR 1 10  0 84 - 1 19 Final   APTT - Normal    PTT 33  23 - 37 seconds Final    Comment: Therapeutic Heparin Range =  60-90 seconds   HS TROPONIN I 0HR - Normal    hs TnI 0hr 7  \"Refer to ACS Flowchart\"- see link ng/L Final    Comment:                                              Initial (time 0) result  If >=50 ng/L, Myocardial injury suggested ;  Type of myocardial injury and treatment strategy  to be determined  If 5-49 ng/L, a delta result at 2 hours and or 4 hours will be needed to further evaluate    If <4 ng/L, and chest pain has " been >3 hours since onset, patient may qualify for discharge based on the HEART score in the ED  If <5 ng/L and <3hours since onset of chest pain, a delta result at 2 hours will be needed to further evaluate  HS Troponin 99th Percentile URL of a Health Population=12 ng/L with a 95% Confidence Interval of 8-18 ng/L  Second Troponin (time 2 hours)  If calculated delta >= 20 ng/L,  Myocardial injury suggested ; Type of myocardial injury and treatment strategy to be determined  If 5-49 ng/L and the calculated delta is 5-19 ng/L, consult medical service for evaluation  Continue evaluation for ischemia on ecg and other possible etiology and repeat hs troponin at 4 hours  If delta is <5 ng/L at 2 hours, consider discharge based on risk stratification via the HEART score (if in ED), or CHRISTINA risk score in IP/Observation  HS Troponin 99th Percentile URL of a Health Population=12 ng/L with a 95% Confidence Interval of 8-18 ng/L  PROCALCITONIN TEST - Normal    Procalcitonin 0 05  <=0 25 ng/ml Final    Comment: Suspected Lower Respiratory Tract Infection (LRTI):  - LESS than or EQUAL to 0 25 ng/mL:   low likelihood for bacterial LRTI; antibiotics DISCOURAGED   - GREATER than 0 25 ng/mL:   increased likelihood for bacterial LRTI; antibiotics ENCOURAGED  Suspected Sepsis:  - Strongly consider initiating antibiotics in ALL UNSTABLE patients  - LESS than or EQUAL to 0 5 ng/mL:   low likelihood for bacterial sepsis; antibiotics DISCOURAGED   - GREATER than 0 5 ng/mL:   increased likelihood for bacterial sepsis; antibiotics ENCOURAGED   - GREATER than 2 ng/mL:   high risk for severe sepsis / septic shock; antibiotics strongly ENCOURAGED  Decisions on antibiotic use should not be based solely on Procalcitonin (PCT) levels  If PCT is low but uncertainty exists with stopping antibiotics, repeat PCT in 6-24 hours to confirm the low level   If antibiotics are administered (regardless if initial PCT was high or low), repeat PCT every 1-2 days to consider early antibiotic cessation (when GREATER than 80% decrease from the peak OR when PCT drops below designated cutoffs, whichever comes first), so long as the infection is NOT one that typically requires prolonged treatment durations (e g , bone/joint infections, endocarditis, Staph  aureus bacteremia)  Situations of FALSE-POSITIVE Procalcitonin values:  1) Newborns < 67 hours old  2) Massive stress from severe trauma / burns, major surgery, acute pancreatitis, cardiogenic / hemorrhagic shock, sickle cell crisis, or other organ perfusion abnormalities  3) Malaria and some Candidal infections  4) Treatment with agents that stimulate cytokines (e g , OKT3, anti-lymphocyte globulins, alemtuzumab, IL-2, granulocyte transfusion [NOT GCSFs])  5) Chronic renal disease causes elevated baseline levels (consider GREATER than 0 75 ng/mL as an abnormal cut-off); initiating HD/CRRT may cause transient decreases  6) Paraneoplastic syndromes from medullary thyroid or SCLC, some forms of vasculitis, and acute tboof-to-axjz disease    Situations of FALSE-NEGATIVE Procalcitonin values:  1) Too early in clinical course for PCT to have reached its peak (may repeat in 6-24 hours to confirm low level)  2) Localized infection WITHOUT systemic (SIRS / sepsis) response (e g , an abscess, osteomyelitis, cystitis)  3) Mycobacteria (e g , Tuberculosis, MAC)  4) Cystic fibrosis exacerbations     BASIC METABOLIC PANEL    Sodium 472  135 - 147 mmol/L Final    Potassium 3 5  3 5 - 5 3 mmol/L Final    Chloride 108  96 - 108 mmol/L Final    CO2 25  21 - 32 mmol/L Final    ANION GAP 10  4 - 13 mmol/L Final    BUN 23  5 - 25 mg/dL Final    Creatinine 0 83  0 60 - 1 30 mg/dL Final    Comment: Standardized to IDMS reference method    Glucose 115  65 - 140 mg/dL Final    Comment: If the patient is fasting, the ADA then defines impaired fasting glucose as > 100 mg/dL and diabetes as > or equal to 123 mg/dL      Calcium 9 5  8 4 - 10 2 mg/dL Final    eGFR 63  ml/min/1 73sq m Final    Narrative:     [place] guidelines for Chronic Kidney Disease (CKD):     Stage 1 with normal or high GFR (GFR > 90 mL/min/1 73 square meters)    Stage 2 Mild CKD (GFR = 60-89 mL/min/1 73 square meters)    Stage 3A Moderate CKD (GFR = 45-59 mL/min/1 73 square meters)    Stage 3B Moderate CKD (GFR = 30-44 mL/min/1 73 square meters)    Stage 4 Severe CKD (GFR = 15-29 mL/min/1 73 square meters)    Stage 5 End Stage CKD (GFR <15 mL/min/1 73 square meters)  Note: GFR calculation is accurate only with a steady state creatinine         Final Diagnosis:  1  Dyspnea    2  Elevated brain natriuretic peptide (BNP) level        P:  -Patient presents for evaluation of shortness of breath  She states that she does have a history of asthma and believes this may be flaring up  No significant wheezing or rhonchi on exam though  Patient appears nondistressed but tells me that she is concerned  Will provide DuoNeb for symptom relief  Will evaluate for cardiac pathology such as ACS, arrhythmia  Evaluate for CHF, PE, infection   -Patient has mildly elevated BNP  Otherwise blood work is unremarkable  Procalcitonin is negative, no signs of infection  CTA was performed secondary to elevated D-dimer that did not show any significant pulmonary emboli  I reviewed all the results with patient and  who are bedside  I discussed admission to hospital versus discharge  Patient states that she would prefer to go home at this time which I believe is reasonable  Discussed possible pathology as being related to heart failure though the patient states that she normally gets swelling in her legs which she feels like she has not gotten recently  Discussed possibly taking additional Lasix to help with this  Return precautions reviewed        Unless otherwise noted the patient's medications were reviewed and they should continue as directed  Medications   predniSONE tablet 40 mg (has no administration in time range)   ipratropium-albuterol (DUO-NEB) 0 5-2 5 mg/3 mL inhalation solution 3 mL (3 mL Nebulization Given 4/26/23 8103)   iohexol (OMNIPAQUE) 350 MG/ML injection (SINGLE-DOSE) 85 mL (85 mL Intravenous Given 4/26/23 0842)     Time reflects when diagnosis was documented in both MDM as applicable and the Disposition within this note     Time User Action Codes Description Comment    4/26/2023  9:30 AM Rosaura Sanchez Add [R06 00] Dyspnea     4/26/2023  9:30 AM Rosaura Sanchez Add [R79 89] Elevated brain natriuretic peptide (BNP) level       ED Disposition     ED Disposition   Discharge    Condition   Stable    Date/Time   Wed Apr 26, 2023  9:30 AM    Comment   Rhonda Hidden discharge to home/self care  Follow-up Information     Follow up With Specialties Details Why [address] Delvin Carrasquillo DO Family Medicine   [address] [place] [address], [place] [id number] [phone]          Patient's Medications   Discharge Prescriptions    No medications on file     No discharge procedures on file  Prior to Admission Medications   Prescriptions Last Dose Informant Patient Reported? Taking?    EPINEPHrine (EPIPEN) 0 3 mg/0 3 mL SOAJ   No No   Sig: Inject as necessary   Inulin (FIBER CHOICE PO)   Yes No   Sig: Take by mouth   Siltussin  MG/5ML oral liquid   No No   Sig: Take 10 mL (200 mg total) by mouth 3 (three) times a day as needed for cough   VENTOLIN  (90 Base) MCG/ACT inhaler   No No   Sig: INHALE 1 TO 2 PUFFS EVERY 6 HOURS AS NEEDED    amLODIPine (NORVASC) 10 mg tablet   No No   Sig: Take 1 tablet (10 mg total) by mouth daily   atorvastatin (LIPITOR) 20 mg tablet   No No   Sig: Take 1 tablet (20 mg total) by mouth daily   carvedilol (COREG) 25 mg tablet   No No   Sig: Take 1 tablet (25 mg total) by mouth 2 (two) times a day with meals   co-enzyme Q-10 100 mg capsule   Yes No   Sig: Take 100 mg by mouth "daily   diclofenac-misoprostol (ARTHROTEC 75) 75-0 2 MG per tablet   No No   Sig: TAKE ONE (1) TABLET BY MOUTH TWO TIMES DAILY  docusate sodium (COLACE) 100 mg capsule   Yes No   Sig: Take 100 mg by mouth 2 (two) times a day as needed for constipation   Patient not taking: Reported on 4/18/2023   doxycycline hyclate (VIBRAMYCIN) 100 mg capsule   Yes No   Patient not taking: Reported on 4/18/2023   furosemide (LASIX) 20 mg tablet   No No   Sig: Take 1 tablet (20 mg total) by mouth daily   olmesartan (BENICAR) 40 mg tablet   No No   Sig: TAKE (1) TABLET BY MOUTH DAILY  pantoprazole (PROTONIX) 40 mg tablet   No No   Sig: Take 1 tablet (40 mg total) by mouth daily   potassium chloride (K-DUR,KLOR-CON) 20 mEq tablet   No No   Sig: Take 1 tablet (20 mEq total) by mouth daily   predniSONE 20 mg tablet   Yes No   tolterodine (DETROL LA) 4 mg 24 hr capsule   No No   Sig: Take 1 capsule (4 mg total) by mouth daily   traZODone (DESYREL) 50 mg tablet   No No   Sig: Take 1 tablet (50 mg total) by mouth daily at bedtime      Facility-Administered Medications: None       Portions of the record may have been created with voice recognition software  Occasional wrong word or \"sound a like\" substitutions may have occurred due to the inherent limitations of voice recognition software  Read the chart carefully and recognize, using context, where substitutions have occurred      Electronically signed by:  Nohemi Blades, MD Leopold Na, MD  04/26/23 8228    "

## 2023-04-26 NOTE — ED NOTES
Patient ambulatory to bathroom at this time, oxygen saturation 92-93% on RA     Ani Reeder RN  04/26/23 5487

## 2023-04-26 NOTE — ED NOTES
Patient transported to [facility], [address] [place] [address]  04/26/23 8816

## 2023-04-27 ENCOUNTER — OFFICE VISIT (OUTPATIENT)
Dept: OBGYN CLINIC | Facility: CLINIC | Age: 88
End: 2023-04-27

## 2023-04-27 VITALS
BODY MASS INDEX: 27.82 KG/M2 | WEIGHT: 138 LBS | HEIGHT: 59 IN | DIASTOLIC BLOOD PRESSURE: 69 MMHG | HEART RATE: 80 BPM | SYSTOLIC BLOOD PRESSURE: 147 MMHG

## 2023-04-27 DIAGNOSIS — S52.502A CLOSED FRACTURE OF DISTAL END OF LEFT RADIUS, UNSPECIFIED FRACTURE MORPHOLOGY, INITIAL ENCOUNTER: Primary | ICD-10-CM

## 2023-04-27 RX ORDER — IBUPROFEN 200 MG
TABLET ORAL EVERY 6 HOURS PRN
COMMUNITY

## 2023-04-27 NOTE — PROGRESS NOTES
Assessment/Plan:   Diagnoses and all orders for this visit:    Closed fracture of distal end of left radius, unspecified fracture morphology, initial encounter  -     Ambulatory Referral to Orthopedic Surgery  -     Cock Up Wrist Splint  -     Fracture / Dislocation Treatment    Other orders  -     ibuprofen (MOTRIN) 200 mg tablet; Take by mouth every 6 (six) hours as needed for mild pain       Reviewed physical exam and imaging with patient on today's visit  Her symptoms and radiographic findings are consistent with a distal radius fracture  The patient was provided a cock up wrist splint for immobilization  She was instructed to wear the splint continuously throughout the day, she may only remove it for hygiene purposes  She will follow-up in 4 weeks for repeat x-rays  The patient expresses understanding and is in agreement with today's treatment plan  Patient has a nondisplaced fracture of her left distal radius  She does not want to be casted  She was placed in a Velcro wrist splint  Return back in 1 month reevaluation with new x-rays of left wrist-2 views  If her condition changes, she would not hesitate to let us know    Subjective:   Patient ID: Cachorro Trejo  1935     HPI  Patient is a 80 y o  female who presents for initial evaluation of her left wrist  The patient states that she experienced a fall on 4/16/23  She was seen in urgent care on 4/18/23, where she had imaging done  The imaging revealed a distal radius fracture  She was placed in a sugar tong splint, however she removed it because it was uncomfortable  She was wearing a wrist sleeve on today's visit  She states that she has distal wrist pain at wrist and with movement  She denies numbness, tingling, feelings of instability, fever, chills, or malaise        The following portions of the patient's history were reviewed and updated as appropriate:  Past medical history, past surgical history, Family history, social history, current medications and allergies    Past Medical History:   Diagnosis Date   • Anemia    • Arthritis    • Cardiac disease    • Chronic pain    • Gout    • Hypercholesterolemia    • Hypertension    • Insomnia        Past Surgical History:   Procedure Laterality Date   • APPENDECTOMY     • ATRIAL ABLATION SURGERY     • HYSTERECTOMY     • JOINT REPLACEMENT  knees   • KNEE ARTHROSCOPY      b/l knees   • LAMINECTOMY     • [place] ARTHRP ACETBLR/PROX FEM PROSTC AGRFT/ALGRFT Right 7/17/2017    Procedure: TOTAL ANTERIOR HIP ARTHROPLASTY;  Surgeon: Austin Gordon MD;  Location: [facility] OR;  Service: Orthopedics       History reviewed  No pertinent family history      Social History     Socioeconomic History   • Marital status: /Civil Union     Spouse name: None   • Number of children: None   • Years of education: None   • Highest education level: None   Occupational History   • None   Tobacco Use   • Smoking status: Never   • Smokeless tobacco: Never   Vaping Use   • Vaping Use: Never used   Substance and Sexual Activity   • Alcohol use: No   • Drug use: No   • Sexual activity: Yes     Partners: Male   Other Topics Concern   • None   Social History Narrative   • None     Social Determinants of Health     Financial Resource Strain: Not on file   Food Insecurity: Not on file   Transportation Needs: Not on file   Physical Activity: Not on file   Stress: Not on file   Social Connections: Not on file   Intimate Partner Violence: Not on file   Housing Stability: Not on file         Current Outpatient Medications:   •  amLODIPine (NORVASC) 10 mg tablet, Take 1 tablet (10 mg total) by mouth daily, Disp: 90 tablet, Rfl: 4  •  atorvastatin (LIPITOR) 20 mg tablet, Take 1 tablet (20 mg total) by mouth daily, Disp: 90 tablet, Rfl: 3  •  carvedilol (COREG) 25 mg tablet, Take 1 tablet (25 mg total) by mouth 2 (two) times a day with meals, Disp: 180 tablet, Rfl: 4  •  co-enzyme Q-10 100 mg capsule, Take 100 mg by mouth daily, Disp: , Rfl: •  diclofenac-misoprostol (ARTHROTEC 75) 75-0 2 MG per tablet, TAKE ONE (1) TABLET BY MOUTH TWO TIMES DAILY  , Disp: 180 tablet, Rfl: 4  •  EPINEPHrine (EPIPEN) 0 3 mg/0 3 mL SOAJ, Inject as necessary, Disp: 2 each, Rfl: 1  •  furosemide (LASIX) 20 mg tablet, Take 1 tablet (20 mg total) by mouth daily, Disp: 90 tablet, Rfl: 3  •  ibuprofen (MOTRIN) 200 mg tablet, Take by mouth every 6 (six) hours as needed for mild pain, Disp: , Rfl:   •  Inulin (FIBER CHOICE PO), Take by mouth, Disp: , Rfl:   •  olmesartan (BENICAR) 40 mg tablet, TAKE (1) TABLET BY MOUTH DAILY  , Disp: 90 tablet, Rfl: 3  •  pantoprazole (PROTONIX) 40 mg tablet, Take 1 tablet (40 mg total) by mouth daily, Disp: 90 tablet, Rfl: 4  •  potassium chloride (K-DUR,KLOR-CON) 20 mEq tablet, Take 1 tablet (20 mEq total) by mouth daily, Disp: 90 tablet, Rfl: 3  •  tolterodine (DETROL LA) 4 mg 24 hr capsule, Take 1 capsule (4 mg total) by mouth daily, Disp: 90 capsule, Rfl: 4  •  traZODone (DESYREL) 50 mg tablet, Take 1 tablet (50 mg total) by mouth daily at bedtime, Disp: 90 tablet, Rfl: 4  •  VENTOLIN  (90 Base) MCG/ACT inhaler, INHALE 1 TO 2 PUFFS EVERY 6 HOURS AS NEEDED , Disp: 18 g, Rfl: 3  •  docusate sodium (COLACE) 100 mg capsule, Take 100 mg by mouth 2 (two) times a day as needed for constipation (Patient not taking: Reported on 4/18/2023), Disp: , Rfl:   •  doxycycline hyclate (VIBRAMYCIN) 100 mg capsule, , Disp: , Rfl:   •  predniSONE 20 mg tablet, , Disp: , Rfl:   •  Siltussin  MG/5ML oral liquid, Take 10 mL (200 mg total) by mouth 3 (three) times a day as needed for cough (Patient not taking: Reported on 4/27/2023), Disp: 120 mL, Rfl: 2  No current facility-administered medications for this visit      Allergies   Allergen Reactions   • Bee Venom Anaphylaxis   • Codeine GI Intolerance     Nausea/vomiting   • Tetanus Immune Globulin Other (See Comments)   • Tetanus Toxoid    • Tetanus Toxoids        Review of Systems   Constitutional: "Negative for chills, fever and unexpected weight change  HENT: Negative for hearing loss, nosebleeds and sore throat  Eyes: Negative for pain, redness and visual disturbance  Respiratory: Negative for cough, shortness of breath and wheezing  Cardiovascular: Negative for chest pain, palpitations and leg swelling  Gastrointestinal: Negative for abdominal pain, nausea and vomiting  Endocrine: Negative for polydipsia and polyuria  Genitourinary: Negative for dysuria and hematuria  Skin: Negative for rash and wound  Neurological: Negative for dizziness, numbness and headaches  Psychiatric/Behavioral: Negative for decreased concentration and suicidal ideas  The patient is not nervous/anxious  All other systems reviewed and are negative  Objective:  /69 (BP Location: Left arm, Patient Position: Sitting, Cuff Size: Standard)   Pulse 80   Ht 4' 11\" (1 499 m)   Wt 62 6 kg (138 lb)   BMI 27 87 kg/m²     Ortho Exam  left wrist -  Patient presents with no obvious anatomical deformity  Skin is warm and dry to touch with no signs of erythema or infection  Ecchymosis present on volar aspect of wrist  ROM limited  Painful active range of motion  TTP over distal radius  MMT: deferred  Mild generalized soft tissue swelling or effusion noted  Full FDS, FDP, extensor mechanisms are intact  Demonstrates normal elbow and shoulder motion  Forearm compartments are soft and supple  2+ Distal radial pulse with brisk capillary refill to the fingers  Radial, median, ulnar motor and sensory distribution intact  Sensation to light touch intact distally      Physical Exam  HENT:      Head: Normocephalic and atraumatic  Nose: Nose normal    Eyes:      Conjunctiva/sclera: Conjunctivae normal    Cardiovascular:      Rate and Rhythm: Normal rate  Pulmonary:      Effort: Pulmonary effort is normal    Musculoskeletal:      Cervical back: Neck supple  Skin:     General: Skin is warm and dry        " "Capillary Refill: Capillary refill takes less than 2 seconds  Neurological:      Mental Status: She is alert and oriented to person, place, and time  Psychiatric:         Mood and Affect: Mood normal          Behavior: Behavior normal           Diagnostic Test Review: The attending physician has personally reviewed the pertinent films in PACS and the interpretation is as follows:    X-Ray of left wrist taken on 4/18/23 were reviewed and showed Impacted fracture of the distal radius  Fracture / Dislocation Treatment    Date/Time: 4/27/2023 8:00 AM  Performed by: Altagracia Crockett DO  Authorized by: Altagracia Crockett DO     Patient Location:  [facility]  [place] Protocol:  Consent: Verbal consent obtained  Risks and benefits: risks, benefits and alternatives were discussed  Consent given by: patient  Time out: Immediately prior to procedure a \"time out\" was called to verify the correct patient, procedure, equipment, support staff and site/side marked as required  Timeout called at: 4/27/2023 8:14 AM   Patient understanding: patient states understanding of the procedure being performed  Test results: test results available and properly labeled  Site marked: the operative site was marked  Radiology Images displayed and confirmed   If images not available, report reviewed: imaging studies available  Patient identity confirmed: verbally with patient      Injury location:  Wrist  Location details:  Left wrist  Injury type:  Fracture  Fracture type: distal radius    Fracture type: distal radius    Neurovascular status: Neurovascularly intact    Distal perfusion: normal    Neurological function: normal    Range of motion: reduced    Local anesthesia used?: No    Manipulation performed?: No    Immobilization:  Splint  Splint type:  Short arm splint, static (forearm to hand)  Neurovascular status: Neurovascularly intact    Distal perfusion: normal    Neurological function: normal    Range of motion: unchanged  " Patient tolerance:  Patient tolerated the procedure well with no immediate complications         Scribe Attestation    I,:  Sisi Redding am acting as a scribe while in the presence of the attending physician :       I,:  Alexia Chavez, DO personally performed the services described in this documentation    as scribed in my presence :

## 2023-05-03 ENCOUNTER — OFFICE VISIT (OUTPATIENT)
Dept: DENTISTRY | Facility: CLINIC | Age: 88
End: 2023-05-03

## 2023-05-03 VITALS — HEART RATE: 72 BPM | DIASTOLIC BLOOD PRESSURE: 78 MMHG | SYSTOLIC BLOOD PRESSURE: 128 MMHG

## 2023-05-03 DIAGNOSIS — K03.6 ACCRETIONS ON TEETH: Primary | ICD-10-CM

## 2023-05-03 NOTE — PROGRESS NOTES
Adult Prophy  Chief Complaint   Patient presents with    Routine Oral Cleaning    Patient broke her arm about 3 weeks ago  Method Used:  · Prophy Method Used: Hand Scaling  · Polished  · Flossed    Radiographs Taken in [facility]: (Taken to assess periodontal health)  · None    Intra/Extra Oral Cancer Screening:  · Within normal limits    Oral Hygiene:  · Fair    Plaque:  · Light  · Moderate    Calculus:  · Light    Bleeding:  · Light    Stain:  · Light    Periodontal Charting:   · Spot probing    Periodontal Classification:  · Generalized  · Mild  · Moderate  · Periodontal Disease    Oral Hygiene Instruction:    Berdine Buzzard over daily routine and c-shaped flossing  Rec Proxa brushes  No orders of the defined types were placed in this encounter         Next Visit:  [facility]  [facility]    Patient must be seen by the dentist first  She has medicare dental

## 2023-05-25 ENCOUNTER — RA CDI HCC (OUTPATIENT)
Dept: OTHER | Facility: HOSPITAL | Age: 88
End: 2023-05-25

## 2023-05-25 ENCOUNTER — APPOINTMENT (OUTPATIENT)
Dept: RADIOLOGY | Facility: MEDICAL CENTER | Age: 88
End: 2023-05-25

## 2023-05-25 ENCOUNTER — OFFICE VISIT (OUTPATIENT)
Dept: OBGYN CLINIC | Facility: CLINIC | Age: 88
End: 2023-05-25

## 2023-05-25 VITALS
WEIGHT: 138 LBS | DIASTOLIC BLOOD PRESSURE: 66 MMHG | SYSTOLIC BLOOD PRESSURE: 122 MMHG | HEART RATE: 77 BPM | BODY MASS INDEX: 27.82 KG/M2 | HEIGHT: 59 IN

## 2023-05-25 DIAGNOSIS — S52.502D CLOSED FRACTURE OF DISTAL END OF LEFT RADIUS WITH ROUTINE HEALING, UNSPECIFIED FRACTURE MORPHOLOGY, SUBSEQUENT ENCOUNTER: ICD-10-CM

## 2023-05-25 DIAGNOSIS — S52.502D CLOSED FRACTURE OF DISTAL END OF LEFT RADIUS WITH ROUTINE HEALING, UNSPECIFIED FRACTURE MORPHOLOGY, SUBSEQUENT ENCOUNTER: Primary | ICD-10-CM

## 2023-05-25 NOTE — PROGRESS NOTES
Assessment/Plan:   Diagnoses and all orders for this visit:    Closed fracture of distal end of left radius with routine healing, unspecified fracture morphology, subsequent encounter  -     XR wrist 2 vw left; Future         Reviewed physical exam and imaging with patient on today's visit  Her symptoms and radiographic findings are consistent with a healing distal radius fracture  The patient may continue to use her cock-up wrist splint as needed  She may follow up on an as-needed basis  The patient expresses understanding and is in agreement with today's treatment plan  If her condition changes, she would not hesitate to let us know  Patient offers no complaints of pain along her left wrist   Strength and motion intact  X-rays do show the fracture to be healed  Continue home exercise program   Follow-up on an as-needed basis  If her condition changes, she would not hesitate to let us know    Subjective:   Patient ID: Amy Cousin  1935     HPI  Patient is a 80 y o  female who presents for repeat evaluation of left wrist distal radius fracture following a fall on 4/16/2023  She reports feeling great, reporting no pain over the distal radius at this time  She has been compliant with wearing her cock-up wrist splint  She denies numbness, tingling, feelings of instability, fever, chills, or malaise        The following portions of the patient's history were reviewed and updated as appropriate:  Past medical history, past surgical history, Family history, social history, current medications and allergies    Past Medical History:   Diagnosis Date   • Anemia    • Arthritis    • Cardiac disease    • Chronic pain    • Gout    • Hypercholesterolemia    • Hypertension    • Insomnia        Past Surgical History:   Procedure Laterality Date   • APPENDECTOMY     • ATRIAL ABLATION SURGERY     • HYSTERECTOMY     • JOINT REPLACEMENT  knees   • KNEE ARTHROSCOPY      b/l knees   • LAMINECTOMY     • [place] ARTHRP ACETBLR/PROX FEM PROSTC AGRFT/ALGRFT Right 7/17/2017    Procedure: TOTAL ANTERIOR HIP ARTHROPLASTY;  Surgeon: Flower Singh MD;  Location: [facility];  Service: Orthopedics       History reviewed  No pertinent family history  Social History     Socioeconomic History   • Marital status: /Civil Union     Spouse name: None   • Number of children: None   • Years of education: None   • Highest education level: None   Occupational History   • None   Tobacco Use   • Smoking status: Never   • Smokeless tobacco: Never   Vaping Use   • Vaping Use: Never used   Substance and Sexual Activity   • Alcohol use: No   • Drug use: No   • Sexual activity: Yes     Partners: Male   Other Topics Concern   • None   Social History Narrative   • None     Social Determinants of Health     Financial Resource Strain: Not on file   Food Insecurity: Not on file   Transportation Needs: Not on file   Physical Activity: Not on file   Stress: Not on file   Social Connections: Not on file   Intimate Partner Violence: Not on file   Housing Stability: Not on file         Current Outpatient Medications:   •  amLODIPine (NORVASC) 10 mg tablet, Take 1 tablet (10 mg total) by mouth daily, Disp: 90 tablet, Rfl: 4  •  atorvastatin (LIPITOR) 20 mg tablet, Take 1 tablet (20 mg total) by mouth daily, Disp: 90 tablet, Rfl: 3  •  carvedilol (COREG) 25 mg tablet, Take 1 tablet (25 mg total) by mouth 2 (two) times a day with meals, Disp: 180 tablet, Rfl: 4  •  co-enzyme Q-10 100 mg capsule, Take 100 mg by mouth daily, Disp: , Rfl:   •  diclofenac-misoprostol (ARTHROTEC 75) 75-0 2 MG per tablet, TAKE ONE (1) TABLET BY MOUTH TWO TIMES DAILY  , Disp: 180 tablet, Rfl: 4  •  EPINEPHrine (EPIPEN) 0 3 mg/0 3 mL SOAJ, Inject as necessary, Disp: 2 each, Rfl: 1  •  furosemide (LASIX) 20 mg tablet, Take 1 tablet (20 mg total) by mouth daily, Disp: 90 tablet, Rfl: 3  •  ibuprofen (MOTRIN) 200 mg tablet, Take by mouth every 6 (six) hours as needed for mild pain, Disp: , Rfl:   •  Inulin (FIBER CHOICE PO), Take by mouth, Disp: , Rfl:   •  olmesartan (BENICAR) 40 mg tablet, TAKE (1) TABLET BY MOUTH DAILY  , Disp: 90 tablet, Rfl: 3  •  pantoprazole (PROTONIX) 40 mg tablet, Take 1 tablet (40 mg total) by mouth daily, Disp: 90 tablet, Rfl: 4  •  potassium chloride (K-DUR,KLOR-CON) 20 mEq tablet, Take 1 tablet (20 mEq total) by mouth daily, Disp: 90 tablet, Rfl: 3  •  tolterodine (DETROL LA) 4 mg 24 hr capsule, Take 1 capsule (4 mg total) by mouth daily, Disp: 90 capsule, Rfl: 4  •  traZODone (DESYREL) 50 mg tablet, Take 1 tablet (50 mg total) by mouth daily at bedtime, Disp: 90 tablet, Rfl: 4  •  VENTOLIN  (90 Base) MCG/ACT inhaler, INHALE 1 TO 2 PUFFS EVERY 6 HOURS AS NEEDED , Disp: 18 g, Rfl: 3  •  docusate sodium (COLACE) 100 mg capsule, Take 100 mg by mouth 2 (two) times a day as needed for constipation (Patient not taking: Reported on 4/18/2023), Disp: , Rfl:   •  doxycycline hyclate (VIBRAMYCIN) 100 mg capsule, , Disp: , Rfl:   •  predniSONE 20 mg tablet, , Disp: , Rfl:   •  Siltussin  MG/5ML oral liquid, Take 10 mL (200 mg total) by mouth 3 (three) times a day as needed for cough (Patient not taking: Reported on 4/27/2023), Disp: 120 mL, Rfl: 2    Allergies   Allergen Reactions   • Bee Venom Anaphylaxis   • Codeine GI Intolerance     Nausea/vomiting   • Tetanus Immune Globulin Other (See Comments)   • Tetanus Toxoid    • Tetanus Toxoids        Review of Systems   Constitutional: Negative for chills and fever  HENT: Negative for ear pain and sore throat  Eyes: Negative for pain and visual disturbance  Respiratory: Negative for cough and shortness of breath  Cardiovascular: Negative for chest pain and palpitations  Gastrointestinal: Negative for abdominal pain and vomiting  Genitourinary: Negative for dysuria and hematuria  Musculoskeletal: Negative for arthralgias, back pain and myalgias  Skin: Negative for color change and rash     Neurological: "Negative for seizures and syncope  All other systems reviewed and are negative  Objective:  /66   Pulse 77   Ht 4' 11\" (1 499 m)   Wt 62 6 kg (138 lb)   BMI 27 87 kg/m²     Ortho Exam  left wrist -  Patient presents with no obvious anatomical deformity  Skin is warm and dry to touch with no signs of erythema, ecchymosis, infection  ROM within normal limits  No TTP   MMT: deferred  No soft tissue swelling or effusion noted  Full FDS, FDP, extensor mechanisms are intact  Demonstrates normal wrist, elbow, and shoulder motion  Forearm compartments are soft and supple  2+ Distal radial pulse with brisk capillary refill to the fingers  Radial, median, ulnar motor and sensory distribution intact  Sensation to light touch intact distally    Physical Exam  Vitals and nursing note reviewed  Constitutional:       General: She is not in acute distress  Appearance: She is well-developed  HENT:      Head: Normocephalic and atraumatic  Eyes:      Conjunctiva/sclera: Conjunctivae normal    Cardiovascular:      Rate and Rhythm: Normal rate and regular rhythm  Heart sounds: No murmur heard  Pulmonary:      Effort: Pulmonary effort is normal  No respiratory distress  Breath sounds: Normal breath sounds  Abdominal:      Palpations: Abdomen is soft  Tenderness: There is no abdominal tenderness  Musculoskeletal:         General: No swelling or tenderness  Cervical back: Neck supple  Skin:     General: Skin is warm and dry  Capillary Refill: Capillary refill takes less than 2 seconds  Neurological:      Mental Status: She is alert  Psychiatric:         Mood and Affect: Mood normal           Diagnostic Test Review: The attending physician has personally reviewed the pertinent films in PACS and the interpretation is as follows:     X-Ray of left wrist taken on 5/25/23 were reviewed and showed callus formation and adequate healing of impacted fracture of the distal radius   " Procedures   No procedure performed today    Scribe Attestation    I,:  Viola Corey am acting as a scribe while in the presence of the attending physician :       I,:  Jailyn Alejandro DO personally performed the services described in this documentation    as scribed in my presence :

## 2023-05-26 NOTE — PROGRESS NOTES
[facility] 75  coding opportunities       Chart reviewed, no opportunity found: CHART REVIEWED, Asael Bagley 1004     Patients Insurance     Medicare Insurance: [employer] 30-Aug-2018 22:40

## 2023-05-27 ENCOUNTER — OFFICE VISIT (OUTPATIENT)
Dept: URGENT CARE | Facility: CLINIC | Age: 88
End: 2023-05-27

## 2023-05-27 VITALS
TEMPERATURE: 97.3 F | DIASTOLIC BLOOD PRESSURE: 68 MMHG | HEART RATE: 79 BPM | OXYGEN SATURATION: 95 % | BODY MASS INDEX: 27.21 KG/M2 | HEIGHT: 59 IN | WEIGHT: 135 LBS | RESPIRATION RATE: 18 BRPM | SYSTOLIC BLOOD PRESSURE: 110 MMHG

## 2023-05-27 DIAGNOSIS — J06.9 ACUTE URI: Primary | ICD-10-CM

## 2023-05-27 RX ORDER — BENZONATATE 200 MG/1
200 CAPSULE ORAL 3 TIMES DAILY PRN
Qty: 20 CAPSULE | Refills: 0 | Status: SHIPPED | OUTPATIENT
Start: 2023-05-27

## 2023-05-27 RX ORDER — PREDNISONE 20 MG/1
40 TABLET ORAL DAILY
Qty: 10 TABLET | Refills: 0 | Status: SHIPPED | OUTPATIENT
Start: 2023-05-27 | End: 2023-06-01

## 2023-05-27 NOTE — PROGRESS NOTES
"  [facility]        NAME: Zhang Scanlon is a 80 y o  female  : 1935    MRN: [id number]  DATE: May 27, 2023  TIME: 9:59 AM    Assessment and Plan   Acute URI [J06 9]  1  Acute URI  predniSONE 20 mg tablet    benzonatate (TESSALON) 200 MG capsule        Symptoms are consistent with an acute upper respiratory infection  Given no resolution with over-the-counter treatment and duration of symptoms willing to trial short course of steroids and Tessalon  Reviewed with patient who is in agreement  Patient Instructions     Pt appears to have a viral upper respiratory infection  Antibiotics are contraindicated at this time and would not help you feel better faster  Although the symptoms are troublesome, usually the patient's body is able to recover from a viral infection on an average time of 7-10 days  Fever, if any, typically resolves after 3-5 days  If patient has sore throat, typically this resolves within 3-5 days  Any nasal congestion, runny nose, post nasal drip typically begin to  improve after 7-10 days  Any cough may linger over a couple weeks  Please note that having a cough is not necessarily a bad thing  It often times is part of our body's protective mechanism to help keep our airways clear  Please note that yellow mucous doesn't necessarily mean a \"bacterial\" infection  Yellow mucous doesn't automatically mean that an antibiotic is needed  It is not unusual for mucus to become more discolored in the days after the start of an upper respiratory infection  Often times this is due to mucous that has thickened  with white blood cells that have flooded the mucosa to try and fight the viral infection  Ear Pain may occur when the eustachian tubes become blocked with mucous or swollen due to acute inflammation from illness  May give Ibuprofen or Tylenol as needed for comfort  May also use warm compress against ear for comfort    If ear ache is persisting and not " improving over 2-3 days or if there is any gross drainage coming from ear, please seek further evaluation  Natural remedies to help provide comfort for cough/ cold symptoms include: one teaspoon of honey (only in infants over 1 year of age), increased vitamin C (oranges, bryson, etc ), zac, and drinking plenty of fluids  Vaporizer by bedside  If you should have prolonged symptoms (Greater than 10 days), worsening symptoms, or any new symptoms please seek further medical attention  If you would be having difficulty breathing, seek further evaluation by calling 911 or proceeding to ER for further evaluation  Follow up with PCP in 3-5 days  Proceed to  ER if symptoms worsen  Chief Complaint     Chief Complaint   Patient presents with   • Cough     Productive cough, thick mucous started x1 week ago  Denies any fever, n/v/d  Reports body aches, chills  OTC cough medicine, tussin with some relief  Denies SOB or dyspnea  History of Present Illness       Patient is an 80year old female who presents to the office today for a cough since last Tuesday   sick with similar symptoms  Has been taking tussin, cough drops, and claritin otc with minimal relief  Pt denies wheezing, shortness of breath, chest pain  States has green and yellow productive phlegm  Denies fevers or chills  Review of Systems   Review of Systems   Constitutional: Negative for chills and fever  HENT: Negative for congestion, rhinorrhea and sore throat  Respiratory: Positive for cough  Negative for shortness of breath and wheezing  Cardiovascular: Negative for chest pain and palpitations  Gastrointestinal: Negative for diarrhea, nausea and vomiting  All other systems reviewed and are negative          Current Medications       Current Outpatient Medications:   •  amLODIPine (NORVASC) 10 mg tablet, Take 1 tablet (10 mg total) by mouth daily, Disp: 90 tablet, Rfl: 4  •  atorvastatin (LIPITOR) 20 mg tablet, Take 1 tablet (20 mg total) by mouth daily, Disp: 90 tablet, Rfl: 3  •  benzonatate (TESSALON) 200 MG capsule, Take 1 capsule (200 mg total) by mouth 3 (three) times a day as needed for cough, Disp: 20 capsule, Rfl: 0  •  carvedilol (COREG) 25 mg tablet, Take 1 tablet (25 mg total) by mouth 2 (two) times a day with meals, Disp: 180 tablet, Rfl: 4  •  co-enzyme Q-10 100 mg capsule, Take 100 mg by mouth daily, Disp: , Rfl:   •  diclofenac-misoprostol (ARTHROTEC 75) 75-0 2 MG per tablet, TAKE ONE (1) TABLET BY MOUTH TWO TIMES DAILY  , Disp: 180 tablet, Rfl: 4  •  EPINEPHrine (EPIPEN) 0 3 mg/0 3 mL SOAJ, Inject as necessary, Disp: 2 each, Rfl: 1  •  furosemide (LASIX) 20 mg tablet, Take 1 tablet (20 mg total) by mouth daily, Disp: 90 tablet, Rfl: 3  •  ibuprofen (MOTRIN) 200 mg tablet, Take by mouth every 6 (six) hours as needed for mild pain, Disp: , Rfl:   •  Inulin (FIBER CHOICE PO), Take by mouth, Disp: , Rfl:   •  olmesartan (BENICAR) 40 mg tablet, TAKE (1) TABLET BY MOUTH DAILY  , Disp: 90 tablet, Rfl: 3  •  pantoprazole (PROTONIX) 40 mg tablet, Take 1 tablet (40 mg total) by mouth daily, Disp: 90 tablet, Rfl: 4  •  potassium chloride (K-DUR,KLOR-CON) 20 mEq tablet, Take 1 tablet (20 mEq total) by mouth daily, Disp: 90 tablet, Rfl: 3  •  predniSONE 20 mg tablet, Take 2 tablets (40 mg total) by mouth daily for 5 days, Disp: 10 tablet, Rfl: 0  •  tolterodine (DETROL LA) 4 mg 24 hr capsule, Take 1 capsule (4 mg total) by mouth daily, Disp: 90 capsule, Rfl: 4  •  traZODone (DESYREL) 50 mg tablet, Take 1 tablet (50 mg total) by mouth daily at bedtime, Disp: 90 tablet, Rfl: 4  •  VENTOLIN  (90 Base) MCG/ACT inhaler, INHALE 1 TO 2 PUFFS EVERY 6 HOURS AS NEEDED , Disp: 18 g, Rfl: 3    Current Allergies     Allergies as of 05/27/2023 - Reviewed 05/27/2023   Allergen Reaction Noted   • Bee venom Anaphylaxis 07/05/2017   • Codeine GI Intolerance 04/28/2015   • Tetanus immune globulin Other (See Comments) 05/16/2019   • "Tetanus toxoid  11/10/2014   • Tetanus toxoids  06/30/2015            The following portions of the patient's history were reviewed and updated as appropriate: allergies, current medications, past family history, past medical history, past social history, past surgical history and problem list      Past Medical History:   Diagnosis Date   • Anemia    • Arthritis    • Cardiac disease    • Chronic pain    • Gout    • Hypercholesterolemia    • Hypertension    • Insomnia        Past Surgical History:   Procedure Laterality Date   • APPENDECTOMY     • ATRIAL ABLATION SURGERY     • HYSTERECTOMY     • JOINT REPLACEMENT  knees   • KNEE ARTHROSCOPY      b/l knees   • LAMINECTOMY     • GA ARTHRP ACETBLR/PROX FEM PROSTC AGRFT/ALGRFT Right 7/17/2017    Procedure: TOTAL ANTERIOR HIP ARTHROPLASTY;  Surgeon: Suha Melendrez MD;  Location: [facility];  Service: Orthopedics       History reviewed  No pertinent family history  Medications have been verified  Objective   /68   Pulse 79   Temp (!) 97 3 °F (36 3 °C)   Resp 18   Ht 4' 11\" (1 499 m)   Wt 61 2 kg (135 lb)   SpO2 95%   BMI 27 27 kg/m²        Physical Exam     Physical Exam  Vitals and nursing note reviewed  Constitutional:       General: She is not in acute distress  Appearance: Normal appearance  She is normal weight  She is not ill-appearing or toxic-appearing  HENT:      Right Ear: Tympanic membrane normal       Left Ear: Tympanic membrane normal       Nose:      Right Turbinates: Enlarged and swollen  Not pale  Left Turbinates: Enlarged and swollen  Not pale  Mouth/Throat:      Mouth: Mucous membranes are moist    Cardiovascular:      Rate and Rhythm: Normal rate and regular rhythm  Pulses: Normal pulses  Heart sounds: Normal heart sounds  Pulmonary:      Effort: Pulmonary effort is normal       Breath sounds: Normal breath sounds  Lymphadenopathy:      Cervical: No cervical adenopathy     Skin:     General: Skin is " warm       Capillary Refill: Capillary refill takes less than 2 seconds  Neurological:      Mental Status: She is alert

## 2023-05-27 NOTE — PATIENT INSTRUCTIONS
"Pt appears to have a viral upper respiratory infection  Antibiotics are contraindicated at this time and would not help you feel better faster  Although the symptoms are troublesome, usually the patient's body is able to recover from a viral infection on an average time of 7-10 days  Fever, if any, typically resolves after 3-5 days  If patient has sore throat, typically this resolves within 3-5 days  Any nasal congestion, runny nose, post nasal drip typically begin to  improve after 7-10 days  Any cough may linger over a couple weeks  Please note that having a cough is not necessarily a bad thing  It often times is part of our body's protective mechanism to help keep our airways clear  Please note that yellow mucous doesn't necessarily mean a \"bacterial\" infection  Yellow mucous doesn't automatically mean that an antibiotic is needed  It is not unusual for mucus to become more discolored in the days after the start of an upper respiratory infection  Often times this is due to mucous that has thickened  with white blood cells that have flooded the mucosa to try and fight the viral infection  Ear Pain may occur when the eustachian tubes become blocked with mucous or swollen due to acute inflammation from illness  May give Ibuprofen or Tylenol as needed for comfort  May also use warm compress against ear for comfort  If ear ache is persisting and not improving over 2-3 days or if there is any gross drainage coming from ear, please seek further evaluation  Natural remedies to help provide comfort for cough/ cold symptoms include: one teaspoon of honey (only in infants over 1 year of age), increased vitamin C (oranges, bryson, etc ), ginger, and drinking plenty of fluids  Vaporizer by bedside  If you should have prolonged symptoms (Greater than 10 days), worsening symptoms, or any new symptoms please seek further medical attention      If you would be having difficulty breathing, seek " further evaluation by calling 911 or proceeding to ER for further evaluation

## 2023-06-01 DIAGNOSIS — R06.02 SOB (SHORTNESS OF BREATH) ON EXERTION: ICD-10-CM

## 2023-06-01 RX ORDER — ALBUTEROL SULFATE 90 UG/1
AEROSOL, METERED RESPIRATORY (INHALATION)
Qty: 8.5 G | Refills: 5 | Status: SHIPPED | OUTPATIENT
Start: 2023-06-01

## 2023-06-06 ENCOUNTER — OFFICE VISIT (OUTPATIENT)
Dept: FAMILY MEDICINE CLINIC | Facility: CLINIC | Age: 88
End: 2023-06-06
Payer: COMMERCIAL

## 2023-06-06 VITALS
BODY MASS INDEX: 26.77 KG/M2 | SYSTOLIC BLOOD PRESSURE: 114 MMHG | HEART RATE: 84 BPM | HEIGHT: 59 IN | OXYGEN SATURATION: 97 % | DIASTOLIC BLOOD PRESSURE: 58 MMHG | TEMPERATURE: 99.4 F | WEIGHT: 132.8 LBS

## 2023-06-06 DIAGNOSIS — I47.1 AVNRT (AV NODAL RE-ENTRY TACHYCARDIA) (HCC): ICD-10-CM

## 2023-06-06 DIAGNOSIS — I10 BENIGN ESSENTIAL HYPERTENSION: Primary | ICD-10-CM

## 2023-06-06 PROBLEM — E79.0 ELEVATED BLOOD URIC ACID LEVEL: Status: RESOLVED | Noted: 2017-07-17 | Resolved: 2023-06-06

## 2023-06-06 PROBLEM — Z11.1 SCREENING-PULMONARY TB: Status: RESOLVED | Noted: 2017-07-20 | Resolved: 2023-06-06

## 2023-06-06 PROBLEM — D64.9 ANEMIA: Status: RESOLVED | Noted: 2017-08-10 | Resolved: 2023-06-06

## 2023-06-06 PROBLEM — G47.00 PERSISTENT DISORDER OF INITIATING OR MAINTAINING SLEEP: Status: ACTIVE | Noted: 2020-07-08

## 2023-06-06 PROBLEM — N18.30 CHRONIC KIDNEY DISEASE, STAGE III (MODERATE) (HCC): Status: RESOLVED | Noted: 2021-05-11 | Resolved: 2023-06-06

## 2023-06-06 PROBLEM — Z87.39 HISTORY OF GOUT: Status: RESOLVED | Noted: 2017-07-17 | Resolved: 2023-06-06

## 2023-06-06 PROBLEM — S52.502A CLOSED FRACTURE OF LEFT DISTAL RADIUS: Status: RESOLVED | Noted: 2023-04-27 | Resolved: 2023-06-06

## 2023-06-06 PROBLEM — E78.49 OTHER HYPERLIPIDEMIA: Status: ACTIVE | Noted: 2017-07-17

## 2023-06-06 PROCEDURE — 99214 OFFICE O/P EST MOD 30 MIN: CPT | Performed by: PHYSICIAN ASSISTANT

## 2023-06-06 NOTE — PROGRESS NOTES
Name: Yas Shaikh      : 1935      MRN: [id number]  Encounter Provider: Judie Farah PA-C  Encounter Date: 2023   Encounter department: [address] [place] [address] Road     1  Benign essential hypertension  Assessment & Plan:  Currently well controlled, continue same  Orders:  -     Comprehensive metabolic panel; Future    2  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 re-entry tachycardia) ([facility] [address] )  Assessment & Plan:  Continue to see cardiology  BMI Counseling: Body mass index is 26 82 kg/m²  The BMI is above normal  Nutrition recommendations include decreasing portion sizes, encouraging healthy choices of fruits and vegetables, decreasing fast food intake, consuming healthier snacks, limiting drinks that contain sugar, moderation in carbohydrate intake, increasing intake of lean protein, reducing intake of saturated and trans fat and reducing intake of cholesterol  Exercise recommendations include moderate physical activity 150 minutes/week  Rationale for BMI follow-up plan is due to patient being overweight or obese  Depression Screening and Follow-up Plan: Patient was screened for depression during today's encounter  They screened negative with a PHQ-2 score of 2  [facility] is here today to establish care with our office  Review of Systems   Constitutional: Negative for chills and fever  HENT: Negative for ear pain and sore throat  Eyes: Negative for pain and visual disturbance  Respiratory: Negative for cough and shortness of breath  Cardiovascular: Negative for chest pain and palpitations  Gastrointestinal: Negative for abdominal pain and vomiting  Genitourinary: Negative for dysuria and hematuria  Musculoskeletal: Negative for arthralgias and back pain  Skin: Negative for color change and rash  Neurological: Negative for seizures and syncope  All other systems reviewed and are negative        Current Outpatient Medications on File Prior to Visit "  Medication Sig   • albuterol (PROVENTIL HFA,VENTOLIN HFA) 90 mcg/act inhaler Inhale by mouth 2 Puffs 4 times a day as needed for Cough or Shortness of Breath  • amLODIPine (NORVASC) 10 mg tablet Take 1 tablet (10 mg total) by mouth daily   • atorvastatin (LIPITOR) 20 mg tablet Take 1 tablet (20 mg total) by mouth daily   • carvedilol (COREG) 25 mg tablet Take 1 tablet (25 mg total) by mouth 2 (two) times a day with meals   • co-enzyme Q-10 100 mg capsule Take 100 mg by mouth daily   • diclofenac-misoprostol (ARTHROTEC 75) 75-0 2 MG per tablet TAKE ONE (1) TABLET BY MOUTH TWO TIMES DAILY  • EPINEPHrine (EPIPEN) 0 3 mg/0 3 mL SOAJ Inject as necessary   • furosemide (LASIX) 20 mg tablet Take 1 tablet (20 mg total) by mouth daily (Patient taking differently: Take 20 mg by mouth daily 40 mg Mondays and Thursday; 20 mg all the rest of the days)   • ibuprofen (MOTRIN) 200 mg tablet Take by mouth every 6 (six) hours as needed for mild pain   • Inulin (FIBER CHOICE PO) Take by mouth   • olmesartan (BENICAR) 40 mg tablet TAKE (1) TABLET BY MOUTH DAILY  • pantoprazole (PROTONIX) 40 mg tablet Take 1 tablet (40 mg total) by mouth daily   • potassium chloride (K-DUR,KLOR-CON) 20 mEq tablet Take 1 tablet (20 mEq total) by mouth daily   • tolterodine (DETROL LA) 4 mg 24 hr capsule Take 1 capsule (4 mg total) by mouth daily   • traZODone (DESYREL) 50 mg tablet Take 1 tablet (50 mg total) by mouth daily at bedtime   • [DISCONTINUED] benzonatate (TESSALON) 200 MG capsule Take 1 capsule (200 mg total) by mouth 3 (three) times a day as needed for cough (Patient not taking: Reported on 6/6/2023)   • [DISCONTINUED] metoprolol tartrate (LOPRESSOR) 100 mg tablet TAKE ONE TABLET BY MOUTH TWICE A DAY  Objective     /58   Pulse 84   Temp 99 4 °F (37 4 °C)   Ht 4' 11\" (1 499 m)   Wt 60 2 kg (132 lb 12 8 oz)   SpO2 97%   BMI 26 82 kg/m²     Physical Exam  Vitals reviewed     Constitutional:       General: She is not in " acute distress  Appearance: She is well-developed  She is not diaphoretic  HENT:      Head: Normocephalic and atraumatic  Right Ear: Hearing, tympanic membrane, ear canal and external ear normal       Left Ear: Hearing, tympanic membrane, ear canal and external ear normal       Mouth/Throat:      Pharynx: Uvula midline  No oropharyngeal exudate  Eyes:      General: No scleral icterus  Right eye: No discharge  Left eye: No discharge  Conjunctiva/sclera: Conjunctivae normal    Neck:      Thyroid: No thyromegaly  Vascular: No carotid bruit  Cardiovascular:      Rate and Rhythm: Normal rate and regular rhythm  Heart sounds: Normal heart sounds  No murmur heard  Pulmonary:      Effort: Pulmonary effort is normal  No respiratory distress  Breath sounds: Normal breath sounds  No wheezing  Abdominal:      General: Bowel sounds are normal  There is no distension  Palpations: Abdomen is soft  There is no mass  Tenderness: There is no abdominal tenderness  There is no guarding or rebound  Musculoskeletal:         General: No tenderness  Normal range of motion  Cervical back: Neck supple  Lymphadenopathy:      Cervical: No cervical adenopathy  Skin:     General: Skin is warm and dry  Findings: No erythema or rash  Neurological:      Mental Status: She is alert and oriented to person, place, and time  Psychiatric:         Behavior: Behavior normal          Thought Content:  Thought content normal          Judgment: Judgment normal        Chiqui Christiansen PA-C

## 2023-06-21 ENCOUNTER — APPOINTMENT (OUTPATIENT)
Dept: LAB | Facility: MEDICAL CENTER | Age: 88
End: 2023-06-21
Payer: COMMERCIAL

## 2023-06-21 DIAGNOSIS — I10 BENIGN ESSENTIAL HYPERTENSION: ICD-10-CM

## 2023-06-21 LAB
ALBUMIN SERPL BCP-MCNC: 3.5 G/DL (ref 3.5–5)
ALP SERPL-CCNC: 78 U/L (ref 46–116)
ALT SERPL W P-5'-P-CCNC: 28 U/L (ref 12–78)
ANION GAP SERPL CALCULATED.3IONS-SCNC: 1 MMOL/L
AST SERPL W P-5'-P-CCNC: 17 U/L (ref 5–45)
BILIRUB SERPL-MCNC: 0.55 MG/DL (ref 0.2–1)
BUN SERPL-MCNC: 20 MG/DL (ref 5–25)
CALCIUM SERPL-MCNC: 9.1 MG/DL (ref 8.3–10.1)
CHLORIDE SERPL-SCNC: 116 MMOL/L (ref 96–108)
CO2 SERPL-SCNC: 26 MMOL/L (ref 21–32)
CREAT SERPL-MCNC: 0.75 MG/DL (ref 0.6–1.3)
GFR SERPL CREATININE-BSD FRML MDRD: 71 ML/MIN/1.73SQ M
GLUCOSE P FAST SERPL-MCNC: 95 MG/DL (ref 65–99)
POTASSIUM SERPL-SCNC: 3.7 MMOL/L (ref 3.5–5.3)
PROT SERPL-MCNC: 7.2 G/DL (ref 6.4–8.4)
SODIUM SERPL-SCNC: 143 MMOL/L (ref 135–147)

## 2023-06-21 PROCEDURE — 36415 COLL VENOUS BLD VENIPUNCTURE: CPT

## 2023-06-21 PROCEDURE — 80053 COMPREHEN METABOLIC PANEL: CPT

## 2023-06-30 DIAGNOSIS — N39.46 MIXED STRESS AND URGE URINARY INCONTINENCE: ICD-10-CM

## 2023-06-30 DIAGNOSIS — I10 BENIGN ESSENTIAL HYPERTENSION: ICD-10-CM

## 2023-06-30 RX ORDER — AMLODIPINE BESYLATE 10 MG/1
10 TABLET ORAL DAILY
Qty: 90 TABLET | Refills: 0 | Status: SHIPPED | OUTPATIENT
Start: 2023-06-30

## 2023-06-30 RX ORDER — TOLTERODINE 4 MG/1
4 CAPSULE, EXTENDED RELEASE ORAL DAILY
Qty: 90 CAPSULE | Refills: 2 | Status: SHIPPED | OUTPATIENT
Start: 2023-06-30

## 2023-06-30 RX ORDER — CARVEDILOL 25 MG/1
25 TABLET ORAL 2 TIMES DAILY WITH MEALS
Qty: 180 TABLET | Refills: 4 | Status: SHIPPED | OUTPATIENT
Start: 2023-06-30

## 2023-09-05 ENCOUNTER — RA CDI HCC (OUTPATIENT)
Dept: OTHER | Facility: HOSPITAL | Age: 88
End: 2023-09-05

## 2023-09-05 NOTE — PROGRESS NOTES
[facility] coding opportunities       Chart reviewed, no opportunity found: [address] Review     Patients Insurance     Medicare Insurance: [employer]

## 2023-09-07 NOTE — PROGRESS NOTES
Cardiology Follow Up    Constantin Lozano  1935  [id number]  [address] [place] [address] [facility] [place] [address]  [phone]    1. 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 re-entry tachycardia) ([address])        2. History of cardiac radiofrequency ablation        3. Benign essential hypertension        4. Dyslipidemia            Discussion/Summary:  History of AVNRT:  Status post ablation. Patient denies any palpitations/symptoms suggestive of recurrence. Continue carvedilol 25 mg BID     Hypertension:  Well controlled. Continue present regimen     Dyslipidemia:  Lipids are well controlled on atorvastatin 20 mg daily. Continue without changes. She has a mild systolic murmur on exam. Prior echo from June 2021 revealed preserved LV systolic function with mild pulmonary hypertension. An echo from 2017 revealed aortic sclerosis. Discussed option of checking another echocardiogram which she declines. This is reasonable given asymptomatic state.     She will RTO in 6 months with Dr. Chucho Gomez or sooner if necessary. She will call with any concerns. Interval History:   Constantin Lozano is a 80 y.o. female with hypertension, dyslipidemia, AVNRT with prior ablation who presents to the office today for follow up. Since her last office visit she has been feeling well. She has had some frequent falls which appear to be mechanical in nature. She denies any lightheadedness, dizziness, or syncope. She denies any exertional chest pain/pressure/discomfort or shortness of breath. She has mild lower extremity edema which has been chronic and stable. She denies orthopnea and PND. She denies palpitations.     Medical Problems     Problem List     Status post total replacement of right hip    Benign essential hypertension    Other hyperlipidemia    Dextroscoliosis    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 re-entry tachycardia) (HCC)    Gout    Iron deficiency anemia History of cardiac radiofrequency ablation    Persistent disorder of initiating or maintaining sleep        Past Medical History:   Diagnosis Date   • Anemia    • Arthritis    • Cardiac disease    • Chronic pain    • Gout    • Hypercholesterolemia    • Hypertension    • Insomnia      Social History     Socioeconomic History   • Marital status: /Civil Union     Spouse name: Not on file   • Number of children: Not on file   • Years of education: Not on file   • Highest education level: Not on file   Occupational History   • Not on file   Tobacco Use   • Smoking status: Never   • Smokeless tobacco: Never   Vaping Use   • Vaping Use: Never used   Substance and Sexual Activity   • Alcohol use: No   • Drug use: No   • Sexual activity: Yes     Partners: Male   Other Topics Concern   • Not on file   Social History Narrative   • Not on file     Social Determinants of Health     Financial Resource Strain: Not on file   Food Insecurity: Not on file   Transportation Needs: Not on file   Physical Activity: Not on file   Stress: Not on file   Social Connections: Not on file   Intimate Partner Violence: Not on file   Housing Stability: Not on file      History reviewed. No pertinent family history.   Past Surgical History:   Procedure Laterality Date   • APPENDECTOMY     • ATRIAL ABLATION SURGERY     • HYSTERECTOMY     • JOINT REPLACEMENT  knees   • KNEE ARTHROSCOPY      b/l knees   • LAMINECTOMY     • CT ARTHRP ACETBLR/PROX FEM PROSTC AGRFT/ALGRFT Right 7/17/2017    Procedure: TOTAL ANTERIOR HIP ARTHROPLASTY;  Surgeon: Sue Heredia MD;  Location: [facility];  Service: Orthopedics       Current Outpatient Medications:   •  albuterol (PROVENTIL HFA,VENTOLIN HFA) 90 mcg/act inhaler, Inhale by mouth 2 Puffs 4 times a day as needed for Cough or Shortness of Breath., Disp: 8.5 g, Rfl: 5  •  amLODIPine (NORVASC) 10 mg tablet, Take 1 tablet (10 mg total) by mouth daily, Disp: 90 tablet, Rfl: 0  •  atorvastatin (LIPITOR) 20 mg tablet, Take 1 tablet (20 mg total) by mouth daily, Disp: 90 tablet, Rfl: 0  •  carvedilol (COREG) 25 mg tablet, Take 1 tablet (25 mg total) by mouth 2 (two) times a day with meals, Disp: 180 tablet, Rfl: 4  •  co-enzyme Q-10 100 mg capsule, Take 100 mg by mouth daily, Disp: , Rfl:   •  diclofenac-misoprostol (ARTHROTEC 75) 75-0.2 MG per tablet, TAKE ONE (1) TABLET BY MOUTH TWO TIMES DAILY. , Disp: 180 tablet, Rfl: 4  •  EPINEPHrine (EPIPEN) 0.3 mg/0.3 mL SOAJ, Inject as necessary, Disp: 2 each, Rfl: 1  •  furosemide (LASIX) 20 mg tablet, Take 1 tablet (20 mg total) by mouth daily 40 mg Mondays and Thursday; 20 mg all the rest of the days, Disp: 108 tablet, Rfl: 0  •  ibuprofen (MOTRIN) 200 mg tablet, Take by mouth every 6 (six) hours as needed for mild pain, Disp: , Rfl:   •  Inulin (FIBER CHOICE PO), Take by mouth, Disp: , Rfl:   •  Klor-Con M20 20 MEQ tablet, Take 1 tablet (20 mEq total) by mouth daily, Disp: 90 tablet, Rfl: 0  •  olmesartan (BENICAR) 40 mg tablet, TAKE (1) TABLET BY MOUTH DAILY. , Disp: 90 tablet, Rfl: 3  •  pantoprazole (PROTONIX) 40 mg tablet, Take 1 tablet (40 mg total) by mouth daily, Disp: 90 tablet, Rfl: 4  •  tolterodine (DETROL LA) 4 mg 24 hr capsule, Take 1 capsule (4 mg total) by mouth daily, Disp: 90 capsule, Rfl: 2  •  traZODone (DESYREL) 50 mg tablet, Take 1 tablet (50 mg total) by mouth daily at bedtime, Disp: 90 tablet, Rfl: 4  Allergies   Allergen Reactions   • Bee Venom Anaphylaxis   • Codeine GI Intolerance     Nausea/vomiting   • Tetanus Immune Globulin Other (See Comments)   • Tetanus Toxoid    • Tetanus Toxoids        Labs:     Chemistry        Component Value Date/Time     11/13/2015 1253    K 3.7 06/21/2023 0716    K 3.3 (L) 11/13/2015 1253     (H) 06/21/2023 0716     11/13/2015 1253    CO2 26 06/21/2023 0716    CO2 28.8 11/13/2015 1253    BUN 20 06/21/2023 0716    BUN 24 11/13/2015 1253    CREATININE 0.75 06/21/2023 0716    CREATININE 0.85 11/13/2015 1253 Component Value Date/Time    CALCIUM 9.1 06/21/2023 0716    CALCIUM 9.0 11/13/2015 1253    ALKPHOS 78 06/21/2023 0716    ALKPHOS 83 11/13/2015 1253    AST 17 06/21/2023 0716    AST 20 11/13/2015 1253    ALT 28 06/21/2023 0716    ALT 36 11/13/2015 1253    BILITOT 0.63 11/13/2015 1253            Lab Results   Component Value Date    CHOL 201 11/13/2015    CHOL 183 11/12/2014     Lab Results   Component Value Date    HDL 54 01/30/2023    HDL 43 (L) 07/25/2022    HDL 33 (L) 12/14/2020     Lab Results   Component Value Date    LDLCALC 64 01/30/2023    LDLCALC 72 07/25/2022    LDLCALC 89 12/14/2020     Lab Results   Component Value Date    TRIG 46 01/30/2023    TRIG 62 07/25/2022    TRIG 173 (H) 12/14/2020     No results found for: "CHOLHDL"    Imaging: No results found. Review of Systems   All other systems reviewed and are negative. Vitals:    09/11/23 1421   BP: 120/74   Pulse: 77   SpO2: 93%     Vitals:    09/11/23 1421   Weight: 59.6 kg (131 lb 6.4 oz)     Height: 4' 11" (149.9 cm)   Body mass index is 26.54 kg/m². Physical Exam:  Physical Exam  Vitals reviewed. Constitutional:       General: She is not in acute distress. Appearance: She is not diaphoretic. HENT:      Head: Normocephalic and atraumatic. Eyes:      Pupils: Pupils are equal, round, and reactive to light. Neck:      Vascular: No carotid bruit. Cardiovascular:      Rate and Rhythm: Normal rate and regular rhythm. Pulses:           Radial pulses are 2+ on the right side and 2+ on the left side. Heart sounds: S1 normal and S2 normal. Murmur heard. Systolic murmur is present with a grade of 2/6. Comments: 2/6 systolic murmur @ RUSB  Pulmonary:      Effort: Pulmonary effort is normal. No respiratory distress. Breath sounds: Normal breath sounds. No wheezing or rales. Abdominal:      General: There is no distension. Palpations: Abdomen is soft. Tenderness: There is no abdominal tenderness. Musculoskeletal:         General: No deformity. Normal range of motion. Cervical back: Normal range of motion. Right lower leg: No edema. Left lower leg: No edema. Skin:     General: Skin is warm and dry. Findings: No erythema. Neurological:      General: No focal deficit present. Mental Status: She is alert and oriented to person, place, and time.    Psychiatric:         Mood and Affect: Mood normal.         Behavior: Behavior normal.

## 2023-09-11 ENCOUNTER — OFFICE VISIT (OUTPATIENT)
Dept: CARDIOLOGY CLINIC | Facility: HOSPITAL | Age: 88
End: 2023-09-11
Payer: COMMERCIAL

## 2023-09-11 VITALS
SYSTOLIC BLOOD PRESSURE: 120 MMHG | HEART RATE: 77 BPM | HEIGHT: 59 IN | DIASTOLIC BLOOD PRESSURE: 74 MMHG | OXYGEN SATURATION: 93 % | WEIGHT: 131.4 LBS | BODY MASS INDEX: 26.49 KG/M2

## 2023-09-11 DIAGNOSIS — E78.5 DYSLIPIDEMIA: ICD-10-CM

## 2023-09-11 DIAGNOSIS — I47.1 AVNRT (AV NODAL RE-ENTRY TACHYCARDIA): Primary | ICD-10-CM

## 2023-09-11 DIAGNOSIS — I10 BENIGN ESSENTIAL HYPERTENSION: ICD-10-CM

## 2023-09-11 DIAGNOSIS — Z98.890 HISTORY OF CARDIAC RADIOFREQUENCY ABLATION: ICD-10-CM

## 2023-09-11 PROCEDURE — 99214 OFFICE O/P EST MOD 30 MIN: CPT | Performed by: PHYSICIAN ASSISTANT

## 2023-09-13 ENCOUNTER — OFFICE VISIT (OUTPATIENT)
Dept: FAMILY MEDICINE CLINIC | Facility: CLINIC | Age: 88
End: 2023-09-13
Payer: COMMERCIAL

## 2023-09-13 VITALS
HEART RATE: 67 BPM | BODY MASS INDEX: 26.81 KG/M2 | HEIGHT: 59 IN | OXYGEN SATURATION: 97 % | TEMPERATURE: 97.6 F | DIASTOLIC BLOOD PRESSURE: 80 MMHG | WEIGHT: 133 LBS | SYSTOLIC BLOOD PRESSURE: 112 MMHG

## 2023-09-13 DIAGNOSIS — I10 BENIGN ESSENTIAL HYPERTENSION: Primary | ICD-10-CM

## 2023-09-13 DIAGNOSIS — E78.49 OTHER HYPERLIPIDEMIA: ICD-10-CM

## 2023-09-13 DIAGNOSIS — T78.2XXD ANAPHYLAXIS, SUBSEQUENT ENCOUNTER: ICD-10-CM

## 2023-09-13 DIAGNOSIS — Z23 NEED FOR INFLUENZA VACCINATION: ICD-10-CM

## 2023-09-13 PROCEDURE — G0008 ADMIN INFLUENZA VIRUS VAC: HCPCS | Performed by: PHYSICIAN ASSISTANT

## 2023-09-13 PROCEDURE — 90662 IIV NO PRSV INCREASED AG IM: CPT | Performed by: PHYSICIAN ASSISTANT

## 2023-09-13 PROCEDURE — 99213 OFFICE O/P EST LOW 20 MIN: CPT | Performed by: PHYSICIAN ASSISTANT

## 2023-09-13 RX ORDER — EPINEPHRINE 0.3 MG/.3ML
INJECTION SUBCUTANEOUS
Qty: 2 EACH | Refills: 1 | Status: SHIPPED | OUTPATIENT
Start: 2023-09-13

## 2023-09-13 NOTE — PROGRESS NOTES
Name: Akash Viera      : 1935      MRN: [id number]  Encounter Provider: Belkys Arredondo PA-C  Encounter Date: 2023   Encounter department: [address]     1. Benign essential hypertension  Assessment & Plan:  Stable, continue same. 2. Other hyperlipidemia  Assessment & Plan:  Stable, continue taking atorvastatin daily. 3. Need for influenza vaccination  -     influenza vaccine, high-dose, PF 0.7 mL (FLUZONE HIGH-DOSE)        Depression Screening and Follow-up Plan: Patient was screened for depression during today's encounter. They screened negative with a PHQ-2 score of 0. Payton Collins is here today for follow up. She continues to do very well. Review of Systems   Constitutional: Negative for activity change, appetite change, chills, diaphoresis, fatigue, fever and unexpected weight change. HENT: Negative for congestion, ear pain, postnasal drip, rhinorrhea, sinus pressure, sinus pain, sneezing, sore throat, tinnitus and voice change. Eyes: Negative for pain, redness and visual disturbance. Respiratory: Negative for cough, chest tightness, shortness of breath and wheezing. Cardiovascular: Negative for chest pain, palpitations and leg swelling. Gastrointestinal: Negative for abdominal pain, blood in stool, constipation, diarrhea, nausea and vomiting. Genitourinary: Negative for difficulty urinating, dysuria, frequency, hematuria and urgency. Musculoskeletal: Negative for arthralgias, back pain, gait problem, joint swelling, myalgias, neck pain and neck stiffness. Skin: Negative for color change, pallor, rash and wound. Neurological: Negative for dizziness, tremors, weakness, light-headedness and headaches. Psychiatric/Behavioral: Negative for dysphoric mood, self-injury, sleep disturbance and suicidal ideas. The patient is not nervous/anxious.         Current Outpatient Medications on File Prior to Visit Medication Sig   • albuterol (PROVENTIL HFA,VENTOLIN HFA) 90 mcg/act inhaler Inhale by mouth 2 Puffs 4 times a day as needed for Cough or Shortness of Breath. • amLODIPine (NORVASC) 10 mg tablet Take 1 tablet (10 mg total) by mouth daily   • atorvastatin (LIPITOR) 20 mg tablet Take 1 tablet (20 mg total) by mouth daily   • carvedilol (COREG) 25 mg tablet Take 1 tablet (25 mg total) by mouth 2 (two) times a day with meals   • co-enzyme Q-10 100 mg capsule Take 100 mg by mouth daily   • diclofenac-misoprostol (ARTHROTEC 75) 75-0.2 MG per tablet TAKE ONE (1) TABLET BY MOUTH TWO TIMES DAILY. • furosemide (LASIX) 20 mg tablet Take 1 tablet (20 mg total) by mouth daily 40 mg Mondays and Thursday; 20 mg all the rest of the days   • ibuprofen (MOTRIN) 200 mg tablet Take by mouth every 6 (six) hours as needed for mild pain   • Inulin (FIBER CHOICE PO) Take by mouth   • Klor-Con M20 20 MEQ tablet Take 1 tablet (20 mEq total) by mouth daily   • olmesartan (BENICAR) 40 mg tablet TAKE (1) TABLET BY MOUTH DAILY. • pantoprazole (PROTONIX) 40 mg tablet Take 1 tablet (40 mg total) by mouth daily   • tolterodine (DETROL LA) 4 mg 24 hr capsule Take 1 capsule (4 mg total) by mouth daily   • traZODone (DESYREL) 50 mg tablet Take 1 tablet (50 mg total) by mouth daily at bedtime   • [DISCONTINUED] EPINEPHrine (EPIPEN) 0.3 mg/0.3 mL SOAJ Inject as necessary   • [DISCONTINUED] metoprolol tartrate (LOPRESSOR) 100 mg tablet TAKE ONE TABLET BY MOUTH TWICE A DAY. Objective     /80   Pulse 67   Temp 97.6 °F (36.4 °C)   Ht 4' 11" (1.499 m)   Wt 60.3 kg (133 lb)   SpO2 97%   BMI 26.86 kg/m²     Physical Exam  Vitals reviewed. Constitutional:       General: She is not in acute distress. Appearance: She is well-developed. She is not diaphoretic. HENT:      Head: Normocephalic and atraumatic.       Right Ear: Hearing, tympanic membrane, ear canal and external ear normal.      Left Ear: Hearing, tympanic membrane, ear canal and external ear normal.      Mouth/Throat:      Pharynx: Uvula midline. No oropharyngeal exudate. Eyes:      General: No scleral icterus. Right eye: No discharge. Left eye: No discharge. Conjunctiva/sclera: Conjunctivae normal.   Neck:      Thyroid: No thyromegaly. Vascular: No carotid bruit. Cardiovascular:      Rate and Rhythm: Normal rate and regular rhythm. Heart sounds: Normal heart sounds. No murmur heard. Pulmonary:      Effort: Pulmonary effort is normal. No respiratory distress. Breath sounds: Normal breath sounds. No wheezing. Abdominal:      General: Bowel sounds are normal. There is no distension. Palpations: Abdomen is soft. There is no mass. Tenderness: There is no abdominal tenderness. There is no guarding or rebound. Musculoskeletal:         General: No tenderness. Normal range of motion. Cervical back: Neck supple. Lymphadenopathy:      Cervical: No cervical adenopathy. Skin:     General: Skin is warm and dry. Findings: No erythema or rash. Neurological:      Mental Status: She is alert and oriented to person, place, and time. Psychiatric:         Behavior: Behavior normal.         Thought Content:  Thought content normal.         Judgment: Judgment normal.       Karen Tapia PA-C

## 2023-09-28 DIAGNOSIS — E87.6 HYPOKALEMIA: ICD-10-CM

## 2023-09-28 DIAGNOSIS — I10 BENIGN ESSENTIAL HYPERTENSION: ICD-10-CM

## 2023-09-28 DIAGNOSIS — I10 ESSENTIAL HYPERTENSION: ICD-10-CM

## 2023-09-28 DIAGNOSIS — E78.5 DYSLIPIDEMIA: ICD-10-CM

## 2023-09-28 RX ORDER — ATORVASTATIN CALCIUM 20 MG/1
20 TABLET, FILM COATED ORAL DAILY
Qty: 90 TABLET | Refills: 4 | Status: SHIPPED | OUTPATIENT
Start: 2023-09-28

## 2023-09-28 RX ORDER — AMLODIPINE BESYLATE 10 MG/1
10 TABLET ORAL DAILY
Qty: 90 TABLET | Refills: 4 | Status: SHIPPED | OUTPATIENT
Start: 2023-09-28

## 2023-09-28 RX ORDER — POTASSIUM CHLORIDE 1500 MG/1
TABLET, EXTENDED RELEASE ORAL
Qty: 90 TABLET | Refills: 4 | Status: SHIPPED | OUTPATIENT
Start: 2023-09-28

## 2023-09-28 RX ORDER — FUROSEMIDE 20 MG/1
TABLET ORAL
Qty: 108 TABLET | Refills: 4 | Status: SHIPPED | OUTPATIENT
Start: 2023-09-28

## 2023-11-03 ENCOUNTER — TELEPHONE (OUTPATIENT)
Dept: FAMILY MEDICINE CLINIC | Facility: CLINIC | Age: 88
End: 2023-11-03

## 2023-11-03 NOTE — TELEPHONE ENCOUNTER
Patient cancelled her appt 11/10/23. She states she received another bill for $51 and is unsure why. Not sure if someone can reach out to her. She states she hasn't heard anything previously from another bill she received either. Not sure if someone can contact her. thanks!

## 2023-12-05 ENCOUNTER — RA CDI HCC (OUTPATIENT)
Dept: OTHER | Facility: HOSPITAL | Age: 88
End: 2023-12-05

## 2023-12-05 NOTE — PROGRESS NOTES
[address] coding opportunities       Chart reviewed, no opportunity found: [address] Review     Patients Insurance     Medicare Insurance: [employer]

## 2023-12-12 ENCOUNTER — RA CDI HCC (OUTPATIENT)
Dept: OTHER | Facility: HOSPITAL | Age: 88
End: 2023-12-12

## 2023-12-12 NOTE — PROGRESS NOTES
[address] [facility] coding opportunities     [address]70.[address] [facility]     Chart Reviewed number of suggestions sent to Provider: 1     GR    Patients Insurance     Medicare Insurance: [facility]

## 2023-12-13 ENCOUNTER — OFFICE VISIT (OUTPATIENT)
Dept: FAMILY MEDICINE CLINIC | Facility: CLINIC | Age: 88
End: 2023-12-13
Payer: COMMERCIAL

## 2023-12-13 VITALS
SYSTOLIC BLOOD PRESSURE: 130 MMHG | WEIGHT: 134 LBS | BODY MASS INDEX: 27.01 KG/M2 | OXYGEN SATURATION: 96 % | TEMPERATURE: 98.3 F | HEIGHT: 59 IN | HEART RATE: 66 BPM | DIASTOLIC BLOOD PRESSURE: 68 MMHG

## 2023-12-13 DIAGNOSIS — I10 BENIGN ESSENTIAL HYPERTENSION: Primary | ICD-10-CM

## 2023-12-13 DIAGNOSIS — E78.49 OTHER HYPERLIPIDEMIA: ICD-10-CM

## 2023-12-13 PROCEDURE — 99213 OFFICE O/P EST LOW 20 MIN: CPT | Performed by: PHYSICIAN ASSISTANT

## 2023-12-13 NOTE — PROGRESS NOTES
Name: Jennyfer Sage      : 1935      MRN: [id number]  Encounter Provider: Rose Marie Gordillo PA-C  Encounter Date: 2023   Encounter department: [address]     1. Benign essential hypertension  Assessment & Plan:  Stable, continue taking medication daily. 2. Other hyperlipidemia  Assessment & Plan:  Continue daily atorvastatin. Depression Screening and Follow-up Plan: Patient was screened for depression during today's encounter. They screened negative with a PHQ-2 score of 0. Kurt Ventura is here today for 3 month follow up. No complaints. She is considering getting RSV vaccination in the near future. States that her  does not want one but she thinks it is a good idea. Review of Systems   Constitutional:  Negative for chills and fever. HENT:  Negative for ear pain and sore throat. Eyes:  Negative for pain and visual disturbance. Respiratory:  Positive for cough (mild cough x 2 weeks, no other sick symtpoms). Negative for shortness of breath. Cardiovascular:  Negative for chest pain and palpitations. Gastrointestinal:  Negative for abdominal pain and vomiting. Genitourinary:  Negative for dysuria and hematuria. Musculoskeletal:  Negative for arthralgias and back pain. Skin:  Negative for color change and rash. Neurological:  Negative for seizures and syncope. All other systems reviewed and are negative. Current Outpatient Medications on File Prior to Visit   Medication Sig   • albuterol (PROVENTIL HFA,VENTOLIN HFA) 90 mcg/act inhaler Inhale by mouth 2 Puffs 4 times a day as needed for Cough or Shortness of Breath.    • amLODIPine (NORVASC) 10 mg tablet Take 1 tablet (10 mg total) by mouth daily   • atorvastatin (LIPITOR) 20 mg tablet Take 1 tablet (20 mg total) by mouth daily   • carvedilol (COREG) 25 mg tablet Take 1 tablet (25 mg total) by mouth 2 (two) times a day with meals   • co-enzyme Q-10 100 mg capsule Take 100 mg by mouth daily   • diclofenac-misoprostol (ARTHROTEC 75) 75-0.2 MG per tablet TAKE ONE (1) TABLET BY MOUTH TWO TIMES DAILY. • EPINEPHrine (EPIPEN) 0.3 mg/0.3 mL SOAJ Inject as necessary   • furosemide (LASIX) 20 mg tablet Take 2 tablets (40 mg) Mondays and Thursday; 1 tablet (20 mg) all the rest of the days   • ibuprofen (MOTRIN) 200 mg tablet Take by mouth every 6 (six) hours as needed for mild pain   • Klor-Con M20 20 MEQ tablet Take 1 tablet (20 mEq total) by mouth daily   • olmesartan (BENICAR) 40 mg tablet TAKE (1) TABLET BY MOUTH DAILY. • pantoprazole (PROTONIX) 40 mg tablet Take 1 tablet (40 mg total) by mouth daily   • tolterodine (DETROL LA) 4 mg 24 hr capsule Take 1 capsule (4 mg total) by mouth daily   • traZODone (DESYREL) 50 mg tablet Take 1 tablet (50 mg total) by mouth daily at bedtime   • Inulin (FIBER CHOICE PO) Take by mouth (Patient not taking: Reported on 12/13/2023)   • [DISCONTINUED] metoprolol tartrate (LOPRESSOR) 100 mg tablet TAKE ONE TABLET BY MOUTH TWICE A DAY. Objective     /68   Pulse 66   Temp 98.3 °F (36.8 °C)   Ht 4' 11" (1.499 m)   Wt 60.8 kg (134 lb)   SpO2 96%   BMI 27.06 kg/m²     Physical Exam  Vitals reviewed. Constitutional:       General: She is not in acute distress. Appearance: She is well-developed. She is not diaphoretic. HENT:      Head: Normocephalic and atraumatic. Right Ear: Hearing, tympanic membrane, ear canal and external ear normal.      Left Ear: Hearing, tympanic membrane, ear canal and external ear normal.      Nose: Nose normal.      Mouth/Throat:      Mouth: Mucous membranes are moist.      Pharynx: Oropharynx is clear. Uvula midline. No oropharyngeal exudate. Eyes:      General: No scleral icterus. Right eye: No discharge. Left eye: No discharge. Conjunctiva/sclera: Conjunctivae normal.   Neck:      Thyroid: No thyromegaly. Vascular: No carotid bruit.    Cardiovascular: Rate and Rhythm: Normal rate and regular rhythm. Heart sounds: Normal heart sounds. No murmur heard. Pulmonary:      Effort: Pulmonary effort is normal. No respiratory distress. Breath sounds: Normal breath sounds. No wheezing. Abdominal:      General: Bowel sounds are normal. There is no distension. Palpations: Abdomen is soft. There is no mass. Tenderness: There is no abdominal tenderness. There is no guarding or rebound. Musculoskeletal:         General: No tenderness. Normal range of motion. Cervical back: Neck supple. Lymphadenopathy:      Cervical: No cervical adenopathy. Skin:     General: Skin is warm and dry. Findings: No erythema or rash. Neurological:      Mental Status: She is alert and oriented to person, place, and time. Psychiatric:         Behavior: Behavior normal.         Thought Content:  Thought content normal.         Judgment: Judgment normal.       Mir Flowers PA-C

## 2023-12-27 DIAGNOSIS — N39.46 MIXED STRESS AND URGE URINARY INCONTINENCE: ICD-10-CM

## 2023-12-27 DIAGNOSIS — K21.9 GASTROESOPHAGEAL REFLUX DISEASE, UNSPECIFIED WHETHER ESOPHAGITIS PRESENT: ICD-10-CM

## 2023-12-27 RX ORDER — TOLTERODINE 4 MG/1
4 CAPSULE, EXTENDED RELEASE ORAL DAILY
Qty: 90 CAPSULE | Refills: 4 | Status: SHIPPED | OUTPATIENT
Start: 2023-12-27

## 2023-12-27 RX ORDER — PANTOPRAZOLE SODIUM 40 MG/1
40 TABLET, DELAYED RELEASE ORAL DAILY
Qty: 90 TABLET | Refills: 4 | Status: SHIPPED | OUTPATIENT
Start: 2023-12-27

## 2024-02-29 ENCOUNTER — RA CDI HCC (OUTPATIENT)
Dept: OTHER | Facility: HOSPITAL | Age: 89
End: 2024-02-29

## 2024-03-08 ENCOUNTER — RA CDI HCC (OUTPATIENT)
Dept: OTHER | Facility: HOSPITAL | Age: 89
End: 2024-03-08

## 2024-03-13 ENCOUNTER — OFFICE VISIT (OUTPATIENT)
Dept: FAMILY MEDICINE CLINIC | Facility: CLINIC | Age: 89
End: 2024-03-13
Payer: COMMERCIAL

## 2024-03-13 VITALS
BODY MASS INDEX: 26.61 KG/M2 | HEART RATE: 67 BPM | RESPIRATION RATE: 16 BRPM | TEMPERATURE: 97.6 F | SYSTOLIC BLOOD PRESSURE: 102 MMHG | WEIGHT: 132 LBS | HEIGHT: 59 IN | DIASTOLIC BLOOD PRESSURE: 74 MMHG | OXYGEN SATURATION: 97 %

## 2024-03-13 DIAGNOSIS — Z00.00 MEDICARE ANNUAL WELLNESS VISIT, SUBSEQUENT: Primary | ICD-10-CM

## 2024-03-13 DIAGNOSIS — Z13.89 SCREENING FOR MULTIPLE CONDITIONS: ICD-10-CM

## 2024-03-13 DIAGNOSIS — Z12.11 COLON CANCER SCREENING: ICD-10-CM

## 2024-03-13 PROCEDURE — G0439 PPPS, SUBSEQ VISIT: HCPCS | Performed by: PHYSICIAN ASSISTANT

## 2024-03-13 NOTE — PATIENT INSTRUCTIONS

## 2024-03-13 NOTE — PROGRESS NOTES
Assessment and Plan:     Problem List Items Addressed This Visit    None  Visit Diagnoses     Medicare annual wellness visit, subsequent    -  Primary    Colon cancer screening        Relevant Orders    Cologuard    Screening for multiple conditions        Relevant Orders    CBC    Comprehensive metabolic panel    Lipid Panel with Direct LDL reflex          Depression Screening and Follow-up Plan: Patient was screened for depression during today's encounter. They screened negative with a PHQ-2 score of 0.    Falls Plan of Care: Home safety education provided.       Preventive health issues were discussed with patient, and age appropriate screening tests were ordered as noted in patient's After Visit Summary.  Personalized health advice and appropriate referrals for health education or preventive services given if needed, as noted in patient's After Visit Summary.     History of Present Illness:     Patient presents for a Medicare Wellness Visit    Mary Beth is here today for routine OV. Due for labs, no complaints at this time. Pt requesting colon CA screening.       Patient Care Team:  Alla Chan PA-C as PCP - General (Physician Assistant)  Ravinder Gomez MD as PCP - PCP-[facility] ([facility])  Ravinder Gomez MD as PCP - PCP-[facility] ([facility])  DO Kalina Pozo MD Chandra Reddy, MD     Review of Systems:     Review of Systems   Constitutional:  Negative for activity change, appetite change, chills, diaphoresis, fatigue, fever and unexpected weight change.   HENT:  Negative for congestion, ear pain, postnasal drip, rhinorrhea, sinus pressure, sinus pain, sneezing, sore throat, tinnitus and voice change.    Eyes:  Negative for pain, redness and visual disturbance.   Respiratory:  Negative for cough, chest tightness, shortness of breath and wheezing.    Cardiovascular:  Negative for chest pain, palpitations and leg swelling.   Gastrointestinal:  Negative for abdominal pain, blood in stool,  constipation, diarrhea, nausea and vomiting.   Genitourinary:  Negative for difficulty urinating, dysuria, frequency, hematuria and urgency.   Musculoskeletal:  Negative for arthralgias, back pain, gait problem, joint swelling, myalgias, neck pain and neck stiffness.   Skin:  Negative for color change, pallor, rash and wound.   Neurological:  Negative for dizziness, tremors, weakness, light-headedness and headaches.   Psychiatric/Behavioral:  Negative for dysphoric mood, self-injury, sleep disturbance and suicidal ideas. The patient is not nervous/anxious.         Problem List:     Patient Active Problem List   Diagnosis   • Status post total replacement of right hip   • Benign essential hypertension   • Other hyperlipidemia   • Dextroscoliosis   • 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 re-entry tachycardia)   • Gout   • Iron deficiency anemia   • History of cardiac radiofrequency ablation   • Persistent disorder of initiating or maintaining sleep      Past Medical and Surgical History:     Past Medical History:   Diagnosis Date   • Anemia    • Arthritis    • Cardiac disease    • Chronic pain    • Gout    • Hypercholesterolemia    • Hypertension    • Insomnia      Past Surgical History:   Procedure Laterality Date   • APPENDECTOMY     • ATRIAL ABLATION SURGERY     • HYSTERECTOMY     • JOINT REPLACEMENT  knees   • KNEE ARTHROSCOPY      b/l knees   • LAMINECTOMY     • [place] ARTHRP ACETBLR/PROX FEM PROSTC AGRFT/ALGRFT Right 7/17/2017    Procedure: TOTAL ANTERIOR HIP ARTHROPLASTY;  Surgeon: Héctor Gregorio MD;  Location: [facility];  Service: Orthopedics      Family History:     History reviewed. No pertinent family history.   Social History:     Social History     Socioeconomic History   • Marital status: /Civil Union     Spouse name: None   • Number of children: None   • Years of education: None   • Highest education level: None   Occupational History   • None   Tobacco Use   • Smoking status: Never   • Smokeless tobacco: Never   Vaping  Use   • Vaping status: Never Used   Substance and Sexual Activity   • Alcohol use: No     Comment: 1  dirnk at night in the winter time ( flu season)   • Drug use: No   • Sexual activity: Yes     Partners: Male   Other Topics Concern   • None   Social History Narrative   • None     Social Determinants of Health     Financial Resource Strain: Not on file   Food Insecurity: No Food Insecurity (3/13/2024)    Hunger Vital Sign    • Worried About Running Out of Food in the Last Year: Never true    • Ran Out of Food in the Last Year: Never true   Transportation Needs: No Transportation Needs (3/13/2024)    PRAPARE - Transportation    • Lack of Transportation (Medical): No    • Lack of Transportation (Non-Medical): No   Physical Activity: Not on file   Stress: Not on file   Social Connections: Not on file   Intimate Partner Violence: Not on file   Housing Stability: Low Risk  (3/13/2024)    Housing Stability Vital Sign    • Unable to Pay for Housing in the Last Year: No    • Number of Places Lived in the Last Year: 1    • Unstable Housing in the Last Year: No      Medications and Allergies:     Current Outpatient Medications   Medication Sig Dispense Refill   • albuterol (PROVENTIL HFA,VENTOLIN HFA) 90 mcg/act inhaler Inhale by mouth 2 Puffs 4 times a day as needed for Cough or Shortness of Breath. 8.5 g 5   • amLODIPine (NORVASC) 10 mg tablet Take 1 tablet (10 mg total) by mouth daily 90 tablet 4   • atorvastatin (LIPITOR) 20 mg tablet Take 1 tablet (20 mg total) by mouth daily 90 tablet 4   • carvedilol (COREG) 25 mg tablet Take 1 tablet (25 mg total) by mouth 2 (two) times a day with meals 180 tablet 4   • co-enzyme Q-10 100 mg capsule Take 100 mg by mouth daily     • diclofenac-misoprostol (ARTHROTEC 75) 75-0.2 MG per tablet TAKE ONE (1) TABLET BY MOUTH TWO TIMES DAILY. 180 tablet 4   • EPINEPHrine (EPIPEN) 0.3 mg/0.3 mL SOAJ Inject as necessary 2 each 1   • furosemide (LASIX) 20 mg tablet Take 2 tablets (40 mg)  Mondays and Thursday; 1 tablet (20 mg) all the rest of the days 108 tablet 4   • Klor-Con M20 20 MEQ tablet Take 1 tablet (20 mEq total) by mouth daily 90 tablet 4   • olmesartan (BENICAR) 40 mg tablet TAKE (1) TABLET BY MOUTH DAILY. 90 tablet 3   • pantoprazole (PROTONIX) 40 mg tablet Take 1 tablet (40 mg total) by mouth daily 90 tablet 4   • tolterodine (DETROL LA) 4 mg 24 hr capsule Take 1 capsule (4 mg total) by mouth daily 90 capsule 4   • traZODone (DESYREL) 50 mg tablet Take 1 tablet (50 mg total) by mouth daily at bedtime 90 tablet 4   • ibuprofen (MOTRIN) 200 mg tablet Take by mouth every 6 (six) hours as needed for mild pain (Patient not taking: Reported on 3/13/2024)     • Inulin (FIBER CHOICE PO) Take by mouth (Patient not taking: Reported on 12/13/2023)       No current facility-administered medications for this visit.     Allergies   Allergen Reactions   • Bee Venom Anaphylaxis   • Codeine GI Intolerance     Nausea/vomiting   • Tetanus Immune Globulin Other (See Comments)   • Tetanus Toxoid    • Tetanus Toxoids       Immunizations:     Immunization History   Administered Date(s) Administered   • COVID-19 J&J ([place]) vaccine 0.5 mL 11/01/2021   • COVID-19, unspecified 04/10/2021   • INFLUENZA 11/23/2021, 10/10/2022, 09/13/2023   • Influenza, high dose seasonal 0.7 mL 11/18/2020, 10/10/2022, 09/13/2023   • Pneumococcal Conjugate 13-Valent 11/05/2015   • Pneumococcal Polysaccharide PPV23 04/20/2009   • RSV IGIV 12/14/2023   • Zoster 06/30/2015      Health Maintenance:     There are no preventive care reminders to display for this patient.      Topic Date Due   • COVID-19 Vaccine (3 - 2023-24 season) 09/01/2023      Medicare Screening Tests and Risk Assessments:     Mary Beth is here for her Subsequent Wellness visit. Last Medicare Wellness visit information reviewed, patient interviewed and updates made to the record today.      Health Risk Assessment:   Patient rates overall health as good. Patient feels  that their physical health rating is same. Patient is satisfied with their life. Eyesight was rated as slightly worse. Hearing was rated as same. Patient feels that their emotional and mental health rating is same. Patients states they are never, rarely angry. Patient states they are never, rarely unusually tired/fatigued. Pain experienced in the last 7 days has been none. Patient states that she has experienced no weight loss or gain in last 6 months.     Depression Screening:   PHQ-2 Score: 0      Fall Risk Screening:   In the past year, patient has experienced: history of falling in past year    Number of falls: 2 or more  Injured during fall?: No    Feels unsteady when standing or walking?: No    Worried about falling?: Yes      Urinary Incontinence Screening:   Patient has leaked urine accidently in the last six months.     Home Safety:  Patient does not have trouble with stairs inside or outside of their home. Patient has working smoke alarms and has working carbon monoxide detector. Home safety hazards include: loose rugs on the floor. Pt does have loose rugs on the floor I discussed with her picking them up since she had a couple falls,   She states she will pick them up and put them away     Cognitive Screening:   Provider or family/friend/caregiver concerned regarding cognition?: No    PREVENTIVE SCREENINGS      Cardiovascular Screening:    General: Screening Not Indicated and History Lipid Disorder      Diabetes Screening:     General: Screening Current      Colorectal Cancer Screening:     General: Risks and Benefits Discussed    Due for: Cologuard      Breast Cancer Screening:     General: Screening Not Indicated      Cervical Cancer Screening:    General: Screening Not Indicated      Osteoporosis Screening:    General: Patient Declines      Abdominal Aortic Aneurysm (AAA) Screening:        General: Screening Not Indicated      Lung Cancer Screening:     General: Screening Not Indicated      Hepatitis C  "Screening:    General: Screening Not Indicated    Screening, Brief Intervention, and Referral to Treatment (SBIRT)    Screening  Typical number of drinks in a day: 1  Typical number of drinks in a week: 1  Interpretation: Low risk drinking behavior.    No results found.     Physical Exam:     /74 (BP Location: Left arm, Patient Position: Sitting, Cuff Size: Adult)   Pulse 67   Temp 97.6 °F (36.4 °C) (Temporal)   Resp 16   Ht 4' 11\" (1.499 m)   Wt 59.9 kg (132 lb)   SpO2 97%   BMI 26.66 kg/m²     Physical Exam  Vitals and nursing note reviewed.   Constitutional:       General: She is not in acute distress.     Appearance: She is well-developed.   HENT:      Head: Normocephalic and atraumatic.   Eyes:      Conjunctiva/sclera: Conjunctivae normal.   Cardiovascular:      Rate and Rhythm: Normal rate and regular rhythm.      Heart sounds: No murmur heard.  Pulmonary:      Effort: Pulmonary effort is normal. No respiratory distress.      Breath sounds: Normal breath sounds.   Abdominal:      Palpations: Abdomen is soft.      Tenderness: There is no abdominal tenderness.   Musculoskeletal:         General: No swelling.      Cervical back: Neck supple.   Skin:     General: Skin is warm and dry.      Capillary Refill: Capillary refill takes less than 2 seconds.   Neurological:      Mental Status: She is alert.   Psychiatric:         Mood and Affect: Mood normal.          Alla Chan PA-C  "

## 2024-03-18 ENCOUNTER — APPOINTMENT (OUTPATIENT)
Dept: LAB | Facility: MEDICAL CENTER | Age: 89
End: 2024-03-18
Payer: COMMERCIAL

## 2024-03-18 DIAGNOSIS — Z13.89 SCREENING FOR MULTIPLE CONDITIONS: ICD-10-CM

## 2024-03-18 LAB
ALBUMIN SERPL BCP-MCNC: 4.4 G/DL (ref 3.5–5)
ALP SERPL-CCNC: 66 U/L (ref 34–104)
ALT SERPL W P-5'-P-CCNC: 20 U/L (ref 7–52)
ANION GAP SERPL CALCULATED.3IONS-SCNC: 11 MMOL/L (ref 4–13)
AST SERPL W P-5'-P-CCNC: 20 U/L (ref 13–39)
BILIRUB SERPL-MCNC: 0.55 MG/DL (ref 0.2–1)
BUN SERPL-MCNC: 29 MG/DL (ref 5–25)
CALCIUM SERPL-MCNC: 9.7 MG/DL (ref 8.4–10.2)
CHLORIDE SERPL-SCNC: 105 MMOL/L (ref 96–108)
CHOLEST SERPL-MCNC: 142 MG/DL
CO2 SERPL-SCNC: 31 MMOL/L (ref 21–32)
CREAT SERPL-MCNC: 0.96 MG/DL (ref 0.6–1.3)
ERYTHROCYTE [DISTWIDTH] IN BLOOD BY AUTOMATED COUNT: 13.8 % (ref 11.6–15.1)
GFR SERPL CREATININE-BSD FRML MDRD: 52 ML/MIN/1.73SQ M
GLUCOSE P FAST SERPL-MCNC: 95 MG/DL (ref 65–99)
HCT VFR BLD AUTO: 42.8 % (ref 34.8–46.1)
HDLC SERPL-MCNC: 55 MG/DL
HGB BLD-MCNC: 14.1 G/DL (ref 11.5–15.4)
LDLC SERPL CALC-MCNC: 75 MG/DL (ref 0–100)
MCH RBC QN AUTO: 28.4 PG (ref 26.8–34.3)
MCHC RBC AUTO-ENTMCNC: 32.9 G/DL (ref 31.4–37.4)
MCV RBC AUTO: 86 FL (ref 82–98)
PLATELET # BLD AUTO: 190 THOUSANDS/UL (ref 149–390)
PMV BLD AUTO: 10.5 FL (ref 8.9–12.7)
POTASSIUM SERPL-SCNC: 4 MMOL/L (ref 3.5–5.3)
PROT SERPL-MCNC: 7.3 G/DL (ref 6.4–8.4)
RBC # BLD AUTO: 4.97 MILLION/UL (ref 3.81–5.12)
SODIUM SERPL-SCNC: 147 MMOL/L (ref 135–147)
TRIGL SERPL-MCNC: 62 MG/DL
WBC # BLD AUTO: 7.4 THOUSAND/UL (ref 4.31–10.16)

## 2024-03-18 PROCEDURE — 85027 COMPLETE CBC AUTOMATED: CPT

## 2024-03-18 PROCEDURE — 36415 COLL VENOUS BLD VENIPUNCTURE: CPT

## 2024-03-18 PROCEDURE — 80061 LIPID PANEL: CPT

## 2024-03-18 PROCEDURE — 80053 COMPREHEN METABOLIC PANEL: CPT

## 2024-03-28 DIAGNOSIS — I10 BENIGN ESSENTIAL HYPERTENSION: ICD-10-CM

## 2024-03-28 RX ORDER — OLMESARTAN MEDOXOMIL 40 MG/1
TABLET ORAL
Qty: 90 TABLET | Refills: 4 | Status: SHIPPED | OUTPATIENT
Start: 2024-03-28

## 2024-03-29 DIAGNOSIS — M19.90 ARTHRITIS: ICD-10-CM

## 2024-03-29 DIAGNOSIS — F51.01 PRIMARY INSOMNIA: ICD-10-CM

## 2024-03-29 RX ORDER — DICLOFENAC SODIUM AND MISOPROSTOL 75; 200 MG/1; UG/1
TABLET, DELAYED RELEASE ORAL
Qty: 180 TABLET | Refills: 4 | Status: SHIPPED | OUTPATIENT
Start: 2024-03-29

## 2024-03-29 RX ORDER — TRAZODONE HYDROCHLORIDE 50 MG/1
50 TABLET ORAL
Qty: 90 TABLET | Refills: 0 | Status: SHIPPED | OUTPATIENT
Start: 2024-03-29

## 2024-04-24 DIAGNOSIS — M19.90 ARTHRITIS: Primary | ICD-10-CM

## 2024-06-05 ENCOUNTER — OFFICE VISIT (OUTPATIENT)
Dept: FAMILY MEDICINE CLINIC | Facility: CLINIC | Age: 89
End: 2024-06-05
Payer: COMMERCIAL

## 2024-06-05 VITALS
OXYGEN SATURATION: 97 % | HEART RATE: 73 BPM | TEMPERATURE: 98.3 F | SYSTOLIC BLOOD PRESSURE: 122 MMHG | WEIGHT: 130 LBS | HEIGHT: 59 IN | BODY MASS INDEX: 26.21 KG/M2 | DIASTOLIC BLOOD PRESSURE: 58 MMHG

## 2024-06-05 DIAGNOSIS — I10 BENIGN ESSENTIAL HYPERTENSION: Primary | ICD-10-CM

## 2024-06-05 PROCEDURE — 99214 OFFICE O/P EST MOD 30 MIN: CPT | Performed by: FAMILY MEDICINE

## 2024-06-05 PROCEDURE — G2211 COMPLEX E/M VISIT ADD ON: HCPCS | Performed by: FAMILY MEDICINE

## 2024-06-05 NOTE — PROGRESS NOTES
Ambulatory Visit  Name: Mary Beth Rios      : 1935      MRN: [id number]  Encounter Provider: Flip Rudolph DO  Encounter Date: 2024   Encounter department: [facility]    Assessment & Plan   1. Benign essential hypertension       History of Present Illness     He is here today to establish herself with our practice but actually she already sees Alla and I am going to pinch it today she has history of essential hypertension hyper lip mixed hyperlipidemia osteoarthritis but actually is quite remarkable feels well very active her only problem is that she does tend to have falls and tumbles but has not broken anything more than her wrist and all of her falls        Review of Systems   Constitutional:  Negative for activity change, appetite change, diaphoresis, fatigue and fever.   HENT: Negative.     Eyes: Negative.    Respiratory:  Negative for apnea, cough, chest tightness, shortness of breath and wheezing.    Cardiovascular:  Negative for chest pain, palpitations and leg swelling.   Gastrointestinal:  Negative for abdominal distention, abdominal pain, anal bleeding, constipation, diarrhea, nausea and vomiting.   Endocrine: Negative for cold intolerance, heat intolerance, polydipsia, polyphagia and polyuria.   Genitourinary:  Negative for difficulty urinating, dysuria, flank pain, hematuria and urgency.   Musculoskeletal:  Negative for arthralgias, back pain, gait problem, joint swelling and myalgias.   Skin:  Negative for color change, rash and wound.   Allergic/Immunologic: Negative for environmental allergies, food allergies and immunocompromised state.   Neurological:  Negative for dizziness, seizures, syncope, speech difficulty, numbness and headaches.   Hematological:  Negative for adenopathy. Does not bruise/bleed easily.   Psychiatric/Behavioral:  Negative for agitation, behavioral problems, hallucinations, sleep disturbance and suicidal ideas.        Objective     /58 (BP  "Location: Right arm, Patient Position: Sitting, Cuff Size: Standard)   Pulse 73   Temp 98.3 °F (36.8 °C) (Temporal)   Ht 4' 11\" (1.499 m)   Wt 59 kg (130 lb)   SpO2 97%   BMI 26.26 kg/m²     Physical Exam  Constitutional:       Appearance: She is well-developed.   HENT:      Head: Normocephalic and atraumatic.      Right Ear: External ear normal.      Left Ear: External ear normal.      Nose: Nose normal.   Eyes:      Conjunctiva/sclera: Conjunctivae normal.      Pupils: Pupils are equal, round, and reactive to light.   Cardiovascular:      Rate and Rhythm: Normal rate and regular rhythm.      Heart sounds: Normal heart sounds. No murmur heard.     No friction rub.   Pulmonary:      Effort: Pulmonary effort is normal. No respiratory distress.      Breath sounds: Normal breath sounds. No wheezing or rales.   Chest:      Chest wall: No tenderness.   Abdominal:      General: Bowel sounds are normal.      Palpations: Abdomen is soft.   Musculoskeletal:         General: Normal range of motion.      Cervical back: Normal range of motion and neck supple.   Skin:     General: Skin is warm and dry.      Capillary Refill: Capillary refill takes 2 to 3 seconds.   Neurological:      Mental Status: She is alert and oriented to person, place, and time.   Psychiatric:         Behavior: Behavior normal.         Thought Content: Thought content normal.         Judgment: Judgment normal.       Administrative Statements           "

## 2024-06-06 ENCOUNTER — OFFICE VISIT (OUTPATIENT)
Dept: CARDIOLOGY CLINIC | Facility: HOSPITAL | Age: 89
End: 2024-06-06
Payer: COMMERCIAL

## 2024-06-06 VITALS
DIASTOLIC BLOOD PRESSURE: 58 MMHG | WEIGHT: 128.8 LBS | HEART RATE: 81 BPM | SYSTOLIC BLOOD PRESSURE: 128 MMHG | HEIGHT: 59 IN | BODY MASS INDEX: 25.96 KG/M2

## 2024-06-06 DIAGNOSIS — I10 BENIGN ESSENTIAL HYPERTENSION: Primary | ICD-10-CM

## 2024-06-06 DIAGNOSIS — Z98.890 HISTORY OF CARDIAC RADIOFREQUENCY ABLATION: ICD-10-CM

## 2024-06-06 DIAGNOSIS — I47.19 AVNRT (AV NODAL RE-ENTRY TACHYCARDIA): ICD-10-CM

## 2024-06-06 DIAGNOSIS — E78.5 DYSLIPIDEMIA: ICD-10-CM

## 2024-06-06 PROCEDURE — 93000 ELECTROCARDIOGRAM COMPLETE: CPT | Performed by: INTERNAL MEDICINE

## 2024-06-06 PROCEDURE — 99214 OFFICE O/P EST MOD 30 MIN: CPT | Performed by: INTERNAL MEDICINE

## 2024-06-06 NOTE — PROGRESS NOTES
Cardiology Follow Up    Mary Beth Rios  1935  [id number]  [facility] [address] [id number] [phone]    1. Benign essential hypertension  POCT ECG      2. Dyslipidemia        3. 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 re-entry tachycardia)        4. History of cardiac radiofrequency ablation              Discussion/Summary:  Mrs. Rios is a pleasant 89-year-old female who presents to the office today.  Given her age she is relatively active and asymptomatic.  She offers no cardiac complaints.     Her blood pressure is well controlled in the office today.  No changes were made to her antihypertensive medication regimen.  A low-salt diet was reinforced.     Her most recent lipids are acceptable on her current statin regimen to which no changes were made.     She has no symptoms suggestive of recurrent SVT.  She will remain on her current AV horace blocking regimen as prescribed.     She had an echocardiogram done in 2021 which was unremarkable except for mild pulmonary hypertension.  She does have a systolic ejection murmur noted on exam.  We discussed another echocardiogram which she declines.     I will see her back in the office in six months or sooner if deemed necessary.     Interval History:   Mrs. Rios is a pleasant 89-year-old who presents to the office today for routine follow-up.      She feels well.  She does not perform much formal physical activity.  However she continues to perform modest housework and also enjoys gardening.  With the activity she does perform she denies any exertional chest pain or shortness of breath.  She denies any signs or symptoms of congestive heart failure including increasing lower extremity edema, paroxysmal nocturnal dyspnea, orthopnea, acute weight gain or increasing abdominal girth.  She denies lightheadedness, syncope or presyncope.  She denies  palpitations.  She denies symptoms of claudication.      Medical Problems       Problem List       Status post total replacement of right hip    Benign essential hypertension    Hyperlipidemia (Chronic)    Elevated blood uric acid level    History of gout    Dextroscoliosis    Screening-pulmonary TB    Anemia    Anxiety    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 re-entry tachycardia) (HCC)    Dyslipidemia    Gout    Iron deficiency anemia    History of cardiac radiofrequency ablation    Primary insomnia    Chronic kidney disease, stage III (moderate) (HCC)    Overview Signed 5/11/2021 12:19 PM by Margarita Gagnon     According to ICD10 guidelines, provider accepted          Lab Results   Component Value Date    EGFR 52 03/18/2024    EGFR 71 06/21/2023    EGFR 63 04/26/2023    CREATININE 0.96 03/18/2024    CREATININE 0.75 06/21/2023    CREATININE 0.83 04/26/2023             Past Medical History:   Diagnosis Date    Anemia     Arthritis     Cardiac disease     Chronic pain     Gout     Hypercholesterolemia     Hypertension     Insomnia      Social History     Socioeconomic History    Marital status: /Civil Union     Spouse name: Not on file    Number of children: Not on file    Years of education: Not on file    Highest education level: Not on file   Occupational History    Not on file   Tobacco Use    Smoking status: Never    Smokeless tobacco: Never   Vaping Use    Vaping status: Never Used   Substance and Sexual Activity    Alcohol use: No     Comment: 1  dirnk at night in the winter time ( flu season)    Drug use: No    Sexual activity: Yes     Partners: Male   Other Topics Concern    Not on file   Social History Narrative    Not on file     Social Determinants of Health     Financial Resource Strain: Not on file   Food Insecurity: No Food Insecurity (3/13/2024)    Hunger Vital Sign     Worried About Running Out of Food in the Last Year: Never true     Ran Out of Food in the Last Year: Never true   Transportation Needs: No  Transportation Needs (3/13/2024)    PRAPARE - Transportation     Lack of Transportation (Medical): No     Lack of Transportation (Non-Medical): No   Physical Activity: Not on file   Stress: Not on file   Social Connections: Not on file   Intimate Partner Violence: Not on file   Housing Stability: Unknown (3/13/2024)    Housing Stability Vital Sign     Unable to Pay for Housing in the Last Year: No     Number of Times Moved in the Last Year: Not on file     Homeless in the Last Year: Not on file      History reviewed. No pertinent family history.  Past Surgical History:   Procedure Laterality Date    APPENDECTOMY      ATRIAL ABLATION SURGERY      HYSTERECTOMY      JOINT REPLACEMENT  knees    KNEE ARTHROSCOPY      b/l knees    LAMINECTOMY      WI ARTHRP ACETBLR/PROX FEM PROSTC AGRFT/ALGRFT Right 7/17/2017    Procedure: TOTAL ANTERIOR HIP ARTHROPLASTY;  Surgeon: Héctor Gregorio MD;  Location: [facility];  Service: Orthopedics       Current Outpatient Medications:     albuterol (PROVENTIL HFA,VENTOLIN HFA) 90 mcg/act inhaler, Inhale by mouth 2 Puffs 4 times a day as needed for Cough or Shortness of Breath., Disp: 8.5 g, Rfl: 5    amLODIPine (NORVASC) 10 mg tablet, Take 1 tablet (10 mg total) by mouth daily, Disp: 90 tablet, Rfl: 4    atorvastatin (LIPITOR) 20 mg tablet, Take 1 tablet (20 mg total) by mouth daily, Disp: 90 tablet, Rfl: 4    carvedilol (COREG) 25 mg tablet, Take 1 tablet (25 mg total) by mouth 2 (two) times a day with meals, Disp: 180 tablet, Rfl: 4    co-enzyme Q-10 100 mg capsule, Take 100 mg by mouth daily, Disp: , Rfl:     Diclofenac Sodium (VOLTAREN) 1 %, Apply 2 g topically 4 (four) times a day, Disp: 100 g, Rfl: 2    diclofenac-misoprostol (ARTHROTEC 75) 75-0.2 MG per tablet, 1 tab BID PRN, Disp: 180 tablet, Rfl: 4    EPINEPHrine (EPIPEN) 0.3 mg/0.3 mL SOAJ, Inject as necessary, Disp: 2 each, Rfl: 1    furosemide (LASIX) 20 mg tablet, Take 2 tablets (40 mg) Mondays and Thursday; 1 tablet (20 mg) all  the rest of the days, Disp: 108 tablet, Rfl: 4    Klor-Con M20 20 MEQ tablet, Take 1 tablet (20 mEq total) by mouth daily, Disp: 90 tablet, Rfl: 4    olmesartan (BENICAR) 40 mg tablet, TAKE (1) TABLET BY MOUTH DAILY., Disp: 90 tablet, Rfl: 4    pantoprazole (PROTONIX) 40 mg tablet, Take 1 tablet (40 mg total) by mouth daily, Disp: 90 tablet, Rfl: 4    tolterodine (DETROL LA) 4 mg 24 hr capsule, Take 1 capsule (4 mg total) by mouth daily, Disp: 90 capsule, Rfl: 4    traZODone (DESYREL) 50 mg tablet, Take 1 tablet (50 mg total) by mouth daily at bedtime, Disp: 90 tablet, Rfl: 0    ibuprofen (MOTRIN) 200 mg tablet, Take by mouth every 6 (six) hours as needed for mild pain (Patient not taking: Reported on 6/6/2024), Disp: , Rfl:     Inulin (FIBER CHOICE PO), Take by mouth (Patient not taking: Reported on 12/13/2023), Disp: , Rfl:   Allergies   Allergen Reactions    Bee Venom Anaphylaxis    Codeine GI Intolerance     Nausea/vomiting    Tetanus Immune Globulin Other (See Comments)    Tetanus Toxoid     Tetanus Toxoids        Labs:     Chemistry        Component Value Date/Time     11/13/2015 1253    K 4.0 03/18/2024 0800    K 3.3 (L) 11/13/2015 1253     03/18/2024 0800     11/13/2015 1253    CO2 31 03/18/2024 0800    CO2 28.8 11/13/2015 1253    BUN 29 (H) 03/18/2024 0800    BUN 24 11/13/2015 1253    CREATININE 0.96 03/18/2024 0800    CREATININE 0.85 11/13/2015 1253        Component Value Date/Time    CALCIUM 9.7 03/18/2024 0800    CALCIUM 9.0 11/13/2015 1253    ALKPHOS 66 03/18/2024 0800    ALKPHOS 83 11/13/2015 1253    AST 20 03/18/2024 0800    AST 20 11/13/2015 1253    ALT 20 03/18/2024 0800    ALT 36 11/13/2015 1253    BILITOT 0.63 11/13/2015 1253            Lab Results   Component Value Date    CHOL 201 11/13/2015    CHOL 183 11/12/2014     Lab Results   Component Value Date    HDL 55 03/18/2024    HDL 54 01/30/2023    HDL 43 (L) 07/25/2022     Lab Results   Component Value Date    LDLCALC 75  "03/18/2024    LDLCALC 64 01/30/2023    LDLCALC 72 07/25/2022     Lab Results   Component Value Date    TRIG 62 03/18/2024    TRIG 46 01/30/2023    TRIG 62 07/25/2022     No results found for: \"CHOLHDL\"    Imaging: No results found.      Review of Systems   Cardiovascular:  Negative for chest pain, claudication, irregular heartbeat, leg swelling, near-syncope, orthopnea, palpitations and syncope.       Vitals:    06/06/24 1547   BP: 128/58   Pulse:        Vitals:    06/06/24 1505   Weight: 58.4 kg (128 lb 12.8 oz)       Height: 4' 11\" (149.9 cm)   Body mass index is 26.01 kg/m².    Physical Exam:   General appearance:  Appears stated age, alert, well appearing and in no distress  HEENT:  PERRLA, EOMI, no scleral icterus, no conjunctival pallor  NECK:  Supple, No elevated JVP, no thyromegaly, no carotid bruits  HEART:  Regular rate and rhythm, normal S1/S2, 2/6 early peaking BON RUSB  LUNGS:  Clear to auscultation bilaterally  ABDOMEN:  Soft, non-tender, positive bowel sounds, no rebound or guarding, no organomegaly   EXTREMITIES:  No edema  VASCULAR:  Normal pedal pulses   SKIN: No lesions or rashes on exposed skin  NEURO:  CN II-XII intact, no focal deficits    "

## 2024-06-26 DIAGNOSIS — F51.01 PRIMARY INSOMNIA: ICD-10-CM

## 2024-06-27 RX ORDER — TRAZODONE HYDROCHLORIDE 50 MG/1
50 TABLET ORAL
Qty: 90 TABLET | Refills: 1 | Status: SHIPPED | OUTPATIENT
Start: 2024-06-27

## 2024-08-31 ENCOUNTER — APPOINTMENT (EMERGENCY)
Dept: CT IMAGING | Facility: HOSPITAL | Age: 89
End: 2024-08-31
Payer: COMMERCIAL

## 2024-08-31 ENCOUNTER — APPOINTMENT (EMERGENCY)
Dept: RADIOLOGY | Facility: HOSPITAL | Age: 89
End: 2024-08-31
Payer: COMMERCIAL

## 2024-08-31 ENCOUNTER — HOSPITAL ENCOUNTER (EMERGENCY)
Facility: HOSPITAL | Age: 89
Discharge: HOME | End: 2024-08-31
Attending: EMERGENCY MEDICINE
Payer: COMMERCIAL

## 2024-08-31 VITALS
BODY MASS INDEX: 25.51 KG/M2 | HEIGHT: 59 IN | HEART RATE: 69 BPM | TEMPERATURE: 98.6 F | DIASTOLIC BLOOD PRESSURE: 65 MMHG | SYSTOLIC BLOOD PRESSURE: 144 MMHG | WEIGHT: 126.54 LBS | OXYGEN SATURATION: 95 % | RESPIRATION RATE: 18 BRPM

## 2024-08-31 DIAGNOSIS — S09.90XA INJURY OF HEAD, INITIAL ENCOUNTER: ICD-10-CM
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
## 2024-08-31 DIAGNOSIS — M25.511 ACUTE PAIN OF RIGHT SHOULDER: Primary | ICD-10-CM

## 2024-08-31 LAB
ATRIAL RATE: 63 BPM
P AXIS: 69 DEGREES
PR INTERVAL: 152 MS
QRS AXIS: 28 DEGREES
QRSD INTERVAL: 78 MS
QT INTERVAL: 398 MS
QTC INTERVAL: 407 MS
T WAVE AXIS: 51 DEGREES
VENTRICULAR RATE: 63 BPM

## 2024-08-31 PROCEDURE — 93005 ELECTROCARDIOGRAM TRACING: CPT

## 2024-08-31 PROCEDURE — 70450 CT HEAD/BRAIN W/O DYE: CPT

## 2024-08-31 PROCEDURE — 99284 EMERGENCY DEPT VISIT MOD MDM: CPT

## 2024-08-31 PROCEDURE — 73030 X-RAY EXAM OF SHOULDER: CPT

## 2024-08-31 PROCEDURE — 99284 EMERGENCY DEPT VISIT MOD MDM: CPT | Performed by: EMERGENCY MEDICINE

## 2024-08-31 RX ORDER — ACETAMINOPHEN 325 MG/1
975 TABLET ORAL ONCE
Status: COMPLETED | OUTPATIENT
Start: 2024-08-31 | End: 2024-08-31

## 2024-08-31 RX ADMIN — ACETAMINOPHEN 975 MG: 325 TABLET ORAL at 08:05

## 2024-08-31 NOTE — ED ATTENDING ATTESTATION
8/31/2024  I, Antonia Heller MD, saw and evaluated the patient. I have discussed the patient with the resident/non-physician practitioner and agree with the resident's/non-physician practitioner's findings, Plan of Care, and MDM as documented in the resident's/non-physician practitioner's note, except where noted. All available labs and Radiology studies were reviewed.  I was present for key portions of any procedure(s) performed by the resident/non-physician practitioner and I was immediately available to provide assistance.       At this point I agree with the current assessment done in the Emergency Department.  I have conducted an independent evaluation of this patient a history and physical is as follows:    89-year-old female presenting for evaluation after a fall.  Patient had a mechanical fall a week ago.  She tripped and fell and hit her head.  She also landed on her right shoulder.  She states she has mild discomfort in the side of her head.  She also has pain in her shoulder.  Denies numbness or weakness in her extremities.  Denies neck or back pain.  Denies chest pain, shortness of breath, or abdominal pain.  Denies pain in her lower extremities or left arm.  Patient does take asa daily.    Physical exam:  Vital signs reviewed, patient is slightly hypertensive, vitals otherwise normal.  Patient is awake and alert, no acute distress, head normocephalic, small hematoma over the right side of the head, mucous membranes moist, neck supple, no midline c spine tenderness, heart regular rate and rhythm, no murmurs/rubs/gallops, lungs clear to auscultation bilaterally, abdomen soft, non tender, non distended, no rebound or guarding, no peripheral edema, no skin rashes, no focal neurologic deficits, tenderness over the anterior part of the right shoulder, no tenderness over the clavicle, pain with attempted range of motion of the right shoulder.  Motor and sensation intact in the right hand in the medial,  radial, and ulnar nerve distribution.     Assessment/plan:  89-year-old female presenting for evaluation after a fall.   Patient had mechanical fall a week ago, she did hit her head and is on aspirin.  She has no focal neurologic deficits but will obtain CT head given use of anticoagulation to evaluate for bleed.  Patient also having pain in her right shoulder.  Will obtain x-ray to evaluate for fracture.  Will treat symptomatically and reassess.    ED Course     Reviewed CT scan, no acute bleed.  Reviewed interpreted shoulder x-ray, no acute fracture.  Suspect possible ligamentous or rotator cuff injury.  Will place patient in sling for comfort.  Will provide referral for orthopedics.  Discussed with patient strict return precautions.  Patient expressed understanding and was agreeable for discharge.    Critical Care Time  Procedures

## 2024-08-31 NOTE — ED PROVIDER NOTES
Emergency Department Trauma Note  Mary Beth Rios 89 y.o. female MRN: [id number]  Unit/Bed#: RM03/RM03 Encounter: [id number]      Trauma Alert: Trauma Acuity: Trauma Evaluation  Model of Arrival:   via    Trauma Team: Current Providers  Attending Provider: Antonia Heller MD  Resident: Alice Lopez DO  Registered Nurse: Rocio Lanza RN  Consultants:     None      History of Present Illness     Chief Complaint:   Chief Complaint   Patient presents with    Fall     Patient reports that she tripped over suitcase one week ago and hit head on table. No loc. Asa daily. Also reports ongoing right shoulder and arm pain.     HPI:  Mary Beth Rios is a 89 y.o. female who presents with R shoulder pain.  Mechanism:Details of Incident: patient tripped over suit case one week ago and hit head on table then chair. pateint had no loc. she does take an asa daily. paitent reports ongoing pain in right shoulder and upper arm. Injury Date: 08/24/24   Injury Occurence Location - Specify County: [facility]    Mary Beth Rios is a 88 y/o F with PMHx of AV horace re-entrant tachycardia, HTN, HLD, s/p total R hip replacement, gout who presents for constant R shoulder pain with radiation down the arm following a fall. Patient states that about a week ago she tripped over one of her grandchildren's clothes on the floor. She did not have dizziness or LOS that caused her to fall. While falling she hit the temporal portion (just posterior and superior to the ear) of her head on a table or chair. She is able to recall the incident and did not lose consciousness after she fell. She has not had any headaches, changes in vision, numbness or weakness in the extremities. She has no neck or back pain. Her shoulder pain has gotten worse since she fell despite the use of Ibuprofen at home. Unsure of any swelling of the area. On ASA at home but no other anticoagulants. Denies chest pain, SOB, N/V, syncope, cold fingers, loss of feeling  in arms.         Review of Systems   Constitutional:  Negative for fever.   Eyes:  Negative for visual disturbance.   Respiratory:  Negative for cough and shortness of breath.    Cardiovascular:  Negative for chest pain.   Gastrointestinal:  Negative for nausea and vomiting.   Musculoskeletal:  Positive for arthralgias.   Skin:  Negative for rash.   Neurological:  Negative for syncope.   Psychiatric/Behavioral:  Negative for confusion.        Historical Information     Immunizations:   Immunization History   Administered Date(s) Administered    COVID-19 J&J (RightAnswers) vaccine 0.5 mL 11/01/2021    COVID-19, unspecified 04/10/2021    INFLUENZA 11/23/2021, 10/10/2022, 09/13/2023    Influenza, high dose seasonal 0.7 mL 11/18/2020, 10/10/2022, 09/13/2023    Pneumococcal Conjugate 13-Valent 11/05/2015    Pneumococcal Polysaccharide PPV23 04/20/2009    RSV IGIV 12/14/2023    Zoster 06/30/2015       Past Medical History:   Diagnosis Date    Anemia     Arthritis     Cardiac disease     Chronic pain     Gout     Hypercholesterolemia     Hypertension     Insomnia      History reviewed. No pertinent family history.  Past Surgical History:   Procedure Laterality Date    APPENDECTOMY      ATRIAL ABLATION SURGERY      HYSTERECTOMY      JOINT REPLACEMENT  knees    KNEE ARTHROSCOPY      b/l knees    LAMINECTOMY      IA ARTHRP ACETBLR/PROX FEM PROSTC AGRFT/ALGRFT Right 7/17/2017    Procedure: TOTAL ANTERIOR HIP ARTHROPLASTY;  Surgeon: Héctor Gregorio MD;  Location: [facility];  Service: Orthopedics     Social History     Tobacco Use    Smoking status: Never    Smokeless tobacco: Never   Vaping Use    Vaping status: Never Used   Substance Use Topics    Alcohol use: No     Comment: 1  dirnk at night in the winter time ( flu season)    Drug use: No     E-Cigarette/Vaping    E-Cigarette Use Never User      E-Cigarette/Vaping Substances    Nicotine No     THC No     CBD No     Flavoring No     Other No     Unknown No        Family History:  non-contributory    Meds/Allergies   Prior to Admission Medications   Prescriptions Last Dose Informant Patient Reported? Taking?   Diclofenac Sodium (VOLTAREN) 1 %   No No   Sig: Apply 2 g topically 4 (four) times a day   EPINEPHrine (EPIPEN) 0.3 mg/0.3 mL SOAJ   No No   Sig: Inject as necessary   Inulin (FIBER CHOICE PO)  Self Yes No   Sig: Take by mouth   Patient not taking: Reported on 2023   Klor-Con M20 20 MEQ tablet   No No   Sig: Take 1 tablet (20 mEq total) by mouth daily   albuterol (PROVENTIL HFA,VENTOLIN HFA) 90 mcg/act inhaler   No No   Sig: Inhale by mouth 2 Puffs 4 times a day as needed for Cough or Shortness of Breath.   amLODIPine (NORVASC) 10 mg tablet   No No   Sig: Take 1 tablet (10 mg total) by mouth daily   atorvastatin (LIPITOR) 20 mg tablet   No No   Sig: Take 1 tablet (20 mg total) by mouth daily   carvedilol (COREG) 25 mg tablet   No No   Sig: Take 1 tablet (25 mg total) by mouth 2 (two) times a day with meals   co-enzyme Q-10 100 mg capsule  Self Yes No   Sig: Take 100 mg by mouth daily   diclofenac-misoprostol (ARTHROTEC 75) 75-0.2 MG per tablet   No No   Si tab BID PRN   furosemide (LASIX) 20 mg tablet   No No   Sig: Take 2 tablets (40 mg)  and Thursday; 1 tablet (20 mg) all the rest of the days   ibuprofen (MOTRIN) 200 mg tablet   Yes No   Sig: Take by mouth every 6 (six) hours as needed for mild pain   Patient not taking: Reported on 2024   olmesartan (BENICAR) 40 mg tablet   No No   Sig: TAKE (1) TABLET BY MOUTH DAILY.   pantoprazole (PROTONIX) 40 mg tablet   No No   Sig: Take 1 tablet (40 mg total) by mouth daily   tolterodine (DETROL LA) 4 mg 24 hr capsule   No No   Sig: Take 1 capsule (4 mg total) by mouth daily   traZODone (DESYREL) 50 mg tablet   No No   Sig: Take 1 tablet (50 mg total) by mouth daily at bedtime      Facility-Administered Medications: None       Allergies   Allergen Reactions    Bee Venom Anaphylaxis    Codeine GI Intolerance      Nausea/vomiting    Tetanus Immune Globulin Other (See Comments)    Tetanus Toxoid     Tetanus Toxoids        PHYSICAL EXAM    PE limited by: N/A    Objective   Vitals:   First set: Temperature: 98.3 °F (36.8 °C) (08/31/24 0737)  Pulse: 68 (08/31/24 0737)  Respirations: 16 (08/31/24 0737)  Blood Pressure: (!) 197/80 (08/31/24 0737)  SpO2: 94 % (08/31/24 0737)    Primary Survey:   (A) Airway: Patent  (B) Breathing: spontaneous, non-labored with no conversational dyspnea  (C) Circulation: Pulses:   radial  2/4 and femoral  2/4  (D) Disabliity:  GCS Total:  15, Eye Opening:   Spontaneous = 4, Motor Response: Obeys commands = 6, and Verbal Response:  Oriented = 5  (E) Expose:  Completed    Secondary Survey: (Click on Physical Exam tab above)  Physical Exam  Vitals reviewed.   Constitutional:       General: She is not in acute distress.     Appearance: Normal appearance. She is normal weight. She is not ill-appearing or toxic-appearing.   HENT:      Head: Normocephalic.      Comments: 1-2 healing laceration on the temporal portion of the head, superior and posterior to the pinnacle of the ear   No active bleeding, drainage, erythema, or edema   Non tender to palpation      Right Ear: External ear normal.      Left Ear: External ear normal.      Nose: Nose normal.   Eyes:      Conjunctiva/sclera: Conjunctivae normal.   Cardiovascular:      Rate and Rhythm: Normal rate and regular rhythm.      Heart sounds: Normal heart sounds. No murmur heard.     No friction rub. No gallop.      Comments: Radial pulses intact bilaterally   Pulmonary:      Effort: Pulmonary effort is normal. No respiratory distress.      Breath sounds: Normal breath sounds. No wheezing, rhonchi or rales.   Musculoskeletal:      Right lower leg: No edema.      Left lower leg: No edema.      Comments: Shoulders with no gross deformities bilaterally. No erythema, edema   Clavicles normal to inspection bilaterally   R AC joint (on apex of shoulder) tender to  "palpation   R posterior shoulder muscles and scapula non tender to palpation   Discomfort noted on palpation of R forearm   Full strength in hands bilaterally     Spine with no bony step offs or tenderness to palpation      Skin:     General: Skin is warm and dry.      Findings: No rash.   Neurological:      General: No focal deficit present.      Mental Status: She is alert and oriented to person, place, and time.      Sensory: No sensory deficit.   Psychiatric:         Mood and Affect: Mood normal.         Behavior: Behavior normal.         Thought Content: Thought content normal.         Cervical spine cleared by clinical criteria? Yes     Invasive Devices       None                   Lab Results:   Results Reviewed       None                   Imaging Studies:   Direct to CT: Yes  TRAUMA - CT head wo contrast   Final Result by Uyen Peter MD (08/31 0807)      1.  No acute intracranial CT abnormality.   2.  Nonspecific heterogeneous attenuation and thickening of the diploic space involving the junction of the left frontal calvarium and greater wing of the left sphenoid. Correlate with prior outside imaging for stability.                  Workstation performed: [id number]         XR shoulder 2+ views RIGHT    (Results Pending)         Procedures  Procedures         ED Course  ED Course as of 08/31/24 0927   Sat Aug 31, 2024   0737 Blood Pressure(!): 197/80   0737 Temperature: 98.3 °F (36.8 °C)   0737 Pulse: 68   0737 SpO2: 94 %   0800 Blood Pressure: 120/56   0800 Pulse: 69   0800 SpO2: 95 %   0807 Head CT with chronic findings, no bleeding. No prior head CT to compare in chart    \" No acute intracranial CT abnormality.  2.  Nonspecific heterogeneous attenuation and thickening of the diploic space involving the junction of the left frontal calvarium and greater wing of the left sphenoid.\"   0817 XR shoulder 2+ views RIGHT  Shoulder pre-dewitt read by ER provider interpreted as no acute fracture present    0830 " Acetaminophen improving pain           Medical Decision Making  89 y/o F with PMHx of AV horace re-entrant tachycardia, HTN, HLD, s/p total R hip replacement, gout who presents for constant R shoulder pain with radiation down the arm following a fall. Differentials including, but not limited to, fracture, sprain of shoulder, muscle sprain, rotator cuff tear, subdural hematoma, concussion. Ordered non contrast head CT given patient's ASA use to rule out hematoma. Order shoulder Xray given tenderness to palpation on physical exam and trauma history. Will give acetaminophen for symptomatic management. Non-contrast CT with no signs of intra-cranial hemorrhage. Shoulder Xray with no acute signs of fracture. Referral placed to orthopedics for outpatient follow up. Recommend rest, ice, heat, and intermittent sling use at home. Recommend Tylenol Q6H PRN pain. GFR of less than 60, so do not recommend NSAIDs. Discussed treatment and plan with patient who verbalizes agreement and understanding.     Amount and/or Complexity of Data Reviewed  Independent Historian: spouse  External Data Reviewed: radiology and notes.  Radiology: ordered. Decision-making details documented in ED Course.    Risk  OTC drugs.  Prescription drug management.  Risk Details: Pt treated in ED for acute shoulder pain   Low suspicion for fracture, subdural hematoma   Given acetaminophen and sling for pain management   Recommend rest, ice, heat, and intermittent sling use at home   Tylenol Q6H PRN pain upon discharge   Referral to orthopedics placed   Patient safe for discharge home                Disposition  Priority One Transfer: No  Final diagnoses:   Acute pain of right shoulder   Fall, initial encounter   Injury of head, initial encounter     Time reflects when diagnosis was documented in both MDM as applicable and the Disposition within this note       Time User Action Codes Description Comment    8/31/2024  8:35 AM Alice Lopez Add [M25.511] Acute  pain of right shoulder     8/31/2024  8:36 AM Alice Lopez Add [W19.XXXA] Fall, initial encounter     8/31/2024  8:36 AM Alice Lopez Add [S09.90XA] Injury of head, initial encounter           ED Disposition       ED Disposition   Discharge    Condition   Stable    Date/Time   Sat Aug 31, 2024  8:36 AM    Comment   Mary Beth Rios discharge to home/self care.                   Follow-up Information       Follow up With Specialties Details Why Contact Info Additional Information    Critical access hospital Emergency Department Emergency Medicine Go to  If symptoms worsen [address] [facility] [address]  [phone] Critical access hospital Emergency Department, [address], [place], [address]    Gilberto Huang MD Orthopedic Surgery, Sports Medicine Schedule an appointment as soon as possible for a visit   [address] [facility]  [address] [place] [address]  [phone]             Discharge Medication List as of 8/31/2024  8:42 AM        CONTINUE these medications which have NOT CHANGED    Details   albuterol (PROVENTIL HFA,VENTOLIN HFA) 90 mcg/act inhaler Inhale by mouth 2 Puffs 4 times a day as needed for Cough or Shortness of Breath., Normal      amLODIPine (NORVASC) 10 mg tablet Take 1 tablet (10 mg total) by mouth daily, Starting Thu 9/28/2023, Normal      atorvastatin (LIPITOR) 20 mg tablet Take 1 tablet (20 mg total) by mouth daily, Starting Thu 9/28/2023, Normal      carvedilol (COREG) 25 mg tablet Take 1 tablet (25 mg total) by mouth 2 (two) times a day with meals, Starting Fri 6/30/2023, Normal      co-enzyme Q-10 100 mg capsule Take 100 mg by mouth daily, Historical Med      Diclofenac Sodium (VOLTAREN) 1 % Apply 2 g topically 4 (four) times a day, Starting Wed 4/24/2024, Normal      diclofenac-misoprostol (ARTHROTEC 75) 75-0.2 MG per tablet 1 tab BID PRN, Normal      EPINEPHrine (EPIPEN) 0.3 mg/0.3 mL SOAJ Inject as necessary, Normal      furosemide  (LASIX) 20 mg tablet Take 2 tablets (40 mg) Mondays and Thursday; 1 tablet (20 mg) all the rest of the days, Normal      ibuprofen (MOTRIN) 200 mg tablet Take by mouth every 6 (six) hours as needed for mild pain, Historical Med      Inulin (FIBER CHOICE PO) Take by mouth, Historical Med      Klor-Con M20 20 MEQ tablet Take 1 tablet (20 mEq total) by mouth daily, Normal      olmesartan (BENICAR) 40 mg tablet TAKE (1) TABLET BY MOUTH DAILY., Normal      pantoprazole (PROTONIX) 40 mg tablet Take 1 tablet (40 mg total) by mouth daily, Starting Wed 12/27/2023, Normal      tolterodine (DETROL LA) 4 mg 24 hr capsule Take 1 capsule (4 mg total) by mouth daily, Starting Wed 12/27/2023, Normal      traZODone (DESYREL) 50 mg tablet Take 1 tablet (50 mg total) by mouth daily at bedtime, Starting Thu 6/27/2024, Normal               PDMP Review         Value Time User    PDMP Reviewed  Yes 9/21/2022 10:37 AM Jose Slater DO            ED Provider  Electronically Signed by:    Alice Lopez DO   PGY-2 [facility]   [facility]      Alice Lopez DO  08/31/24 0986

## 2024-09-03 PROCEDURE — 93010 ELECTROCARDIOGRAM REPORT: CPT | Performed by: INTERNAL MEDICINE

## 2024-09-26 DIAGNOSIS — I10 BENIGN ESSENTIAL HYPERTENSION: ICD-10-CM

## 2024-09-26 DIAGNOSIS — M19.90 ARTHRITIS: ICD-10-CM

## 2024-09-26 RX ORDER — CARVEDILOL 25 MG/1
25 TABLET ORAL 2 TIMES DAILY WITH MEALS
Qty: 180 TABLET | Refills: 1 | Status: SHIPPED | OUTPATIENT
Start: 2024-09-26

## 2024-09-26 RX ORDER — DICLOFENAC SODIUM AND MISOPROSTOL 75; 200 MG/1; UG/1
TABLET, DELAYED RELEASE ORAL
Qty: 60 TABLET | Refills: 4 | Status: SHIPPED | OUTPATIENT
Start: 2024-09-26

## 2024-09-29 ENCOUNTER — RA CDI HCC (OUTPATIENT)
Dept: OTHER | Facility: HOSPITAL | Age: 89
End: 2024-09-29

## 2024-09-30 NOTE — PROGRESS NOTES
HCC coding opportunities          Chart Reviewed number of suggestions sent to Provider: 1  N18.30     Patients Insurance     Medicare Insurance: [employer]age

## 2024-10-02 ENCOUNTER — RA CDI HCC (OUTPATIENT)
Dept: OTHER | Facility: HOSPITAL | Age: 89
End: 2024-10-02

## 2024-12-23 DIAGNOSIS — I10 ESSENTIAL HYPERTENSION: ICD-10-CM

## 2024-12-23 NOTE — TELEPHONE ENCOUNTER
This is your Patient Dr Klaudia THOMAS w/ alejandro 06/05/2024 - LOV with [facility] 1/27/2023

## 2024-12-24 RX ORDER — FUROSEMIDE 20 MG/1
TABLET ORAL
Qty: 108 TABLET | Refills: 4 | Status: SHIPPED | OUTPATIENT
Start: 2024-12-24

## 2024-12-27 DIAGNOSIS — E78.5 DYSLIPIDEMIA: ICD-10-CM

## 2024-12-27 DIAGNOSIS — K21.9 GASTROESOPHAGEAL REFLUX DISEASE, UNSPECIFIED WHETHER ESOPHAGITIS PRESENT: ICD-10-CM

## 2024-12-27 DIAGNOSIS — F51.01 PRIMARY INSOMNIA: ICD-10-CM

## 2024-12-27 DIAGNOSIS — I10 BENIGN ESSENTIAL HYPERTENSION: ICD-10-CM

## 2024-12-27 DIAGNOSIS — N39.46 MIXED STRESS AND URGE URINARY INCONTINENCE: ICD-10-CM

## 2024-12-27 DIAGNOSIS — E87.6 HYPOKALEMIA: ICD-10-CM

## 2024-12-27 RX ORDER — TOLTERODINE 4 MG/1
4 CAPSULE, EXTENDED RELEASE ORAL DAILY
Qty: 90 CAPSULE | Refills: 4 | Status: SHIPPED | OUTPATIENT
Start: 2024-12-27

## 2024-12-27 RX ORDER — PANTOPRAZOLE SODIUM 40 MG/1
40 TABLET, DELAYED RELEASE ORAL DAILY
Qty: 90 TABLET | Refills: 4 | Status: SHIPPED | OUTPATIENT
Start: 2024-12-27

## 2024-12-27 RX ORDER — TRAZODONE HYDROCHLORIDE 50 MG/1
50 TABLET, FILM COATED ORAL
Qty: 90 TABLET | Refills: 0 | Status: SHIPPED | OUTPATIENT
Start: 2024-12-27

## 2024-12-30 RX ORDER — POTASSIUM CHLORIDE 1500 MG/1
20 TABLET, EXTENDED RELEASE ORAL DAILY
Qty: 90 TABLET | Refills: 4 | Status: SHIPPED | OUTPATIENT
Start: 2024-12-30

## 2024-12-30 RX ORDER — CARVEDILOL 25 MG/1
25 TABLET ORAL 2 TIMES DAILY WITH MEALS
Qty: 180 TABLET | Refills: 1 | Status: SHIPPED | OUTPATIENT
Start: 2024-12-30

## 2024-12-30 RX ORDER — ATORVASTATIN CALCIUM 20 MG/1
20 TABLET, FILM COATED ORAL DAILY
Qty: 90 TABLET | Refills: 4 | Status: SHIPPED | OUTPATIENT
Start: 2024-12-30

## 2024-12-30 RX ORDER — AMLODIPINE BESYLATE 10 MG/1
10 TABLET ORAL DAILY
Qty: 90 TABLET | Refills: 1 | Status: SHIPPED | OUTPATIENT
Start: 2024-12-30

## 2025-01-07 ENCOUNTER — OFFICE VISIT (OUTPATIENT)
Dept: FAMILY MEDICINE CLINIC | Facility: CLINIC | Age: OVER 89
End: 2025-01-07
Payer: COMMERCIAL

## 2025-01-07 VITALS
HEIGHT: 59 IN | HEART RATE: 81 BPM | WEIGHT: 125.6 LBS | SYSTOLIC BLOOD PRESSURE: 120 MMHG | TEMPERATURE: 98.7 F | BODY MASS INDEX: 25.32 KG/M2 | OXYGEN SATURATION: 96 % | DIASTOLIC BLOOD PRESSURE: 60 MMHG

## 2025-01-07 DIAGNOSIS — J06.9 UPPER RESPIRATORY TRACT INFECTION, UNSPECIFIED TYPE: Primary | ICD-10-CM

## 2025-01-07 DIAGNOSIS — I47.19 AVNRT (AV NODAL RE-ENTRY TACHYCARDIA) (HCC): ICD-10-CM

## 2025-01-07 DIAGNOSIS — N18.31 STAGE 3A CHRONIC KIDNEY DISEASE (HCC): ICD-10-CM

## 2025-01-07 DIAGNOSIS — I10 BENIGN ESSENTIAL HYPERTENSION: ICD-10-CM

## 2025-01-07 PROCEDURE — 99214 OFFICE O/P EST MOD 30 MIN: CPT | Performed by: FAMILY MEDICINE

## 2025-01-07 RX ORDER — CEFUROXIME AXETIL 500 MG/1
500 TABLET ORAL EVERY 12 HOURS SCHEDULED
Qty: 14 TABLET | Refills: 0 | Status: SHIPPED | OUTPATIENT
Start: 2025-01-07 | End: 2025-01-14

## 2025-01-07 NOTE — PROGRESS NOTES
Name: Mary Beth Rios      : 1935      MRN: [id number]  Encounter Provider: Flip Rudolph DO  Encounter Date: 2025   Encounter department: [facility]  :  Assessment & Plan  Upper respiratory tract infection, unspecified type         Benign essential hypertension                History of Present Illness     Mrs. Haile ski here with upper respiratory tract infection her sinuses are her primary problem although she is coughing and hacking up some purulent mucus but on exam her throat has on and off a lot of postnasal drip    URI   Associated symptoms include coughing and sinus pain. Pertinent negatives include no abdominal pain, chest pain, diarrhea, dysuria, headaches, nausea, rash, vomiting or wheezing.     Review of Systems   Constitutional:  Positive for activity change. Negative for appetite change, diaphoresis, fatigue and fever.   HENT:  Positive for sinus pressure and sinus pain.    Eyes: Negative.    Respiratory:  Positive for cough. Negative for apnea, chest tightness, shortness of breath and wheezing.    Cardiovascular:  Negative for chest pain, palpitations and leg swelling.   Gastrointestinal:  Negative for abdominal distention, abdominal pain, anal bleeding, constipation, diarrhea, nausea and vomiting.   Endocrine: Negative for cold intolerance, heat intolerance, polydipsia, polyphagia and polyuria.   Genitourinary:  Negative for difficulty urinating, dysuria, flank pain, hematuria and urgency.   Musculoskeletal:  Negative for arthralgias, back pain, gait problem, joint swelling and myalgias.   Skin:  Negative for color change, rash and wound.   Allergic/Immunologic: Negative for environmental allergies, food allergies and immunocompromised state.   Neurological:  Negative for dizziness, seizures, syncope, speech difficulty, numbness and headaches.   Hematological:  Negative for adenopathy. Does not bruise/bleed easily.   Psychiatric/Behavioral:  Negative for agitation,  "behavioral problems, hallucinations, sleep disturbance and suicidal ideas.        Objective   /60 (BP Location: Left arm, Patient Position: Sitting, Cuff Size: Standard)   Pulse 81   Temp 98.7 °F (37.1 °C) (Temporal)   Ht 4' 11\" (1.499 m)   Wt 57 kg (125 lb 9.6 oz)   SpO2 96%   BMI 25.37 kg/m²      Physical Exam  Constitutional:       Appearance: She is well-developed.   HENT:      Head: Normocephalic and atraumatic.      Right Ear: External ear normal.      Left Ear: External ear normal.      Nose: Nose normal.   Eyes:      Conjunctiva/sclera: Conjunctivae normal.      Pupils: Pupils are equal, round, and reactive to light.   Cardiovascular:      Rate and Rhythm: Normal rate and regular rhythm.      Heart sounds: Normal heart sounds. No murmur heard.     No friction rub.   Pulmonary:      Effort: Pulmonary effort is normal. No respiratory distress.      Breath sounds: Normal breath sounds. No wheezing or rales.   Chest:      Chest wall: No tenderness.   Abdominal:      General: Bowel sounds are normal.      Palpations: Abdomen is soft.   Musculoskeletal:         General: Normal range of motion.      Cervical back: Normal range of motion and neck supple.   Skin:     General: Skin is warm and dry.      Capillary Refill: Capillary refill takes 2 to 3 seconds.   Neurological:      Mental Status: She is alert and oriented to person, place, and time.   Psychiatric:         Behavior: Behavior normal.         Thought Content: Thought content normal.         Judgment: Judgment normal.         "

## 2025-02-26 DIAGNOSIS — M19.90 ARTHRITIS: ICD-10-CM

## 2025-02-27 RX ORDER — DICLOFENAC SODIUM AND MISOPROSTOL 75; 200 MG/1; UG/1
TABLET, DELAYED RELEASE ORAL
Qty: 60 TABLET | Refills: 0 | Status: SHIPPED | OUTPATIENT
Start: 2025-02-27

## 2025-03-21 DIAGNOSIS — F51.01 PRIMARY INSOMNIA: ICD-10-CM

## 2025-03-21 DIAGNOSIS — I10 BENIGN ESSENTIAL HYPERTENSION: ICD-10-CM

## 2025-03-21 RX ORDER — TRAZODONE HYDROCHLORIDE 50 MG/1
50 TABLET ORAL
Qty: 90 TABLET | Refills: 4 | Status: SHIPPED | OUTPATIENT
Start: 2025-03-21

## 2025-03-24 RX ORDER — AMLODIPINE BESYLATE 10 MG/1
10 TABLET ORAL DAILY
Qty: 90 TABLET | Refills: 1 | Status: SHIPPED | OUTPATIENT
Start: 2025-03-24

## 2025-04-08 ENCOUNTER — OFFICE VISIT (OUTPATIENT)
Dept: FAMILY MEDICINE CLINIC | Facility: CLINIC | Age: OVER 89
End: 2025-04-08
Payer: COMMERCIAL

## 2025-04-08 VITALS
SYSTOLIC BLOOD PRESSURE: 124 MMHG | WEIGHT: 125 LBS | HEIGHT: 59 IN | DIASTOLIC BLOOD PRESSURE: 54 MMHG | BODY MASS INDEX: 25.2 KG/M2 | HEART RATE: 71 BPM | OXYGEN SATURATION: 97 % | TEMPERATURE: 98.4 F

## 2025-04-08 DIAGNOSIS — R53.82 CHRONIC FATIGUE: ICD-10-CM

## 2025-04-08 DIAGNOSIS — T78.2XXD ANAPHYLAXIS, SUBSEQUENT ENCOUNTER: ICD-10-CM

## 2025-04-08 DIAGNOSIS — E78.2 MIXED HYPERLIPIDEMIA: Primary | ICD-10-CM

## 2025-04-08 PROCEDURE — G2211 COMPLEX E/M VISIT ADD ON: HCPCS | Performed by: FAMILY MEDICINE

## 2025-04-08 PROCEDURE — 99213 OFFICE O/P EST LOW 20 MIN: CPT | Performed by: FAMILY MEDICINE

## 2025-04-08 RX ORDER — EPINEPHRINE 0.3 MG/.3ML
INJECTION SUBCUTANEOUS
Qty: 2 EACH | Refills: 1 | Status: SHIPPED | OUTPATIENT
Start: 2025-04-08

## 2025-04-08 NOTE — PROGRESS NOTES
Name: Mary Beth Rios      : 1935      MRN: [id number]  Encounter Provider: Flip Rudolph DO  Encounter Date: 2025   Encounter department: [facility]  :  Assessment & Plan  Anaphylaxis, subsequent encounter  epipens       Mixed hyperlipidemia  Lipid panel       Chronic fatigue  Cbc              Depression Screening and Follow-up Plan: Patient was screened for depression during today's encounter. They screened negative with a PHQ-2 score of 0.        History of Present Illness   As per Zayda here for a follow-up visit she has turned 89 as of  doing well patient pressures 124/54 her weight remains stable at 125 pounds she is due for a well visit later this year and we will get labs on her 1 week prior to the well visit otherwise she really does not have a lot of complaints      Review of Systems   Constitutional:  Negative for activity change, appetite change, diaphoresis, fatigue and fever.   HENT: Negative.     Eyes: Negative.    Respiratory:  Negative for apnea, cough, chest tightness, shortness of breath and wheezing.    Cardiovascular:  Negative for chest pain, palpitations and leg swelling.   Gastrointestinal:  Negative for abdominal distention, abdominal pain, anal bleeding, constipation, diarrhea, nausea and vomiting.   Endocrine: Negative for cold intolerance, heat intolerance, polydipsia, polyphagia and polyuria.   Genitourinary:  Negative for difficulty urinating, dysuria, flank pain, hematuria and urgency.   Musculoskeletal:  Negative for arthralgias, back pain, gait problem, joint swelling and myalgias.   Skin:  Negative for color change, rash and wound.   Allergic/Immunologic: Negative for environmental allergies, food allergies and immunocompromised state.   Neurological:  Negative for dizziness, seizures, syncope, speech difficulty, numbness and headaches.   Hematological:  Negative for adenopathy. Does not bruise/bleed easily.   Psychiatric/Behavioral:  Negative  "for agitation, behavioral problems, hallucinations, sleep disturbance and suicidal ideas.        Objective   /54 (BP Location: Left arm, Patient Position: Sitting, Cuff Size: Standard)   Pulse 71   Temp 98.4 °F (36.9 °C) (Temporal)   Ht 4' 11\" (1.499 m)   Wt 56.7 kg (125 lb)   SpO2 97%   BMI 25.25 kg/m²      Physical Exam  Constitutional:       Appearance: She is well-developed.   HENT:      Head: Normocephalic and atraumatic.      Right Ear: External ear normal.      Left Ear: External ear normal.      Nose: Nose normal.   Eyes:      Conjunctiva/sclera: Conjunctivae normal.      Pupils: Pupils are equal, round, and reactive to light.   Cardiovascular:      Rate and Rhythm: Normal rate and regular rhythm.      Heart sounds: Normal heart sounds. No murmur heard.     No friction rub.   Pulmonary:      Effort: Pulmonary effort is normal. No respiratory distress.      Breath sounds: Normal breath sounds. No wheezing or rales.   Chest:      Chest wall: No tenderness.   Abdominal:      General: Bowel sounds are normal.      Palpations: Abdomen is soft.   Musculoskeletal:         General: Normal range of motion.      Cervical back: Normal range of motion and neck supple.   Skin:     General: Skin is warm and dry.      Capillary Refill: Capillary refill takes 2 to 3 seconds.   Neurological:      Mental Status: She is alert and oriented to person, place, and time.   Psychiatric:         Behavior: Behavior normal.         Thought Content: Thought content normal.         Judgment: Judgment normal.         "

## 2025-05-27 ENCOUNTER — APPOINTMENT (OUTPATIENT)
Dept: LAB | Facility: MEDICAL CENTER | Age: OVER 89
End: 2025-05-27
Attending: FAMILY MEDICINE
Payer: COMMERCIAL

## 2025-05-27 DIAGNOSIS — R53.82 CHRONIC FATIGUE: ICD-10-CM

## 2025-05-27 LAB
ALBUMIN SERPL BCG-MCNC: 4 G/DL (ref 3.5–5)
ALP SERPL-CCNC: 55 U/L (ref 34–104)
ALT SERPL W P-5'-P-CCNC: 11 U/L (ref 7–52)
ANION GAP SERPL CALCULATED.3IONS-SCNC: 10 MMOL/L (ref 4–13)
AST SERPL W P-5'-P-CCNC: 15 U/L (ref 13–39)
BASOPHILS # BLD AUTO: 0.05 THOUSANDS/ÂΜL (ref 0–0.1)
BASOPHILS NFR BLD AUTO: 1 % (ref 0–1)
BILIRUB SERPL-MCNC: 0.52 MG/DL (ref 0.2–1)
BUN SERPL-MCNC: 24 MG/DL (ref 5–25)
CALCIUM SERPL-MCNC: 9.4 MG/DL (ref 8.4–10.2)
CHLORIDE SERPL-SCNC: 104 MMOL/L (ref 96–108)
CHOLEST SERPL-MCNC: 188 MG/DL (ref ?–200)
CO2 SERPL-SCNC: 33 MMOL/L (ref 21–32)
CREAT SERPL-MCNC: 0.82 MG/DL (ref 0.6–1.3)
EOSINOPHIL # BLD AUTO: 0.18 THOUSAND/ÂΜL (ref 0–0.61)
EOSINOPHIL NFR BLD AUTO: 3 % (ref 0–6)
ERYTHROCYTE [DISTWIDTH] IN BLOOD BY AUTOMATED COUNT: 13.6 % (ref 11.6–15.1)
GFR SERPL CREATININE-BSD FRML MDRD: 63 ML/MIN/1.73SQ M
GLUCOSE P FAST SERPL-MCNC: 103 MG/DL (ref 65–99)
HCT VFR BLD AUTO: 42.1 % (ref 34.8–46.1)
HDLC SERPL-MCNC: 48 MG/DL
HGB BLD-MCNC: 13.8 G/DL (ref 11.5–15.4)
IMM GRANULOCYTES # BLD AUTO: 0.02 THOUSAND/UL (ref 0–0.2)
IMM GRANULOCYTES NFR BLD AUTO: 0 % (ref 0–2)
LDLC SERPL CALC-MCNC: 123 MG/DL (ref 0–100)
LYMPHOCYTES # BLD AUTO: 1.4 THOUSANDS/ÂΜL (ref 0.6–4.47)
LYMPHOCYTES NFR BLD AUTO: 22 % (ref 14–44)
MCH RBC QN AUTO: 28.5 PG (ref 26.8–34.3)
MCHC RBC AUTO-ENTMCNC: 32.8 G/DL (ref 31.4–37.4)
MCV RBC AUTO: 87 FL (ref 82–98)
MONOCYTES # BLD AUTO: 0.56 THOUSAND/ÂΜL (ref 0.17–1.22)
MONOCYTES NFR BLD AUTO: 9 % (ref 4–12)
NEUTROPHILS # BLD AUTO: 4.23 THOUSANDS/ÂΜL (ref 1.85–7.62)
NEUTS SEG NFR BLD AUTO: 65 % (ref 43–75)
NONHDLC SERPL-MCNC: 140 MG/DL
NRBC BLD AUTO-RTO: 0 /100 WBCS
PLATELET # BLD AUTO: 214 THOUSANDS/UL (ref 149–390)
PMV BLD AUTO: 9.9 FL (ref 8.9–12.7)
POTASSIUM SERPL-SCNC: 3.4 MMOL/L (ref 3.5–5.3)
PROT SERPL-MCNC: 7 G/DL (ref 6.4–8.4)
RBC # BLD AUTO: 4.84 MILLION/UL (ref 3.81–5.12)
SODIUM SERPL-SCNC: 147 MMOL/L (ref 135–147)
TRIGL SERPL-MCNC: 86 MG/DL (ref ?–150)
TSH SERPL DL<=0.05 MIU/L-ACNC: 1.01 UIU/ML (ref 0.45–4.5)
WBC # BLD AUTO: 6.44 THOUSAND/UL (ref 4.31–10.16)

## 2025-05-27 PROCEDURE — 85025 COMPLETE CBC W/AUTO DIFF WBC: CPT

## 2025-05-27 PROCEDURE — 84443 ASSAY THYROID STIM HORMONE: CPT

## 2025-05-28 ENCOUNTER — RESULTS FOLLOW-UP (OUTPATIENT)
Dept: FAMILY MEDICINE CLINIC | Facility: CLINIC | Age: OVER 89
End: 2025-05-28

## 2025-06-03 ENCOUNTER — RA CDI HCC (OUTPATIENT)
Dept: OTHER | Facility: HOSPITAL | Age: OVER 89
End: 2025-06-03

## 2025-06-10 ENCOUNTER — OFFICE VISIT (OUTPATIENT)
Dept: FAMILY MEDICINE CLINIC | Facility: CLINIC | Age: OVER 89
End: 2025-06-10
Payer: COMMERCIAL

## 2025-06-10 VITALS
DIASTOLIC BLOOD PRESSURE: 68 MMHG | HEART RATE: 68 BPM | HEIGHT: 59 IN | TEMPERATURE: 96.4 F | BODY MASS INDEX: 25.2 KG/M2 | SYSTOLIC BLOOD PRESSURE: 138 MMHG | OXYGEN SATURATION: 94 % | WEIGHT: 125 LBS

## 2025-06-10 DIAGNOSIS — Z00.00 MEDICARE ANNUAL WELLNESS VISIT, SUBSEQUENT: Primary | ICD-10-CM

## 2025-06-10 PROCEDURE — G0439 PPPS, SUBSEQ VISIT: HCPCS | Performed by: FAMILY MEDICINE

## 2025-06-10 NOTE — PROGRESS NOTES
Name: Mary Beth Rios      : 1935      MRN: [id number]  Encounter Provider: Flip Rudolph DO  Encounter Date: 6/10/2025   Encounter department: [facility]  :  Assessment & Plan       Preventive health issues were discussed with patient, and age appropriate screening tests were ordered as noted in patient's After Visit Summary. Personalized health advice and appropriate referrals for health education or preventive services given if needed, as noted in patient's After Visit Summary.    History of Present Illness     Mrs. Hu is here also for a Medicare well visit       Patient Care Team:  Flip Rudolph DO as PCP - General (Family Medicine)  Ravinder Gomez MD as PCP - PCP-[facility]  Flip Rudolph [facility]CP [facility]  DO Kalina Pozo MD Chandra Reddy, MD    Review of Systems   Constitutional:  Negative for activity change, appetite change, diaphoresis, fatigue and fever.   HENT:  Positive for dental problem.    Eyes: Negative.    Respiratory:  Negative for apnea, cough, chest tightness, shortness of breath and wheezing.    Cardiovascular:  Negative for chest pain, palpitations and leg swelling.   Gastrointestinal:  Negative for abdominal distention, abdominal pain, anal bleeding, constipation, diarrhea, nausea and vomiting.   Endocrine: Negative for cold intolerance, heat intolerance, polydipsia, polyphagia and polyuria.   Genitourinary:  Negative for difficulty urinating, dysuria, flank pain, hematuria and urgency.   Musculoskeletal:  Negative for arthralgias, back pain, gait problem, joint swelling and myalgias.   Skin:  Negative for color change, rash and wound.   Allergic/Immunologic: Negative for environmental allergies, food allergies and immunocompromised state.   Neurological:  Negative for dizziness, seizures, syncope, speech difficulty, numbness and headaches.   Hematological:  Negative for adenopathy. Does not bruise/bleed  easily.   Psychiatric/Behavioral:  Negative for agitation, behavioral problems, hallucinations, sleep disturbance and suicidal ideas.      Medical History Reviewed by provider this encounter:       Annual Wellness Visit Questionnaire   Mary Beth is here for her Subsequent Wellness visit.     Health Risk Assessment:   Patient rates overall health as good. Patient feels that their physical health rating is same. Patient is very satisfied with their life. Eyesight was rated as same. Hearing was rated as slightly worse. Patient feels that their emotional and mental health rating is same. Patients states they are never, rarely angry. Patient states they are never, rarely unusually tired/fatigued. Pain experienced in the last 7 days has been some. Patient's pain rating has been 5/10. Patient states that she has experienced no weight loss or gain in last 6 months.     Depression Screening:   PHQ-2 Score: 0      Fall Risk Screening:   In the past year, patient has experienced: history of falling in past year    Number of falls: 1  Injured during fall?: No    Feels unsteady when standing or walking?: Yes    Worried about falling?: Yes      Urinary Incontinence Screening:   Patient has leaked urine accidently in the last six months.     Home Safety:  Patient does not have trouble with stairs inside or outside of their home. Patient has working smoke alarms and has working carbon monoxide detector. Home safety hazards include: none.     Nutrition:   Current diet is Regular.     Medications:   Patient is currently taking over-the-counter supplements. OTC medications include: see medication list. Patient is able to manage medications.     Activities of Daily Living (ADLs)/Instrumental Activities of Daily Living (IADLs):   Walk and transfer into and out of bed and chair?: Yes  Dress and groom yourself?: Yes    Bathe or shower yourself?: Yes    Feed yourself? Yes  Do your laundry/housekeeping?: Yes  Manage your money, pay your bills  and track your expenses?: Yes  Make your own meals?: Yes    Do your own shopping?: Yes    Previous Hospitalizations:   Any hospitalizations or ED visits within the last 12 months?: Yes    How many hospitalizations have you had in the last year?: 1-2    Advance Care Planning:   Living will: Yes    Durable POA for healthcare: Yes    Advanced directive: Yes    Advanced directive counseling given: No    Five wishes given: No    Patient declined ACP directive: No    End of Life Decisions reviewed with patient: Yes    Provider agrees with end of life decisions: Yes      Cognitive Screening:   Provider or family/friend/caregiver concerned regarding cognition?: No    Preventive Screenings      Cardiovascular Screening:    General: Screening Not Indicated and History Lipid Disorder      Diabetes Screening:     General: Screening Current      Colorectal Cancer Screening:     General: Screening Not Indicated      Cervical Cancer Screening:    General: Screening Not Indicated      Lung Cancer Screening:     General: Screening Not Indicated    Immunizations:  - Immunizations due: Zoster (Shingrix)    Screening, Brief Intervention, and Referral to Treatment (SBIRT)     Screening  Typical number of drinks in a day: 0  Typical number of drinks in a week: 2  Interpretation: Low risk drinking behavior.    Single Item Drug Screening:  How often have you used an illegal drug (including marijuana) or a prescription medication for non-medical reasons in the past year? never    Single Item Drug Screen Score: 0  Interpretation: Negative screen for possible drug use disorder    Social Drivers of Health     Food Insecurity: No Food Insecurity (3/13/2024)    Nursing - Inadequate Food Risk Classification     Worried About Running Out of Food in the Last Year: Never true     Ran Out of Food in the Last Year: Never true   Transportation Needs: No Transportation Needs (3/13/2024)    PRAPARE - Transportation     Lack of Transportation (Medical):  "No     Lack of Transportation (Non-Medical): No   Housing Stability: Low Risk  (3/13/2024)    Housing Stability Vital Sign     Unable to Pay for Housing in the Last Year: No     Number of Times Moved in the Last Year: 1     Homeless in the Last Year: No    Received from [facility]     No results found.    Objective   /68 (BP Location: Left arm, Patient Position: Sitting, Cuff Size: Standard)   Pulse 68   Temp (!) 96.4 °F (35.8 °C) (Temporal)   Ht 4' 11\" (1.499 m)   Wt 56.7 kg (125 lb)   SpO2 94%   BMI 25.25 kg/m²     Physical Exam  Constitutional:       Appearance: She is well-developed.   HENT:      Head: Normocephalic and atraumatic.      Right Ear: External ear normal.      Left Ear: External ear normal.      Nose: Nose normal.     Eyes:      Conjunctiva/sclera: Conjunctivae normal.      Pupils: Pupils are equal, round, and reactive to light.       Cardiovascular:      Rate and Rhythm: Normal rate and regular rhythm.      Heart sounds: Normal heart sounds. No murmur heard.     No friction rub.   Pulmonary:      Effort: Pulmonary effort is normal. No respiratory distress.      Breath sounds: Normal breath sounds. No wheezing or rales.   Chest:      Chest wall: No tenderness.   Abdominal:      General: Bowel sounds are normal.      Palpations: Abdomen is soft.     Musculoskeletal:         General: Normal range of motion.      Cervical back: Normal range of motion and neck supple.     Skin:     General: Skin is warm and dry.      Capillary Refill: Capillary refill takes 2 to 3 seconds.     Neurological:      Mental Status: She is alert and oriented to person, place, and time.     Psychiatric:         Behavior: Behavior normal.         Thought Content: Thought content normal.         Judgment: Judgment normal.         "

## 2025-06-27 DIAGNOSIS — I10 BENIGN ESSENTIAL HYPERTENSION: ICD-10-CM

## 2025-06-27 RX ORDER — CARVEDILOL 25 MG/1
25 TABLET ORAL 2 TIMES DAILY WITH MEALS
Qty: 180 TABLET | Refills: 3 | Status: SHIPPED | OUTPATIENT
Start: 2025-06-27

## 2025-06-27 RX ORDER — OLMESARTAN MEDOXOMIL 40 MG/1
40 TABLET ORAL DAILY
Qty: 90 TABLET | Refills: 3 | Status: SHIPPED | OUTPATIENT
Start: 2025-06-27

## (undated) DEVICE — BLADE SAW 5072-181

## (undated) DEVICE — SUT VICRYL 1 CTX 36 IN J977H

## (undated) DEVICE — SURGICAL GOWN, XL SMARTSLEEVE: Brand: CONVERTORS

## (undated) DEVICE — CAPIT HIP UPCHRG  GRIPTION CUP

## (undated) DEVICE — TUBING SUCTION 5MM X 12 FT

## (undated) DEVICE — SUT VICRYL 2-0 CP-1 27 IN J266H

## (undated) DEVICE — DRAPE C-ARM X-RAY

## (undated) DEVICE — BIPOLAR SEALER 23-301-1 AQM MBS: Brand: AQUAMANTYS™

## (undated) DEVICE — INTENDED FOR TISSUE SEPARATION, AND OTHER PROCEDURES THAT REQUIRE A SHARP SURGICAL BLADE TO PUNCTURE OR CUT.: Brand: BARD-PARKER ® CARBON RIB-BACK BLADES

## (undated) DEVICE — GAUZE SPONGES,16 PLY: Brand: CURITY

## (undated) DEVICE — GLOVE SRG BIOGEL 8

## (undated) DEVICE — FEMORAL CANAL TIP: Brand: SIMPULSE

## (undated) DEVICE — COMFORT HOOD -75 EA/BX: Brand: CARDINAL HEALTH

## (undated) DEVICE — ALL PURPOSE SPONGES,NON-WOVEN, 4 PLY: Brand: CURITY

## (undated) DEVICE — PREP IM ENCHANCED TOTAL HIP BONE                                    PREPARATION KIT: Brand: PREP-IM

## (undated) DEVICE — ULTRACLEAN ACCESSORY ELECTRODE, 1 INCH COATED NEEDLE WITH EXTENDED INSULATION: Brand: ULTRACLEAN

## (undated) DEVICE — DRESSING XEROFORM 5 X 9

## (undated) DEVICE — U-DRAPE: Brand: CONVERTORS

## (undated) DEVICE — 3M™ STERI-DRAPE™ U-DRAPE 1015: Brand: STERI-DRAPE™

## (undated) DEVICE — NO-SCRATCH ™ SMALL WHITNEY CURETTE ™ IS A SINGLE-USE, PLASTIC CURETTE FOR QUICKLY APPLYING, MANIPULATING AND REMOVING BONE CEMENT DURING HIP AND KNEE REPLACEMENT SURGERY. THE PLASTIC IS SOFTER THAN STEEL INSTRUMENTS, REDUCING THE RISK OF DAMAGING THE PROSTHESIS WITH METAL INSTRUMENTS.  THE CURETTE’S 6MM TIP REMOVES EXCESS CEMENT FROM REPLACEMENT HIPS AND KNEES. EASY-TO-MANEUVER, THE SMALL BLUE CURETTE LETS YOU REMOVE CEMENT FROM ALL EDGES OF THE PROSTHESIS.NO-SCRATCH WHITNEY SMALL CURETTE FEATURES:SAFER THAN STEEL- MADE OF PLASTIC - STURDY YET SOFTER THAN SURGICAL STEEL.HANDIER- EACH TOOL HAS A MOLDED-IN THUMB INDENTATION INSTANTLY ORIENTING THE TOOL.- EASIER TO MANEUVER IN HARD TO SEE PLACES.- COLOR-CODED FOR EASY IDENTIFICATION.FASTER- COMES INDIVIDUALLY PACKAGED IN STERILE, PEEL OPEN POUCH, READY TO GO.- APPLIES, MANIPULATES, OR REMOVES CEMENT WITH FINGERTIP PRECISION.ECONOMICAL- THE COST OF A SINGLE REVISION DWARFS THE COST OF A SINGLE-USE CURETTE. - DISPOSABLE – THERE’S NO NEED TO WASTE TIME REMOVING HARDENED CEMENT OR RE-STERILIZING TOOLS.- LESS EXPENSIVE TO BUY AND INVENTORY - ORDER ONLY THE TOOL YOU USE.- PACKAGED 25 INDIVIDUALLY WRAPPED TOOLS TO A CARTON FOR CONVENIENT SHELF STORAGE.: Brand: WHITNEY NO-SCRATCH CURETTE (SMALL)

## (undated) DEVICE — DRAPE TOWEL: Brand: CONVERTORS

## (undated) DEVICE — CAPIT HIP MOP -METAL ON POLY

## (undated) DEVICE — FRAZIER SUCTION INSTRUMENT 18 FR W/OBTURATOR, NO CONTROL VENT: Brand: FRAZIER

## (undated) DEVICE — SUT PROLENE 1 CT-1 30 IN 8425H

## (undated) DEVICE — TRAY FOLEY 16FR URIMETER SURESTEP

## (undated) DEVICE — SUT FIBERWIRE #2 1/2 CIRCLE T-5 38IN AR-7200

## (undated) DEVICE — CHLORAPREP HI-LITE 26ML ORANGE

## (undated) DEVICE — GLOVE INDICATOR PI UNDERGLOVE SZ 8 BLUE

## (undated) DEVICE — BULB SYRINGE,IRRIGATION WITH PROTECTIVE CAP: Brand: DOVER

## (undated) DEVICE — HEAVY DUTY TABLE COVER: Brand: CONVERTORS

## (undated) DEVICE — ABDOMINAL PAD: Brand: DERMACEA

## (undated) DEVICE — CAUTERY TIP POLISHER: Brand: DEVON

## (undated) DEVICE — STRL ALLENTOWN HIP SHOULDER PK: Brand: CARDINAL HEALTH

## (undated) DEVICE — GOWN,SLEEVE,STERILE,W/CSR WRAP,1/P: Brand: MEDLINE

## (undated) DEVICE — DRAPE ISOLATION

## (undated) DEVICE — THE SIMPULSE SOLO SYSTEM WITH ULTREX RETRACTABLE SPLASH SHIELD TIP: Brand: SIMPULSE SOLO